# Patient Record
Sex: FEMALE | Race: BLACK OR AFRICAN AMERICAN | Employment: UNEMPLOYED
[De-identification: names, ages, dates, MRNs, and addresses within clinical notes are randomized per-mention and may not be internally consistent; named-entity substitution may affect disease eponyms.]

---

## 2017-02-10 ENCOUNTER — OFFICE VISIT (OUTPATIENT)
Dept: FAMILY MEDICINE CLINIC | Facility: CLINIC | Age: 47
End: 2017-02-10

## 2017-02-10 ENCOUNTER — HOSPITAL ENCOUNTER (OUTPATIENT)
Age: 47
Discharge: HOME | End: 2017-02-10

## 2017-02-10 ENCOUNTER — HOSPITAL ENCOUNTER (OUTPATIENT)
Dept: GENERAL RADIOLOGY | Age: 47
Discharge: HOME | End: 2017-02-10

## 2017-02-10 VITALS
TEMPERATURE: 96.8 F | HEIGHT: 59 IN | HEART RATE: 65 BPM | DIASTOLIC BLOOD PRESSURE: 68 MMHG | WEIGHT: 106.4 LBS | BODY MASS INDEX: 21.45 KG/M2 | SYSTOLIC BLOOD PRESSURE: 102 MMHG

## 2017-02-10 DIAGNOSIS — M25.551 PAIN OF RIGHT HIP JOINT: Primary | ICD-10-CM

## 2017-02-10 DIAGNOSIS — I67.1 BRAIN ANEURYSM: ICD-10-CM

## 2017-02-10 DIAGNOSIS — M25.551 PAIN OF RIGHT HIP JOINT: ICD-10-CM

## 2017-02-10 PROCEDURE — 73502 X-RAY EXAM HIP UNI 2-3 VIEWS: CPT | Performed by: RADIOLOGY

## 2017-02-10 PROCEDURE — 99203 OFFICE O/P NEW LOW 30 MIN: CPT | Performed by: STUDENT IN AN ORGANIZED HEALTH CARE EDUCATION/TRAINING PROGRAM

## 2017-02-10 PROCEDURE — 99214 OFFICE O/P EST MOD 30 MIN: CPT | Performed by: FAMILY MEDICINE

## 2017-02-10 PROCEDURE — 73502 X-RAY EXAM HIP UNI 2-3 VIEWS: CPT

## 2017-02-10 RX ORDER — MELOXICAM 7.5 MG/1
7.5 TABLET ORAL DAILY
Qty: 30 TABLET | Refills: 3 | Status: SHIPPED | OUTPATIENT
Start: 2017-02-10 | End: 2017-10-19 | Stop reason: ALTCHOICE

## 2017-02-10 ASSESSMENT — ENCOUNTER SYMPTOMS
SHORTNESS OF BREATH: 0
COUGH: 0

## 2017-02-14 ENCOUNTER — HOSPITAL ENCOUNTER (OUTPATIENT)
Dept: PHYSICAL THERAPY | Age: 47
Setting detail: THERAPIES SERIES
Discharge: HOME | End: 2017-02-14

## 2017-02-14 PROCEDURE — 97162 PT EVAL MOD COMPLEX 30 MIN: CPT

## 2017-02-14 PROCEDURE — G0283 ELEC STIM OTHER THAN WOUND: HCPCS

## 2017-02-17 ENCOUNTER — HOSPITAL ENCOUNTER (OUTPATIENT)
Dept: PHYSICAL THERAPY | Age: 47
Setting detail: THERAPIES SERIES
Discharge: HOME | End: 2017-02-17

## 2017-02-17 PROCEDURE — 97110 THERAPEUTIC EXERCISES: CPT

## 2017-02-17 PROCEDURE — G0283 ELEC STIM OTHER THAN WOUND: HCPCS

## 2017-02-22 ENCOUNTER — HOSPITAL ENCOUNTER (OUTPATIENT)
Dept: PHYSICAL THERAPY | Age: 47
Setting detail: THERAPIES SERIES
Discharge: HOME | End: 2017-02-22

## 2017-02-22 PROCEDURE — G0283 ELEC STIM OTHER THAN WOUND: HCPCS

## 2017-02-22 PROCEDURE — 97110 THERAPEUTIC EXERCISES: CPT

## 2017-02-24 ENCOUNTER — HOSPITAL ENCOUNTER (OUTPATIENT)
Dept: PHYSICAL THERAPY | Age: 47
Setting detail: THERAPIES SERIES
Discharge: HOME | End: 2017-02-24

## 2017-02-24 PROCEDURE — 97110 THERAPEUTIC EXERCISES: CPT

## 2017-02-24 PROCEDURE — G0283 ELEC STIM OTHER THAN WOUND: HCPCS

## 2017-03-01 ENCOUNTER — HOSPITAL ENCOUNTER (OUTPATIENT)
Dept: PHYSICAL THERAPY | Age: 47
Setting detail: THERAPIES SERIES
Discharge: HOME | End: 2017-03-01

## 2017-03-01 PROCEDURE — G0283 ELEC STIM OTHER THAN WOUND: HCPCS

## 2017-03-01 PROCEDURE — 97110 THERAPEUTIC EXERCISES: CPT

## 2017-03-03 ENCOUNTER — HOSPITAL ENCOUNTER (OUTPATIENT)
Dept: PHYSICAL THERAPY | Age: 47
Setting detail: THERAPIES SERIES
Discharge: HOME | End: 2017-03-03

## 2017-03-03 PROCEDURE — G0283 ELEC STIM OTHER THAN WOUND: HCPCS

## 2017-03-03 PROCEDURE — 97110 THERAPEUTIC EXERCISES: CPT

## 2017-03-08 ENCOUNTER — HOSPITAL ENCOUNTER (OUTPATIENT)
Dept: PHYSICAL THERAPY | Age: 47
Setting detail: THERAPIES SERIES
Discharge: HOME | End: 2017-03-08

## 2017-03-10 ENCOUNTER — HOSPITAL ENCOUNTER (OUTPATIENT)
Dept: PHYSICAL THERAPY | Age: 47
Setting detail: THERAPIES SERIES
Discharge: HOME | End: 2017-03-10

## 2017-03-10 PROCEDURE — G0283 ELEC STIM OTHER THAN WOUND: HCPCS

## 2017-03-10 PROCEDURE — 97110 THERAPEUTIC EXERCISES: CPT

## 2017-03-15 ENCOUNTER — HOSPITAL ENCOUNTER (OUTPATIENT)
Dept: PHYSICAL THERAPY | Age: 47
Setting detail: THERAPIES SERIES
Discharge: HOME | End: 2017-03-15

## 2017-03-15 PROCEDURE — G0283 ELEC STIM OTHER THAN WOUND: HCPCS

## 2017-03-15 PROCEDURE — 97110 THERAPEUTIC EXERCISES: CPT

## 2017-03-17 ENCOUNTER — APPOINTMENT (OUTPATIENT)
Dept: PHYSICAL THERAPY | Age: 47
End: 2017-03-17

## 2017-03-17 ENCOUNTER — HOSPITAL ENCOUNTER (OUTPATIENT)
Dept: PHYSICAL THERAPY | Age: 47
Setting detail: THERAPIES SERIES
Discharge: HOME | End: 2017-03-17

## 2017-03-17 PROCEDURE — 97110 THERAPEUTIC EXERCISES: CPT

## 2017-03-22 ENCOUNTER — HOSPITAL ENCOUNTER (OUTPATIENT)
Dept: PHYSICAL THERAPY | Age: 47
Setting detail: THERAPIES SERIES
Discharge: HOME | End: 2017-03-22

## 2017-03-24 ENCOUNTER — HOSPITAL ENCOUNTER (OUTPATIENT)
Dept: PHYSICAL THERAPY | Age: 47
Setting detail: THERAPIES SERIES
Discharge: HOME | End: 2017-03-24

## 2017-03-24 ENCOUNTER — OFFICE VISIT (OUTPATIENT)
Dept: FAMILY MEDICINE CLINIC | Age: 47
End: 2017-03-24

## 2017-03-24 ENCOUNTER — HOSPITAL ENCOUNTER (OUTPATIENT)
Age: 47
Discharge: HOME | End: 2017-03-24

## 2017-03-24 VITALS
HEART RATE: 78 BPM | BODY MASS INDEX: 22.01 KG/M2 | HEIGHT: 59 IN | DIASTOLIC BLOOD PRESSURE: 58 MMHG | WEIGHT: 109.2 LBS | TEMPERATURE: 97.8 F | SYSTOLIC BLOOD PRESSURE: 90 MMHG

## 2017-03-24 DIAGNOSIS — R42 DIZZINESS: ICD-10-CM

## 2017-03-24 DIAGNOSIS — D25.9 UTERINE LEIOMYOMA, UNSPECIFIED LOCATION: Primary | ICD-10-CM

## 2017-03-24 PROCEDURE — 99213 OFFICE O/P EST LOW 20 MIN: CPT | Performed by: STUDENT IN AN ORGANIZED HEALTH CARE EDUCATION/TRAINING PROGRAM

## 2017-03-24 PROCEDURE — 99213 OFFICE O/P EST LOW 20 MIN: CPT | Performed by: FAMILY MEDICINE

## 2017-03-24 PROCEDURE — 97110 THERAPEUTIC EXERCISES: CPT

## 2017-03-24 ASSESSMENT — ENCOUNTER SYMPTOMS
ABDOMINAL PAIN: 0
COUGH: 0
SHORTNESS OF BREATH: 0

## 2017-03-27 ENCOUNTER — HOSPITAL ENCOUNTER (OUTPATIENT)
Dept: PHYSICAL THERAPY | Age: 47
Setting detail: THERAPIES SERIES
Discharge: HOME | End: 2017-03-27

## 2017-03-27 PROCEDURE — 97110 THERAPEUTIC EXERCISES: CPT

## 2017-03-27 PROCEDURE — G0283 ELEC STIM OTHER THAN WOUND: HCPCS

## 2017-03-29 ENCOUNTER — HOSPITAL ENCOUNTER (OUTPATIENT)
Dept: PHYSICAL THERAPY | Age: 47
Setting detail: THERAPIES SERIES
Discharge: HOME | End: 2017-03-29

## 2017-04-05 ENCOUNTER — APPOINTMENT (OUTPATIENT)
Dept: PHYSICAL THERAPY | Age: 47
End: 2017-04-05

## 2017-04-05 ENCOUNTER — HOSPITAL ENCOUNTER (OUTPATIENT)
Dept: PHYSICAL THERAPY | Age: 47
Setting detail: THERAPIES SERIES
Discharge: HOME | End: 2017-04-05

## 2017-04-11 ENCOUNTER — HOSPITAL ENCOUNTER (OUTPATIENT)
Dept: ULTRASOUND IMAGING | Age: 47
Discharge: HOME | End: 2017-04-11

## 2017-04-11 DIAGNOSIS — D25.9 UTERINE LEIOMYOMA, UNSPECIFIED LOCATION: ICD-10-CM

## 2017-04-11 PROCEDURE — 76856 US EXAM PELVIC COMPLETE: CPT

## 2017-04-12 ENCOUNTER — TELEPHONE (OUTPATIENT)
Dept: INTERNAL MEDICINE CLINIC | Age: 47
End: 2017-04-12

## 2017-04-20 ENCOUNTER — OFFICE VISIT (OUTPATIENT)
Dept: FAMILY MEDICINE CLINIC | Age: 47
End: 2017-04-20

## 2017-04-20 ENCOUNTER — HOSPITAL ENCOUNTER (OUTPATIENT)
Age: 47
Setting detail: SPECIMEN
Discharge: HOME | End: 2017-04-20

## 2017-04-20 VITALS
HEART RATE: 98 BPM | WEIGHT: 109.8 LBS | SYSTOLIC BLOOD PRESSURE: 104 MMHG | BODY MASS INDEX: 22.14 KG/M2 | DIASTOLIC BLOOD PRESSURE: 56 MMHG | TEMPERATURE: 99 F

## 2017-04-20 DIAGNOSIS — Z01.419 WELL WOMAN EXAM: Primary | ICD-10-CM

## 2017-04-20 DIAGNOSIS — D25.1 INTRAMURAL LEIOMYOMA OF UTERUS: ICD-10-CM

## 2017-04-20 DIAGNOSIS — Z13.220 SCREENING FOR HYPERLIPIDEMIA: ICD-10-CM

## 2017-04-20 DIAGNOSIS — Z11.4 SCREENING FOR HIV WITHOUT PRESENCE OF RISK FACTORS: ICD-10-CM

## 2017-04-20 DIAGNOSIS — Z01.419 WELL WOMAN EXAM: ICD-10-CM

## 2017-04-20 LAB
DIRECT EXAM: ABNORMAL
Lab: ABNORMAL
SPECIMEN DESCRIPTION: ABNORMAL
STATUS: ABNORMAL

## 2017-04-20 PROCEDURE — 99214 OFFICE O/P EST MOD 30 MIN: CPT | Performed by: STUDENT IN AN ORGANIZED HEALTH CARE EDUCATION/TRAINING PROGRAM

## 2017-04-20 PROCEDURE — 99213 OFFICE O/P EST LOW 20 MIN: CPT | Performed by: STUDENT IN AN ORGANIZED HEALTH CARE EDUCATION/TRAINING PROGRAM

## 2017-04-20 RX ORDER — DESVENLAFAXINE SUCCINATE 50 MG/1
TABLET, EXTENDED RELEASE ORAL
Refills: 0 | COMMUNITY
Start: 2017-02-07 | End: 2019-03-20 | Stop reason: ALTCHOICE

## 2017-04-20 RX ORDER — CELECOXIB 200 MG/1
CAPSULE ORAL
Refills: 0 | COMMUNITY
Start: 2017-04-04 | End: 2022-02-18

## 2017-04-20 RX ORDER — ARIPIPRAZOLE 5 MG/1
TABLET ORAL
Refills: 0 | COMMUNITY
Start: 2017-03-27 | End: 2017-04-20

## 2017-04-20 RX ORDER — ESCITALOPRAM OXALATE 10 MG/1
TABLET ORAL
Refills: 0 | COMMUNITY
Start: 2017-03-27 | End: 2017-04-20

## 2017-04-20 RX ORDER — ARIPIPRAZOLE 2 MG/1
TABLET ORAL
Refills: 0 | COMMUNITY
Start: 2017-03-27 | End: 2019-03-20 | Stop reason: ALTCHOICE

## 2017-04-20 RX ORDER — ESCITALOPRAM OXALATE 20 MG/1
TABLET ORAL
Refills: 0 | COMMUNITY
Start: 2017-02-07 | End: 2018-01-30

## 2017-04-20 RX ORDER — CLONAZEPAM 0.5 MG/1
TABLET ORAL
Refills: 0 | COMMUNITY
Start: 2017-03-27 | End: 2019-03-20 | Stop reason: ALTCHOICE

## 2017-04-21 LAB
C TRACH DNA GENITAL QL NAA+PROBE: NEGATIVE
N. GONORRHOEAE DNA: NEGATIVE

## 2017-04-25 ENCOUNTER — HOSPITAL ENCOUNTER (OUTPATIENT)
Dept: PHYSICAL THERAPY | Age: 47
Setting detail: THERAPIES SERIES
Discharge: HOME | End: 2017-04-25

## 2017-04-25 LAB — CYTOLOGY REPORT: NORMAL

## 2017-05-04 ENCOUNTER — TELEPHONE (OUTPATIENT)
Dept: INTERNAL MEDICINE CLINIC | Age: 47
End: 2017-05-04

## 2017-05-04 DIAGNOSIS — N76.0 BACTERIAL VAGINOSIS: Primary | ICD-10-CM

## 2017-05-04 DIAGNOSIS — B96.89 BACTERIAL VAGINOSIS: Primary | ICD-10-CM

## 2017-05-04 RX ORDER — METRONIDAZOLE 500 MG/1
500 TABLET ORAL 2 TIMES DAILY
Qty: 14 TABLET | Refills: 0 | Status: SHIPPED | OUTPATIENT
Start: 2017-05-04 | End: 2017-05-11

## 2017-06-26 ENCOUNTER — TELEPHONE (OUTPATIENT)
Dept: FAMILY MEDICINE CLINIC | Age: 47
End: 2017-06-26

## 2017-06-26 ENCOUNTER — OFFICE VISIT (OUTPATIENT)
Dept: FAMILY MEDICINE CLINIC | Age: 47
End: 2017-06-26

## 2017-06-26 VITALS
BODY MASS INDEX: 21.85 KG/M2 | HEIGHT: 59 IN | HEART RATE: 69 BPM | DIASTOLIC BLOOD PRESSURE: 66 MMHG | TEMPERATURE: 97.9 F | SYSTOLIC BLOOD PRESSURE: 109 MMHG | WEIGHT: 108.4 LBS

## 2017-06-26 DIAGNOSIS — J00 ACUTE RHINITIS: Primary | ICD-10-CM

## 2017-06-26 DIAGNOSIS — R09.81 NOSE CONGESTION: ICD-10-CM

## 2017-06-26 PROCEDURE — 99213 OFFICE O/P EST LOW 20 MIN: CPT

## 2017-06-26 PROCEDURE — 99213 OFFICE O/P EST LOW 20 MIN: CPT | Performed by: FAMILY MEDICINE

## 2017-06-26 RX ORDER — FLUTICASONE PROPIONATE 50 MCG
1 SPRAY, SUSPENSION (ML) NASAL DAILY
Qty: 1 BOTTLE | Refills: 3 | Status: SHIPPED | OUTPATIENT
Start: 2017-06-26 | End: 2018-01-30 | Stop reason: ALTCHOICE

## 2017-06-26 RX ORDER — IBUPROFEN 400 MG/1
400 TABLET ORAL EVERY 6 HOURS PRN
Qty: 120 TABLET | Refills: 3 | Status: SHIPPED | OUTPATIENT
Start: 2017-06-26 | End: 2017-10-19 | Stop reason: ALTCHOICE

## 2017-06-26 ASSESSMENT — ENCOUNTER SYMPTOMS
SORE THROAT: 0
RHINORRHEA: 1
APNEA: 0
CHEST TIGHTNESS: 0
COUGH: 0
TROUBLE SWALLOWING: 0
FACIAL SWELLING: 0
CHOKING: 0

## 2017-06-26 ASSESSMENT — PATIENT HEALTH QUESTIONNAIRE - PHQ9
10. IF YOU CHECKED OFF ANY PROBLEMS, HOW DIFFICULT HAVE THESE PROBLEMS MADE IT FOR YOU TO DO YOUR WORK, TAKE CARE OF THINGS AT HOME, OR GET ALONG WITH OTHER PEOPLE: 2
7. TROUBLE CONCENTRATING ON THINGS, SUCH AS READING THE NEWSPAPER OR WATCHING TELEVISION: 1
4. FEELING TIRED OR HAVING LITTLE ENERGY: 3
1. LITTLE INTEREST OR PLEASURE IN DOING THINGS: 3
5. POOR APPETITE OR OVEREATING: 3
SUM OF ALL RESPONSES TO PHQ QUESTIONS 1-9: 19
SUM OF ALL RESPONSES TO PHQ9 QUESTIONS 1 & 2: 6
8. MOVING OR SPEAKING SO SLOWLY THAT OTHER PEOPLE COULD HAVE NOTICED. OR THE OPPOSITE, BEING SO FIGETY OR RESTLESS THAT YOU HAVE BEEN MOVING AROUND A LOT MORE THAN USUAL: 1
3. TROUBLE FALLING OR STAYING ASLEEP: 1
9. THOUGHTS THAT YOU WOULD BE BETTER OFF DEAD, OR OF HURTING YOURSELF: 1
2. FEELING DOWN, DEPRESSED OR HOPELESS: 3
6. FEELING BAD ABOUT YOURSELF - OR THAT YOU ARE A FAILURE OR HAVE LET YOURSELF OR YOUR FAMILY DOWN: 3

## 2017-06-29 ENCOUNTER — TELEPHONE (OUTPATIENT)
Dept: FAMILY MEDICINE CLINIC | Age: 47
End: 2017-06-29

## 2017-06-29 DIAGNOSIS — J20.9 ACUTE BRONCHITIS, UNSPECIFIED ORGANISM: Primary | ICD-10-CM

## 2017-06-29 RX ORDER — AZITHROMYCIN 250 MG/1
TABLET, FILM COATED ORAL
Qty: 1 PACKET | Refills: 0 | Status: SHIPPED | OUTPATIENT
Start: 2017-06-29 | End: 2017-07-09

## 2017-07-26 ENCOUNTER — OFFICE VISIT (OUTPATIENT)
Dept: OBGYN | Age: 47
End: 2017-07-26

## 2017-07-26 VITALS
WEIGHT: 108 LBS | HEART RATE: 73 BPM | BODY MASS INDEX: 21.77 KG/M2 | SYSTOLIC BLOOD PRESSURE: 120 MMHG | HEIGHT: 59 IN | DIASTOLIC BLOOD PRESSURE: 74 MMHG

## 2017-07-26 DIAGNOSIS — D25.1 FIBROIDS, INTRAMURAL: Primary | ICD-10-CM

## 2017-07-26 PROCEDURE — 99212 OFFICE O/P EST SF 10 MIN: CPT

## 2017-07-26 PROCEDURE — 99212 OFFICE O/P EST SF 10 MIN: CPT | Performed by: OBSTETRICS & GYNECOLOGY

## 2017-08-11 ENCOUNTER — HOSPITAL ENCOUNTER (OUTPATIENT)
Dept: INTERVENTIONAL RADIOLOGY/VASCULAR | Age: 47
Discharge: HOME | End: 2017-08-11
Payer: COMMERCIAL

## 2017-08-11 ENCOUNTER — HOSPITAL ENCOUNTER (OUTPATIENT)
Dept: MRI IMAGING | Age: 47
Discharge: HOME | End: 2017-08-11
Payer: COMMERCIAL

## 2017-08-11 VITALS — WEIGHT: 105 LBS | BODY MASS INDEX: 21.17 KG/M2 | HEIGHT: 59 IN

## 2017-08-11 DIAGNOSIS — T14.8XXA SPRAIN: ICD-10-CM

## 2017-08-11 DIAGNOSIS — S43.421A SPRAIN OF RIGHT ROTATOR CUFF CAPSULE, INITIAL ENCOUNTER: ICD-10-CM

## 2017-08-11 PROCEDURE — 2500000003 HC RX 250 WO HCPCS: Performed by: RADIOLOGY

## 2017-08-11 PROCEDURE — 23350 INJECTION FOR SHOULDER X-RAY: CPT | Performed by: RADIOLOGY

## 2017-08-11 PROCEDURE — 6360000004 HC RX CONTRAST MEDICATION: Performed by: ORTHOPAEDIC SURGERY

## 2017-08-11 PROCEDURE — 73222 MRI JOINT UPR EXTREM W/DYE: CPT

## 2017-08-11 PROCEDURE — A9579 GAD-BASE MR CONTRAST NOS,1ML: HCPCS | Performed by: ORTHOPAEDIC SURGERY

## 2017-08-11 PROCEDURE — 73040 CONTRAST X-RAY OF SHOULDER: CPT | Performed by: RADIOLOGY

## 2017-08-11 RX ORDER — LIDOCAINE HYDROCHLORIDE 10 MG/ML
INJECTION, SOLUTION INFILTRATION; PERINEURAL
Status: COMPLETED | OUTPATIENT
Start: 2017-08-11 | End: 2017-08-11

## 2017-08-11 RX ADMIN — GADOPENTETATE DIMEGLUMINE 1 ML: 469.01 INJECTION INTRAVENOUS at 10:21

## 2017-08-11 RX ADMIN — IOTHALAMATE MEGLUMINE 50 ML: 600 INJECTION INTRAVASCULAR at 09:19

## 2017-08-11 RX ADMIN — LIDOCAINE HYDROCHLORIDE 2 ML: 10 INJECTION, SOLUTION INFILTRATION; PERINEURAL at 09:08

## 2017-08-11 ASSESSMENT — PAIN - FUNCTIONAL ASSESSMENT: PAIN_FUNCTIONAL_ASSESSMENT: 0-10

## 2017-08-11 ASSESSMENT — PAIN DESCRIPTION - LOCATION: LOCATION: SHOULDER

## 2017-08-11 ASSESSMENT — PAIN DESCRIPTION - ORIENTATION: ORIENTATION: RIGHT

## 2017-08-11 ASSESSMENT — PAIN DESCRIPTION - PAIN TYPE: TYPE: CHRONIC PAIN

## 2017-08-11 ASSESSMENT — PAIN SCALES - GENERAL: PAINLEVEL_OUTOF10: 9

## 2017-08-11 ASSESSMENT — PAIN DESCRIPTION - DESCRIPTORS: DESCRIPTORS: CONSTANT;ACHING;SHARP

## 2017-09-28 ENCOUNTER — OFFICE VISIT (OUTPATIENT)
Dept: FAMILY MEDICINE CLINIC | Age: 47
End: 2017-09-28
Payer: COMMERCIAL

## 2017-09-28 VITALS
WEIGHT: 111 LBS | HEART RATE: 86 BPM | SYSTOLIC BLOOD PRESSURE: 116 MMHG | HEIGHT: 59 IN | BODY MASS INDEX: 22.38 KG/M2 | TEMPERATURE: 98.6 F | DIASTOLIC BLOOD PRESSURE: 71 MMHG

## 2017-09-28 DIAGNOSIS — J20.9 ACUTE BRONCHITIS, UNSPECIFIED ORGANISM: Primary | ICD-10-CM

## 2017-09-28 PROCEDURE — 99213 OFFICE O/P EST LOW 20 MIN: CPT | Performed by: FAMILY MEDICINE

## 2017-09-28 PROCEDURE — 99213 OFFICE O/P EST LOW 20 MIN: CPT

## 2017-09-28 RX ORDER — GUAIFENESIN/DEXTROMETHORPHAN 100-10MG/5
5 SYRUP ORAL 3 TIMES DAILY PRN
Qty: 120 ML | Refills: 0 | Status: SHIPPED | OUTPATIENT
Start: 2017-09-28 | End: 2017-10-08

## 2017-09-28 RX ORDER — AZITHROMYCIN 250 MG/1
TABLET, FILM COATED ORAL
Qty: 1 PACKET | Refills: 0 | Status: SHIPPED | OUTPATIENT
Start: 2017-09-28 | End: 2017-10-08

## 2017-09-28 ASSESSMENT — ENCOUNTER SYMPTOMS
ABDOMINAL PAIN: 0
WHEEZING: 0
VOMITING: 0
COLOR CHANGE: 0
NAUSEA: 0
CONSTIPATION: 0
SINUS PRESSURE: 0
COUGH: 1
DIARRHEA: 0
SORE THROAT: 1

## 2017-09-28 NOTE — MR AVS SNAPSHOT
After Visit Summary             Manav Wallace   2017 1:45 PM   Office Visit    Description:  Female : 1970   Provider:  Delmis Garcia MD   Department:  [facility]              Your Follow-Up and Future Appointments         Below is a list of your follow-up and future appointments. This may not be a complete list as you may have made appointments directly with providers that we are not aware of or your providers may have made some for you. Please call your providers to confirm appointments. It is important to keep your appointments. Please bring your current insurance card, photo ID, co-pay, and all medication bottles to your appointment. If self-pay, payment is expected at the time of service. Your To-Do List     Future Appointments Provider Department Dept Phone    10/19/2017 11:00 AM Nuvia Cardona MD [facility] [phone]    Please arrive 15 minutes prior to appointment time, bring insurance card and photo ID. Follow-Up    Return in about 2 weeks (around 10/12/2017) for follow up on acute bronchitis . Information from Your Visit        Department     Name Address Phone Fax    [facility] [address] [place] [address] [phone]      You Were Seen for:         Comments    Acute bronchitis, unspecified organism   [[id number]]         Vital Signs     Blood Pressure Pulse Temperature Height Weight Last Menstrual Period    116/71 (Site: Left Arm, Position: Sitting, Cuff Size: Medium Adult) 86 98.6 °F (37 °C) (Temporal) 4' 11.06\" (1.5 m) 111 lb (50.3 kg) 2017 (Approximate)    Body Mass Index Smoking Status                22.38 kg/m2 Current Every Day Smoker          Instructions    Thank you for letting us take care of you today. We hope all your questions were addressed. If a question was overlooked or something else comes to mind after you return home, please contact a member of your Care Team listed below. take 1 tab (250 mg) on days 2 through 5.    guaiFENesin-dextromethorphan (ROBITUSSIN DM) 100-10 MG/5ML syrup Take 5 mLs by mouth 3 times daily as needed for Cough    fluticasone (FLONASE) 50 MCG/ACT nasal spray 1 spray by Nasal route daily    ibuprofen (ADVIL;MOTRIN) 400 MG tablet Take 1 tablet by mouth every 6 hours as needed for Pain    LYRICA 75 MG capsule Take 75 mg by mouth daily     ARIPiprazole (ABILIFY) 2 MG tablet take 1 tablet by mouth every morning    PRISTIQ 50 MG TB24 extended release tablet take 1 tablet by mouth at bedtime    celecoxib (CELEBREX) 200 MG capsule take 1 capsule by mouth once daily    clonazePAM (KLONOPIN) 0.5 MG tablet take 1 tablet every morning if needed    meloxicam (MOBIC) 7.5 MG tablet Take 1 tablet by mouth daily    tiZANidine (ZANAFLEX) 4 MG tablet Take 1 tablet by mouth every 8 hours as needed (pain)    zolpidem (AMBIEN CR) 12.5 MG CR tablet Take 12.5 mg by mouth nightly as needed for Sleep. oxyCODONE-acetaminophen (PERCOCET) 5-325 MG per tablet Take 1 tablet by mouth every 4 hours as needed for Pain.    escitalopram (LEXAPRO) 20 MG tablet take 1 tablet by mouth every morning      Allergies           No Known Allergies         Additional Information        Basic Information     Date Of Birth Sex Race Ethnicity Preferred Language    1970 Female Black Non-/Non  English      Problem List as of 9/28/2017                 Uterine leiomyoma    Dizziness      Immunizations as of 9/28/2017     Name Date    Tdap (Boostrix, Adacel) 3/17/2015      Preventive Care        Date Due    HIV screening is recommended for all people regardless of risk factors  aged 15-65 years at least once (lifetime) who have never been HIV tested.  8/4/1983    Cholesterol Screening 8/4/2008    Yearly Flu Vaccine (1) 9/1/2017    Pneumococcal Vaccine - Pneumovax for adults aged 19-64 years with: chronic heart disease, chronic lung disease, diabetes mellitus,

## 2017-09-28 NOTE — PROGRESS NOTES
Subjective:      Rosmery Shaw is a 50 y.o. female with Hx of  has a past medical history of Anxiety; Chronic right shoulder pain; and Depression. Presented to the office today for:     HPI    Three weeks history of productive cough with yellowish-green thick phlegm. Also reports some fever, sore throat, rhinorrhea and mild SOB. Hx of smoking about 0.5 PPD. trying to cut down. Denies headache, eye pain/discharge, ear pain/discharge, postnasal drip, wheezing or changes in bowel habits. Review of Systems   Constitutional: Positive for fever. Negative for chills. HENT: Positive for congestion and sore throat. Negative for ear discharge, ear pain, postnasal drip and sinus pressure. Eyes: Negative for visual disturbance. Respiratory: Positive for cough. Negative for wheezing. Cardiovascular: Negative for chest pain, palpitations and leg swelling. Gastrointestinal: Negative for abdominal pain, constipation, diarrhea, nausea and vomiting. Genitourinary: Negative for difficulty urinating. Musculoskeletal: Negative for arthralgias and myalgias. Skin: Negative for color change, pallor, rash and wound. Neurological: Negative for headaches. Family History   Problem Relation Age of Onset    Asthma Mother     Cancer Father        Social History     Social History    Marital status: Legally      Spouse name: N/A    Number of children: N/A    Years of education: N/A     Occupational History    Not on file.      Social History Main Topics    Smoking status: Current Every Day Smoker     Packs/day: 0.50     Years: 12.00     Types: Cigarettes    Smokeless tobacco: Never Used    Alcohol use No    Drug use: No    Sexual activity: Yes     Partners: Male     Other Topics Concern    Not on file     Social History Narrative       Objective:      /71 (Site: Left Arm, Position: Sitting, Cuff Size: Medium Adult)  Pulse 86  Temp 98.6 °F (37 °C) (Temporal)   Ht 4' 11.06\" (1.5 m)  Wt 111 lb (50.3 kg)  LMP 08/26/2017 (Approximate)  BMI 22.38 kg/m2   BP Readings from Last 3 Encounters:   09/28/17 116/71   07/26/17 120/74   06/26/17 109/66       Physical Exam    General Appearance:  A&Ox3, NAD  HEENT: Head is NC/AT. Eyes sclera anicteric, no discharge. Ears intact TM B/L, NL external ear, no discharge. Mild nasal mucosal swelling. Tonsillar enlargement mainly on the right side with no exudate   Neck: Supple, no thyromegaly  Lungs:  Clear to auscultation B/L. Cardiovascular:  NL S1/S2, RRR, no m,g,r. Abdomen: Soft, nontender, nondistended, no masses or organomegaly  Extremities: No cyanosis, clubbing     Assessment:     1. Acute bronchitis, unspecified organism      Plan:      1. Acute bronchitis, unspecified organism  - azithromycin (ZITHROMAX) 250 MG tablet; Take 2 tabs (500 mg) on Day 1, and take 1 tab (250 mg) on days 2 through 5. Dispense: 1 packet; Refill: 0  - guaiFENesin-dextromethorphan (ROBITUSSIN DM) 100-10 MG/5ML syrup; Take 5 mLs by mouth 3 times daily as needed for Cough  Dispense: 120 mL; Refill: 0  - continue flonase, and ibuprofen       Meseret received counseling on the following healthy behaviors: nutrition, medication adherence and tobacco cessation  Reviewed prior labs and health maintenance. Continue current medications, diet and exercise. Discussed use, benefit, and side effects of prescribed medications. Barriers to medication compliance addressed. Patient given educational materials - see patient instructions. All patient questions answered. Patient voiced understanding.      Requested Prescriptions     Signed Prescriptions Disp Refills    azithromycin (ZITHROMAX) 250 MG tablet 1 packet 0     Sig: Take 2 tabs (500 mg) on Day 1, and take 1 tab (250 mg) on days 2 through 5.    guaiFENesin-dextromethorphan (ROBITUSSIN DM) 100-10 MG/5ML syrup 120 mL 0     Sig: Take 5 mLs by mouth 3 times daily as needed for Cough       There are no discontinued

## 2017-09-28 NOTE — PROGRESS NOTES
I have reviewed and discussed lopez elements of [address] [place] [address] with the resident including plan of care and follow up and agree with the care romelia plan.

## 2017-09-28 NOTE — PROGRESS NOTES
Visit Information    Have you changed or started any medications since your last visit including any over-the-counter medicines, vitamins, or herbal medicines? no   Have you stopped taking any of your medications? Is so, why? -  no  Are you having any side effects from any of your medications? - no    Have you seen any other physician or provider since your last visit?  no   Have you had any other diagnostic tests since your last visit? yes - MRI   Have you been seen in the emergency room and/or had an admission in a hospital since we last saw you?  no   Have you had your routine dental cleaning in the past 6 months?  no     Do you have an active MyChart account? If no, what is the barrier?   Yes    Patient Care Team:  Kathi Mendoza MD as PCP - General (Family Medicine)    Medical History Review  Past Medical, Family, and Social History reviewed and does not contribute to the patient presenting condition    Health Maintenance   Topic Date Due    HIV screen  08/04/1983    Lipid screen  08/04/2008    Flu vaccine (1) 09/01/2017    Pneumococcal med risk (1 of 1 - PPSV23) 03/24/2023 (Originally 8/4/1987)    Cervical cancer screen  04/20/2020    DTaP/Tdap/Td vaccine (2 - Td) 03/17/2025

## 2017-09-28 NOTE — PATIENT INSTRUCTIONS
Thank you for letting us take care of you today. We hope all your questions were addressed. If a question was overlooked or something else comes to mind after you return home, please contact a member of your Care Team listed below. Please make sure you have a routine office visit set up to follow-up on [address]. Your Care Team at [place] [address] is Team #1  Jose Vieyra MD (Faculty)  Sincere Castano MD (Faculty  Larsherron Best MD (Resident)  Keyon Joseph MD (Resident)  Tamiko Martinez MD (Resident)  Ernestine Cyr MD (Resident)  Thania Olsen MD (Resident)  Philly Childers [facility]  Michael Montiel DARRYL CLAYTON, [facility], ([address])  IVY Heck, ([address] [facility])  Kat Duncan, Ph.D., ([address] [facility])  Vik Alcala, [facility] (Clinical Pharmacist)     Office phone number: [phone]    If you need to get in right away due to illness, please be advised we have \"Same Day\" appointments available Monday-Friday. Please call us at [phone] option #1 to schedule your \"Same Day\" appointment.

## 2017-10-19 ENCOUNTER — OFFICE VISIT (OUTPATIENT)
Dept: FAMILY MEDICINE CLINIC | Age: 47
End: 2017-10-19

## 2017-10-19 VITALS
HEART RATE: 72 BPM | TEMPERATURE: 96.8 F | DIASTOLIC BLOOD PRESSURE: 75 MMHG | SYSTOLIC BLOOD PRESSURE: 119 MMHG | BODY MASS INDEX: 22.18 KG/M2 | HEIGHT: 59 IN | WEIGHT: 110 LBS

## 2017-10-19 DIAGNOSIS — Z13.220 SCREENING, LIPID: ICD-10-CM

## 2017-10-19 DIAGNOSIS — N89.8 VAGINAL DISCHARGE: Primary | ICD-10-CM

## 2017-10-19 PROCEDURE — 99213 OFFICE O/P EST LOW 20 MIN: CPT

## 2017-10-19 PROCEDURE — 96127 BRIEF EMOTIONAL/BEHAV ASSMT: CPT | Performed by: STUDENT IN AN ORGANIZED HEALTH CARE EDUCATION/TRAINING PROGRAM

## 2017-10-19 PROCEDURE — 99213 OFFICE O/P EST LOW 20 MIN: CPT | Performed by: STUDENT IN AN ORGANIZED HEALTH CARE EDUCATION/TRAINING PROGRAM

## 2017-10-19 RX ORDER — METRONIDAZOLE 500 MG/1
500 TABLET ORAL 3 TIMES DAILY
Qty: 7 TABLET | Refills: 0 | Status: SHIPPED | OUTPATIENT
Start: 2017-10-19 | End: 2017-10-29

## 2017-10-19 ASSESSMENT — PATIENT HEALTH QUESTIONNAIRE - PHQ9
4. FEELING TIRED OR HAVING LITTLE ENERGY: 2
9. THOUGHTS THAT YOU WOULD BE BETTER OFF DEAD, OR OF HURTING YOURSELF: 1
6. FEELING BAD ABOUT YOURSELF - OR THAT YOU ARE A FAILURE OR HAVE LET YOURSELF OR YOUR FAMILY DOWN: 2
7. TROUBLE CONCENTRATING ON THINGS, SUCH AS READING THE NEWSPAPER OR WATCHING TELEVISION: 3
SUM OF ALL RESPONSES TO PHQ QUESTIONS 1-9: 14
SUM OF ALL RESPONSES TO PHQ9 QUESTIONS 1 & 2: 2
3. TROUBLE FALLING OR STAYING ASLEEP: 3
SUM OF ALL RESPONSES TO PHQ QUESTIONS 1-9: 2
2. FEELING DOWN, DEPRESSED OR HOPELESS: 1
5. POOR APPETITE OR OVEREATING: 2
8. MOVING OR SPEAKING SO SLOWLY THAT OTHER PEOPLE COULD HAVE NOTICED. OR THE OPPOSITE, BEING SO FIGETY OR RESTLESS THAT YOU HAVE BEEN MOVING AROUND A LOT MORE THAN USUAL: 1
1. LITTLE INTEREST OR PLEASURE IN DOING THINGS: 1
10. IF YOU CHECKED OFF ANY PROBLEMS, HOW DIFFICULT HAVE THESE PROBLEMS MADE IT FOR YOU TO DO YOUR WORK, TAKE CARE OF THINGS AT HOME, OR GET ALONG WITH OTHER PEOPLE: 2

## 2017-10-19 ASSESSMENT — ENCOUNTER SYMPTOMS
SHORTNESS OF BREATH: 0
COUGH: 0
WHEEZING: 0
CHEST TIGHTNESS: 0
STRIDOR: 0

## 2017-10-19 NOTE — PATIENT INSTRUCTIONS
Thank you for letting us take care of you today. We hope all your questions were addressed. If a question was overlooked or something else comes to mind after you return home, please contact a member of your Care Team listed below. Please make sure you have a routine office visit set up to follow-up on [address]. Your Care Team at [place] [address] is Team #1  Abbi Sumner MD (Faculty)  Josesito Medina MD (Faculty  Bonniestacie Sanchez MD (Resident)  Alec Pack MD (Resident)  Zeynep Ramsey MD (Resident)  Александр Cintron MD (Resident)  Josué Miller MD (Resident)  Layla Laird [facility]  Ebony Mahoney DARRYL CLAYTON, [facility], ([address] [facility])  IVY Mathew, ([address] [facility])  Tyler Toribio, Ph.D., ([address] [facility])  Rojas Funk [facility] (Clinical Pharmacist)     Office phone number: [phone]    If you need to get in right away due to illness, please be advised we have \"Same Day\" appointments available Monday-Friday. Please call us at [phone] option #1 to schedule your \"Same Day\" appointment.

## 2017-10-19 NOTE — PROGRESS NOTES
Subjective:    Fer Barksdale is a 50 y.o. female with  has a past medical history of Anxiety; Chronic right shoulder pain; and Depression. Family History   Problem Relation Age of Onset    Asthma Mother    [place] Cancer Father        Presented to the office today for:  Chief Complaint   Patient presents with    Follow-up       HPI   Patient presented with concerns of vaginal discharge . Has had h/o BV treated in the past with flagyl. States symptoms have recurred. Denies fever. States bronchitis has resolved and is feeling better. Has had depression and follows with dr. Brian Mello. Not suicidal now but feels like harming others but vague about if she has a plan. Had seen him last week. States her long standing rt shoulder pain has got her to be depressed, as she is unable to stay employed as before and refuses to be on disability    Review of Systems   Constitutional: Negative for fatigue and fever. Respiratory: Negative for cough, chest tightness, shortness of breath, wheezing and stridor. Cardiovascular: Negative for chest pain and leg swelling. Genitourinary: Positive for difficulty urinating and vaginal discharge. Negative for dyspareunia, flank pain, frequency, genital sores, hematuria, menstrual problem, pelvic pain and vaginal pain. Neurological: Negative for light-headedness and headaches. Psychiatric/Behavioral: Positive for dysphoric mood and sleep disturbance. Negative for agitation, confusion, decreased concentration and suicidal ideas. Objective:    /75 (Site: Left Arm, Position: Sitting, Cuff Size: Small Adult)   Pulse 72   Temp 96.8 °F (36 °C) (Temporal)   Ht 4' 11.06\" (1.5 m)   Wt 110 lb (49.9 kg)   LMP 09/22/2017   BMI 22.18 kg/m²    BP Readings from Last 3 Encounters:   10/19/17 119/75   09/28/17 116/71   07/26/17 120/74     Physical Exam   Constitutional: She appears well-developed and well-nourished. Cardiovascular: Normal heart sounds.     No murmur heard.  Pulmonary/Chest: Effort normal and breath sounds normal. She has no wheezes. She exhibits no tenderness. Abdominal: Soft. Bowel sounds are normal. There is no tenderness. There is no guarding. Psychiatric:   Depressed low mood  phq 9 positive score of 14         Lab Results   Component Value Date    WBC 6.8 10/25/2015    HGB 11.8 (L) 10/25/2015    HCT 36.1 10/25/2015     10/25/2015    ALT 21 12/20/2013    AST 30 12/20/2013     10/25/2015    K 3.6 (L) 10/25/2015     10/25/2015    CREATININE 0.87 10/25/2015    BUN 16 10/25/2015    CO2 24 10/25/2015    INR 1.1 12/20/2013     Lab Results   Component Value Date    CALCIUM 9.0 10/25/2015     No results found for: LDLCALC, LDLCHOLESTEROL, LDLDIRECT    Assessment and Plan:    1. Vaginal discharge    - metroNIDAZOLE (FLAGYL) 500 MG tablet; Take 1 tablet by mouth 3 times daily for 10 days  Dispense: 7 tablet; Refill: 0    2. Screening, lipid   - Lipid Panel; Future      3. Depression  Advised to schedule a earlier follow up appointment with     Requested Prescriptions     Signed Prescriptions Disp Refills    metroNIDAZOLE (FLAGYL) 500 MG tablet 7 tablet 0     Sig: Take 1 tablet by mouth 3 times daily for 10 days       Medications Discontinued During This Encounter   Medication Reason    ibuprofen (ADVIL;MOTRIN) 400 MG tablet Therapy completed    meloxicam (MOBIC) 7.5 MG tablet Therapy completed       Kathryn Epperson received counseling on the following healthy behaviors: nutrition, exercise and medication adherence    Patient given educational materials : see patient instruction       Discussed use, benefit, and side effects of prescribed medications. Barriers to medication compliance addressed. All patient questions answered. Pt voiced understanding. Return in about 2 months (around 12/19/2017), or if symptoms worsen or fail to improve. Called Dr. Mir Render office, staff stated he was busy. . Left a message regarding patient updated

## 2017-10-24 ENCOUNTER — PATIENT MESSAGE (OUTPATIENT)
Dept: FAMILY MEDICINE CLINIC | Age: 47
End: 2017-10-24

## 2017-10-24 NOTE — TELEPHONE ENCOUNTER
From: Lyle Nichole  To: Karmen Beaver MD  Sent: 10/23/2017 5:42 PM EDT  Subject: RE:Thank you for your visit! Thank you for caring about your patients. I appreciate you listening to me and providing the best care.  ----- Message -----  From: Karmen Beaver MD  Sent: 10/20/2017 8:35 AM EDT  To: Lyle Nichole  Subject: Thank you for your visit!  10/20/2017   Karmen Beaver MD   [facility] [place] [address]  Dept: [phone]  Dept Fax: [phone]     Dear Kathryn Epperson,  Thank you for your recent visit. We at [facility] are committed to providing amazing patient care, and would like to make sure we were successful in providing you a great experience at our office. In the coming days, you may receive a survey via mail or email about your experience with my office. We ask that you please take a couple of minutes to complete and return it. We use our patients feedback to make improvements within the office that will make the experience even better for all of our patients. Thank you, and be well!   Karmen Beaver MD

## 2017-11-24 ENCOUNTER — OFFICE VISIT (OUTPATIENT)
Dept: FAMILY MEDICINE CLINIC | Age: 47
End: 2017-11-24

## 2017-11-24 VITALS
HEIGHT: 59 IN | DIASTOLIC BLOOD PRESSURE: 53 MMHG | HEART RATE: 67 BPM | SYSTOLIC BLOOD PRESSURE: 90 MMHG | BODY MASS INDEX: 22.18 KG/M2 | WEIGHT: 110 LBS | TEMPERATURE: 98.5 F

## 2017-11-24 DIAGNOSIS — M25.561 ACUTE PAIN OF RIGHT KNEE: Primary | ICD-10-CM

## 2017-11-24 PROCEDURE — 99213 OFFICE O/P EST LOW 20 MIN: CPT | Performed by: HOSPITALIST

## 2017-11-24 PROCEDURE — 99214 OFFICE O/P EST MOD 30 MIN: CPT

## 2017-11-24 ASSESSMENT — ENCOUNTER SYMPTOMS: BACK PAIN: 0

## 2017-11-24 NOTE — PROGRESS NOTES
kg/m²    Assessment:     1. Acute pain of right knee        Plan:    1. Acute pain of right knee  - Most likely sprain of right knee vs meniscal tear  - will try conservative management with PT, ice, elevation and celebrex for pain control  - ACE bandage done in office  - if no relief in 4-6 weeks, consider MRI for further assessment  - XR KNEE RIGHT (3 VIEWS); Future  [facility]      Continue to monitor for low BP. Today pt is asymptomatic. Tovaandria Rasheed received counseling on the following healthy behaviors: nutrition, exercise and medication adherence    Patient given educational materials : see patient instruction   Tova Wili received counseling on the following healthy behaviors: nutrition, exercise, medication adherence and tobacco cessation  Reviewed prior labs and health maintenance  Continue current medications, diet and exercise. Discussed use, benefit, and side effects of prescribed medications. Barriers to medication compliance addressed. Patient given educational materials - see patient instructions  Was a self-tracking handout given in paper form or via [facility] [employer]? Yes    Requested Prescriptions      No prescriptions requested or ordered in this encounter       All patient questions answered. Patient voiced understanding. Quality Measures    Body mass index is 22.18 kg/m². Normal. Weight control planned discussed Healthy diet and regular exercise. BP: (!) 90/53 Blood pressure is low. Treatment plan consists of No treatment change needed.     No results found for: LDLCALC, LDLCHOLESTEROL, LDLDIRECT (goal LDL reduction with dx if diabetes is 50% LDL reduction)      PHQ Scores 10/19/2017 10/19/2017 6/26/2017   PHQ2 Score - 2 6   PHQ9 Score 14 2 19     Interpretation of Total Score Depression Severity: 1-4 = Minimal depression, 5-9 = Mild depression, 10-14 = Moderate depression, 15-19 = Moderately severe depression, 20-27 = Severe depression    Discussed use, benefit, and side

## 2017-11-27 ENCOUNTER — HOSPITAL ENCOUNTER (OUTPATIENT)
Age: 47
Discharge: HOME | End: 2017-11-27

## 2017-11-27 ENCOUNTER — HOSPITAL ENCOUNTER (OUTPATIENT)
Dept: GENERAL RADIOLOGY | Age: 47
Discharge: HOME | End: 2017-11-27

## 2017-11-27 DIAGNOSIS — M25.561 ACUTE PAIN OF RIGHT KNEE: ICD-10-CM

## 2017-11-27 PROCEDURE — 73562 X-RAY EXAM OF KNEE 3: CPT

## 2018-01-30 ENCOUNTER — HOSPITAL ENCOUNTER (OUTPATIENT)
Dept: PREADMISSION TESTING | Age: 48
Discharge: HOME | End: 2018-02-03

## 2018-01-30 VITALS
DIASTOLIC BLOOD PRESSURE: 64 MMHG | WEIGHT: 114.42 LBS | OXYGEN SATURATION: 100 % | HEART RATE: 65 BPM | SYSTOLIC BLOOD PRESSURE: 110 MMHG | BODY MASS INDEX: 23.07 KG/M2 | HEIGHT: 59 IN | RESPIRATION RATE: 18 BRPM

## 2018-01-30 LAB
ABSOLUTE EOS #: 0 K/UL (ref 0–0.4)
ABSOLUTE IMMATURE GRANULOCYTE: ABNORMAL K/UL (ref 0–0.3)
ABSOLUTE LYMPH #: 1.6 K/UL (ref 1–4.8)
ABSOLUTE MONO #: 0.3 K/UL (ref 0.2–0.8)
ANION GAP SERPL CALCULATED.3IONS-SCNC: 10 MMOL/L (ref 9–17)
BASOPHILS # BLD: 1 % (ref 0–2)
BASOPHILS ABSOLUTE: 0 K/UL (ref 0–0.2)
BUN BLDV-MCNC: 14 MG/DL (ref 6–20)
CHLORIDE BLD-SCNC: 104 MMOL/L (ref 98–107)
CO2: 25 MMOL/L (ref 20–31)
CREAT SERPL-MCNC: 0.84 MG/DL (ref 0.5–0.9)
DIFFERENTIAL TYPE: ABNORMAL
EKG ATRIAL RATE: 63 BPM
EKG P AXIS: 24 DEGREES
EKG P-R INTERVAL: 146 MS
EKG Q-T INTERVAL: 380 MS
EKG QRS DURATION: 68 MS
EKG QTC CALCULATION (BAZETT): 388 MS
EKG R AXIS: 34 DEGREES
EKG T AXIS: 37 DEGREES
EKG VENTRICULAR RATE: 63 BPM
EOSINOPHILS RELATIVE PERCENT: 1 % (ref 1–4)
GFR AFRICAN AMERICAN: >60 ML/MIN
GFR NON-AFRICAN AMERICAN: >60 ML/MIN
GFR SERPL CREATININE-BSD FRML MDRD: NORMAL ML/MIN/{1.73_M2}
GFR SERPL CREATININE-BSD FRML MDRD: NORMAL ML/MIN/{1.73_M2}
HCT VFR BLD CALC: 35.8 % (ref 36–46)
HEMOGLOBIN: 11.8 G/DL (ref 12–16)
IMMATURE GRANULOCYTES: ABNORMAL %
LYMPHOCYTES # BLD: 30 % (ref 24–44)
MCH RBC QN AUTO: 28.9 PG (ref 26–34)
MCHC RBC AUTO-ENTMCNC: 33 G/DL (ref 31–37)
MCV RBC AUTO: 87.7 FL (ref 80–100)
MONOCYTES # BLD: 7 % (ref 1–7)
NRBC AUTOMATED: ABNORMAL PER 100 WBC
PDW BLD-RTO: 14.6 % (ref 11.5–14.5)
PLATELET # BLD: 242 K/UL (ref 130–400)
PLATELET ESTIMATE: ABNORMAL
PMV BLD AUTO: 7.6 FL (ref 6–12)
POTASSIUM SERPL-SCNC: 3.9 MMOL/L (ref 3.7–5.3)
RBC # BLD: 4.08 M/UL (ref 4–5.2)
RBC # BLD: ABNORMAL 10*6/UL
SEG NEUTROPHILS: 61 % (ref 36–66)
SEGMENTED NEUTROPHILS ABSOLUTE COUNT: 3.3 K/UL (ref 1.8–7.7)
SODIUM BLD-SCNC: 139 MMOL/L (ref 135–144)
WBC # BLD: 5.2 K/UL (ref 3.5–11)
WBC # BLD: ABNORMAL 10*3/UL

## 2018-01-30 PROCEDURE — 80051 ELECTROLYTE PANEL: CPT

## 2018-01-30 PROCEDURE — 84520 ASSAY OF UREA NITROGEN: CPT

## 2018-01-30 PROCEDURE — 36415 COLL VENOUS BLD VENIPUNCTURE: CPT

## 2018-01-30 PROCEDURE — 82565 ASSAY OF CREATININE: CPT

## 2018-01-30 PROCEDURE — 93005 ELECTROCARDIOGRAM TRACING: CPT

## 2018-01-30 PROCEDURE — 85025 COMPLETE CBC W/AUTO DIFF WBC: CPT

## 2018-01-30 ASSESSMENT — PAIN SCALES - GENERAL: PAINLEVEL_OUTOF10: 7

## 2018-01-30 ASSESSMENT — PAIN DESCRIPTION - PAIN TYPE: TYPE: CHRONIC PAIN

## 2018-01-30 ASSESSMENT — PAIN DESCRIPTION - LOCATION: LOCATION: SHOULDER

## 2018-01-30 ASSESSMENT — PAIN DESCRIPTION - ORIENTATION: ORIENTATION: RIGHT

## 2018-01-30 ASSESSMENT — PAIN DESCRIPTION - FREQUENCY: FREQUENCY: CONTINUOUS

## 2018-02-13 ENCOUNTER — HOSPITAL ENCOUNTER (OUTPATIENT)
Dept: MRI IMAGING | Age: 48
Discharge: HOME | End: 2018-02-15

## 2018-02-19 ENCOUNTER — ANESTHESIA EVENT (OUTPATIENT)
Dept: OPERATING ROOM | Age: 48
End: 2018-02-19
Payer: COMMERCIAL

## 2018-02-20 ENCOUNTER — ANESTHESIA (OUTPATIENT)
Dept: OPERATING ROOM | Age: 48
End: 2018-02-20
Payer: COMMERCIAL

## 2018-02-20 ENCOUNTER — HOSPITAL ENCOUNTER (OUTPATIENT)
Age: 48
Discharge: HOME | End: 2018-02-20
Attending: ORTHOPAEDIC SURGERY | Admitting: ORTHOPAEDIC SURGERY
Payer: COMMERCIAL

## 2018-02-20 VITALS — DIASTOLIC BLOOD PRESSURE: 59 MMHG | OXYGEN SATURATION: 100 % | TEMPERATURE: 96.3 F | SYSTOLIC BLOOD PRESSURE: 119 MMHG

## 2018-02-20 VITALS
HEART RATE: 60 BPM | HEIGHT: 59 IN | DIASTOLIC BLOOD PRESSURE: 45 MMHG | RESPIRATION RATE: 18 BRPM | OXYGEN SATURATION: 100 % | WEIGHT: 114 LBS | BODY MASS INDEX: 22.98 KG/M2 | TEMPERATURE: 97.6 F | SYSTOLIC BLOOD PRESSURE: 118 MMHG

## 2018-02-20 PROBLEM — M25.511 RIGHT SHOULDER PAIN: Status: ACTIVE | Noted: 2018-02-20

## 2018-02-20 LAB — HCG QUALITATIVE: NEGATIVE

## 2018-02-20 PROCEDURE — 6360000002 HC RX W HCPCS: Performed by: NURSE ANESTHETIST, CERTIFIED REGISTERED

## 2018-02-20 PROCEDURE — 84703 CHORIONIC GONADOTROPIN ASSAY: CPT

## 2018-02-20 PROCEDURE — 2500000003 HC RX 250 WO HCPCS: Performed by: ANESTHESIOLOGY

## 2018-02-20 PROCEDURE — 6360000002 HC RX W HCPCS: Performed by: ANESTHESIOLOGY

## 2018-02-20 PROCEDURE — 3600000013 HC SURGERY LEVEL 3 ADDTL 15MIN: Performed by: ORTHOPAEDIC SURGERY

## 2018-02-20 PROCEDURE — 6370000000 HC RX 637 (ALT 250 FOR IP): Performed by: STUDENT IN AN ORGANIZED HEALTH CARE EDUCATION/TRAINING PROGRAM

## 2018-02-20 PROCEDURE — 7100000000 HC PACU RECOVERY - FIRST 15 MIN: Performed by: ORTHOPAEDIC SURGERY

## 2018-02-20 PROCEDURE — 3600000003 HC SURGERY LEVEL 3 BASE: Performed by: ORTHOPAEDIC SURGERY

## 2018-02-20 PROCEDURE — 2500000003 HC RX 250 WO HCPCS: Performed by: NURSE ANESTHETIST, CERTIFIED REGISTERED

## 2018-02-20 PROCEDURE — 6360000002 HC RX W HCPCS: Performed by: ORTHOPAEDIC SURGERY

## 2018-02-20 PROCEDURE — 2720000010 HC SURG SUPPLY STERILE: Performed by: ORTHOPAEDIC SURGERY

## 2018-02-20 PROCEDURE — 2580000003 HC RX 258: Performed by: ORTHOPAEDIC SURGERY

## 2018-02-20 PROCEDURE — 64416 NJX AA&/STRD BRCH PL NFS IMG: CPT | Performed by: ANESTHESIOLOGY

## 2018-02-20 PROCEDURE — A6454 SELF-ADHER BAND W>=3" <5"/YD: HCPCS | Performed by: ORTHOPAEDIC SURGERY

## 2018-02-20 PROCEDURE — 2580000003 HC RX 258: Performed by: ANESTHESIOLOGY

## 2018-02-20 PROCEDURE — 7100000001 HC PACU RECOVERY - ADDTL 15 MIN: Performed by: ORTHOPAEDIC SURGERY

## 2018-02-20 PROCEDURE — 3700000000 HC ANESTHESIA ATTENDED CARE: Performed by: ORTHOPAEDIC SURGERY

## 2018-02-20 PROCEDURE — 3700000001 HC ADD 15 MINUTES (ANESTHESIA): Performed by: ORTHOPAEDIC SURGERY

## 2018-02-20 RX ORDER — CLONAZEPAM 0.5 MG/1
0.5 TABLET ORAL DAILY
Status: DISCONTINUED | OUTPATIENT
Start: 2018-02-20 | End: 2018-02-20 | Stop reason: HOSPADM

## 2018-02-20 RX ORDER — ONDANSETRON 2 MG/ML
4 INJECTION INTRAMUSCULAR; INTRAVENOUS
Status: DISCONTINUED | OUTPATIENT
Start: 2018-02-20 | End: 2018-02-20 | Stop reason: HOSPADM

## 2018-02-20 RX ORDER — HYDRALAZINE HYDROCHLORIDE 20 MG/ML
5 INJECTION INTRAMUSCULAR; INTRAVENOUS EVERY 10 MIN PRN
Status: DISCONTINUED | OUTPATIENT
Start: 2018-02-20 | End: 2018-02-20 | Stop reason: HOSPADM

## 2018-02-20 RX ORDER — ASPIRIN 81 MG/1
81 TABLET ORAL 2 TIMES DAILY
Status: DISCONTINUED | OUTPATIENT
Start: 2018-02-20 | End: 2018-02-20 | Stop reason: HOSPADM

## 2018-02-20 RX ORDER — CEPHALEXIN 500 MG/1
CAPSULE ORAL
Qty: 6 CAPSULE | Refills: 0 | Status: SHIPPED | OUTPATIENT
Start: 2018-02-20 | End: 2018-08-10

## 2018-02-20 RX ORDER — DOCUSATE SODIUM 100 MG/1
100 CAPSULE, LIQUID FILLED ORAL 2 TIMES DAILY
Status: DISCONTINUED | OUTPATIENT
Start: 2018-02-20 | End: 2018-02-20 | Stop reason: HOSPADM

## 2018-02-20 RX ORDER — NEOSTIGMINE METHYLSULFATE 5 MG/5 ML
SYRINGE (ML) INTRAVENOUS PRN
Status: DISCONTINUED | OUTPATIENT
Start: 2018-02-20 | End: 2018-02-20 | Stop reason: SDUPTHER

## 2018-02-20 RX ORDER — LABETALOL HYDROCHLORIDE 5 MG/ML
5 INJECTION, SOLUTION INTRAVENOUS EVERY 10 MIN PRN
Status: DISCONTINUED | OUTPATIENT
Start: 2018-02-20 | End: 2018-02-20 | Stop reason: HOSPADM

## 2018-02-20 RX ORDER — ZOLPIDEM TARTRATE 12.5 MG/1
12.5 TABLET, FILM COATED, EXTENDED RELEASE ORAL NIGHTLY PRN
Status: DISCONTINUED | OUTPATIENT
Start: 2018-02-20 | End: 2018-02-20 | Stop reason: CLARIF

## 2018-02-20 RX ORDER — CELECOXIB 200 MG/1
200 CAPSULE ORAL DAILY
Status: DISCONTINUED | OUTPATIENT
Start: 2018-02-20 | End: 2018-02-20 | Stop reason: HOSPADM

## 2018-02-20 RX ORDER — PREGABALIN 75 MG/1
75 CAPSULE ORAL DAILY
Status: DISCONTINUED | OUTPATIENT
Start: 2018-02-20 | End: 2018-02-20 | Stop reason: HOSPADM

## 2018-02-20 RX ORDER — FENTANYL CITRATE 50 UG/ML
INJECTION, SOLUTION INTRAMUSCULAR; INTRAVENOUS PRN
Status: DISCONTINUED | OUTPATIENT
Start: 2018-02-20 | End: 2018-02-20 | Stop reason: SDUPTHER

## 2018-02-20 RX ORDER — ARIPIPRAZOLE 2 MG/1
2 TABLET ORAL DAILY
Status: DISCONTINUED | OUTPATIENT
Start: 2018-02-20 | End: 2018-02-20 | Stop reason: HOSPADM

## 2018-02-20 RX ORDER — OXYCODONE HYDROCHLORIDE AND ACETAMINOPHEN 5; 325 MG/1; MG/1
1 TABLET ORAL EVERY 4 HOURS PRN
Status: DISCONTINUED | OUTPATIENT
Start: 2018-02-20 | End: 2018-02-20 | Stop reason: HOSPADM

## 2018-02-20 RX ORDER — FENTANYL CITRATE 50 UG/ML
25 INJECTION, SOLUTION INTRAMUSCULAR; INTRAVENOUS EVERY 5 MIN PRN
Status: DISCONTINUED | OUTPATIENT
Start: 2018-02-20 | End: 2018-02-20 | Stop reason: HOSPADM

## 2018-02-20 RX ORDER — ACETAMINOPHEN 325 MG/1
650 TABLET ORAL EVERY 4 HOURS PRN
Status: DISCONTINUED | OUTPATIENT
Start: 2018-02-20 | End: 2018-02-20 | Stop reason: HOSPADM

## 2018-02-20 RX ORDER — ONDANSETRON 4 MG/1
TABLET, FILM COATED ORAL
Qty: 30 TABLET | Refills: 0 | Status: SHIPPED | OUTPATIENT
Start: 2018-02-20 | End: 2018-08-10 | Stop reason: ALTCHOICE

## 2018-02-20 RX ORDER — PROPOFOL 10 MG/ML
INJECTION, EMULSION INTRAVENOUS PRN
Status: DISCONTINUED | OUTPATIENT
Start: 2018-02-20 | End: 2018-02-20 | Stop reason: SDUPTHER

## 2018-02-20 RX ORDER — DEXAMETHASONE SODIUM PHOSPHATE 10 MG/ML
INJECTION INTRAMUSCULAR; INTRAVENOUS PRN
Status: DISCONTINUED | OUTPATIENT
Start: 2018-02-20 | End: 2018-02-20 | Stop reason: SDUPTHER

## 2018-02-20 RX ORDER — HYDROMORPHONE HCL 110MG/55ML
0.5 PATIENT CONTROLLED ANALGESIA SYRINGE INTRAVENOUS EVERY 5 MIN PRN
Status: DISCONTINUED | OUTPATIENT
Start: 2018-02-20 | End: 2018-02-20 | Stop reason: HOSPADM

## 2018-02-20 RX ORDER — LIDOCAINE HYDROCHLORIDE 20 MG/ML
INJECTION, SOLUTION EPIDURAL; INFILTRATION; INTRACAUDAL; PERINEURAL PRN
Status: DISCONTINUED | OUTPATIENT
Start: 2018-02-20 | End: 2018-02-20 | Stop reason: SDUPTHER

## 2018-02-20 RX ORDER — LIDOCAINE HYDROCHLORIDE 10 MG/ML
1 INJECTION, SOLUTION EPIDURAL; INFILTRATION; INTRACAUDAL; PERINEURAL
Status: DISCONTINUED | OUTPATIENT
Start: 2018-02-21 | End: 2018-02-20 | Stop reason: HOSPADM

## 2018-02-20 RX ORDER — ONDANSETRON 2 MG/ML
INJECTION INTRAMUSCULAR; INTRAVENOUS PRN
Status: DISCONTINUED | OUTPATIENT
Start: 2018-02-20 | End: 2018-02-20 | Stop reason: SDUPTHER

## 2018-02-20 RX ORDER — FENTANYL CITRATE 50 UG/ML
25 INJECTION, SOLUTION INTRAMUSCULAR; INTRAVENOUS
Status: DISCONTINUED | OUTPATIENT
Start: 2018-02-20 | End: 2018-02-20 | Stop reason: HOSPADM

## 2018-02-20 RX ORDER — DESVENLAFAXINE 50 MG/1
50 TABLET, EXTENDED RELEASE ORAL DAILY
Status: DISCONTINUED | OUTPATIENT
Start: 2018-02-20 | End: 2018-02-20 | Stop reason: HOSPADM

## 2018-02-20 RX ORDER — SODIUM CHLORIDE 0.9 % (FLUSH) 0.9 %
10 SYRINGE (ML) INJECTION PRN
Status: DISCONTINUED | OUTPATIENT
Start: 2018-02-20 | End: 2018-02-20 | Stop reason: HOSPADM

## 2018-02-20 RX ORDER — LORAZEPAM 0.5 MG/1
0.5 TABLET ORAL NIGHTLY PRN
Status: DISCONTINUED | OUTPATIENT
Start: 2018-02-20 | End: 2018-02-20 | Stop reason: HOSPADM

## 2018-02-20 RX ORDER — OXYCODONE HYDROCHLORIDE AND ACETAMINOPHEN 5; 325 MG/1; MG/1
2 TABLET ORAL EVERY 4 HOURS PRN
Status: DISCONTINUED | OUTPATIENT
Start: 2018-02-20 | End: 2018-02-20 | Stop reason: HOSPADM

## 2018-02-20 RX ORDER — LIDOCAINE HYDROCHLORIDE 10 MG/ML
INJECTION, SOLUTION EPIDURAL; INFILTRATION; INTRACAUDAL; PERINEURAL PRN
Status: DISCONTINUED | OUTPATIENT
Start: 2018-02-20 | End: 2018-02-20 | Stop reason: SDUPTHER

## 2018-02-20 RX ORDER — ONDANSETRON 2 MG/ML
4 INJECTION INTRAMUSCULAR; INTRAVENOUS EVERY 6 HOURS PRN
Status: DISCONTINUED | OUTPATIENT
Start: 2018-02-20 | End: 2018-02-20 | Stop reason: HOSPADM

## 2018-02-20 RX ORDER — MIDAZOLAM HYDROCHLORIDE 1 MG/ML
INJECTION INTRAMUSCULAR; INTRAVENOUS PRN
Status: DISCONTINUED | OUTPATIENT
Start: 2018-02-20 | End: 2018-02-20 | Stop reason: SDUPTHER

## 2018-02-20 RX ORDER — FENTANYL CITRATE 50 UG/ML
50 INJECTION, SOLUTION INTRAMUSCULAR; INTRAVENOUS
Status: DISCONTINUED | OUTPATIENT
Start: 2018-02-20 | End: 2018-02-20 | Stop reason: HOSPADM

## 2018-02-20 RX ORDER — SODIUM CHLORIDE 0.9 % (FLUSH) 0.9 %
10 SYRINGE (ML) INJECTION EVERY 12 HOURS SCHEDULED
Status: DISCONTINUED | OUTPATIENT
Start: 2018-02-20 | End: 2018-02-20 | Stop reason: HOSPADM

## 2018-02-20 RX ORDER — SODIUM CHLORIDE 9 MG/ML
INJECTION, SOLUTION INTRAVENOUS CONTINUOUS
Status: DISCONTINUED | OUTPATIENT
Start: 2018-02-20 | End: 2018-02-20 | Stop reason: HOSPADM

## 2018-02-20 RX ORDER — ROCURONIUM BROMIDE 10 MG/ML
INJECTION, SOLUTION INTRAVENOUS PRN
Status: DISCONTINUED | OUTPATIENT
Start: 2018-02-20 | End: 2018-02-20 | Stop reason: SDUPTHER

## 2018-02-20 RX ORDER — ROPIVACAINE HYDROCHLORIDE 5 MG/ML
INJECTION, SOLUTION EPIDURAL; INFILTRATION; PERINEURAL PRN
Status: DISCONTINUED | OUTPATIENT
Start: 2018-02-20 | End: 2018-02-20 | Stop reason: SDUPTHER

## 2018-02-20 RX ORDER — PROMETHAZINE HYDROCHLORIDE 25 MG/ML
6.25 INJECTION, SOLUTION INTRAMUSCULAR; INTRAVENOUS
Status: DISCONTINUED | OUTPATIENT
Start: 2018-02-20 | End: 2018-02-20 | Stop reason: HOSPADM

## 2018-02-20 RX ORDER — GLYCOPYRROLATE 1 MG/5 ML
SYRINGE (ML) INTRAVENOUS PRN
Status: DISCONTINUED | OUTPATIENT
Start: 2018-02-20 | End: 2018-02-20 | Stop reason: SDUPTHER

## 2018-02-20 RX ORDER — EPHEDRINE SULFATE 50 MG/ML
INJECTION, SOLUTION INTRAVENOUS PRN
Status: DISCONTINUED | OUTPATIENT
Start: 2018-02-20 | End: 2018-02-20 | Stop reason: SDUPTHER

## 2018-02-20 RX ORDER — SODIUM CHLORIDE, SODIUM LACTATE, POTASSIUM CHLORIDE, CALCIUM CHLORIDE 600; 310; 30; 20 MG/100ML; MG/100ML; MG/100ML; MG/100ML
INJECTION, SOLUTION INTRAVENOUS CONTINUOUS
Status: DISCONTINUED | OUTPATIENT
Start: 2018-02-21 | End: 2018-02-20

## 2018-02-20 RX ORDER — MIDAZOLAM HYDROCHLORIDE 1 MG/ML
2 INJECTION INTRAMUSCULAR; INTRAVENOUS ONCE
Status: COMPLETED | OUTPATIENT
Start: 2018-02-20 | End: 2018-02-20

## 2018-02-20 RX ADMIN — EPHEDRINE SULFATE 5 MG: 50 INJECTION INTRAMUSCULAR; INTRAVENOUS; SUBCUTANEOUS at 12:37

## 2018-02-20 RX ADMIN — CLONAZEPAM 0.5 MG: 0.5 TABLET ORAL at 18:07

## 2018-02-20 RX ADMIN — EPHEDRINE SULFATE 5 MG: 50 INJECTION INTRAMUSCULAR; INTRAVENOUS; SUBCUTANEOUS at 12:38

## 2018-02-20 RX ADMIN — DOCUSATE SODIUM 100 MG: 100 CAPSULE, LIQUID FILLED ORAL at 20:06

## 2018-02-20 RX ADMIN — LIDOCAINE HYDROCHLORIDE 100 MG: 20 INJECTION, SOLUTION EPIDURAL; INFILTRATION; INTRACAUDAL; PERINEURAL at 12:26

## 2018-02-20 RX ADMIN — PHENYLEPHRINE HYDROCHLORIDE 100 MCG: 10 INJECTION INTRAVENOUS at 13:30

## 2018-02-20 RX ADMIN — ROCURONIUM BROMIDE 50 MG: 10 INJECTION, SOLUTION INTRAVENOUS at 12:28

## 2018-02-20 RX ADMIN — ROPIVACAINE HYDROCHLORIDE 30 ML: 5 INJECTION, SOLUTION EPIDURAL; INFILTRATION; PERINEURAL at 10:14

## 2018-02-20 RX ADMIN — ASPIRIN 81 MG: 81 TABLET, COATED ORAL at 20:06

## 2018-02-20 RX ADMIN — ONDANSETRON 4 MG: 2 INJECTION, SOLUTION INTRAMUSCULAR; INTRAVENOUS at 13:47

## 2018-02-20 RX ADMIN — EPHEDRINE SULFATE 5 MG: 50 INJECTION INTRAMUSCULAR; INTRAVENOUS; SUBCUTANEOUS at 12:39

## 2018-02-20 RX ADMIN — SODIUM CHLORIDE, POTASSIUM CHLORIDE, SODIUM LACTATE AND CALCIUM CHLORIDE: 600; 310; 30; 20 INJECTION, SOLUTION INTRAVENOUS at 09:32

## 2018-02-20 RX ADMIN — FENTANYL CITRATE 50 MCG: 50 INJECTION, SOLUTION INTRAMUSCULAR; INTRAVENOUS at 12:54

## 2018-02-20 RX ADMIN — PROPOFOL 200 MG: 10 INJECTION, EMULSION INTRAVENOUS at 12:26

## 2018-02-20 RX ADMIN — PHENYLEPHRINE HYDROCHLORIDE 100 MCG: 10 INJECTION INTRAVENOUS at 13:27

## 2018-02-20 RX ADMIN — MIDAZOLAM HYDROCHLORIDE 2 MG: 1 INJECTION, SOLUTION INTRAMUSCULAR; INTRAVENOUS at 12:21

## 2018-02-20 RX ADMIN — Medication 5 MG: at 13:54

## 2018-02-20 RX ADMIN — SODIUM CHLORIDE, POTASSIUM CHLORIDE, SODIUM LACTATE AND CALCIUM CHLORIDE: 600; 310; 30; 20 INJECTION, SOLUTION INTRAVENOUS at 13:08

## 2018-02-20 RX ADMIN — MIDAZOLAM 2 MG: 1 INJECTION INTRAMUSCULAR; INTRAVENOUS at 10:13

## 2018-02-20 RX ADMIN — DEXAMETHASONE SODIUM PHOSPHATE 10 MG: 10 INJECTION INTRAMUSCULAR; INTRAVENOUS at 12:57

## 2018-02-20 RX ADMIN — LIDOCAINE HYDROCHLORIDE 3 ML: 10 INJECTION, SOLUTION EPIDURAL; INFILTRATION; INTRACAUDAL; PERINEURAL at 10:13

## 2018-02-20 RX ADMIN — SODIUM CHLORIDE, POTASSIUM CHLORIDE, SODIUM LACTATE AND CALCIUM CHLORIDE: 600; 310; 30; 20 INJECTION, SOLUTION INTRAVENOUS at 12:21

## 2018-02-20 RX ADMIN — EPHEDRINE SULFATE 5 MG: 50 INJECTION INTRAMUSCULAR; INTRAVENOUS; SUBCUTANEOUS at 12:40

## 2018-02-20 RX ADMIN — CEFAZOLIN SODIUM 2 G: 2 SOLUTION INTRAVENOUS at 12:37

## 2018-02-20 RX ADMIN — Medication 500 ML: at 14:52

## 2018-02-20 RX ADMIN — Medication 0.8 MG: at 13:54

## 2018-02-20 RX ADMIN — FENTANYL CITRATE 200 MCG: 50 INJECTION, SOLUTION INTRAMUSCULAR; INTRAVENOUS at 12:26

## 2018-02-20 ASSESSMENT — PULMONARY FUNCTION TESTS
PIF_VALUE: 17
PIF_VALUE: 0
PIF_VALUE: 17
PIF_VALUE: 19
PIF_VALUE: 16
PIF_VALUE: 16
PIF_VALUE: 18
PIF_VALUE: 20
PIF_VALUE: 18
PIF_VALUE: 23
PIF_VALUE: 0
PIF_VALUE: 1
PIF_VALUE: 20
PIF_VALUE: 18
PIF_VALUE: 18
PIF_VALUE: 1
PIF_VALUE: 30
PIF_VALUE: 19
PIF_VALUE: 18
PIF_VALUE: 17
PIF_VALUE: 17
PIF_VALUE: 18
PIF_VALUE: 17
PIF_VALUE: 17
PIF_VALUE: 19
PIF_VALUE: 15
PIF_VALUE: 18
PIF_VALUE: 19
PIF_VALUE: 17
PIF_VALUE: 20
PIF_VALUE: 15
PIF_VALUE: 17
PIF_VALUE: 18
PIF_VALUE: 18
PIF_VALUE: 17
PIF_VALUE: 21
PIF_VALUE: 20
PIF_VALUE: 17
PIF_VALUE: 18
PIF_VALUE: 2
PIF_VALUE: 18
PIF_VALUE: 17
PIF_VALUE: 18
PIF_VALUE: 18
PIF_VALUE: 17
PIF_VALUE: 18
PIF_VALUE: 21
PIF_VALUE: 19
PIF_VALUE: 17
PIF_VALUE: 15
PIF_VALUE: 18
PIF_VALUE: 18
PIF_VALUE: 1
PIF_VALUE: 19
PIF_VALUE: 16
PIF_VALUE: 19
PIF_VALUE: 20
PIF_VALUE: 17
PIF_VALUE: 19
PIF_VALUE: 19
PIF_VALUE: 25
PIF_VALUE: 38
PIF_VALUE: 15
PIF_VALUE: 29
PIF_VALUE: 20
PIF_VALUE: 26
PIF_VALUE: 18
PIF_VALUE: 18
PIF_VALUE: 17
PIF_VALUE: 15
PIF_VALUE: 24
PIF_VALUE: 17
PIF_VALUE: 20
PIF_VALUE: 18
PIF_VALUE: 19
PIF_VALUE: 2
PIF_VALUE: 9
PIF_VALUE: 18
PIF_VALUE: 20
PIF_VALUE: 17
PIF_VALUE: 20
PIF_VALUE: 20
PIF_VALUE: 15
PIF_VALUE: 19
PIF_VALUE: 18
PIF_VALUE: 18
PIF_VALUE: 9
PIF_VALUE: 18
PIF_VALUE: 17
PIF_VALUE: 20
PIF_VALUE: 18
PIF_VALUE: 2
PIF_VALUE: 10
PIF_VALUE: 1
PIF_VALUE: 16
PIF_VALUE: 15
PIF_VALUE: 17
PIF_VALUE: 18
PIF_VALUE: 18
PIF_VALUE: 20
PIF_VALUE: 18
PIF_VALUE: 17
PIF_VALUE: 19

## 2018-02-20 ASSESSMENT — PAIN SCALES - GENERAL
PAINLEVEL_OUTOF10: 0

## 2018-02-20 ASSESSMENT — PAIN DESCRIPTION - LOCATION: LOCATION: SHOULDER

## 2018-02-20 ASSESSMENT — PAIN - FUNCTIONAL ASSESSMENT: PAIN_FUNCTIONAL_ASSESSMENT: 0-10

## 2018-02-20 ASSESSMENT — PAIN DESCRIPTION - PAIN TYPE: TYPE: ACUTE PAIN

## 2018-02-20 NOTE — BRIEF OP NOTE
Brief Postoperative Note  ______________________________________________________________    Patient: Dee Dailey  YOB: 1970  MRN: [id number]  Date of Procedure: 2/20/2018    Pre-Op Diagnosis: RIGHT SHOULDER ROTATOR CUFF TEAR    Post-Op Diagnosis: Extensive Adhesions. Suture granuloma right shoulder. Procedure(s):  RIGHT SHOULDER ARTHROSCOPY  LYSIS OF ADHESIONS  Extensive debridement  Open suture granuloma removal     Anesthesia: General    Surgeon(s):  Yonis Bell DO, Rodell Heron    Staff:  Surgical Assistant: Su Quinones     Estimated Blood Loss: 10 cc     Fluids: 1500 cc Crystalliods    Complications: None    Specimens: none    Implants: none    Findings: Findings as above. Intact rotator cuff. See op note for details.      Radha Buenrostro DO  Date: 2/20/2018  Time: 1:53 PM

## 2018-02-20 NOTE — ANESTHESIA PRE PROCEDURE
9.0 10/25/2015    BILITOT 0.20 12/20/2013    ALKPHOS 64 12/20/2013    AST 30 12/20/2013    ALT 21 12/20/2013       POC Tests: No results for input(s): POCGLU, POCNA, POCK, POCCL, POCBUN, POCHEMO, POCHCT in the last 72 hours. Coags:   Lab Results   Component Value Date    PROTIME 11.4 12/20/2013    INR 1.1 12/20/2013    APTT 27.5 12/20/2013       HCG (If Applicable):   Lab Results   Component Value Date    HCG NEGATIVE 10/25/2015        ABGs: No results found for: PHART, PO2ART, IJL3IGX, NBK0CRJ, BEART, M3AQWGHQ     Type & Screen (If Applicable):  No results found for: LABABO, [address]    Anesthesia Evaluation  Patient summary reviewed and Nursing notes reviewed no history of anesthetic complications:   Airway: Mallampati: II  TM distance: >3 FB   Neck ROM: full  Mouth opening: > = 3 FB Dental: normal exam         Pulmonary:normal exam        (-) COPD and asthma                           Cardiovascular:  Exercise tolerance: no interval change,       (-) hypertension, past MI and CAD      Rhythm: regular  Rate: normal           Beta Blocker:  Not on Beta Blocker         Neuro/Psych:   (+) psychiatric history:depression/anxiety    (-) seizures, TIA and CVA           GI/Hepatic/Renal:        (-) GERD       Endo/Other:        (-) diabetes mellitus               Abdominal:           Vascular:                                        Anesthesia Plan      general     ASA 2       Induction: intravenous. Anesthetic plan and risks discussed with patient. Plan discussed with CRNA.     Attending anesthesiologist reviewed and agrees with Pre Eval content              Lindsay Saleh DO   2/20/2018

## 2018-02-20 NOTE — H&P (VIEW-ONLY)
of Anemia; Anxiety; Chronic right shoulder pain; Depression; and Irregular heartbeat. PLANS:   1.  Right shoulder arthroscopy rotator cuff revision    Soraida Burt, ANP-BC  Electronically signed 1/30/2018 at 12:59 PM

## 2018-02-20 NOTE — ANESTHESIA POSTPROCEDURE EVALUATION
Department of Anesthesiology  Postprocedure Note    Patient: Rayne Corley  MRN: [id number]  YOB: 1970  Date of evaluation: 2/20/2018  Time:  6:27 PM     Procedure Summary     Date:  02/20/18 Room / Location:  [facility] OR 01 / [facility] OR    Anesthesia Start:  2626 Anesthesia Stop:  1922    Procedure:  RIGHT SHOULDER ARTHROSCOPY ROTATOR CUFF LYSIS OF ADHESIONS (Right Shoulder) Diagnosis:  (RIGHT SHOULDER ROTATOR CUFF TEAR)    Surgeon:  Nathan Lee DO Responsible Provider:  Carlos Rowley DO    Anesthesia Type:  general ASA Status:  2          Anesthesia Type: general    Lana Phase I: Lana Score: 10    Lana Phase II:      Last vitals: Reviewed and per EMR flowsheets.        Anesthesia Post Evaluation    Patient location during evaluation: PACU  Patient participation: complete - patient participated  Level of consciousness: awake and alert  Airway patency: patent  Nausea & Vomiting: no nausea and no vomiting  Complications: no  Cardiovascular status: hemodynamically stable  Respiratory status: acceptable  Hydration status: stable

## 2018-02-20 NOTE — FLOWSHEET NOTE
Pt admitted to 2102 from PACU  Pt oriented to room and surroundings  Data base completed  Pt refusing all vaccines  Call light with in reach no distress noted   Dressing dry and intact to right shoulder  Sling in place

## 2018-02-21 NOTE — OP NOTE
[address] [address]                                 OPERATIVE REPORT    PATIENT NAME: Braeden Fairchild                    :        1970  MED REC NO:   [id number]                             ROOM:       2102  ACCOUNT NO:   [de-identified]                           ADMIT DATE: 2018  PROVIDER:     Nathan Lee    DATE OF PROCEDURE:  2018    PREOPERATIVE DIAGNOSES:  Right shoulder rotator cuff tear; painful suture  granuloma. POSTOPERATIVE DIAGNOSES:  Right shoulder glenoid chondromalacia, right  shoulder humeral head chondromalacia, right shoulder labral tear, right  shoulder extensive rotator cuff scarring intraarticularly and  subacromially, right shoulder healed rotator cuff tear. PROCEDURES PERFORMED:  Right shoulder arthroscopy with debridement of  glenoid, humeral head, rotator interval scarring, labral fraying,  intraarticular release of anterior interval slide, subacromial extensive  debridement, lysis of adhesions, and removal of suture granuloma. SURGEON:  Nathan Lee DO.    ANESTHESIA:  General.    COMPLICATIONS:  None. DISPOSITION:  To postanesthesia care unit in stable condition. INDICATION:  The patient is a 78-year-old female with longstanding shoulder  pain failing conservative therapy. She had responded mildly to injections. She understands the risk and benefits of the procedure. Informed consent  was signed. Operative site was marked. Preoperative antibiotics were  given. PROCEDURE IN DETAIL:  The patient was taken to the operative suite and  placed in a supine position on the operating table. General inhalation  anesthetic with endotracheal intubation was performed. Exam under  anesthesia revealed a stable shoulder with minimal loss of range of motion. She was placed in a lateral position.   Axillary roll was placed, and padded  underneath her legs and in between her extensive probing was intact. The sutures were  noted in the subacromial space in the proper position. The arthroscopy was  again repeated from all three portals in the subacromial space, and  extensive lysis of adhesions and debridement of scar tissue was performed. No bony procedures were indicated. There was much more free motion of the  supraspinatus and much release of extensive scar. At this time, a small  incision was made over the previous open incision. Blunt dissection was  carried down to the deltoid fascia. There was a very hard large permanent  suture and a granuloma that was present. The suture was dissected out and  cut on one edge and suture knot stack was removed. This markedly decreased  the palpable suture granuloma that was painful to the patient. All portals  were closed with 3-0 nylon suture. Sterile dressing was placed. Simple  sling was placed. The patient was awakened by Department of Anesthesia and  transferred to the postanesthesia care unit in a stable condition.         Messi Jovel    D: 02/20/2018 14:34:20       T: 02/20/2018 15:55:32     [email or web address]  Job#: [id number]     Doc#: [id number]    CC:

## 2018-02-23 ENCOUNTER — HOSPITAL ENCOUNTER (OUTPATIENT)
Dept: PHYSICAL THERAPY | Age: 48
Setting detail: THERAPIES SERIES
Discharge: HOME | End: 2018-02-23
Payer: COMMERCIAL

## 2018-02-23 PROCEDURE — 97140 MANUAL THERAPY 1/> REGIONS: CPT

## 2018-02-23 PROCEDURE — 97110 THERAPEUTIC EXERCISES: CPT

## 2018-02-23 PROCEDURE — 97530 THERAPEUTIC ACTIVITIES: CPT

## 2018-02-23 PROCEDURE — 97161 PT EVAL LOW COMPLEX 20 MIN: CPT

## 2018-02-23 NOTE — CONSULTS
Other:  Allergies:      [] Refer to full medical record [x] None [] Other:    Function:  Hand Dominance  [x] Right  [] Left  Working:  [] Normal Duty  [] Light Duty  [] Off D/T Condition  [] Retired    [] Not Employed    []  Disability  [] Other:             Return to work:   Job/ADL Description: Last worked 2016; did not return to job where injury occurred     Pain:  [x] Yes  [] No Location: Right shoulder   Pain Rating: (0-10 scale) 8/10  Pain altered Tx:  [] Yes  [x] No  Action:  Symptoms:  [] Improving [] Worsening [] Same  Better:  [] AM    [] PM    [] Sit    [] Rise/Sit    []Stand    [] Walk    [] Lying    [x] Other: pendulum  Worse: [] AM    [] PM    [] Sit    [] Rise/Sit    []Stand    [] Walk    [] Lying    [] Bend                             [] Valsalva    [x] Other: everything  Sleep: [] OK    [x] Disturbed    Objective:     ROM  °A/P END FEEL STRENGTH    Left Right  Left Right   Sit Shld Flex        Sit Shld Abd        Sit Shld IR        Shoulder Flex  50  4+ NT   Ext        ABD  46  4+ NT   ER @ 0   17  4+ NT   IR  53  4+ NT   Supraspinatus        Infraspinatus        Serratus Ant        Pectoralis        Lats        Mid Trap        Lower Trap        Elbow Flex. 5 3   Elbow Ext.    5 3   Pronation    5 3   Supination    5 3   Wrist Flex. 5 3   Wrist Ext.    5 3   Rad. Dev.    5 3   Ulnar Dev.     5 3       5 3     OBSERVATION No Deficit Deficit Not Tested Comments   Forward Head [x] [] []    Rounded Shoulders [] [x] []    Kyphosis [x] [] []    Scap Height/Position [x] [] []    Winging [] [x] []    SH Rhythm [] [x] []    INSPECTION/PALPATION       SC/AC Joint [] [x] []    Supraspinatus [] [x] []    Biceps tendon/groove [] [x] []    Posterior shld [] [x] []    Subscapularis [] [x] []    NEUROLOGICAL       Cervical ROM/Quadrant [] [x] []    Reflexes [] [] [x]    Compression/Distraction [] [] [x]    Sensation [] [x] []      FUNCTION Normal Difficult Unable   Overhead reach [] [x] []   Underarm reach Therapeutic Exercise  [] Modalities:  [x] Dry Needling  [] Therapeutic Activity  [] Ultrasound  [x] Electrical Stimulation  [] Gait Training   [] Massage       [] Lumbar/Cervical Traction  [] Neuromuscular Re-education [] Cold/hotpack [] Iontophoresis: 4 mg/mL  [x] Instruction in HEP             Dexamethasone Sodium  [x] Manual Therapy             Phosphate 40-80 mAmin  [] Aquatic Therapy                   [x] Vasocompression/    [x] Other: kinesiotape            Game Ready   []  Medication allergies reviewed for use of    Dexamethasone Sodium Phosphate 4mg/ml     with iontophoresis treatments. Pt is not allergic. Frequency: 2 x/week for 12 visits    Todays Treatment:  Modalities:   Precautions: Per KO Rivera patient did NOT have a repair, only debridement of multiple areas, lysis of adhesions, and removal of suture granuloma (see notation above). Per Cande Hollis, patient does not have any restrictions. Exercises:  Exercise Reps/ Time Weight/ Level Comments   PROM 10 min  All planes   Pendulums   HEP   Ball squeezes 10 x  Issued green ball; HEP   Elbow flexion/extension 10 x  HEP   Shoulder rolls   HEP   Other:  Therapist called Dr. Ana María Pool' office after evaluation and information above was relayed. Spoke to patient on the phone and provided information to her as well. Specific Instructions for next treatment:  Improve ROM; increase strength (slowly); improve function; decrease pain.       Evaluation Complexity:  History (Personal factors, comorbidities) [] 0 [x] 1-2 [] 3+   Exam (limitations, restrictions) [x] 1-2 [] 3 [] 4+   Clinical presentation (progression) [x] Stable [] Evolving  [] Unstable   Decision Making [x] Low [] Moderate [] High    [x] Low Complexity [] Moderate Complexity [] High Complexity       Treatment Charges: Mins Units   [x] Evaluation       [x]  Low       []  Moderate       []  High 20 1   []  Modalities     [x]  Ther Exercise 10 1   []  Manual Therapy     [x]  Ther Activities

## 2018-02-26 ENCOUNTER — HOSPITAL ENCOUNTER (OUTPATIENT)
Dept: PHYSICAL THERAPY | Age: 48
Setting detail: THERAPIES SERIES
Discharge: HOME | End: 2018-02-26
Payer: COMMERCIAL

## 2018-02-26 PROCEDURE — 97110 THERAPEUTIC EXERCISES: CPT

## 2018-02-26 NOTE — FLOWSHEET NOTE
[x] Kenzie Elizondo       Outpatient Physical        Therapy       [address]       Phone: [phone]       Fax: [phone] [] [facility] at [address]       Phone: [phone]       Fax: [phone] [] [place]   Phone: [phone]   Fax:  [phone]     Physical Therapy Daily Treatment Note    Date:  2018  Patient Name:  Terese Whaley    :  1970  MRN: [id number]   Physician: Dr. Jenny Barraza: Cameron Delacruz Comp- 12 visits from 18 - 18  Medical Diagnosis: s/p right shoulder arthroscopy with revision of rotator cuff repair                      Rehab Codes: M 25.611, M 25.511, M 62.81, M 79.601  Onset Date: 18                 Next 's appt: 18  Visit# / total visits:   Cancels/No Shows: 0/0    Subjective:    Pain:  [x] Yes  [] No Location:  R shoulder Pain Rating: (0-10 scale) 6/10  Pain altered Tx:  [x] No  [] Yes  Action:  Comments: Reports she took pain medication prior to therapy. Addressing HEP. States she needs to have a short session due to MD appt at 400. Relying on other to drive her due to fear of driving. Wearing sling. Objective:    Modalities:   Precautions: Per KO Pompa patient did NOT have a repair, only debridement of multiple areas, lysis of adhesions, and removal of suture granuloma (see notation above). Per Suman Parikh, patient does not have any restrictions.   Exercises:  Exercise Reps/ Time Weight/ Level Comments         PROM 10 min   All planes    wand    Added all wand    Chest press 10x     Horizontal abd 15x     protraction 10x     flexion 10x     ER/IR 10x     Pendulums     HEP   Ball squeezes     Issued green ball; HEP   Elbow flexion/extension 20 x   HEP   Shoulder rolls 15x  HEP   Scapular retraction 15x  added          Other:   Informed pt to ask MD specific on wearing sling.        Specific Instructions for next treatment:  Improve ROM; increase strength (slowly); improve function; decrease pain.            Treatment Charges: Mins Units   []  Modalities     [x]  Ther Exercise 30  2   []  Manual Therapy     []  Ther Activities     []  Aquatics     []  Vasocompression     []  Other     Total Treatment time  30 2       Assessment: [x] Progressing toward goals Added supine wand and scapular retraction exercises. Good tolerance to additions. [] No change. [] Other:     STG: (to be met in 6 treatments)  1. ? Pain: Right shoulder pain improve to 7/10 with movement  2. ? ROM: Supine right shoulder AAROM improve to: flexion 90 degrees; abduction 80 degrees; IR 60 degrees; ER 45 degrees  3. ? Strength: Patient able to tolerate 15 minutes of strengthening exs with minimal increase of pain  4. ? Function: Patient to report improved sleep without sling to 3 hours without waking. 5. Independent with Home Exercise Programs     LTG: (to be met in 12 treatments) Additional visits may be required   1. Right shoulder pain improve to 5/10 with movement  2. Supine right shoulder AAROM improve to: flexion 140 degrees; abduction 120 degrees; IR 90 degrees; ER 75 degrees. 3. Seated right shoulder AROM improve to: flexion 120 degrees; abduction 100 degrees; IR within 3\" of left shoulder; ER to behind head. 4. Right shoulder strength improve to 4-/5 grossly  5. DASH score of 50% functionally impaired or less  6. Sleep improve to 6 hours without waking due to right shoulder pain. 7. Patient to resume all ADLs and IADLs.                   Patient goals: \"get better\"       Pt. Education:  [x] Yes  [] No  [] Reviewed Prior HEP/Ed  Method of Education: [x] Verbal  [x] Demo  [] Written  Comprehension of Education:  [x] Verbalizes understanding. [] Demonstrates understanding. [] Needs review. [] Demonstrates/verbalizes HEP/Ed previously given.      Plan: [x] Continue per plan of care.    [] Other:      Time In: 305  PM         Time Out: 335 PM    Electronically signed by:  Malick Rosado, PTA

## 2018-02-27 ENCOUNTER — HOSPITAL ENCOUNTER (OUTPATIENT)
Dept: PHYSICAL THERAPY | Age: 48
Setting detail: THERAPIES SERIES
Discharge: HOME | End: 2018-02-27
Payer: COMMERCIAL

## 2018-02-27 PROCEDURE — 97110 THERAPEUTIC EXERCISES: CPT

## 2018-03-06 ENCOUNTER — HOSPITAL ENCOUNTER (OUTPATIENT)
Dept: PHYSICAL THERAPY | Age: 48
Setting detail: THERAPIES SERIES
Discharge: HOME | End: 2018-03-06
Payer: COMMERCIAL

## 2018-03-09 ENCOUNTER — HOSPITAL ENCOUNTER (OUTPATIENT)
Dept: PHYSICAL THERAPY | Age: 48
Setting detail: THERAPIES SERIES
Discharge: HOME | End: 2018-03-09
Payer: COMMERCIAL

## 2018-03-09 PROCEDURE — 97016 VASOPNEUMATIC DEVICE THERAPY: CPT

## 2018-03-09 PROCEDURE — 97110 THERAPEUTIC EXERCISES: CPT

## 2018-03-09 NOTE — FLOWSHEET NOTE
lbs    ER/IR 15x      Sleeper stretch     IR/ER, add as able. Side lying       ER 10x     abd 10x AAROM          Pendulums  HEP   HEP   Ball squeezes 20x   Issued green ball; HEP   Elbow flexion/extension   x    HEP, pt deferred, requested to end session. Shoulder rolls 10x  HEP   Scapular retraction 10x            Tband   add   ext      rows       IR/ER      Other: completed bold typed exercise. Pt slow with all movements.       Specific Instructions for next treatment:  Improve ROM; increase strength (slowly); improve function; decrease pain. Add tband exercises.            Treatment Charges: Mins Units   []  Modalities     [x]  Ther Exercise  35  2   [x]  Manual Therapy     []  Ther Activities     []  Aquatics     [x]  Vasocompression 10 1   []  Other     Total Treatment time  45  3       Assessment: [x] Progressing toward goals added wall ROM exercises with good tolerance and ROM. Continue with PROM, distraction and progressed wand exercises with good tolerance. No other progressions due to 35 mins late. [] No change. [x] Other:      STG: (to be met in 6 treatments)  1. ? Pain: Right shoulder pain improve to 7/10 with movement  2. ? ROM: Supine right shoulder AAROM improve to: flexion 90 degrees; abduction 80 degrees; IR 60 degrees; ER 45 degrees  3. ? Strength: Patient able to tolerate 15 minutes of strengthening exs with minimal increase of pain  4. ? Function: Patient to report improved sleep without sling to 3 hours without waking. 5. Independent with Home Exercise Programs     LTG: (to be met in 12 treatments) Additional visits may be required   1. Right shoulder pain improve to 5/10 with movement  2. Supine right shoulder AAROM improve to: flexion 140 degrees; abduction 120 degrees; IR 90 degrees; ER 75 degrees. 3. Seated right shoulder AROM improve to: flexion 120 degrees; abduction 100 degrees; IR within 3\" of left shoulder; ER to behind head.   4. Right shoulder strength improve to

## 2018-03-12 ENCOUNTER — HOSPITAL ENCOUNTER (OUTPATIENT)
Dept: PHYSICAL THERAPY | Age: 48
Setting detail: THERAPIES SERIES
Discharge: HOME | End: 2018-03-12
Payer: COMMERCIAL

## 2018-03-12 PROCEDURE — 97110 THERAPEUTIC EXERCISES: CPT

## 2018-03-12 NOTE — FLOWSHEET NOTE
3/9   Chest press 15x 1 lbs    Horizontal abd 15x 1 lbs    protraction 10x 1 lbs    abduction  10x 1 lbs    flexion 10x 1 lbs    ER/IR 15x      ER stretch 5x20 sec     Sleeper stretch     IR/ER, add as able. Side lying       ER 10x     abd 10x      Horizontal abd 5x AAROM          Pendulums  HEP   HEP   Ball squeezes 20x   Issued green ball; HEP   Elbow flexion/extension   x    HEP, pt deferred, requested to end session. Shoulder rolls 10x  HEP   Scapular retraction 10x            Tband   Added 3/12   ext 10x yellow    rows 10x yellow     IR/ER 10x yellow    Other: completed bold typed exercise. Pt slow with all movements. Held some exercises due to pt changed appt time due to transportation issues/time constraints.       Specific Instructions for next treatment:  Improve ROM; increase strength (slowly); improve function; decrease pain.              Treatment Charges: Mins Units   []  Modalities     []  Ther Exercise   40 3   []  Manual Therapy     []  Ther Activities     []  Aquatics     [x]  Vasocompression      []  Other     Total Treatment time  40 3       Assessment: [x] Progressing toward added tband exercises with good tolerance. Pt reports tband exercises made shldr feel better. [] No change. [x] Other:  Slow movement throughout. STG: (to be met in 6 treatments)  1. ? Pain: Right shoulder pain improve to 7/10 with movement  2. ? ROM: Supine right shoulder AAROM improve to: flexion 90 degrees; abduction 80 degrees; IR 60 degrees; ER 45 degrees  3. ? Strength: Patient able to tolerate 15 minutes of strengthening exs with minimal increase of pain  4. ? Function: Patient to report improved sleep without sling to 3 hours without waking. 5. Independent with Home Exercise Programs     LTG: (to be met in 12 treatments) Additional visits may be required   1. Right shoulder pain improve to 5/10 with movement  2.  Supine right shoulder AAROM improve to: flexion 140 degrees; abduction 120 degrees; IR 90 degrees; ER 75 degrees. 3. Seated right shoulder AROM improve to: flexion 120 degrees; abduction 100 degrees; IR within 3\" of left shoulder; ER to behind head. 4. Right shoulder strength improve to 4-/5 grossly  5. DASH score of 50% functionally impaired or less  6. Sleep improve to 6 hours without waking due to right shoulder pain. 7. Patient to resume all ADLs and IADLs.                   Patient goals: \"get better\"       Pt. Education:  [x] Yes  [] No  [] Reviewed Prior HEP/Ed  Method of Education: [x] Verbal  [x] Demo  [x] Written  Comprehension of Education:  [x] Verbalizes understanding. [x] Demonstrates understanding. [] Needs review. [x] Demonstrates/verbalizes HEP/Ed previously given. 2/27/18: supine wand: flexion, IR/ER, horizontal abd. Cervical Spine: SB.rotation, Levator. stretch   3/9/18: wall shldr flexion/abduciton. IR with towel. Plan: [x] Continue per plan of care.    [] Other:      Time In: 204   AM         Time Out: 250   AM    Electronically signed by:  Mj Bejarano PTA

## 2018-03-14 ENCOUNTER — HOSPITAL ENCOUNTER (OUTPATIENT)
Dept: PHYSICAL THERAPY | Age: 48
Setting detail: THERAPIES SERIES
Discharge: HOME | End: 2018-03-14
Payer: COMMERCIAL

## 2018-03-14 PROCEDURE — 97110 THERAPEUTIC EXERCISES: CPT

## 2018-03-14 NOTE — FLOWSHEET NOTE
treatments)  1. ? Pain: Right shoulder pain improve to 7/10 with movement 3/14/18 NOT MET  2. ? ROM: Supine right shoulder AAROM improve to: flexion 90 degrees; abduction 80 degrees; IR 60 degrees; ER 45 degrees 3/14/18 MET  3. ? Strength: Patient able to tolerate 15 minutes of strengthening exs with minimal increase of pain 3/14/18 MET   4. ? Function: Patient to report improved sleep without sling to 3 hours without waking. 3/14 MET, sleeping 3-4 hours  5. Independent with Home Exercise Programs 3/14/18 MET     LTG: (to be met in 12 treatments) Additional visits may be required   1. Right shoulder pain improve to 5/10 with movement  2. Supine right shoulder AAROM improve to: flexion 140 degrees; abduction 120 degrees; IR 90 degrees; ER 75 degrees. 3. Seated right shoulder AROM improve to: flexion 120 degrees; abduction 100 degrees; IR within 3\" of left shoulder; ER to behind head. 4. Right shoulder strength improve to 4-/5 grossly  5. DASH score of 50% functionally impaired or less  6. Sleep improve to 6 hours without waking due to right shoulder pain. 7. Patient to resume all ADLs and IADLs.                   Patient goals: \"get better\"       Pt. Education:  [x] Yes  [] No  [] Reviewed Prior HEP/Ed  Method of Education: [x] Verbal  [x] Demo  [x] Written  Comprehension of Education:  [x] Verbalizes understanding. [x] Demonstrates understanding. [] Needs review. [x] Demonstrates/verbalizes HEP/Ed previously given. 2/27/18: supine wand: flexion, IR/ER, horizontal abd. Cervical Spine: SB.rotation, Levator. stretch   3/9/18: wall shldr flexion/abduciton. IR with towel. Plan: [x] Continue per plan of care.    [] Other:      Time In: 155   PM         Time Out: 250    PM    Electronically signed by:  Trudy Rooney PTA

## 2018-03-19 ENCOUNTER — HOSPITAL ENCOUNTER (OUTPATIENT)
Dept: PHYSICAL THERAPY | Age: 48
Setting detail: THERAPIES SERIES
Discharge: HOME | End: 2018-03-19
Payer: COMMERCIAL

## 2018-03-19 NOTE — FLOWSHEET NOTE
[x] Jamar Pike       Outpatient Physical        Therapy       [address]       Phone: [phone]       Fax: [phone] [] [facility] at [address] [facility]       Phone: [phone]       Fax: [phone] [] [place]. [address] [place] [address]   Phone: [phone]   Fax:  [phone]     Physical Therapy Daily Treatment Note    Date:  3/19/2018  Patient Name:  Jonny Hernandez    :  1970  MRN: [id number]   Physician: Dr. Tomas Francising: Everardo Gonzalez Workers Comp- 12 visits from 18 - 18  Medical Diagnosis: s/p right shoulder arthroscopy with revision of rotator cuff repair                      Rehab Codes: M 25.611, M 25.511, M 62.81, M 79.601  Onset Date: 18                 Next 's appt: 18  Visit# / total visits:     Cancels/No Shows: 0/1    NOTE INTENTIONALLY DELETED.  PT NOT TREATED, LATE CX VIA PT AS SHE STARTED HOME HEALTH PT.               Electronically signed by:  Michel Rider PTA

## 2018-03-19 NOTE — DISCHARGE SUMMARY
[x] [facility]        Outpatient Physical                Therapy       [address]       Phone: [phone]       Fax: [phone] [] [facility]       [facility] at [address]       Phone: [phone]       Fax: [phone] [] [facility]. [address] [place] [address]     [facility]     Phone: [phone]     Fax:  [phone]     Physical Therapy Discharge Note    Date: 3/19/2018      Patient: Moreno iWlder  : 1970  MRN: [id number]    Physician: Dr. Singh Pi: Ilsa Delacruz Comp- 12 visits from 18 - 18  Medical Diagnosis: s/p right shoulder arthroscopy with revision of rotator cuff repair                      Rehab Codes: M 25.611, M 25.511, M 62.81, M 79.601  Onset Date: 18                 Next 's appt: 18  Visit# / total visits:                Cancels/No Shows:     Date of initial visit: 18                Date of final visit: 18     Subjective:  Pain:  [x] Yes  [] No  Location:  R ana Pain Rating: (0-10 scale)8-9 /10  Pain altered Tx:  [x] No  [] Yes  Action:  Comments: 3/14/18 Reports she just took 1/2 pain pill at 2 pm.  States she used hot pack earlier today due to pain level. States JAX perez felt better at the end of PT session on 3/12/18, states that date pain went down to 6-7/10. Also reports she was approved for home Physical therapy due to transportation issues. She does not have this scheduled yet. Objective:  Test Measurements:  3/14/18:R UESupine  KERMITOM shldr flexion 130, abd 84  Supine AROM IR 67 and ER 57 with 45 degrees abduction.    Function: 18:  81% disability         Assessment:   STG: (to be met in 6 treatments)  1. ? Pain: Right shoulder pain improve to 7/10 with movement -- No change; pain remains elevated at 8-9/10  2. ? ROM: Supine right shoulder AAROM improve to: flexion 90 degrees; prescribed number of treatment sessions. [x] Other: Pt transferred to [facility] due to transportion issues. Electronically signed by Salomón Thomas PTA on 3/19/2018 at 9:37 AM   Electronically signed by Brittany Sandoval PT on 3/20/2018 at 11:22 PM        If you have any questions or concerns, please don't hesitate to call.   Thank you for your referral.

## 2018-03-21 ENCOUNTER — APPOINTMENT (OUTPATIENT)
Dept: PHYSICAL THERAPY | Age: 48
End: 2018-03-21
Payer: COMMERCIAL

## 2018-04-05 ENCOUNTER — HOSPITAL ENCOUNTER (OUTPATIENT)
Dept: PHYSICAL THERAPY | Age: 48
Setting detail: THERAPIES SERIES
Discharge: HOME | End: 2018-04-05
Payer: COMMERCIAL

## 2018-04-05 ENCOUNTER — APPOINTMENT (OUTPATIENT)
Dept: PHYSICAL THERAPY | Age: 48
End: 2018-04-05
Payer: COMMERCIAL

## 2018-04-05 PROCEDURE — 97164 PT RE-EVAL EST PLAN CARE: CPT

## 2018-04-09 ENCOUNTER — HOSPITAL ENCOUNTER (OUTPATIENT)
Dept: PHYSICAL THERAPY | Age: 48
Setting detail: THERAPIES SERIES
Discharge: HOME | End: 2018-04-09
Payer: COMMERCIAL

## 2018-04-09 PROCEDURE — 97016 VASOPNEUMATIC DEVICE THERAPY: CPT

## 2018-04-09 PROCEDURE — 97110 THERAPEUTIC EXERCISES: CPT

## 2018-04-11 ENCOUNTER — HOSPITAL ENCOUNTER (EMERGENCY)
Age: 48
Discharge: LEFT BEFORE DISCHARGE | End: 2018-04-11
Attending: EMERGENCY MEDICINE
Payer: MEDICAID

## 2018-04-11 ENCOUNTER — APPOINTMENT (OUTPATIENT)
Dept: GENERAL RADIOLOGY | Age: 48
End: 2018-04-11
Payer: MEDICAID

## 2018-04-11 ENCOUNTER — HOSPITAL ENCOUNTER (OUTPATIENT)
Dept: PHYSICAL THERAPY | Age: 48
Setting detail: THERAPIES SERIES
Discharge: HOME | End: 2018-04-11
Payer: COMMERCIAL

## 2018-04-11 VITALS
DIASTOLIC BLOOD PRESSURE: 73 MMHG | HEART RATE: 60 BPM | BODY MASS INDEX: 21.77 KG/M2 | SYSTOLIC BLOOD PRESSURE: 122 MMHG | RESPIRATION RATE: 18 BRPM | WEIGHT: 108 LBS | TEMPERATURE: 97.3 F | OXYGEN SATURATION: 98 % | HEIGHT: 59 IN

## 2018-04-11 DIAGNOSIS — R55 NEAR SYNCOPE: Primary | ICD-10-CM

## 2018-04-11 LAB
ABSOLUTE EOS #: 0.05 K/UL (ref 0–0.44)
ABSOLUTE IMMATURE GRANULOCYTE: <0.03 K/UL (ref 0–0.3)
ABSOLUTE LYMPH #: 1.36 K/UL (ref 1.1–3.7)
ABSOLUTE MONO #: 0.32 K/UL (ref 0.1–1.2)
ANION GAP SERPL CALCULATED.3IONS-SCNC: 13 MMOL/L (ref 9–17)
BASOPHILS # BLD: 1 % (ref 0–2)
BASOPHILS ABSOLUTE: 0.05 K/UL (ref 0–0.2)
BUN BLDV-MCNC: 12 MG/DL (ref 6–20)
BUN/CREAT BLD: ABNORMAL (ref 9–20)
CALCIUM SERPL-MCNC: 8.9 MG/DL (ref 8.6–10.4)
CHLORIDE BLD-SCNC: 101 MMOL/L (ref 98–107)
CO2: 22 MMOL/L (ref 20–31)
CREAT SERPL-MCNC: 0.67 MG/DL (ref 0.5–0.9)
DIFFERENTIAL TYPE: ABNORMAL
EKG ATRIAL RATE: 57 BPM
EKG P AXIS: 64 DEGREES
EKG P-R INTERVAL: 150 MS
EKG Q-T INTERVAL: 376 MS
EKG QRS DURATION: 64 MS
EKG QTC CALCULATION (BAZETT): 365 MS
EKG R AXIS: 16 DEGREES
EKG T AXIS: 32 DEGREES
EKG VENTRICULAR RATE: 57 BPM
EOSINOPHILS RELATIVE PERCENT: 1 % (ref 1–4)
GFR AFRICAN AMERICAN: >60 ML/MIN
GFR NON-AFRICAN AMERICAN: >60 ML/MIN
GFR SERPL CREATININE-BSD FRML MDRD: ABNORMAL ML/MIN/{1.73_M2}
GFR SERPL CREATININE-BSD FRML MDRD: ABNORMAL ML/MIN/{1.73_M2}
GLUCOSE BLD-MCNC: 139 MG/DL (ref 70–99)
HCT VFR BLD CALC: 39.6 % (ref 36.3–47.1)
HEMOGLOBIN: 12.3 G/DL (ref 11.9–15.1)
IMMATURE GRANULOCYTES: 0 %
LYMPHOCYTES # BLD: 26 % (ref 24–43)
MCH RBC QN AUTO: 27.6 PG (ref 25.2–33.5)
MCHC RBC AUTO-ENTMCNC: 31.1 G/DL (ref 28.4–34.8)
MCV RBC AUTO: 88.8 FL (ref 82.6–102.9)
MONOCYTES # BLD: 6 % (ref 3–12)
NRBC AUTOMATED: 0 PER 100 WBC
PDW BLD-RTO: 13.6 % (ref 11.8–14.4)
PLATELET # BLD: 236 K/UL (ref 138–453)
PLATELET ESTIMATE: ABNORMAL
PMV BLD AUTO: 9.7 FL (ref 8.1–13.5)
POC TROPONIN I: 0 NG/ML (ref 0–0.1)
POC TROPONIN I: 0.01 NG/ML (ref 0–0.1)
POC TROPONIN INTERP: NORMAL
POC TROPONIN INTERP: NORMAL
POTASSIUM SERPL-SCNC: 4.2 MMOL/L (ref 3.7–5.3)
RBC # BLD: 4.46 M/UL (ref 3.95–5.11)
RBC # BLD: ABNORMAL 10*6/UL
SEG NEUTROPHILS: 66 % (ref 36–65)
SEGMENTED NEUTROPHILS ABSOLUTE COUNT: 3.35 K/UL (ref 1.5–8.1)
SODIUM BLD-SCNC: 136 MMOL/L (ref 135–144)
WBC # BLD: 5.2 K/UL (ref 3.5–11.3)
WBC # BLD: ABNORMAL 10*3/UL

## 2018-04-11 PROCEDURE — 99284 EMERGENCY DEPT VISIT MOD MDM: CPT | Performed by: NURSE PRACTITIONER

## 2018-04-11 PROCEDURE — 80048 BASIC METABOLIC PNL TOTAL CA: CPT

## 2018-04-11 PROCEDURE — 85025 COMPLETE CBC W/AUTO DIFF WBC: CPT

## 2018-04-11 PROCEDURE — 97530 THERAPEUTIC ACTIVITIES: CPT

## 2018-04-11 PROCEDURE — 99284 EMERGENCY DEPT VISIT MOD MDM: CPT

## 2018-04-11 PROCEDURE — 71046 X-RAY EXAM CHEST 2 VIEWS: CPT

## 2018-04-11 PROCEDURE — 93005 ELECTROCARDIOGRAM TRACING: CPT

## 2018-04-11 PROCEDURE — 84484 ASSAY OF TROPONIN QUANT: CPT

## 2018-04-11 PROCEDURE — 97110 THERAPEUTIC EXERCISES: CPT

## 2018-04-11 PROCEDURE — 6370000000 HC RX 637 (ALT 250 FOR IP): Performed by: PHYSICIAN ASSISTANT

## 2018-04-11 RX ORDER — 0.9 % SODIUM CHLORIDE 0.9 %
500 INTRAVENOUS SOLUTION INTRAVENOUS ONCE
Status: DISCONTINUED | OUTPATIENT
Start: 2018-04-11 | End: 2018-04-11 | Stop reason: HOSPADM

## 2018-04-11 RX ORDER — MECLIZINE HCL 12.5 MG/1
12.5 TABLET ORAL ONCE
Status: COMPLETED | OUTPATIENT
Start: 2018-04-11 | End: 2018-04-11

## 2018-04-11 RX ADMIN — MECLIZINE HCL 12.5 MG: 12.5 TABLET ORAL at 13:36

## 2018-04-11 ASSESSMENT — ENCOUNTER SYMPTOMS
NAUSEA: 0
EYE DISCHARGE: 0
WHEEZING: 0
RHINORRHEA: 0
COLOR CHANGE: 0
EYE ITCHING: 0
SORE THROAT: 0
COUGH: 0
BACK PAIN: 0
EYE PAIN: 0
VOMITING: 0

## 2018-04-16 ENCOUNTER — HOSPITAL ENCOUNTER (OUTPATIENT)
Dept: PHYSICAL THERAPY | Age: 48
Setting detail: THERAPIES SERIES
Discharge: HOME | End: 2018-04-16
Payer: COMMERCIAL

## 2018-04-16 PROCEDURE — 97110 THERAPEUTIC EXERCISES: CPT

## 2018-04-18 ENCOUNTER — HOSPITAL ENCOUNTER (OUTPATIENT)
Dept: PHYSICAL THERAPY | Age: 48
Setting detail: THERAPIES SERIES
Discharge: HOME | End: 2018-04-18
Payer: COMMERCIAL

## 2018-04-18 PROCEDURE — 97110 THERAPEUTIC EXERCISES: CPT

## 2018-04-20 ENCOUNTER — HOSPITAL ENCOUNTER (OUTPATIENT)
Dept: PHYSICAL THERAPY | Age: 48
Setting detail: THERAPIES SERIES
Discharge: HOME | End: 2018-04-20
Payer: COMMERCIAL

## 2018-04-23 ENCOUNTER — HOSPITAL ENCOUNTER (OUTPATIENT)
Dept: PHYSICAL THERAPY | Age: 48
Setting detail: THERAPIES SERIES
Discharge: HOME | End: 2018-04-23

## 2018-04-27 ENCOUNTER — HOSPITAL ENCOUNTER (OUTPATIENT)
Dept: PHYSICAL THERAPY | Age: 48
Setting detail: THERAPIES SERIES
Discharge: HOME | End: 2018-04-27
Payer: COMMERCIAL

## 2018-04-27 PROCEDURE — 97110 THERAPEUTIC EXERCISES: CPT

## 2018-04-27 PROCEDURE — 97140 MANUAL THERAPY 1/> REGIONS: CPT

## 2018-04-30 ENCOUNTER — HOSPITAL ENCOUNTER (OUTPATIENT)
Dept: PHYSICAL THERAPY | Age: 48
Setting detail: THERAPIES SERIES
Discharge: HOME | End: 2018-04-30

## 2018-05-02 ENCOUNTER — HOSPITAL ENCOUNTER (OUTPATIENT)
Dept: PHYSICAL THERAPY | Age: 48
Setting detail: THERAPIES SERIES
Discharge: HOME | End: 2018-05-02
Payer: COMMERCIAL

## 2018-05-02 PROCEDURE — 97110 THERAPEUTIC EXERCISES: CPT

## 2018-05-04 ENCOUNTER — HOSPITAL ENCOUNTER (OUTPATIENT)
Dept: PHYSICAL THERAPY | Age: 48
Setting detail: THERAPIES SERIES
Discharge: HOME | End: 2018-05-04

## 2018-05-07 ENCOUNTER — HOSPITAL ENCOUNTER (OUTPATIENT)
Dept: PHYSICAL THERAPY | Age: 48
Setting detail: THERAPIES SERIES
Discharge: HOME | End: 2018-05-07
Payer: COMMERCIAL

## 2018-05-07 PROCEDURE — 97140 MANUAL THERAPY 1/> REGIONS: CPT

## 2018-05-07 PROCEDURE — 97110 THERAPEUTIC EXERCISES: CPT

## 2018-05-09 ENCOUNTER — HOSPITAL ENCOUNTER (OUTPATIENT)
Dept: PHYSICAL THERAPY | Age: 48
Setting detail: THERAPIES SERIES
Discharge: HOME | End: 2018-05-09
Payer: COMMERCIAL

## 2018-05-09 PROCEDURE — 97110 THERAPEUTIC EXERCISES: CPT

## 2018-05-09 PROCEDURE — 97140 MANUAL THERAPY 1/> REGIONS: CPT

## 2018-05-14 ENCOUNTER — HOSPITAL ENCOUNTER (OUTPATIENT)
Dept: PHYSICAL THERAPY | Age: 48
Setting detail: THERAPIES SERIES
Discharge: HOME | End: 2018-05-14
Payer: COMMERCIAL

## 2018-05-16 ENCOUNTER — HOSPITAL ENCOUNTER (OUTPATIENT)
Dept: PHYSICAL THERAPY | Age: 48
Setting detail: THERAPIES SERIES
Discharge: HOME | End: 2018-05-16
Payer: COMMERCIAL

## 2018-05-16 PROCEDURE — 97110 THERAPEUTIC EXERCISES: CPT

## 2018-05-17 ENCOUNTER — HOSPITAL ENCOUNTER (OUTPATIENT)
Dept: PHYSICAL THERAPY | Age: 48
Setting detail: THERAPIES SERIES
Discharge: HOME | End: 2018-05-17
Payer: COMMERCIAL

## 2018-05-17 PROCEDURE — 97110 THERAPEUTIC EXERCISES: CPT

## 2018-05-21 ENCOUNTER — HOSPITAL ENCOUNTER (OUTPATIENT)
Dept: PHYSICAL THERAPY | Age: 48
Setting detail: THERAPIES SERIES
End: 2018-05-21
Payer: COMMERCIAL

## 2018-05-24 ENCOUNTER — HOSPITAL ENCOUNTER (OUTPATIENT)
Dept: PHYSICAL THERAPY | Age: 48
Setting detail: THERAPIES SERIES
Discharge: HOME | End: 2018-05-24
Payer: COMMERCIAL

## 2018-05-30 ENCOUNTER — HOSPITAL ENCOUNTER (OUTPATIENT)
Dept: PHYSICAL THERAPY | Age: 48
Setting detail: THERAPIES SERIES
Discharge: HOME | End: 2018-05-30
Payer: COMMERCIAL

## 2018-05-30 PROCEDURE — 97110 THERAPEUTIC EXERCISES: CPT

## 2018-05-31 ENCOUNTER — HOSPITAL ENCOUNTER (OUTPATIENT)
Dept: PHYSICAL THERAPY | Age: 48
Setting detail: THERAPIES SERIES
Discharge: HOME | End: 2018-05-31
Payer: COMMERCIAL

## 2018-05-31 PROCEDURE — 97110 THERAPEUTIC EXERCISES: CPT

## 2018-06-06 ENCOUNTER — HOSPITAL ENCOUNTER (OUTPATIENT)
Dept: PHYSICAL THERAPY | Age: 48
Setting detail: THERAPIES SERIES
Discharge: HOME | End: 2018-06-06
Payer: COMMERCIAL

## 2018-06-06 PROCEDURE — 97110 THERAPEUTIC EXERCISES: CPT

## 2018-06-08 ENCOUNTER — HOSPITAL ENCOUNTER (OUTPATIENT)
Dept: PHYSICAL THERAPY | Age: 48
Setting detail: THERAPIES SERIES
Discharge: HOME | End: 2018-06-08
Payer: COMMERCIAL

## 2018-06-08 PROCEDURE — 97110 THERAPEUTIC EXERCISES: CPT

## 2018-06-18 ENCOUNTER — HOSPITAL ENCOUNTER (OUTPATIENT)
Dept: PHYSICAL THERAPY | Age: 48
Setting detail: THERAPIES SERIES
Discharge: HOME | End: 2018-06-18
Payer: COMMERCIAL

## 2018-06-18 PROCEDURE — 97140 MANUAL THERAPY 1/> REGIONS: CPT

## 2018-06-18 PROCEDURE — 97110 THERAPEUTIC EXERCISES: CPT

## 2018-06-20 ENCOUNTER — HOSPITAL ENCOUNTER (OUTPATIENT)
Dept: PHYSICAL THERAPY | Age: 48
Setting detail: THERAPIES SERIES
Discharge: HOME | End: 2018-06-20
Payer: COMMERCIAL

## 2018-06-20 PROCEDURE — 97110 THERAPEUTIC EXERCISES: CPT

## 2018-06-20 PROCEDURE — 97140 MANUAL THERAPY 1/> REGIONS: CPT

## 2018-06-27 ENCOUNTER — APPOINTMENT (OUTPATIENT)
Dept: PHYSICAL THERAPY | Age: 48
End: 2018-06-27
Payer: COMMERCIAL

## 2018-06-28 ENCOUNTER — HOSPITAL ENCOUNTER (OUTPATIENT)
Dept: PHYSICAL THERAPY | Age: 48
Setting detail: THERAPIES SERIES
Discharge: HOME | End: 2018-06-28
Payer: COMMERCIAL

## 2018-06-28 PROCEDURE — 97110 THERAPEUTIC EXERCISES: CPT

## 2018-06-28 PROCEDURE — 97140 MANUAL THERAPY 1/> REGIONS: CPT

## 2018-07-11 ENCOUNTER — HOSPITAL ENCOUNTER (EMERGENCY)
Age: 48
Discharge: HOME | End: 2018-07-11
Attending: EMERGENCY MEDICINE
Payer: MEDICAID

## 2018-07-11 VITALS
HEIGHT: 59 IN | RESPIRATION RATE: 16 BRPM | OXYGEN SATURATION: 99 % | TEMPERATURE: 97.5 F | WEIGHT: 114 LBS | HEART RATE: 90 BPM | DIASTOLIC BLOOD PRESSURE: 76 MMHG | SYSTOLIC BLOOD PRESSURE: 118 MMHG | BODY MASS INDEX: 22.98 KG/M2

## 2018-07-11 DIAGNOSIS — M79.605 LEFT LEG PAIN: Primary | ICD-10-CM

## 2018-07-11 PROCEDURE — 96372 THER/PROPH/DIAG INJ SC/IM: CPT

## 2018-07-11 PROCEDURE — 6360000002 HC RX W HCPCS: Performed by: NURSE PRACTITIONER

## 2018-07-11 PROCEDURE — 99283 EMERGENCY DEPT VISIT LOW MDM: CPT

## 2018-07-11 RX ORDER — KETOROLAC TROMETHAMINE 30 MG/ML
60 INJECTION, SOLUTION INTRAMUSCULAR; INTRAVENOUS ONCE
Status: COMPLETED | OUTPATIENT
Start: 2018-07-11 | End: 2018-07-11

## 2018-07-11 RX ADMIN — KETOROLAC TROMETHAMINE 60 MG: 30 INJECTION, SOLUTION INTRAMUSCULAR at 21:49

## 2018-07-11 ASSESSMENT — ENCOUNTER SYMPTOMS
CONSTIPATION: 0
ABDOMINAL PAIN: 0
COLOR CHANGE: 0
VOMITING: 0
RHINORRHEA: 0
WHEEZING: 0
NAUSEA: 0
COUGH: 0
SINUS PRESSURE: 0
SORE THROAT: 0
SHORTNESS OF BREATH: 0
DIARRHEA: 0

## 2018-07-11 ASSESSMENT — PAIN SCALES - GENERAL: PAINLEVEL_OUTOF10: 10

## 2018-07-11 ASSESSMENT — PAIN DESCRIPTION - LOCATION: LOCATION: LEG

## 2018-07-11 ASSESSMENT — PAIN DESCRIPTION - DESCRIPTORS: DESCRIPTORS: ACHING;CONSTANT

## 2018-07-11 ASSESSMENT — PAIN DESCRIPTION - ORIENTATION: ORIENTATION: LEFT

## 2018-07-11 ASSESSMENT — PAIN DESCRIPTION - FREQUENCY: FREQUENCY: CONTINUOUS

## 2018-07-12 ENCOUNTER — HOSPITAL ENCOUNTER (OUTPATIENT)
Dept: VASCULAR LAB | Age: 48
Discharge: HOME | End: 2018-07-12
Payer: MEDICAID

## 2018-07-12 ENCOUNTER — OFFICE VISIT (OUTPATIENT)
Dept: FAMILY MEDICINE CLINIC | Age: 48
End: 2018-07-12
Payer: MEDICAID

## 2018-07-12 VITALS
HEART RATE: 61 BPM | TEMPERATURE: 98 F | WEIGHT: 116.2 LBS | BODY MASS INDEX: 23.43 KG/M2 | DIASTOLIC BLOOD PRESSURE: 76 MMHG | SYSTOLIC BLOOD PRESSURE: 126 MMHG | HEIGHT: 59 IN

## 2018-07-12 DIAGNOSIS — M25.562 ACUTE PAIN OF LEFT KNEE: Primary | ICD-10-CM

## 2018-07-12 DIAGNOSIS — M79.605 LEFT LEG PAIN: Primary | ICD-10-CM

## 2018-07-12 PROCEDURE — 99213 OFFICE O/P EST LOW 20 MIN: CPT | Performed by: STUDENT IN AN ORGANIZED HEALTH CARE EDUCATION/TRAINING PROGRAM

## 2018-07-12 PROCEDURE — 4004F PT TOBACCO SCREEN RCVD TLK: CPT | Performed by: STUDENT IN AN ORGANIZED HEALTH CARE EDUCATION/TRAINING PROGRAM

## 2018-07-12 PROCEDURE — G8420 CALC BMI NORM PARAMETERS: HCPCS | Performed by: STUDENT IN AN ORGANIZED HEALTH CARE EDUCATION/TRAINING PROGRAM

## 2018-07-12 PROCEDURE — 93971 EXTREMITY STUDY: CPT

## 2018-07-12 PROCEDURE — G8428 CUR MEDS NOT DOCUMENT: HCPCS | Performed by: STUDENT IN AN ORGANIZED HEALTH CARE EDUCATION/TRAINING PROGRAM

## 2018-07-12 RX ORDER — CYCLOBENZAPRINE HCL 5 MG
5 TABLET ORAL 3 TIMES DAILY PRN
Qty: 20 TABLET | Refills: 0 | Status: SHIPPED | OUTPATIENT
Start: 2018-07-12 | End: 2018-08-10 | Stop reason: SDUPTHER

## 2018-07-12 ASSESSMENT — ENCOUNTER SYMPTOMS
WHEEZING: 0
SHORTNESS OF BREATH: 0
COUGH: 0

## 2018-07-12 NOTE — PATIENT INSTRUCTIONS
Thank you for letting us take care of you today. We hope all your questions were addressed. If a question was overlooked or something else comes to mind after you return home, please contact a member of your Care Team listed below. Please make sure you have a routine office visit set up to follow-up on [address]. Your Care Team at [place] [address] is Team #3  Makenzie Hinds MD (Faculty)  Kendell Amaro MD (Faculty  Jobenny Barlow MD (Resident)  Renita Sharpe MD (Resident)   Rea Toney MD (Resident)  Nacho Richards MD (Resident)  Dillan Akhtar MD (Resident)  IVY Butterfield., TAE Diaz., Itzel Mcneil ([address])  Agatha Koenig RN, ([address] [place] )  Gabe Torres, Ph.D., (Behavioral Services)  Lashell Velasco, [address] [place] [address] (Clinical Pharmacist)     Office phone number: [phone]    If you need to get in right away due to illness, please be advised we have \"Same Day\" appointments available Monday-Friday. Please call us at [phone] option #3 to schedule your \"Same Day\" appointment.

## 2018-07-12 NOTE — ED PROVIDER NOTES
MG capsule 2 capsules every 8 hours, Disp-6 capsule, R-0Normal      ondansetron (ZOFRAN) 4 MG tablet 1 tablet on the tongue and allow to dissolve every 8 hours, Disp-30 tablet, R-0Normal             PAST MEDICAL HISTORY         Diagnosis Date    Anemia     Anxiety     Chronic right shoulder pain     Depression     Irregular heartbeat        SURGICAL HISTORY           Procedure Laterality Date    BRAIN ANEURYSM SURGERY  2014    [facility]iling at [address]      x4    [facility] ARTHROSCOP,SURG,W/ROTAT CUFF REPR Right 2018    RIGHT SHOULDER ARTHROSCOPY ROTATOR CUFF LYSIS OF ADHESIONS performed by Kat Ross DO at [address] ARTHROSCOPY Right 2018    extensive lysis of adhesions; Open suture granuloma removal     SHOULDER SURGERY Right     x 3    TONSILLECTOMY           FAMILY HISTORY       Family History   Problem Relation Age of Onset    Asthma Mother     Cancer Father      Family Status   Relation Status    Mother Alive    Father     Sister Alive    Brother Alive        SOCIAL HISTORY      reports that she has been smoking Cigarettes. She has a 6.00 pack-year smoking history. She has never used smokeless tobacco. She reports that she does not drink alcohol or use drugs. REVIEW OF SYSTEMS    (2-9 systems for level 4, 10 or more for level 5)     Review of Systems   Constitutional: Negative for chills, fever and unexpected weight change. HENT: Negative for congestion, rhinorrhea, sinus pressure and sore throat. Respiratory: Negative for cough, shortness of breath and wheezing. Cardiovascular: Negative for chest pain and palpitations. Gastrointestinal: Negative for abdominal pain, constipation, diarrhea, nausea and vomiting. Genitourinary: Negative for dysuria and hematuria. Musculoskeletal: Negative for arthralgias and myalgias. Leg pain   Skin: Negative for color change and rash.    Neurological: Negative for dizziness, weakness

## 2018-07-12 NOTE — ED NOTES
Pt presents to ED ambulatory with steady gait c/o of left lower leg pain for the past couple of days. Pt rates pain 10/10. Pt states pain to back of calf that radiates down to foot. Pt states pain is worse with no movement. No swelling, bruising, or deformity noted. Pt denies injury to area. Skin is brown, warm, and dry. Palp pedal pulse with cap refill less than 3 seconds.  Pt states taking home medications (percocet) with no relief       [address] Milla Whitlock RN  07/11/18 [id number]

## 2018-07-12 NOTE — PROGRESS NOTES
Visit Information    Have you changed or started any medications since your last visit including any over-the-counter medicines, vitamins, or herbal medicines? no   Have you stopped taking any of your medications? Is so, why? -  no  Are you having any side effects from any of your medications? - no    Have you seen any other physician or provider since your last visit? Yes [facility] ED  Have you had any other diagnostic tests since your last visit?  no   Have you been seen in the emergency room and/or had an admission in a hospital since we last saw you?  yes - [facility] ED   Have you had your routine dental cleaning in the past 6 months?  no     Do you have an active MyChart account? If no, what is the barrier?   Yes    Patient Care Team:  Lino Cabral MD as PCP - General (Family Medicine)    Medical History Review  Past Medical, Family, and Social History reviewed and does contribute to the patient presenting condition    Health Maintenance   Topic Date Due    HIV screen  08/04/1983    Lipid screen  08/04/2008    Flu vaccine (1) 11/24/2018 (Originally 9/1/2018)    Pneumococcal med risk (1 of 1 - PPSV23) 03/24/2023 (Originally 8/4/1987)    Cervical cancer screen  04/20/2020    DTaP/Tdap/Td vaccine (2 - Td) 03/17/2025
Reviewed prior labs and health maintenance  3. Continue current medications, diet and exercise. 4.  Discussed use, benefit, and side effects of prescribed medications. Barriers to medication compliance addressed. All patient questions answered. Pt voiced understanding. 5.  Patient given educational materials - see patient instructions  6. Was a self-tracking handout given in paper form or via [employer]? No  If yes, see orders or list here. PHQ Scores 10/19/2017 10/19/2017 6/26/2017   PHQ2 Score - 2 6   PHQ9 Score 14 2 19     Interpretation of Total Score Depression Severity: 1-4 = Minimal depression, 5-9 = Mild depression, 10-14 = Moderate depression, 15-19 = Moderately severe depression, 20-27 = Severe depression  Normal    Completed Refills   Requested Prescriptions     Signed Prescriptions Disp Refills    cyclobenzaprine (FLEXERIL) 5 MG tablet 20 tablet 0     Sig: Take 1 tablet by mouth 3 times daily as needed for Muscle spasms       Return in about 6 weeks (around 8/23/2018) for l knee pain.

## 2018-07-16 ENCOUNTER — HOSPITAL ENCOUNTER (OUTPATIENT)
Dept: PHYSICAL THERAPY | Age: 48
Setting detail: THERAPIES SERIES
Discharge: HOME | End: 2018-07-16
Payer: MEDICAID

## 2018-07-16 PROCEDURE — 97140 MANUAL THERAPY 1/> REGIONS: CPT

## 2018-07-16 PROCEDURE — 97110 THERAPEUTIC EXERCISES: CPT

## 2018-07-18 ENCOUNTER — HOSPITAL ENCOUNTER (OUTPATIENT)
Dept: PHYSICAL THERAPY | Age: 48
Setting detail: THERAPIES SERIES
Discharge: HOME | End: 2018-07-18
Payer: MEDICAID

## 2018-07-18 PROCEDURE — 97140 MANUAL THERAPY 1/> REGIONS: CPT

## 2018-07-18 PROCEDURE — 97110 THERAPEUTIC EXERCISES: CPT

## 2018-07-18 NOTE — FLOWSHEET NOTE
[x] Alex Sandoval       Outpatient Physical        Therapy       [address]       Phone: [phone]       Fax: [phone] [] [facility] at [address]       Phone: [phone]       Fax: [phone] [] [facility]  [address] [place] [address]   Phone: [phone]   Fax:  [phone]     Physical Therapy Daily Treatment Note    Date:  2018  Patient Name:  Madalyn Horton    :  1970  MRN: [id number]   Physician: Jaziel Weathers DO   Insurance: [email or web address] Comp-extended from 18- 8/15/18 for 18 visits, 2-3x per week. Medical Diagnosis: s/p right shoulder arthroscopy with revision of rotator cuff repair           Rehab Codes: M 25.611, M 25.511, M 62.81, M 79.601  Onset Date: 2018            Next 's appt: Wk of    Visit# / total visits:  of newly extended C9   ( from prior C9's: 18-18 and 18-7/3/18)    Cancels/No Shows:     Subjective:   Pain:  [x] Yes  [] No Location: R shldr  Pain Rating: (0-10 scale) 7-8/10     Pain altered Tx:  [x] No  [] Yes  Action:  Comments:  Pt 19 mins late. States she went to ER last night and reports she received 3 injections for L sciatica. (has new script for L knee pain now to schedule eval) Reports R shldr pain is up due to sleeping on R shdlr to get wt off of left hip. Requesting to go easy on shldr this date.        Objective:    Modalities: HP to R shldr at end of tx for 10 min-HELD this date  Precautions:  TO [address] TRAPS/SCAPULAR AREA WITH ROM PER LINCOLN GAONA AT Jeannette Marino MD ORDER (CALLED )  Exercises:  Exercise   Right shoulder Reps/ Time Weight/ Level  Comments   UBE  2/2 mins L2 x     Cervical rotation to the left  HEP    Verbal instruct with demo for home use   Cervical side bending to the left HEP    Verbal instruct with demo for home use   Cervical flexion HEP    Verbal instruct with demo for home use   Chin tucks HEP                      Supine       PROM/shldr mobs 5 mins   Distraction PROM all planes in supine, and STM teres minor in side lying this date   FW chest press    add   FW shldr flex   x    FW abd    add   FW horz abd    add   FW flys 10x 2 lbs  x    FW triceps    x    Scapular stabilization 5x15\" 1 lbs  restart   Sleeper stretch IR       Standing       Wall ball stab. exercise 15x each  x Tennis ball, CW, CCW,    wall push up plus  15x  x               FW shldr shrugs 20x 2 lbs  restart   FW shldr flexion 15x 2 1bs   restart   FW shldr abd 15x 2 lbs   restart   Scap. retraction  2 lbs  add   Wand IR 15x 2 lbs  restart   pect corner  stretch      Try to add but be very cautions due to prior attempts and pt reports dizziness. dont be suggestive to pt of this.    shldr ext 20x grn x     triceps 20x grn x    depression 20x Blue       high row   20x grn x Progressed to high rows 7/18   biceps 20x grn      IR/ER 20x red x  progressed to 90 degrees abd 7/18   Horizontal abd 20x grn x             Side lying          ER 10x2 2 lbs x      abduction 10x2 2 lbs   Restart   Horizontal abd 10x2 2 lbs   restart   Sleepers stretch 3x20\"  x IR   Prone         Is, Ts, Ys 2x10 2 lbs   restart   ext 2x10  2 lbs  restart   rows 2x10 3 lbs  restart          Ball toss   4' wt ball  uner hand, chest pass ,shot pus throw, add as able          Cybex. Add as ablel              Other: Exercises log updated 7/18/18. completed exercises marked with \"x\"           Manual : Trigger point release and STM to R upper traps and periscapular area x 7 mins.         Specific Instructions for next treatment: Advance to independence of Exercises in clinic with flow sheet, progress all exercises adding scapular stab. Exercises, ball toss, lifting and cybex. EDUCATE PT AGAIN ON THERACANE OR TENNIS BALL STM OF SCAPULAR/UT AREAS.         Treatment Charges: Mins Units   []  Modalities HP      [x]  Ther Exercise Demonstrates/verbalizes HEP/Ed previously given. Plan: [x] Continue per plan of care-extend POC d/t newly extended C9   [x] Other: 2-3x wk for 4-6 wk new C9 7/4/18-8/15/18.   Pt scheduled 7/23/18 for Knee evaluation paper work given and pt to bring to 7/23 eval.      Time In:    1019 AM              Time Out: 1110       AM    Electronically signed by:  Christos Baer PTA

## 2018-07-20 ENCOUNTER — HOSPITAL ENCOUNTER (OUTPATIENT)
Dept: PHYSICAL THERAPY | Age: 48
Setting detail: THERAPIES SERIES
Discharge: HOME | End: 2018-07-20
Payer: MEDICAID

## 2018-07-20 NOTE — FLOWSHEET NOTE
[x] Merlene Bui       Outpatient Physical        Therapy       [address]       Phone: [phone]       Fax: [phone] [] [facility] at [address] [place] [address]       Phone: [phone]       Fax: [phone] [] [facility]  [address]   Phone: [phone]   Fax:  [phone]     Physical Therapy Daily Treatment Note    Date:  2018  Patient Name:  Karli Caldera    :  1970  MRN: [id number]   Physician: Brittney Manzo DO   Insurance: [employer] Comp-extended from 18- 8/15/18 for 18 visits, 2-3x per week. Medical Diagnosis: s/p right shoulder arthroscopy with revision of rotator cuff repair           Rehab Codes: M 25.611, M 25.511, M 62.81, M 79.601  Onset Date: 2018            Next 's appt: Wk of    Visit# / total visits: 3/18 of newly extended C9   ( from prior C9's: 18-18 and 18-7/3/18)    Cancels/No Shows:     Subjective: Pt reports increased pain during PROM, but stated that she \"needed that stretching\". Pt also reports pain in L shoulder d/t overuse. Pain:  [x] Yes  [] No Location: R shldr  Pain Rating: (0-10 scale) 7-8/10     Pain altered Tx:  [x] No  [] Yes  Action:  Comments: Pt arrived on time for PT session.       Objective:    Modalities: HP to R shldr at end of tx for 10 min-HELD this date  Precautions:  TO ADDRESS TRIGGER POINT RELEASE UPPER TRAPS/SCAPULAR AREA WITH ROM PER LINCOLN Beal MD ORDER (CALLED )  Exercises:  Exercise   Right shoulder Reps/ Time Weight/ Level  Comments   UBE  3/3 mins L2 x     Cervical rotation to the left  HEP    Verbal instruct with demo for home use   Cervical side bending to the left HEP    Verbal instruct with demo for home use   Cervical flexion HEP    Verbal instruct with demo for home use   Chin tucks HEP                      Supine       PROM/shldr mobs 5 mins  x Distraction PROM all planes in supine, and STM teres minor in side lying this date   FW chest press    add   FW shldr flex  2lbs x    FW abd  2lbs x add   FW horz abd  2lbs x add   FW flys 10x 2 lbs  x    FW triceps    x    Scapular stabilization 5x15\" 1 lbs     Alternating Isometrics 3x30\"  x Added this date   Sleeper stretch IR       Standing       Wall ball stab. exercise 15x each  x Tennis ball, CW, CCW,    wall push up plus  15x  x               FW shldr shrugs 20x 2 lbs  restart   FW shldr flexion 15x 2 1bs   restart   FW shldr abd 15x 2 lbs   restart   Scap. retraction  2 lbs  add   Wand IR 15x 2 lbs  restart   [place]  stretch      Try to add but be very cautions due to prior attempts and pt reports dizziness. dont be suggestive to pt of this.    shldr ext 20x Green      triceps 20x Green     depression 20x Blue       high row   20x Green  Progressed to high rows 7/18   biceps 20x Green      IR/ER 20x Red   progressed to 90 degrees abd 7/18   Horizontal abd 20x Green              Side lying          ER 10x2 2 lbs x      abduction 10x2 2 lbs  x    Horizontal abd 10x2 2 lbs x     Sleepers stretch 3x20\"  x IR   Prone         Is, Ts, Ys 2x10 2 lbs  x    ext 2x10  2 lbs x    rows 2x10 3 lbs x           Ball toss   4' wt ball  under hand, chest pass ,shot put throw, add as able          Cybex. Add as ablel              Other: Exercises log updated 7/18/18. completed exercises marked with \"x\"           Manual : Trigger point release and STM to R upper traps and periscapular area with AROM; inferior/ Posterior Glide with gentle distraciton        Specific Instructions for next treatment: Advance to independence of Exercises in clinic with flow sheet, progress all exercises adding scapular stab. Exercises, ball toss, lifting and cybex. EDUCATE PT AGAIN ON THERACANE OR TENNIS BALL STM OF SCAPULAR/UT AREAS.         Treatment Charges: Mins Units   []  Modalities HP  10 N/C   [x]  Ther Exercise  40 2   [x]  Manual Therapy    9 1   []  Ther Activities     []  Aquatics     []  Vasocompression      []  Other     Total Treatment time 59  3       Assessment: [x] Progressing toward goals: Continuing to progress towards goals with increased resistance and repetitions. Added alternating isometrics for increased scapulohumeral stabilization, pt tolerated well. STM to upper trapezius and periscapular area with AROM. Pt reports decreased tension in UT with increased ROM. [] No change. [] Other:       STG: (to be met in 9 treatments)  1. ? Pain:I the right shoulder 5/10 or less with activity( 6/18/2018 (goal met)  2. ? ROM: right shoulder:  flexion 160 ° or better (goal met),abduction 150 ° of better (goal met), internal rotation 85 ° or better, external rotation 85 ° or better (both rotation from 90 ° of abduction)   3. ? Strength:patient will have 4+/5 strength in right shoulder flexion, abduction and supraspinatus and external rotation at 90 ° abduction 5/7/18 NOT MET for flexion and abduction  4. ? Function:patient will have DASH score with 45 % of less impairment 5/7/18 NOT MET  5. Independent with Home Exercise Programs 4/25/18 MET  6. Demonstrate Knowledge of fall prevention   LTG: (to be met in 18 treatments)  1. DASH score with 40% or less impairment  2. Patient able to reach overhead with 3/10 or less pain  3. Patient will have 5-/5 strength in all muscle groups of the right upper extremity and scapular muscle groups to promote return to previous activities of daily living and avocational activities  4. Sleep improve to 6 hours without waking due to right shoulder pain. Additional long term goals:  1. Patient will be able to reach overhead with 2/10 or less pain in he right shoulder so she can place objects (plates) in her cupboards                    Patient goals:to return  to use of the right upper extremity       Pt.  Education:  [x] Yes  [] No  [x] Reviewed Prior HEP/Ed  Method of Education: [x] Verbal  [x] Demo [] Written  Comprehension of Education:  [x] Verbalizes understanding. [x] Demonstrates understanding. [] Needs review. [x] Demonstrates/verbalizes HEP/Ed previously given. Plan: [x] Continue per plan of care-extend POC d/t newly extended C9   [x] Other: 2-3x wk for 4-6 wk new C9 7/4/18-8/15/18.   Pt scheduled 7/23/18 for Knee evaluation paper work given and pt to bring to 7/23 eval.      Time In:    0800              Time Out: [address]     Electronically signed by: Angelic Lee PTA

## 2018-07-23 ENCOUNTER — HOSPITAL ENCOUNTER (OUTPATIENT)
Dept: PHYSICAL THERAPY | Age: 48
Setting detail: THERAPIES SERIES
Discharge: HOME | End: 2018-07-23
Payer: MEDICAID

## 2018-07-23 ENCOUNTER — APPOINTMENT (OUTPATIENT)
Dept: PHYSICAL THERAPY | Age: 48
End: 2018-07-23
Payer: MEDICAID

## 2018-07-23 PROCEDURE — 97110 THERAPEUTIC EXERCISES: CPT

## 2018-07-23 PROCEDURE — 97161 PT EVAL LOW COMPLEX 20 MIN: CPT

## 2018-07-23 NOTE — FLOWSHEET NOTE
[x] [facility]       Outpatient Physical        Therapy       [address]       Phone: [phone]       Fax: [phone] [] [facility] at [address] [facility]       Phone: [phone]       Fax: [phone] [] [facility]  [address] [place] [address]   Phone: [phone]   Fax:  [phone]     Physical Therapy Daily Treatment Note    Date:  2018  Patient Name:  Sharon Gonzales    :  1970  MRN: [id number]   Physician: Silvia Painting DO   Insurance: [employer] Comp-extended from 18- 8/15/18 for 18 visits, 2-3x per week. Medical Diagnosis: s/p right shoulder arthroscopy with revision of rotator cuff repair           Rehab Codes: M 25.611, M 25.511, M 62.81, M 79.601  Onset Date: 2018            Next 's appt: Wk of    Visit# / total visits:  of newly extended C9   ( from prior C9's: 18-18 and 18-7/3/18)    Cancels/No Shows:     Subjective: Pt arrived noting \"slightly elevated\" pain rating at 4/10. Notes last night \"was rough\" on her, could not get comfortable enough to get adequate sleep. Pain:  [x] Yes  [] No Location: R shldr  Pain Rating: (0-10 scale) 4/10     Pain altered Tx:  [x] No  [] Yes  Action:  Comments: Pt arrived on time for PT session.       Objective:    Modalities: HP to R shldr at end of tx for 10 min-HELD this date  Precautions:  TO [address] TRAPS/SCAPULAR AREA WITH ROM PER LINCOLN GAONA AT [facility] MD ORDER (CALLED )  Exercises:  Exercise   Right shoulder Reps/ Time Weight/ Level  Comments   UBE  3/3 mins L2 x     Cervical rotation to the left  HEP    Verbal instruct with demo for home use   Cervical side bending to the left HEP    Verbal instruct with demo for home use   Cervical flexion HEP    Verbal instruct with demo for home use   Chin tucks HEP                      Supine       PROM/shldr mobs 5 mins  x Distraction PROM all planes in supine, and STM teres minor in side lying this date   FW chest press 15x 2 lbs x Added 7/23   FW shldr flex 15x 2lbs x    FW abd  2lbs     FW horz abd 15x 2lbs x    FW flys 10x 2 lbs  x    FW triceps        Scapular stabilization 5x15\" 1 lbs     Alternating Isometrics 3x30\"  x Added 7/20   Sleeper stretch IR       Standing       Wall ball stab. exercise 15x each  x Tennis ball, CW, CCW,    wall push up plus  15x  x               FW shldr shrugs 15x 2 lbs x Restarted 7/23   FW shldr flexion 15x 2 lbs x Restarted 7/23   FW shldr abd 15x 2 lbs  x Restarted 7/23   Scap. retraction  2 lbs  add   Wand IR 15x 2 lbs  restart   [place]  stretch      Try to add but be very cautions due to prior attempts and pt reports dizziness. dont be suggestive to pt of this.    shldr ext 20x Green      triceps 20x Green     depression 20x Blue       high row   20x Green  Progressed to high rows 7/18   biceps 20x Green      IR/ER 20x Red   progressed to 90 degrees abd 7/18   Horizontal abd 20x Green              Side lying          ER 10x2 2 lbs x     abduction 10x2 2 lbs  x    Horizontal abd 10x2 2 lbs x     Sleepers stretch 3x20\"  x IR   Prone         Is, Ts, Ys 2x10 2 lbs  x    ext 2x10  2 lbs x    rows 2x10 3 lbs x           Ball toss   4' wt ball  under hand, chest pass ,shot put throw, add as able          Cybex. Add as ablel              Other: Exercises log updated 7/18/18. completed exercises marked with \"x\"           Manual : Trigger point release and STM to R upper traps and periscapular area with AROM; inferior/ Posterior Glide with gentle distraciton        Specific Instructions for next treatment: Advance to independence of Exercises in clinic with flow sheet, progress all exercises adding scapular stab. Exercises, ball toss, lifting and cybex. EDUCATE PT AGAIN ON THERACANE OR TENNIS BALL STM OF SCAPULAR/UT AREAS.         Treatment Charges: Mins Units   []  Modalities HP  10 N/C [x] Reviewed Prior HEP/Ed  Method of Education: [x] Verbal  [x] Demo  [] Written  Comprehension of Education:  [x] Verbalizes understanding. [x] Demonstrates understanding. [] Needs review. [x] Demonstrates/verbalizes HEP/Ed previously given. Plan: [x] Continue per plan of care-extend POC d/t newly extended C9   [x] Other: 2-3x wk for 4-6 wk new C9 7/4/18-8/15/18.   Pt scheduled 7/23/18 for Knee evaluation paper work given and pt to bring to 7/23 eval.      Time In:    923 am              Time Out: 1025     Electronically signed by: Sanchez Knapp PTA

## 2018-07-23 NOTE — CONSULTS
over Lower extremity Functional scale with patient as she left many pararmeters blank and answered only 5   Patient states that she is surprised the referral is only for her left knee because she feels that the problem is in her entire left leg and is back related    Assessment:  Problems:    [x] ? Pain: left knee and left lower extremity    [] ? ROM:left knee    [x] ? Strength:left lower extremity     [x] ? Function:     [] ? Balance  [] Edema:  [x] Postural Deviations  [] Gait Deviations  [] Other:       STG: (to be met in 10 treatments)  1. ? Pain:patient will report decreased episodes of \"cramping\" in the left calf  2. ? Strength:increase  Left knee VMO strength to 4+/5 and quadriceps strength to 5-/5 or better to allow patient greater ease in ambulation on levels, stairs, curbs, ramps  3. ? Function:patient will report increased ability to descend and ascend stairs  4. Independent with Home Exercise Programs                   Patient goals:  not to feel the left leg pain anymore  Rehab Potential:  [x] Good  [] Fair  [] Poor   Suggested Professional Referral:  [x] No  [] Yes:  Barriers to Goal Achievement[de-identified]  [x] No  [] Yes:  Domestic Concerns:  [x] No  [] Yes:    Pt. Education:  [x] Plans/Goals, Risks/Benefits discussed  [x] Home exercise program    Method of Education: [x] Verbal  [x] Demo  [] Written - patient declined written home exercise program and stated that she felt that she could recall her exercises. Patient understands that she may request a written home exercise program at any time   Comprehension of Education:  [x] Verbalizes understanding. [] Demonstrates understanding. [] Needs Review. [] Demonstrates/verbalizes understanding of HEP/Ed previously given.     Treatment Plan:  [x] Therapeutic Exercise    [x] Modalities:  [] Dry Needling  [x] Therapeutic Activity    [] Ultrasound  [] Electrical Stimulation  [] Gait Training     [] Massage       [] Lumbar/Cervical Traction  [] Neuromuscular

## 2018-07-23 NOTE — PROGRESS NOTES
[x] Luz Douglas        Outpatient Physical                Therapy       [address]       Phone: [phone]       Fax: [phone]

## 2018-07-25 ENCOUNTER — HOSPITAL ENCOUNTER (OUTPATIENT)
Dept: PHYSICAL THERAPY | Age: 48
Setting detail: THERAPIES SERIES
End: 2018-07-25
Payer: MEDICAID

## 2018-07-25 ENCOUNTER — HOSPITAL ENCOUNTER (OUTPATIENT)
Dept: PHYSICAL THERAPY | Age: 48
Setting detail: THERAPIES SERIES
Discharge: HOME | End: 2018-07-25
Payer: MEDICAID

## 2018-07-25 PROCEDURE — 97110 THERAPEUTIC EXERCISES: CPT

## 2018-07-25 NOTE — FLOWSHEET NOTE
this date   FW chest press 15x 2 lbs Added 7/23   FW shldr flex 15x 2lbs Patient independent   FW abd  2lbs Patient independent   FW horz abd 15x 2lbs Patient independent   FW flys 10x 2 lbs    FW triceps  10 reps each  Verbal instruct with demo for home use     Scapular stabilization 5x15\" 1 lbs    Alternating Isometrics 3x30\"  Added 7/20   Sleeper stretch IR      Standing      Wall ball stab. exercise 15x each  Tennis ball, CW, CCW,    wall push up plus  15x              FW shldr shrugs 15x 2 lbs Restarted 7/23   FW shldr flexion 15x 2 lbs Restarted 7/23   FW shldr abd 15x 2 lbs  Restarted 7/23   Scap. retraction  2 lbs add   Wand IR 15x 2 lbs restart   [place]  stretch     Try to add but be very cautions due to prior attempts and pt reports dizziness. dont be suggestive to pt of this.    shldr ext  Green     triceps  Green    depression  Blue     high row    Green Progressed to high rows 7/18   Biceps  Green    IR/ER  Red  progressed to 90 degrees abd 7/18   Horizontal abd  Green            Side lying         ER 10x2 2 lbs     abduction 10x2 2 lbs    Horizontal abd 10x2 2 lbs     Sleepers stretch 3x20\"  IR   Prone       Is, Ts, Ys 2x10 2 lbs    ext 2x10  2 lbs    rows 2x10 3 lbs    rhomboids 2 x10 2 poounds    Ball toss   4' wt ball under hand, chest pass ,shot put throw, add as able         Cybex. Add as able             Other: educated patientemphasizing the importance of scapular strengthening for stabilization during overhead activitis          Manual :       Specific Instructions for next treatment: Advance to independence of Exercises in clinic with flow sheet, progress all exercises adding scapular stab. Exercises, ball toss, lifting and cybex. EDUCATE PT AGAIN ON THERACANE OR TENNIS BALL STM OF SCAPULAR/UT AREAS.         Treatment Charges: Mins Units   []  Modalities HP      [x]  Ther Exercise  40 2   [x]  Manual Therapy    9 1   []  Ther Activities     []  Aquatics     []  Vasocompression      [] Other     Total Treatment time 49  3       Assessment: [x] Progressing toward goals: patient demonstrated good understanding of the purpose of her exercise     [] No change. [x] Other:ptient continue to need supervision and some verbal cueing and demo during exercises for correct performance and form with her exercise program       STG: (to be met in 9 treatments)  1. ? Pain:I the right shoulder 5/10 or less with activity( 6/18/2018 (goal met)  2. ? ROM: right shoulder:  flexion 160 ° or better (goal met),abduction 150 ° of better (goal met), internal rotation 85 ° or better, external rotation 85 ° or better (both rotation from 90 ° of abduction)   3. ? Strength:patient will have 4+/5 strength in right shoulder flexion, abduction and supraspinatus and external rotation at 90 ° abduction 5/7/18 NOT MET for flexion and abduction  4. ? Function:patient will have DASH score with 45 % of less impairment 5/7/18 NOT MET  5. Independent with Home Exercise Programs 4/25/18 MET  6. Demonstrate Knowledge of fall prevention   LTG: (to be met in 18 treatments)  1. DASH score with 40% or less impairment  2. Patient able to reach overhead with 3/10 or less pain  3. Patient will have 5-/5 strength in all muscle groups of the right upper extremity and scapular muscle groups to promote return to previous activities of daily living and avocational activities  4. Sleep improve to 6 hours without waking due to right shoulder pain. Additional long term goals:  1. Patient will be able to reach overhead with 2/10 or less pain in he right shoulder so she can place objects (plates) in her cupboards                    Patient goals:to return  to use of the right upper extremity       Pt. Education:  [x] Yes  [] No  [x] Reviewed Prior HEP/Ed  Method of Education: [x] Verbal  [x] Demo  [] Written  Comprehension of Education:  [x] Verbalizes understanding. [x] Demonstrates understanding. [] Needs review.   [x] Demonstrates/verbalizes

## 2018-07-25 NOTE — FLOWSHEET NOTE
Progressing toward goals. [] No change. [] Other:  STG: (to be met in 10 treatments)  1. ? Pain:patient will report decreased episodes of \"cramping\" in the left calf  2. ? Strength:increase  Left knee VMO strength to 4+/5 and quadriceps strength to 5-/5 or better to allow patient greater ease in ambulation on levels, stairs, curbs, ramps  3. ? Function:patient will report increased ability to descend and ascend stairs  4. Independent with Home Exercise Programs         Pt. Education:  [x] Yes  [] No  [x] Reviewed Prior home exercise program/education/instructions  Method of Education: [x] Verbal  [x] Demo  [x] Written - patient declines written home exercise program and understands that she may request one at any time  Comprehension of Education:  [x] Verbalizes understanding. [] Demonstrates understanding. [] Needs review. [x] Demonstrates/verbalizes home exercise program/education/instructions previously given. Plan: [x] Continue per plan of care. [] Other:      Time In:1420            Time Out: 6375    Electronically signed by:   Ramon Myers, PT

## 2018-07-27 ENCOUNTER — APPOINTMENT (OUTPATIENT)
Dept: PHYSICAL THERAPY | Age: 48
End: 2018-07-27
Payer: MEDICAID

## 2018-07-27 ENCOUNTER — HOSPITAL ENCOUNTER (OUTPATIENT)
Dept: PHYSICAL THERAPY | Age: 48
Setting detail: THERAPIES SERIES
Discharge: HOME | End: 2018-07-27
Payer: MEDICAID

## 2018-07-27 PROCEDURE — 97110 THERAPEUTIC EXERCISES: CPT

## 2018-07-27 NOTE — FLOWSHEET NOTE
[x] [facility]       Outpatient Physical        Therapy       [address]       Phone: [phone]       Fax: [phone] [] [facility] at [address] [facility]       Phone: [phone]       Fax: [phone] [] [place] [address] [place] [address] [facility]  [address] [place] [address]   Phone: [phone]   Fax:  [phone]     Physical Therapy Daily Treatment Note    Date:  2018  Patient Name:  Irvin Pollard    :  1970  MRN: [id number]   Physician: Hemal Arredondo DO   Insurance: [employer] Comp-extended from 18- 8/15/18 for 18 visits, 2-3x per week. Medical Diagnosis: s/p right shoulder arthroscopy with revision of rotator cuff repair           Rehab Codes: M 25.611, M 25.511, M 62.81, M 79.601  Onset Date: 2018            Next 's appt: 2018   Visit# / total visits:  of newly extended C9   ( from prior C9's: 18-18 and 18-7/3/18)    Cancels/No Shows: 2/2    SubjectivePain:  [x] Yes  [] No Location: R shldr  Pain Rating: (0-10 scale) 2/10     Pain altered Tx:  [x] No  [] Yes  Action:  Comments: Pt arrived on time for PT session.       Objective:    Modalities: HP to R shldr at end of tx for 15 minutes  Precautions:  TO ADDRESS TRIGGER POINT RELEASE UPPER TRAPS/SCAPULAR AREA WITH ROM PER LINCOLN GAONA AT [facility] PER MD ORDER (CALLED )  Exercises:  Exercise   Right shoulder Reps/ Time Weight/ Level Comments   UBE  2/2/2 mins L2  2 minutes retro, 2 minutes forward, 2 minutes retro   Cervical rotation to the left 3 reps   Reviewed with patient; Patient independent     Cervical side bending to the left 3 reps   Reviewed with patient; Patient independent     Cervical flexion 3 reps   Reviewed with patient; Patient independent   Chin tucks 3 reps  Reviewed with patient                Supine      PROM/shldr mobs   Distraction PROM all planes in supine, and STM teres minor in side lying this date FW chest press 15x 2 lbs Added 7/23   FW shldr flex 15x 2lbs Patient independent   FW abd  2lbs Patient independent   FW horz abd 15x 2lbs Patient independent   FW flys 10x 2 lbs    FW triceps  10 reps each  Verbal instruct with demo for home use     Scapular stabilization 5x15\" 1 lbs    Alternating Isometrics 3x30\"  Added 7/20   Sleeper stretch IR      Standing      Wall ball stab. exercise 15x each  Tennis ball, CW, CCW, patient standng with ball in abduction from the body and in flexion from the body   wall push up plus  15x    Reviewed with patient; Patient independent   Body blade      Arm extended at side  2 minutes  Verbal instruction with demo for use in clinic   Elbow at 90 ° of flexion with arm at side 2 minutes  Verbal instruction with demo for use in clinic     Arm extension to arm with elbow flexion 2 minutes  Verbal instruction with demo for use in clinic             FW shldr shrugs 15x 2 lbs Restarted 7/23   FW shldr flexion 15x 2 lbs Restarted 7/23   FW shldr abd 15x 2 lbs  Restarted 7/23   Scap. retraction  2 lbs add   Wand IR  2 lbs restart   [place]  stretch     Try to add but be very cautions due to prior attempts and pt reports dizziness. dont be suggestive to pt of this.    shldr ext  Tyler Lynn     triceps  Green    depression  Blue     high row    Green Progressed to high rows 7/18   Biceps  Green    IR/ER  Red  progressed to 90 degrees abd 7/18   Horizontal abd  Green            Side lying         ER 10x2 2 lbs     abduction 10x2 2 lbs    Horizontal abd 10x2 2 lbs     Sleepers stretch 3x20\"  IR   Prone       Is, Ts, Ys 2x10 2 lbs Patient independent   ext 15 reps  2 lbs Patient independent   rows 2x10 3 lbs Patient independent   rhomboids 2 x10 2 poounds Reviewed with patient; verbal cueing throughout   Camilla toss   4' wt ball under hand, chest pass ,shot put throw, add as able         Cybex.    Add as able             Other:   Added body blade today as noted above        Manual :   in the clinic                    Patient goals:to return  to use of the right upper extremity       Pt. Education:  [x] Yes  [] No  [x] Reviewed Prior home exercise program/education/instructions  Method of Education: [x] Verbal  [x] Demo  [] Written  Comprehension of Education:  [x] Verbalizes understanding. [x] Demonstrates understanding. [] Needs review. [x] Demonstrates/verbalizes HEP/Ed previously given. Plan: [x] Continue per plan of care-extend POC d/t newly extended C9   [x] Other: 2-3x wk for 4-6 wk new C9 7/4/18-8/15/18. Patient arrived 10 minutes late for appointment - patient called at 10:05am and stated she was on her way     Time In:   1012              Time Out: 1115     Electronically signed by:  Abdulaziz Rodrigues, PT

## 2018-07-30 ENCOUNTER — APPOINTMENT (OUTPATIENT)
Dept: PHYSICAL THERAPY | Age: 48
End: 2018-07-30
Payer: MEDICAID

## 2018-08-01 ENCOUNTER — APPOINTMENT (OUTPATIENT)
Dept: PHYSICAL THERAPY | Age: 48
End: 2018-08-01
Payer: MEDICAID

## 2018-08-01 ENCOUNTER — HOSPITAL ENCOUNTER (OUTPATIENT)
Dept: PHYSICAL THERAPY | Age: 48
Setting detail: THERAPIES SERIES
Discharge: HOME | End: 2018-08-01
Payer: MEDICAID

## 2018-08-01 PROCEDURE — 97110 THERAPEUTIC EXERCISES: CPT

## 2018-08-01 NOTE — FLOWSHEET NOTE
[x] [facility]       Outpatient Physical        Therapy       [address]       Phone: [phone]       Fax: [phone] [] [facility] at [address]       Phone: [phone]       Fax: [phone] [] [place]. [address] [place] [address] [facility]  [address] [place] [address]   Phone: [phone]   Fax:  [phone]     Physical Therapy Daily Treatment Note    Date:  2018  Patient Name:  Neil Willard    :  1970  MRN: [id number]   Physician: Jackie Bell DO   Insurance: [employer]-extended from 18- 8/15/18 for 18 visits, 2-3x per week. Medical Diagnosis: s/p right shoulder arthroscopy with revision of rotator cuff repair           Rehab Codes: M 25.611, M 25.511, M 62.81, M 79.601  Onset Date: 2018            Next 's appt: 2018   Visit# / total visits:  of newly extended C9   ( from prior C9's: 18-18 and 18-7/3/18)    Cancels/No Shows: 2/    SubjectivePain:  [x] Yes  [] No Location: R shldr  Pain Rating: (0-10 scale) 7/10   Upper traps and anterior shoulder. Pain altered Tx:  [x] No  [] Yes  Action:  Comments: . Reports R shoulder pain in up today. Pt points to upper traps and anterior shoulder. States she cx 7/30 due to her pain level. Objective:    Modalities: HP to R shldr at end of tx for 15 minutes-held today.    Precautions:  TO ADDRESS TRIGGER POINT RELEASE UPPER TRAPS/SCAPULAR AREA WITH ROM PER LINCOLN Herrera MD ORDER (CALLED )  Exercises:  Exercise   Right shoulder Reps/ Time Weight/ Level  Comments   UBE  2/2/2 mins L2 x  2 minutes retro, 2 minutes forward, Nustep cindy UE/LE 8/1 x 6 mins   Cervical rotation to the left 3 reps   x Reviewed with patient; Patient independent     Cervical side bending to the left 3x10   x Reviewed with patient; Patient independent     levatror stretch 3x10\"  x Issued HEP    Cervical flexion/ext 3x10\"   x Reviewed

## 2018-08-03 ENCOUNTER — OFFICE VISIT (OUTPATIENT)
Dept: FAMILY MEDICINE CLINIC | Age: 48
End: 2018-08-03
Payer: MEDICAID

## 2018-08-03 ENCOUNTER — HOSPITAL ENCOUNTER (OUTPATIENT)
Dept: PHYSICAL THERAPY | Age: 48
Setting detail: THERAPIES SERIES
Discharge: HOME | End: 2018-08-03
Payer: MEDICAID

## 2018-08-03 ENCOUNTER — APPOINTMENT (OUTPATIENT)
Dept: PHYSICAL THERAPY | Age: 48
End: 2018-08-03
Payer: MEDICAID

## 2018-08-03 VITALS
WEIGHT: 118.6 LBS | SYSTOLIC BLOOD PRESSURE: 122 MMHG | HEIGHT: 59 IN | BODY MASS INDEX: 23.91 KG/M2 | DIASTOLIC BLOOD PRESSURE: 74 MMHG | HEART RATE: 76 BPM | TEMPERATURE: 97.6 F

## 2018-08-03 DIAGNOSIS — Z13.1 SCREENING FOR DIABETES MELLITUS (DM): ICD-10-CM

## 2018-08-03 DIAGNOSIS — Z13.220 NEED FOR LIPID SCREENING: ICD-10-CM

## 2018-08-03 DIAGNOSIS — M54.32 SCIATICA OF LEFT SIDE: Primary | ICD-10-CM

## 2018-08-03 PROCEDURE — 4004F PT TOBACCO SCREEN RCVD TLK: CPT | Performed by: STUDENT IN AN ORGANIZED HEALTH CARE EDUCATION/TRAINING PROGRAM

## 2018-08-03 PROCEDURE — G8420 CALC BMI NORM PARAMETERS: HCPCS | Performed by: STUDENT IN AN ORGANIZED HEALTH CARE EDUCATION/TRAINING PROGRAM

## 2018-08-03 PROCEDURE — 99213 OFFICE O/P EST LOW 20 MIN: CPT | Performed by: STUDENT IN AN ORGANIZED HEALTH CARE EDUCATION/TRAINING PROGRAM

## 2018-08-03 PROCEDURE — G8427 DOCREV CUR MEDS BY ELIG CLIN: HCPCS | Performed by: STUDENT IN AN ORGANIZED HEALTH CARE EDUCATION/TRAINING PROGRAM

## 2018-08-03 PROCEDURE — 97110 THERAPEUTIC EXERCISES: CPT

## 2018-08-03 PROCEDURE — 99214 OFFICE O/P EST MOD 30 MIN: CPT | Performed by: STUDENT IN AN ORGANIZED HEALTH CARE EDUCATION/TRAINING PROGRAM

## 2018-08-03 RX ORDER — KETOROLAC TROMETHAMINE 15 MG/ML
15 INJECTION, SOLUTION INTRAMUSCULAR; INTRAVENOUS ONCE
Status: DISCONTINUED | OUTPATIENT
Start: 2018-08-03 | End: 2018-08-03

## 2018-08-03 RX ORDER — KETOROLAC TROMETHAMINE 15 MG/ML
15 INJECTION, SOLUTION INTRAMUSCULAR; INTRAVENOUS ONCE
Status: COMPLETED | OUTPATIENT
Start: 2018-08-03 | End: 2018-08-03

## 2018-08-03 RX ADMIN — KETOROLAC TROMETHAMINE 15 MG: 15 INJECTION, SOLUTION INTRAMUSCULAR; INTRAVENOUS at 11:49

## 2018-08-03 ASSESSMENT — ENCOUNTER SYMPTOMS
COUGH: 0
VOMITING: 0
WHEEZING: 0
NAUSEA: 0
SHORTNESS OF BREATH: 0

## 2018-08-03 NOTE — PROGRESS NOTES
Subjective:    Dennys Dozier is a 50 y.o. female with  has a past medical history of Anemia; Anxiety; Chronic right shoulder pain; Depression; and Irregular heartbeat. Family History   Problem Relation Age of Onset    Asthma Mother    Ros Iverson Cancer Father        Presented to the office today for:  Chief Complaint   Patient presents with    Knee Pain     lft knee pt believes it is cause by her siaticia and would like to get refill on muscle relaxers     Referral - General     pt would like gyn referral to follow up on fibroids        HPI  Patient presented for follow-up of left-sided sciatica. States physical therapy has been helping her greatly. Uses Flexeril occasionally. Has History of fibroids and was advised to follow up with ObGyn. History is positive for mother having diabetes. Has not been tested for diabetes. Review of Systems   Constitutional: Negative for fatigue and fever. Respiratory: Negative for cough, shortness of breath and wheezing. Cardiovascular: Negative for chest pain, palpitations and leg swelling. Gastrointestinal: Negative for nausea and vomiting. Neurological: Negative for dizziness, weakness, numbness and headaches. Objective:    /74 (Site: Left Arm, Position: Sitting, Cuff Size: Medium Adult)   Pulse 76   Temp 97.6 °F (36.4 °C) (Temporal)   Ht 4' 11\" (1.499 m)   Wt 118 lb 9.6 oz (53.8 kg)   LMP 07/21/2018   BMI 23.95 kg/m²    BP Readings from Last 3 Encounters:   08/03/18 122/74   07/12/18 126/76   07/11/18 118/76     Physical Exam   Constitutional: She is oriented to person, place, and time. She appears well-developed and well-nourished. No distress. Neck: Neck supple. No thyromegaly present. Cardiovascular: Normal rate, regular rhythm and normal heart sounds. No murmur heard. Pulmonary/Chest: Effort normal and breath sounds normal. No respiratory distress. She has no wheezes. She has no rales. She exhibits no tenderness. Musculoskeletal:   slr test negative b/l    Power tone reflexes sensations and pulses b/l LL wnl   Neurological: She is alert and oriented to person, place, and time. Skin: She is not diaphoretic. Lab Results   Component Value Date    WBC 5.2 04/11/2018    HGB 12.3 04/11/2018    HCT 39.6 04/11/2018     04/11/2018    ALT 21 12/20/2013    AST 30 12/20/2013     04/11/2018    K 4.2 04/11/2018     04/11/2018    CREATININE 0.67 04/11/2018    BUN 12 04/11/2018    CO2 22 04/11/2018    INR 1.1 12/20/2013     Lab Results   Component Value Date    CALCIUM 8.9 04/11/2018     No results found for: LDLCALC, LDLCHOLESTEROL, LDLDIRECT    Assessment and Plan:    1. Screening for diabetes mellitus (DM)    - Hemoglobin A1C; Future    2. Need for lipid screening    - Lipid Panel; Future    3. Sciatica of left side    - [facility]  - ketorolac (TORADOL) injection 15 mg; Inject 1 mL into the muscle once          Requested Prescriptions      No prescriptions requested or ordered in this encounter       Medications Discontinued During This Encounter   Medication Reason    ketorolac (TORADOL) injection 15 mg        Srikanth Crooks received counseling on the following healthy behaviors: nutrition, exercise and medication adherence    Discussed use, benefit, and side effects of prescribed medications. Barriers to medication compliance addressed. All patient questions answered. Pt voiced understanding. Return in about 1 month (around 9/3/2018) for follow up of lab results sciatica.

## 2018-08-03 NOTE — FLOWSHEET NOTE
[x] [facility]       Outpatient Physical        Therapy       [address]       Phone: [phone]       Fax: [phone] [] [facility] at [address] [facility]       Phone: [phone]       Fax: [phone] [] [place]. [address] [place] [address] [facility]  [address] [place] [address]   Phone: [phone]   Fax:  [phone]     Physical Therapy Daily Treatment Note    Date:  8/3/2018  Patient Name:  Kush Tim    :  1970  MRN: [id number]  Physician: Terrell Vernon     Insurance: [facility]  Diagnosis: Acute pain of left knee (M25.562)       Onset Date: 2018   Next Dr. Tatiana Driscollerty: 8/3/2018  Visit# / total visits: 2/8  Cancels/No Shows: 0/0    Subjective:  Pt c/o LBP 7/10, stating that it localized on the left side, no radicular pain at the moment. Pain:  [x] Yes  [] No Location: left knee  Pain Rating: (0-10 scale) 7-8/10  Pain altered Tx:  [x] No  [] Yes  Action: HS Stretching/ Hip extension with core stability exercises. Comments:    Objective:   Modalities:   Precautions:  Exercises:   Exercise;   left knee pain Reps/ Time Weight/ Level Comments    supine       Quad set 10 reps   x   Straight leg raise 10 reps   x   Short arc quadriceps 10 reps 1lb  x   Hamstring Stretch 3x45\"  Therapist Assisted x   Piriformis Stretch 3x30\"   x          Prone       Knee flexion 10 reps   x   Hip extension 10 reps  Knee Flexed- applied pressure to L side LB x          Side lying       Hip abduction 10 reps   x   Hip adduction 10reps   x   Clamshells x15  VC to limit pelvic rotation x          Other:  Manual therapy; patellar mobilization on the left knee - superior glide 4 minutes    Specific Instructions for next treatment:patellar mobilizations superior on the left.  Tissue lengthening/ extensibility.       Treatment Charges: Mins Units   []  Modalities     [x]  Ther Exercise 25 2   [x]  Manual Therapy     []  Ther Activities []  Aquatics     []  Vasocompression     []  Other     Total Treatment time 25 2       Assessment: [x] Progressing toward goals. Progressed pt with PROM of hamstring muscles with gentle stretch applied at end range of motion. Pt demonstrates decreased tension and reports decreased pain and increased feeling of well being. [] No change. [] Other:  STG: (to be met in 10 treatments)  1. ? Pain:patient will report decreased episodes of \"cramping\" in the left calf  2. ? Strength:increase  Left knee VMO strength to 4+/5 and quadriceps strength to 5-/5 or better to allow patient greater ease in ambulation on levels, stairs, curbs, ramps  3. ? Function:patient will report increased ability to descend and ascend stairs  4. Independent with Home Exercise Programs         Pt. Education:  [x] Yes  [] No  [x] Reviewed Prior home exercise program/education/instructions  Method of Education: [x] Verbal  [x] Demo  [x] Written - patient declines written home exercise program and understands that she may request one at any time  Comprehension of Education:  [x] Verbalizes understanding. [] Demonstrates understanding. [] Needs review. [x] Demonstrates/verbalizes home exercise program/education/instructions previously given. Plan: [x] Continue per plan of care.    [] Other:      Time In: 0845            Time Out: 4863    Electronically signed by:  Ruchi Haile PTA

## 2018-08-06 ENCOUNTER — HOSPITAL ENCOUNTER (OUTPATIENT)
Dept: PHYSICAL THERAPY | Age: 48
Setting detail: THERAPIES SERIES
Discharge: HOME | End: 2018-08-06
Payer: MEDICAID

## 2018-08-06 PROCEDURE — 97110 THERAPEUTIC EXERCISES: CPT

## 2018-08-08 ENCOUNTER — HOSPITAL ENCOUNTER (OUTPATIENT)
Dept: PHYSICAL THERAPY | Age: 48
Setting detail: THERAPIES SERIES
Discharge: HOME | End: 2018-08-08
Payer: MEDICAID

## 2018-08-08 PROCEDURE — 97110 THERAPEUTIC EXERCISES: CPT

## 2018-08-08 NOTE — FLOWSHEET NOTE
[x] [facility]       Outpatient Physical        Therapy       [address]       Phone: [phone]       Fax: [phone] [] [facility] at [address] [facility]       Phone: [phone]       Fax: [phone] [] [facility]. [address] [place] [address] [facility]  [address] [facility]   Phone: [phone]   Fax:  [phone]     Physical Therapy Daily Treatment Note    Date:  2018  Patient Name:  Sil Bradford    :  1970  MRN: [id number]  Physician: Cleopatra Arcos     Insurance: [employer]  Diagnosis: Acute pain of left knee (M25.562)       Onset Date: 2018   Next Dr. Cox Terry: 8/3/2018  Visit# / total visits: 3/8  Cancels/No Shows: 0/0    Subjective: Initially pt  called to cx session then called back and stated she would be in but would be a few minutes late. Reports L knee feels fine today, States her pain in leg sometime starts at posterior L hip to knee and other times its from knee to ankle.    Pain:  [x] Yes  [] No Location: left knee  Pain Rating: (0-10 scale) 0/10  Pain altered Tx:  [x] No  [] Yes  Action:    Comments:    Objective:   Modalities: N/A  Precautions:  Exercises:   Exercise;   left knee pain Reps/ Time Weight/ Level Comments           sitting        HS stretching* 3x20\"  8 in box x   Piriformis stretch* 3x20\"  Figure 4 x   Standing    added x   Gastro stretch* 3x20\"  wedge x          supine       Quad set* 10 x5\"  Added ball for VMO x   Straight leg raise* 10 reps   x   SLR with ER* 10x      Short arc quadriceps* 10 reps 1lb  x   Hamstring Stretch 3x45\"  Therapist Assisted     Piriformis Stretch 3x30\"              Prone       Prone lying 2 mins   x   JENNIFER 2 mins   x   Knee flexion* 10 reps   x   Hip extension* 10 reps  Knee Flexed- applied pressure to L side LB x          Side lying       Hip abduction* 10 reps   x   Hip adduction* 10reps   x   Clamshells x15  VC to limit pelvic rotation x

## 2018-08-08 NOTE — FLOWSHEET NOTE
Cervical side bending to the left 3x10    Reviewed with patient; Patient independent     levatror stretch 3x10\"   Issued HEP    Cervical flexion/ext 3x10\"    Reviewed with patient; Patient independent   Chin tucks 10x5\"   Reviewed with patient                  Supine       PROM/shldr mobs x  x Distraction PROM all planes in supine,      FW chest press 15x 2 lbs  Added 7/23   FW shldr flex 15x 2lbs  Patient independent   FW abd  2lbs  Patient independent   FW horz abd 15x 2lbs  Patient independent   FW flys 10x 2 lbs     FW triceps  10 reps each   Verbal instruct with demo for home use     FW protraction  15x 2 lbs x Added 8/8   Scapular stabilization 4x15\" 1 lbs x             Standing       Wall ball stab. exercise 15x each  x Tennis ball, CW, CCW, patient standng with ball in abduction from the body and in flexion from the body   wall push up plus  15x  x   Reviewed with patient; Patient independent   Blue rocker board 10x3  x Added 8/8   Body blade       Abduction/ Adduction  1 mins x2    Verbal instruction with demo for use in clinic, decreased time due to fatigue 8/6   Flexion/ Extension 1.5 mins     Verbal instruction with demo for use in clinic, decreased time due to fatigue 8/6      Arm extension to arm with elbow flexion 1.5  mins      Verbal instruction with demo for use in clinic, decreased time due to fatigue     Scapular clock 10x   x    Focus on 7 oclock   FW shldr shrugs 15x 2 lbs       FW shldr flexion 15x 2 lbs      FW shldr abd 15x 2 lbs      Scap. retraction   2 lbs      Wand IR 15x 2 lbs       [place]  stretch        . shldr ext 20x Green       triceps 20x Green x    depression 20x Blue  x    high row   20x Green      rows 20x green x    Biceps 20x Green      IR/ER 20x Red x      Horizontal abd 20x Green              Side lying          manual x  x See above. Scap.  Depression  10x A/AA x Added 8/8   ER 10x2 2 lbs       abduction 10x2 2 lbs     Horizontal abd 10x2 2 lbs      Sleepers

## 2018-08-09 ENCOUNTER — HOSPITAL ENCOUNTER (OUTPATIENT)
Age: 48
Discharge: HOME | End: 2018-08-09
Payer: MEDICAID

## 2018-08-09 DIAGNOSIS — Z13.1 SCREENING FOR DIABETES MELLITUS (DM): ICD-10-CM

## 2018-08-09 DIAGNOSIS — Z13.220 NEED FOR LIPID SCREENING: ICD-10-CM

## 2018-08-09 LAB
CHOLESTEROL/HDL RATIO: 2.9
CHOLESTEROL: 174 MG/DL
ESTIMATED AVERAGE GLUCOSE: 123 MG/DL
HBA1C MFR BLD: 5.9 % (ref 4–6)
HDLC SERPL-MCNC: 59 MG/DL
LDL CHOLESTEROL: 101 MG/DL (ref 0–130)
TRIGL SERPL-MCNC: 69 MG/DL
VLDLC SERPL CALC-MCNC: NORMAL MG/DL (ref 1–30)

## 2018-08-09 PROCEDURE — 83036 HEMOGLOBIN GLYCOSYLATED A1C: CPT

## 2018-08-09 PROCEDURE — 36415 COLL VENOUS BLD VENIPUNCTURE: CPT

## 2018-08-09 PROCEDURE — 80061 LIPID PANEL: CPT

## 2018-08-10 ENCOUNTER — HOSPITAL ENCOUNTER (OUTPATIENT)
Dept: PHYSICAL THERAPY | Age: 48
Setting detail: THERAPIES SERIES
Discharge: HOME | End: 2018-08-10
Payer: MEDICAID

## 2018-08-10 ENCOUNTER — OFFICE VISIT (OUTPATIENT)
Dept: FAMILY MEDICINE CLINIC | Age: 48
End: 2018-08-10
Payer: MEDICAID

## 2018-08-10 VITALS
TEMPERATURE: 98.3 F | HEART RATE: 82 BPM | WEIGHT: 116.3 LBS | BODY MASS INDEX: 23.49 KG/M2 | SYSTOLIC BLOOD PRESSURE: 122 MMHG | DIASTOLIC BLOOD PRESSURE: 81 MMHG

## 2018-08-10 DIAGNOSIS — M62.830 MUSCLE SPASM OF BACK: ICD-10-CM

## 2018-08-10 DIAGNOSIS — H57.89 SWELLING OF EYE, LEFT: Primary | ICD-10-CM

## 2018-08-10 PROCEDURE — 97110 THERAPEUTIC EXERCISES: CPT

## 2018-08-10 PROCEDURE — 4004F PT TOBACCO SCREEN RCVD TLK: CPT | Performed by: STUDENT IN AN ORGANIZED HEALTH CARE EDUCATION/TRAINING PROGRAM

## 2018-08-10 PROCEDURE — 99213 OFFICE O/P EST LOW 20 MIN: CPT | Performed by: STUDENT IN AN ORGANIZED HEALTH CARE EDUCATION/TRAINING PROGRAM

## 2018-08-10 PROCEDURE — G8420 CALC BMI NORM PARAMETERS: HCPCS | Performed by: STUDENT IN AN ORGANIZED HEALTH CARE EDUCATION/TRAINING PROGRAM

## 2018-08-10 PROCEDURE — G8427 DOCREV CUR MEDS BY ELIG CLIN: HCPCS | Performed by: STUDENT IN AN ORGANIZED HEALTH CARE EDUCATION/TRAINING PROGRAM

## 2018-08-10 PROCEDURE — 3017F COLORECTAL CA SCREEN DOC REV: CPT | Performed by: STUDENT IN AN ORGANIZED HEALTH CARE EDUCATION/TRAINING PROGRAM

## 2018-08-10 PROCEDURE — 97140 MANUAL THERAPY 1/> REGIONS: CPT

## 2018-08-10 RX ORDER — CYCLOBENZAPRINE HCL 5 MG
5 TABLET ORAL 3 TIMES DAILY PRN
Qty: 30 TABLET | Refills: 0 | Status: SHIPPED | OUTPATIENT
Start: 2018-08-10 | End: 2018-08-20

## 2018-08-10 ASSESSMENT — ENCOUNTER SYMPTOMS
SORE THROAT: 0
DIARRHEA: 0
EYE ITCHING: 0
EYE REDNESS: 0
EYE DISCHARGE: 0
SHORTNESS OF BREATH: 0
PHOTOPHOBIA: 0
CHEST TIGHTNESS: 0
COUGH: 0
NAUSEA: 0
EYE PAIN: 0
VOMITING: 0
ABDOMINAL PAIN: 0
CONSTIPATION: 0

## 2018-08-10 NOTE — FLOWSHEET NOTE
[x] Nicolas Heaton       Outpatient Physical        Therapy       [address]       Phone: [phone]       Fax: [phone]     Physical Therapy Daily Treatment Note    Date:  8/10/2018  Patient Name:  Rm Cadena    :  1970  MRN: [id number]  Physician: Sarah Oliva     Insurance: [employer]  Diagnosis: Acute pain of left knee (M25.562)       Onset Date: 2018   Next Dr. Carballo Perfect: 8/3/2018  Visit# / total visits:    Cancels/No Shows: 0/0    Subjective:   Pain:  [] Yes  [x] No Location: left knee  Pain Rating: (0-10 scale) 0/10  Pain altered Tx:  [x] No  [] Yes  Action:    Comments: States her knee is doing okay. Objective:   Modalities: N/A  Precautions:  Exercises:   Exercise;   left knee pain Reps/ Time Weight/ Level Comments           sitting        HS stretching* 3x20\"  8 in box    Piriformis stretch* 3x20\"  Figure 4           Standing        Gastroc stretch* 3x20\"  wedge x   Heel raises 15x   x   HS curls 15x  B x   Marches  15x  B x   3 way hip 15x  B x   Squats 7x  Emphasis on posture x          supine       Quad set* 10 x5\"  Added ball for VMO    Straight leg raise* 10 reps      SLR with ER* 10x      Short arc quadriceps* 10 reps 1lb     Hamstring Stretch 3x45\"  Therapist Assisted    Piriformis Stretch 3x30\"             Prone       Prone lying 2 mins      JENNIFER 2 mins      Knee flexion* 10 reps      Hip extension* 10 reps  Knee Flexed- applied pressure to L side LB           Side lying       Hip abduction* 10 reps      Hip adduction* 10reps      Clamshells x15  VC to limit pelvic rotation           Other: * issued HEP in writing. Manual therapy; patellar mobilization on the left knee - superior glide 4 minutes-omitted this date in error. Held     Specific Instructions for next treatment:patellar mobilizations superior on the left. Tissue lengthening/ extensibility. VMO strengthening.  Check compliance with HEP       Treatment Charges: Mins Units   []  Modalities

## 2018-08-10 NOTE — PATIENT INSTRUCTIONS
Readings from Last 3 Encounters:   08/03/18 122/74   07/12/18 126/76   07/11/18 118/76          (goal 120/80)    All Future Testing planned in [facility]  Lab Frequency Next Occurrence             Visit Information    Have you changed or started any medications since your last visit including any over-the-counter medicines, vitamins, or herbal medicines? no   Have you stopped taking any of your medications? Is so, why? -  no  Are you having any side effects from any of your medications? - no    Have you seen any other physician or provider since your last visit?  no   Have you had any other diagnostic tests since your last visit?  no   Have you been seen in the emergency room and/or had an admission in a hospital since we last saw you?  no   Have you had your routine dental cleaning in the past 6 months?  no     Do you have an active MyChart account? If no, what is the barrier?   No:     Patient Care Team:  Brooke Bernal MD as PCP - General (Family Medicine)    Medical History Review  Past Medical, Family, and Social History reviewed and does not contribute to the patient presenting condition    Health Maintenance   Topic Date Due    HIV screen  08/04/1983    Shingles Vaccine (1 of 2 - 2 Dose Series) 08/04/2018    Breast cancer screen  08/04/2018    Colon cancer screen colonoscopy  08/04/2018    Flu vaccine (1) 11/24/2018 (Originally 9/1/2018)    Pneumococcal med risk (1 of 1 - PPSV23) 03/24/2023 (Originally 8/4/1987)    A1C test (Diabetic or Prediabetic)  08/09/2019    Cervical cancer screen  04/20/2020    Lipid screen  08/09/2023    DTaP/Tdap/Td vaccine (2 - Td) 03/17/2025

## 2018-08-10 NOTE — PROGRESS NOTES
Prescriptions     Pending Prescriptions Disp Refills    cyclobenzaprine (FLEXERIL) 5 MG tablet 30 tablet 0     Sig: Take 1 tablet by mouth 3 times daily as needed for Muscle spasms       Medications Discontinued During This Encounter   Medication Reason    cephALEXin (KEFLEX) 500 MG capsule LIST CLEANUP    ondansetron (ZOFRAN) 4 MG tablet Therapy completed       Return in about 6 weeks (around 9/21/2018) for back spasm, eye swelling. Cam Zia received counseling on the following healthy behaviors: nutrition and tobacco cessation  Reviewed prior labs and health maintenance  Continue current medications, diet and exercise. Discussed use, benefit, and side effects of prescribed medications. Barriers to medication compliance addressed. Patient given educational materials - see patient instructions  Was a self-tracking handout given in paper form or via [employer]? No:     Requested Prescriptions     Pending Prescriptions Disp Refills    cyclobenzaprine (FLEXERIL) 5 MG tablet 30 tablet 0     Sig: Take 1 tablet by mouth 3 times daily as needed for Muscle spasms       All patient questions answered. Patient voiced understanding. Quality Measures    Body mass index is 23.49 kg/m². Normal. Weight control planned discussed Healthy diet and regular exercise. BP: 122/81 Blood pressure is normal. Treatment plan consists of No treatment change needed.     Lab Results   Component Value Date    LDLCHOLESTEROL 101 08/09/2018    (goal LDL reduction with dx if diabetes is 50% LDL reduction)      PHQ Scores 10/19/2017 10/19/2017 6/26/2017   PHQ2 Score - 2 6   PHQ9 Score 14 2 19     Interpretation of Total Score Depression Severity: 1-4 = Minimal depression, 5-9 = Mild depression, 10-14 = Moderate depression, 15-19 = Moderately severe depression, 20-27 = Severe depression

## 2018-08-10 NOTE — FLOWSHEET NOTE
[x] Darylstacey Yonyjohn       Outpatient Physical        Therapy       [address]       Phone: [phone]       Fax: [phone]     Physical Therapy Daily Treatment Note    Date:  8/10/2018  Patient Name:  Artur Dunne    :  1970  MRN: [id number]   Physician: Danni Byrne DO   Insurance: [employer]-extended from 18- 8/15/18 for 18 visits, 2-3x per week. Medical Diagnosis: s/p right shoulder arthroscopy with revision of rotator cuff repair           Rehab Codes: M 25.611, M 25.511, M 62.81, M 79.601  Onset Date: 2018            Next 's appt: 2018   Visit# / total visits:  of newly extended C9   ( from prior C9's: 18-18 and 18-7/3/18)    Cancels/No Shows:     Subjective:   [x] Yes  [] No Location: R shldr  Pain Rating: (0-10 scale) 4-5/10  Superior shldr      Pain altered Tx:  [x] No  [] Yes  Action:  Comments: Patient called stating she would be 10 minutes late, ~30 minutes late. Came from doctors appt not related to therapy. Shoulder has been hurting all week, doesn't usually have this much consistent pain. Objective:    Modalities: HP to R shldr at end of tx for 15 minutes post session. Precautions:  TO ADDRESS TRIGGER POINT RELEASE UPPER TRAPS/SCAPULAR AREA WITH ROM PER LINCOLN Garsia MD ORDER (CALLED )-held today. 15 mins prior to exercises 8/10.   Exercises:  Exercise   Right shoulder Reps/ Time Weight/ Level  Comments   UBE  2/2/2 mins L2    2 minutes retro, 2 minutes forward,     Cervical rotation to the left 3 reps    Reviewed with patient; Patient independent     Cervical side bending to the left 3x10    Reviewed with patient; Patient independent     levatror stretch 3x10\"   Issued HEP    Cervical flexion/ext 3x10\"    Reviewed with patient; Patient independent   Chin tucks 10x5\"  x Reviewed with patient                  Supine       PROM/shldr mobs x  x Distraction PROM all planes in supine,      FW chest press 15x 2 lbs  Added 7/23   FW shldr flex 15x 2lbs  Patient independent   FW abd  2lbs  Patient independent   FW horz abd 15x 2lbs  Patient independent   FW flys 10x 2 lbs     FW triceps  10 reps each   Verbal instruct with demo for home use     FW protraction  15x 2 lbs  Added 8/8   Scapular stabilization 4x15\" 1 lbs x             Standing       Wall ball stab. exercise 15x each   Tennis ball, CW, CCW, patient standng with ball in abduction from the body and in flexion from the body   wall push up plus  15x     Reviewed with patient; Patient independent   Blue rocker board 10x3   Added 8/8   Body blade       Abduction/ Adduction  1 mins x2    Verbal instruction with demo for use in clinic, decreased time due to fatigue 8/6   Flexion/ Extension 1.5 mins     Verbal instruction with demo for use in clinic, decreased time due to fatigue 8/6      Arm extension to arm with elbow flexion 1.5  mins      Verbal instruction with demo for use in clinic, decreased time due to fatigue     Scapular clock 10x       Focus on 7 oclock   FW shldr shrugs 15x 2 lbs      FW shldr flexion 15x 2 lbs      FW shldr abd 15x 2 lbs      Scap. retraction   2 lbs      Wand IR 15x 2 lbs      [place]  stretch        . shldr ext 20x Green      triceps 20x Green     depression 20x Blue      high row   20x Green      rows 20x green     Biceps 20x Green     IR/ER 20x Red       Horizontal abd 20x Green              Side lying          manual x   See above. Scap. Depression  10x A/AA  Added 8/8   ER 10x2 2 lbs x     abduction 10x2 2 lbs x    Horizontal abd 10x2 2 lbs x     Sleepers stretch 3x20\"   IR   Prone        Is, Ts, Ys x15 2 lbs x    ext x15 2 lbs x    rows x15 3 lbs x    Rows w/ 90 abd x15 3 lbs x     Ball toss  10-15x ea 4' wt ball  under hand, chest pass            Cybex Athletic Row 2x15 1Plate  Add as able              Other:    Completed exercises marked with \"x\".          Manual : education on theracane  Use for Trigger point release x 3 arielle  2. Patient will be able to tolerate 1.5 hours of strengthening exercises in the clinic                    Patient goals:to return  to use of the right upper extremity       Pt. Education:  [x] Yes  [] No  [x] Reviewed Prior home exercise program/education/instructions  Method of Education: [x] Verbal  [x] Demo  [] Written  Comprehension of Education:  [x] Verbalizes understanding. [x] Demonstrates understanding. [] Needs review. [x] Demonstrates/verbalizes HEP/Ed previously given. 8/1/18 Levator stretching. Plan: [x] Continue per plan of care-extend POC d/t newly extended C9   [x] Other: 2-3x wk for 4-6 wk new C9 7/4/18-8/15/18.          Time In:  1542            Time Out:    9058     Electronically signed by: Idalia Carter PTA

## 2018-08-13 ENCOUNTER — HOSPITAL ENCOUNTER (OUTPATIENT)
Dept: PHYSICAL THERAPY | Age: 48
Setting detail: THERAPIES SERIES
Discharge: HOME | End: 2018-08-13
Payer: COMMERCIAL

## 2018-08-13 ENCOUNTER — HOSPITAL ENCOUNTER (OUTPATIENT)
Dept: PHYSICAL THERAPY | Age: 48
Setting detail: THERAPIES SERIES
Discharge: HOME | End: 2018-08-13
Payer: MEDICAID

## 2018-08-13 PROCEDURE — 97110 THERAPEUTIC EXERCISES: CPT

## 2018-08-13 NOTE — FLOWSHEET NOTE
[x] Kinga Monday       Outpatient Physical        Therapy       [address]       Phone: [phone]       Fax: [phone]     Physical Therapy Daily Treatment Note    Date:  2018  Patient Name:  Kavon Grider    :  1970  MRN: [id number]   Physician: Patric Cordova DO   Insurance: [employer]-extended from 18- 8/15/18 for 18 visits, 2-3x per week. Medical Diagnosis: s/p right shoulder arthroscopy with revision of rotator cuff repair           Rehab Codes: M 25.611, M 25.511, M 62.81, M 79.601  Onset Date: 2018            Next 's appt: 2018   Visit# / total visits:  of newly extended C9   ( from prior C9's: 18-18 and 18-7/3/18)    Cancels/No Shows:     Subjective:   [x] Yes  [] No Location: R shldr  Pain Rating: (0-10 scale) 3/10  Superior shldr      Pain altered Tx:  [x] No  [] Yes  Action:  Comments:  No new complaints. States last week was a bad week for shldr.       Objective:    Modalities: HP to R shldr at end of tx for 10 minutes post session. Precautions:  TO ADDRESS TRIGGER POINT RELEASE UPPER TRAPS/SCAPULAR AREA WITH ROM PER LINCOLN Brooks MD ORDER (CALLED )-held today. 15 mins prior to exercises 8/10.   Exercises:  Exercise   Right shoulder Reps/ Time Weight/ Level  Comments   UBE  2/2/2 mins L2    2 minutes retro, 2 minutes forward,     Cervical rotation to the left 3 reps    Reviewed with patient; Patient independent     Cervical side bending to the left 3x10    Reviewed with patient; Patient independent     levatror stretch 3x10\"   Issued HEP    Cervical flexion/ext 3x10\"    Reviewed with patient; Patient independent   Chin tucks 10x5\"    Reviewed with patient                  Supine       PROM/shldr mobs x    Distraction PROM all planes in supine,      FW chest press 15x 2 lbs x     FW shldr flex 15x 2lbs x Patient independent   FW abd 15x 2lbs   Patient independent   FW horz abd 15x 2lbs  Patient independent Instructions for next treatment: Advance to independence of Exercises in clinic with flow sheet, progress all exercises:progress  scapular stab. exercises, progress weighted ball toss, lifting and cybex.          Treatment Charges: Mins Units   [x]  Modalities HP  10 -   [x]  Ther Exercise   54  4   []  Manual Therapy      []  Ther Activities     []  Aquatics     []  Vasocompression      []  Other     Total Treatment time  54 4       Assessment: [x] Progressing toward goals: Focus on scapular stab. Exercises. Addred serratus anterior exercises as listed in log. [] No change. [x] Other:  Pt self limiting with  Tband serratus anterior work and body blade time. STG: (to be met in 9 treatments)  1. ? Pain:I the right shoulder 5/10 or less with activity( 6/18/2018 (goal met)  2. ? ROM: right shoulder:  flexion 160 ° or better (goal met),abduction 150 ° of better (goal met), internal rotation 85 ° or better, external rotation 85 ° or better (both rotation from 90 ° of abduction)   3. ? Strength:patient will have 4+/5 strength in right shoulder flexion, abduction and supraspinatus and external rotation at 90 ° abduction 5/7/18 NOT MET for flexion and abduction  4. ? Function:patient will have DASH score with 45 % of less impairment 5/7/18 NOT MET  5. Independent with Home Exercise Programs 4/25/18 MET  6. Demonstrate Knowledge of fall prevention   LTG: (to be met in 18 treatments)  1. DASH score with 40% or less impairment  2. Patient able to reach overhead with 3/10 or less pain  3. Patient will have 5-/5 strength in all muscle groups of the right upper extremity and scapular muscle groups to promote return to previous activities of daily living and avocational activities  4. Sleep improve to 6 hours without waking due to right shoulder pain.(goal met)  Additional long term goals:to be met by visit 38  1.  Patient will be able to reach overhead with 2/10 or less pain in he right shoulder so she can place objects (plates) in her cupboards  2. Patient will be able to tolerate 1.5 hours of strengthening exercises in the clinic                    Patient goals:to return  to use of the right upper extremity       Pt. Education:  [x] Yes  [] No  [x] Reviewed Prior home exercise program/education/instructions  Method of Education: [x] Verbal  [x] Demo  [] Written  Comprehension of Education:  [x] Verbalizes understanding. [x] Demonstrates understanding. [] Needs review. [x] Demonstrates/verbalizes HEP/Ed previously given. 8/1/18 Levator stretching. Plan: [x] Continue per plan of care-extend POC d/t newly extended C9   [x] Other: 2-3x wk for 4-6 wk new C9 7/4/18-8/15/18.          Time In:   1048           Time Out:    6880     Electronically signed by: Ruben Morales PTA

## 2018-08-14 ENCOUNTER — HOSPITAL ENCOUNTER (OUTPATIENT)
Dept: PAIN MANAGEMENT | Age: 48
Discharge: HOME | End: 2018-08-14
Payer: COMMERCIAL

## 2018-08-14 VITALS
HEIGHT: 59 IN | HEART RATE: 74 BPM | WEIGHT: 115 LBS | DIASTOLIC BLOOD PRESSURE: 79 MMHG | RESPIRATION RATE: 16 BRPM | SYSTOLIC BLOOD PRESSURE: 112 MMHG | TEMPERATURE: 98.1 F | BODY MASS INDEX: 23.18 KG/M2

## 2018-08-14 DIAGNOSIS — Z79.891 CHRONIC PRESCRIPTION OPIATE USE: Chronic | ICD-10-CM

## 2018-08-14 DIAGNOSIS — Z79.899 CHRONIC PRESCRIPTION BENZODIAZEPINE USE: Chronic | ICD-10-CM

## 2018-08-14 DIAGNOSIS — G89.29 CHRONIC BILATERAL LOW BACK PAIN WITH LEFT-SIDED SCIATICA: Chronic | ICD-10-CM

## 2018-08-14 DIAGNOSIS — G89.29 CHRONIC RIGHT SHOULDER PAIN: Chronic | ICD-10-CM

## 2018-08-14 DIAGNOSIS — M25.511 CHRONIC RIGHT SHOULDER PAIN: Chronic | ICD-10-CM

## 2018-08-14 DIAGNOSIS — M54.42 CHRONIC BILATERAL LOW BACK PAIN WITH LEFT-SIDED SCIATICA: Chronic | ICD-10-CM

## 2018-08-14 PROCEDURE — 99244 OFF/OP CNSLTJ NEW/EST MOD 40: CPT | Performed by: ANESTHESIOLOGY

## 2018-08-14 PROCEDURE — 99203 OFFICE O/P NEW LOW 30 MIN: CPT

## 2018-08-14 ASSESSMENT — PAIN DESCRIPTION - ONSET: ONSET: ON-GOING

## 2018-08-14 ASSESSMENT — ENCOUNTER SYMPTOMS
COUGH: 0
BLURRED VISION: 0
HEARTBURN: 0

## 2018-08-14 ASSESSMENT — PAIN DESCRIPTION - FREQUENCY: FREQUENCY: INTERMITTENT

## 2018-08-14 ASSESSMENT — PAIN DESCRIPTION - LOCATION: LOCATION: BACK;LEG;ANKLE

## 2018-08-14 ASSESSMENT — PAIN SCALES - GENERAL: PAINLEVEL_OUTOF10: 10

## 2018-08-14 ASSESSMENT — PAIN DESCRIPTION - ORIENTATION: ORIENTATION: LEFT

## 2018-08-14 ASSESSMENT — PAIN DESCRIPTION - PAIN TYPE: TYPE: CHRONIC PAIN

## 2018-08-14 ASSESSMENT — PAIN DESCRIPTION - PROGRESSION: CLINICAL_PROGRESSION: NOT CHANGED

## 2018-08-14 NOTE — PROGRESS NOTES
[facility] Pain Management  Patient Pain Assessment  Consultation    Primary Care Physician: Liberty Latif MD    Chief complaint:   Chief Complaint   Patient presents with    Lower Back Pain   . HISTORY OF PRESENT ILLNESS:  Kostas Alcala is 46 y.o. female with    HPI    - History of chronic right shoulder pain, related to a work injury, she has a workman comp case for this injury and has had multiple right shoulder surgery. [email or web address]. He's been seeing pain management at [facility] for her chronic right shoulder pain issues and is being managed with long-term opioids and non-opioid medication regimen. Her current medication regimen include Klonopin, Flexeril, Celebrex, Lyrica and Percocet. - She is here today referred by her primary care physician for evaluation of lower back and left-sided leg pain. Pain started 2 months ago and has been consistent since then. No particular injury associated with this pain. Pain is located on the left side in the lumbosacral area and radiates down constantly as a burning and stabbing pain sensation to the knee. Occasionally the pain has radiated down to the feet. Associated symptoms include intermittent numbness and tingling in leg. No loss of bladder or bowel control. No history of fever chills or weight loss. No previous lumbar spine injection history  No previous lumbar spine diagnostic workup  No previous lumbar spine surgical history. Current Pain Assessment           Associated Symptoms  1 Associated symptoms: tingling  2. Red Flags:    Chills No              Weight Loss :No              Loss of Bladder Control :No              Loss of Bowel Control: No  3. BMI 23.3      Previous management history:  1. Previous diagnostic workup: X-ray 11/2016  2.  Previous non interventional treatments tried:                  Physical Therapy: Yes in currently completed 2-3 active range of motion on lumbar spine is limited and associated with pain, no apparent spine deformity and inspection, positive tenderness to palpation over left lumbar area, straight leg raise positive  . Neurological exam reveals alert, oriented, normal speech, no focal findings or movement disorder noted, neck supple without rigidity, motor and sensory grossly normal bilaterally. Assessment & Plan     - History of chronic right shoulder pain, related to a work injury, she has a workman comp case for this injury and has had multiple right shoulder surgery. Most [email or web address] Dr[facility] Marcela Guevara. He's been seeing pain management at [facility] for her chronic right shoulder pain issues and is being managed with long-term opioids and non-opioid medication regimen. Her current medication regimen include Klonopin, Flexeril, Celebrex, Lyrica and Percocet. - She is here today referred by her primary care physician for evaluation of lower back and left-sided leg pain. Pain started 2 months ago and has been consistent since then. No particular injury associated with this pain. Pain is located on the left side in the lumbosacral area and radiates down constantly as a burning and stabbing pain sensation to the knee. Occasionally the pain has radiated down to the feet. Associated symptoms include intermittent numbness and tingling in leg. No loss of bladder or bowel control. No history of fever chills or weight loss. No previous lumbar spine injection history  No previous lumbar spine diagnostic workup  No previous lumbar spine surgical history. 1. Chronic bilateral low back pain with left-sided sciatica    2. Chronic prescription opiate use    3. Chronic prescription benzodiazepine use    4. Chronic right shoulder pain      She is here today for evaluation of lower back and left-sided sciatica  This is not a workman comp related injury.   Her current pain

## 2018-08-15 ENCOUNTER — HOSPITAL ENCOUNTER (OUTPATIENT)
Dept: PHYSICAL THERAPY | Age: 48
Setting detail: THERAPIES SERIES
Discharge: HOME | End: 2018-08-15
Payer: MEDICAID

## 2018-08-15 ENCOUNTER — HOSPITAL ENCOUNTER (OUTPATIENT)
Dept: PHYSICAL THERAPY | Age: 48
Setting detail: THERAPIES SERIES
End: 2018-08-15
Payer: MEDICAID

## 2018-08-15 PROCEDURE — 97110 THERAPEUTIC EXERCISES: CPT

## 2018-08-15 NOTE — FLOWSHEET NOTE
prevention   LTG: (to be met in 18 treatments)  1. DASH score with 40% or less impairment  2. Patient able to reach overhead with 3/10 or less pain  3. Patient will have 5-/5 strength in all muscle groups of the right upper extremity and scapular muscle groups to promote return to previous activities of daily living and avocational activities  4. Sleep improve to 6 hours without waking due to right shoulder pain.(goal met)  Additional long term goals:to be met by visit 38  1. Patient will be able to reach overhead with 2/10 or less pain in he right shoulder so she can place objects (plates) in her cupboards  2. Patient will be able to tolerate 1.5 hours of strengthening exercises in the clinic                    Patient goals:to return  to use of the right upper extremity       Pt. Education:  [x] Yes  [] No  [x] Reviewed Prior home exercise program/education/instructions  Method of Education: [x] Verbal  [x] Demo  [] Written  Comprehension of Education:  [x] Verbalizes understanding. [x] Demonstrates understanding. [] Needs review. [x] Demonstrates/verbalizes HEP/Ed previously given. 8/1/18 Levator stretching. Plan: [x] Continue per plan of care-extend POC d/t newly extended C9   [x] Other: 2-3x wk for 4-6 wk new C9 7/4/18-8/15/18.          Time In:   9055        Time Out:    3795     Electronically signed by: Saul Lima PTA

## 2018-08-17 ENCOUNTER — HOSPITAL ENCOUNTER (OUTPATIENT)
Dept: PHYSICAL THERAPY | Age: 48
Setting detail: THERAPIES SERIES
Discharge: HOME | End: 2018-08-17
Payer: MEDICAID

## 2018-08-20 ENCOUNTER — HOSPITAL ENCOUNTER (OUTPATIENT)
Dept: PHYSICAL THERAPY | Age: 48
Setting detail: THERAPIES SERIES
Discharge: HOME | End: 2018-08-20
Payer: MEDICAID

## 2018-08-20 PROCEDURE — 97110 THERAPEUTIC EXERCISES: CPT

## 2018-08-20 NOTE — FLOWSHEET NOTE
[x] Orquidea Dean       Outpatient Physical        Therapy       [address]       Phone: [phone]       Fax: [phone]     Physical Therapy Daily Treatment Note    Date:  2018  Patient Name:  Shiela Wilder    :  1970  MRN: [id number]   Physician: Felipe Moe DO   Insurance: [employer]-extended from 18- 8/15/18 for 18 visits, 2-3x per week. (8/15/18 end date was extended to  for remaining 5 visits)  Medical Diagnosis: s/p right shoulder arthroscopy with revision of rotator cuff repair           Rehab Codes: M 25.611, M 25.511, M 62.81, M 79.601  Onset Date: 2018            Next 's appt: 2018   Visit# / total visits:  of newly extended C9   ( from prior C9's: 18-18 and 18-7/3/18)    Cancels/No Shows: 3/     Subjective:   [x] Yes  [] No Location: R shldr  Pain Rating: (0-10 scale) 7/10  Anterior shoulder       Pain altered Tx:  [x] No  [] Yes  Action:  Comments: Reports shldr pain has been up since 8/15/18 session. (cx 18 session due to pain)  States she wants to start with the difficult  Serratus anterior tband exercises first.         Objective:    Modalities: HP to R shldr at end of tx for 10 minutes post session. -held as pt cut session short        Precautions:  TO [address] TRAPS/SCAPULAR AREA WITH ROM PER Liana Ferguson MD ORDER (CALLED )-held today. Exercises:  Exercise   Right shoulder Reps/ Time Weight/ Level  Comments   UBE  2/2/2 mins L2  x  2 minutes retro, 2 minutes forward,     Cervical rotation to the left 3 reps     Patient independent     Cervical side bending to the left 3x10      Patient independent     levatror stretch 3x15\"   Verbal cues for set up with ea UE placement.     Cervical flexion/ext 3x10\"    Patient independent   Chin tucks 10x5\"   Pt indpendent                   Supine       PROM/shldr mobs x   x Distraction PROM all planes in supine,      FW chest press 15x 2 lbs       FW shldr flex 15x 2lbs x Patient independent   FW abd 15x 2lbs  Patient independent   FW horz abd 15x 2lbs   Patient independent   FW flys 10x 2 lbs x    FW triceps  10 reps each    Verbal instruct with demo for home use     FW protraction  15x 2 lbs x     Scapular stabilization 4x15\" 2 lbs x              Standing       Wall ball stab. exercise 15x each  x Tennis ball, CW, CCW, patient standng with ball in abduction from the body and in flexion from the body   wall push up plus  15x  x   Reviewed with patient; Patient independent   Blue rocker board 10x3  x     Body blade       Abduction/ Adduction  15\" x3          Verbal instruction with demo for use in clinic,        Flexion/ Extension 1 mins x2      Verbal instruction with demo for use in clinic,           Arm extension to arm with elbow flexion 1  mins x2       Verbal instruction with demo for use in clinic,         Scapular clock 10x    x    Focus on 7 oclock   FW shldr shrugs 15x 3 lbs    increased wt 8/15   FW shldr flexion 15x 2 lbs     pt self limits at approx 120 degrees 8/15   FW shldr abd 15x 2 lbs      Scap. retraction   2 lbs      Wand IR 15x 2 lbs      [place]  stretch        . shldr ext 20x Green      triceps 20x Green     depression 20x Blue   x    high row   20x Green  x     rows 20x green     Biceps 20x Green     IR/ER 20x Red       Horizontal abd 20x Green      SA wall washes 15x red x     SA wall lateral walks 4x6 ft red x    SA wall (roll off)  R retraction 5x2 red x     Side lying          manual x  x  distraction, flexion, abd   Scap.  Depression  15x A/AA    7 oclock,tactile cues   ER 10x2 2 lbs x     abduction 10x2 2 lbs x      Horizontal abd 10x2 2 lbs x    Sleeper stretch 3x20\"  x IR   Prone        Is, Ts, Ys x15 2 lbs      ext x15 2 lbs     rows x15 3 lbs     Rows w/ 90 abd x15 3 lbs      Ball toss  10-15x ea 5' wt ball  under hand, chest pass            Cybex Athletic Row 2x15 1Plate  Add as able            Other:    Completed exercises marked with \"x\". Rotate vairous  exercises due to volumn/pt exercise rate/ and pt self limiting exercise log. Pt cut session short due to reporting she wants to go to MD office for walk in appt for L hip/thigh pain and get dx changed to LB vs knee pain. (medical insurance POC). Informed pt that additional LTG for shldr is to progress to 1.5 hrs. Work in clinic. Manual :  TRIGGER POINT RELEASE UPPER TRAPS/SCAPULAR AREA  -held. Manual:  MET Lateral/subscap. For progression of shldr flexion/abduction x 5 mins.          Specific Instructions for next treatment: Advance to independence of Exercises in clinic with flow sheet, progress all exercises:progress  scapular stab. exercises, progress weighted ball toss, lifting and cybex. Start with tband SA exercises and body blade vs PRE due to pt fatigue next sessions. Check all LTG, add accordingly.          Treatment Charges: Mins Units   []  Modalities HP      [x]  Ther Exercise  38 3   [x]  Manual Therapy    5 -   []  Ther Activities     []  Aquatics     []  Vasocompression      []  Other     Total Treatment time  43 3       Assessment: [] Progressing toward goals:       [] No change. [x] Other:  Added MET to lateral  subscapularis with  full shldr abduction accomplished  but had to distract shldr for full flexion due to pt reporting increased shldr pain. Addressed Serratus anterior tband exercises prior to other exercises per pt reports of fatigue but unable to complete tband exercises due to pt request. See above comment.         STG: (to be met in 9 treatments)  1. ? Pain:I the right shoulder 5/10 or less with activity( 6/18/2018 (goal met)  2. ? ROM: right shoulder:  flexion 160 ° or better (goal met),abduction 150 ° of better (goal met), internal rotation 85 ° or better, external rotation 85 ° or better (both rotation from 90 ° of abduction)   3. ? Strength:patient will have 4+/5 strength in right shoulder flexion,

## 2018-08-20 NOTE — FLOWSHEET NOTE
[x] Fredy Escobar       Outpatient Physical        Therapy       [address]       Phone: [phone]       Fax: [phone]     Physical Therapy Daily Treatment Note    Date:  2018  Patient Name:  Aminta Oh    :  1970  MRN: [id number]  Physician: Emilio Beck     Insurance: [employer]  Diagnosis: Acute pain of left knee (M25.562)       Onset Date: 2018   Next Dr. Parikh Gearing:  N/A  Visit# / total visits:    Cancels/No Shows: 1/0    Subjective:   Pain:  [] Yes  [x] No Location: left knee  Pain Rating: (0-10 scale) 0/10  Pain altered Tx:  [] No  [] Yes  Action:    Comments:  Report no problems with knee, states she thinks the pain is coming from her back not a \"knee issue\" States she left a msg with MDs office on Friday regarding this. Describes hip/posterior thigh as asharp/stabbing constant pain since 18. Reports heat pad helps a little. States she was in pain all weekend and was not active as a result. Rates this as a  9/10 pain since 18. Reports she is going to stop at MDs office today and will keep me updated.      Objective:   Modalities: N/A  Precautions:  Exercises:   Exercise;   left knee pain Reps/ Time Weight/ Level Comments    Nu step  5 mins L2 4 mins   x          sitting        HS stretching* 3x20\"  8 in box x   Piriformis stretch* 3x20\"  Figure 4 x          Standing        Gastroc stretch* 3x20\"  wedge x   Heel raises 15x       HS curls 15x 1 lbs B,       Marches  15x 1 lbs B,      3 way hip 15x 1 lbs B, 10x each   x   Squats 15x  Emphasis on posture x   Step ups  10x 6 in       Heel taps 10x 4 in                    supine       Quad set* 10 x5\"  Added ball for VMO     Straight leg raise* 10x2 reps 1 lbs Added wt,      SLR with ER* 10x 2 lbs       Short arc quadriceps* 10 reps 1lb      bridging 10x5\"       Hamstring Stretch 3x45\"   completed in standing      Piriformis Stretch 3x30\"  Completed in sitting  x            Prone Prone lying 1 mins   x   JENNIFER 2 mins      Gluteal sets 10x   x   Knee flexion* 15 reps  Increased reps 8/20 x   Hip extension* 5x3 reps  Knee Flexed- applied pressure to L side LB x          Side lying       Hip abduction* 8 reps  Increased pain lateral hip, limited reps   x   Hip adduction* 10 reps   x   Clamshells x15 red   x          Other: * issued HEP in writing 8/8. Completed exercises marked with \"x\" Pt stated she could not address full session due to posterior hip/thigh pain. Manual therapy; patellar mobilization on the left knee - superior glide 3 minutes        Specific Instructions for next treatment:patellar mobilizations superior on the left. Tissue lengthening/ extensibility. VMO strengthening. Progress [facility] exerices        Treatment Charges: Mins Units   []  Modalities     [x]  Ther Exercise  38 3   [x]  Manual Therapy   3 -   []  Ther Activities     []  Aquatics     []  Vasocompression     []  Other     Total Treatment time  41 3       Assessment: [] Progressing toward goals     [] No change. [x] Other: limited progressions and several exercises declined  due to pt pain complaints as listed above. Intermittent verbal cues for posture/technique  with hip strengthning. STG: (to be met in 10 treatments)  1. ? Pain:patient will report decreased episodes of \"cramping\" in the left calf  2. ? Strength:increase  Left knee VMO strength to 4+/5 and quadriceps strength to 5-/5 or better to allow patient greater ease in ambulation on levels, stairs, curbs, ramps  3. ? Function:patient will report increased ability to descend and ascend stairs  4. Independent with Home Exercise Programs         Pt. Education:  [x] Yes  [] No  [x] Reviewed Prior home exercise program/education/instructions  Method of Education: [x] Verbal  [x] Demo  [] Written * ISSUED WRITING hep 8/8/18     Comprehension of Education:  [x] Verbalizes understanding. [] Demonstrates understanding. [] Needs review.   [x]

## 2018-08-24 ENCOUNTER — HOSPITAL ENCOUNTER (OUTPATIENT)
Dept: PHYSICAL THERAPY | Age: 48
Setting detail: THERAPIES SERIES
Discharge: HOME | End: 2018-08-24
Payer: MEDICAID

## 2018-08-24 PROCEDURE — 97110 THERAPEUTIC EXERCISES: CPT

## 2018-08-24 PROCEDURE — 97140 MANUAL THERAPY 1/> REGIONS: CPT

## 2018-08-24 NOTE — FLOWSHEET NOTE
hip, limited reps      Hip adduction* 10 reps      Clamshells x15 red             Other: * issued HEP in writing 8/8. Completed exercises marked with \"x\" Pt stated she could not address full session due to posterior hip/thigh pain. Manual therapy; patellar mobilization on the left knee - superior glide 3 minutes  Held 8/24      Specific Instructions for next treatment:patellar mobilizations superior on the left. Tissue lengthening/ extensibility. VMO strengthening. Progress [facility] exerices        Treatment Charges: Mins Units   []  Modalities     [x]  Ther Exercise  36 2   [x]  Manual Therapy     []  Ther Activities     []  Aquatics     []  Vasocompression     []  Other     Total Treatment time  36 2       Assessment: [] Progressing toward goals     [] No change. [x] Other: No complaints of hip/knee pain throughout shoulder treatment prior to beginning knee treatment. Upon initiating seated stretches patient notes onset of severe pain in the L hip. Discontinued seated stretches and attempted standing stretching/exercises with limited tolerance from patient due to pain. Suggested proceeding to supine exercises with patient expressing she would like to \"call\" treatment. Treating therapist suggested supine with heat to decrease symptoms, proceeded with heat and patient was agreeable to attempt supine exercises completed as noted in log with fair tolerance. Verbalized relief of L hip symptoms by manual assist piriformis stretch. Upon standing to exit clinic, patient demonstrating antalgic gait and guarding, educated patient on importance of standing up straight even with onset of pain to reduce forward flexed posture.       STG: (to be met in 10 treatments)  1. ? Pain:patient will report decreased episodes of \"cramping\" in the left calf  2. ? Strength:increase  Left knee VMO strength to 4+/5 and quadriceps strength to 5-/5 or better to allow patient greater ease in ambulation on levels, stairs, curbs, ramps  3. ? Function:patient will report increased ability to descend and ascend stairs  4. Independent with Home Exercise Programs         Pt. Education:  [x] Yes  [] No  [x] Reviewed Prior home exercise program/education/instructions  Method of Education: [x] Verbal  [x] Demo  [] Written * ISSUED WRITING hep 8/8/18     Comprehension of Education:  [x] Verbalizes understanding. [] Demonstrates understanding. [] Needs review. [x] Demonstrates/verbalizes home exercise program/education/instructions previously given. Plan: [x] Continue per plan of care.    [] Other:      Time In:  1100       Time Out:   0442     Electronically signed by:  Luis E Ritter PTA

## 2018-08-24 NOTE — FLOWSHEET NOTE
[x] [facility]       Outpatient Physical        Therapy       [address]       Phone: [phone]       Fax: [phone]     Physical Therapy Daily Treatment Note    Date:  2018  Patient Name:  Amanda Person    :  1970  MRN: [id number]   Physician: Deb Calles DO   Insurance: [employer] Comp-extended from 18- 8/15/18 for 18 visits, 2-3x per week. (8/15/18 end date was extended to  for remaining 5 visits)  Medical Diagnosis: s/p right shoulder arthroscopy with revision of rotator cuff repair           Rehab Codes: M 25.611, M 25.511, M 62.81, M 79.601  Onset Date: 2018            Next 's appt: 2018   Visit# / total visits: 15/18 of newly extended C9   ( from prior C9's: 18-18 and 18-7/3/18)    Cancels/No Shows: 3/2     Subjective:   [x] Yes  [] No Location: R shldr  Pain Rating: (0-10 scale) 5/10  Anterior shoulder       Pain altered Tx:  [x] No  [] Yes  Action:  Comments: States shoulder pain is \"better than it's been\". Did not sleep at all last night d/t pain, but upon arrival pain is lessened. Objective:    Modalities: HP to R shldr at end of tx for 10 minutes post session. Precautions:  TO ADDRESS TRIGGER POINT RELEASE UPPER TRAPS/SCAPULAR AREA WITH ROM PER LINCOLN Costa MD ORDER (CALLED )-held today. Exercises:  Exercise   Right shoulder Reps/ Time Weight/ Level  Comments   UBE  2/2/2 mins L2  x  2 minutes retro, 2 minutes forward,     Cervical rotation to the left 3 reps     Patient independent     Cervical side bending to the left 3x10      Patient independent     levatror stretch 3x15\"   Verbal cues for set up with ea UE placement.     Cervical flexion/ext 3x10\"    Patient independent   Chin tucks 10x5\"   Pt indpendent                   Supine       PROM/shldr mobs x   x Distraction PROM all planes in supine,      FW chest press 15x 2 lbs  x     FW shldr flex 15x 2lbs  Patient independent   FW abd 15x 2lbs marked with \"x\". Rotate vairous  exercises due to volumn/pt exercise rate/ and pt self limiting exercise log. Manual :  TRIGGER POINT RELEASE UPPER TRAPS/SCAPULAR AREA  X 9 minutes  Manual:  MET Lateral/subscap. For progression of shldr flexion/abduction held 8/24          Specific Instructions for next treatment: Advance to independence of Exercises in clinic with flow sheet, progress all exercises:progress  scapular stab. exercises, progress weighted ball toss, lifting and cybex. Start with tband SA exercises and body blade vs PRE due to pt fatigue next sessions. Check all LTG, add accordingly.          Treatment Charges: Mins Units   []  Modalities HP      [x]  Ther Exercise 44 3   [x]  Manual Therapy    9 1   []  Ther Activities     []  Aquatics     []  Vasocompression      []  Other     Total Treatment time  53 4       Assessment: [] Progressing toward goals:       [] No change. [x] Other: Performed exercises as noted per patient tolerance this date. Overall fair tolerance to program with fatigue noted and minimal increase in pain specifically during standing FW exercises. Intermittent verbal cueing for proper posture and alignment to ensure appropriate muscle activation throughout treatment. Patient verbalized that she wishes to discontinue BodyBlade exercises as they are only increasing pain and she feels little benefit from it. Ended treatment with TPR to R upper trap with good release noted.         STG: (to be met in 9 treatments)  1. ? Pain:I the right shoulder 5/10 or less with activity( 6/18/2018 (goal met)  2. ? ROM: right shoulder:  flexion 160 ° or better (goal met),abduction 150 ° of better (goal met), internal rotation 85 ° or better, external rotation 85 ° or better (both rotation from 90 ° of abduction)   3. ? Strength:patient will have 4+/5 strength in right shoulder flexion, abduction and supraspinatus and external rotation at 90 ° abduction 5/7/18 NOT MET for flexion and

## 2018-08-27 ENCOUNTER — HOSPITAL ENCOUNTER (OUTPATIENT)
Dept: PHYSICAL THERAPY | Age: 48
Setting detail: THERAPIES SERIES
Discharge: HOME | End: 2018-08-27
Payer: MEDICAID

## 2018-08-27 ENCOUNTER — HOSPITAL ENCOUNTER (OUTPATIENT)
Dept: PHYSICAL THERAPY | Age: 48
Setting detail: THERAPIES SERIES
Discharge: HOME | End: 2018-08-27
Payer: COMMERCIAL

## 2018-08-27 PROCEDURE — 97110 THERAPEUTIC EXERCISES: CPT

## 2018-08-27 NOTE — FLOWSHEET NOTE
in 18 treatments)  1. DASH score with 40% or less impairment  2. Patient able to reach overhead with 3/10 or less pain  3. Patient will have 5-/5 strength in all muscle groups of the right upper extremity and scapular muscle groups to promote return to previous activities of daily living and avocational activities  4. Sleep improve to 6 hours without waking due to right shoulder pain.(goal met)  Additional long term goals:to be met by visit 38  1. Patient will be able to reach overhead with 2/10 or less pain in he right shoulder so she can place objects (plates) in her cupboards  2. Patient will be able to tolerate 1.5 hours of strengthening exercises in the clinic                    Patient goals:to return  to use of the right upper extremity       Pt. Education:  [x] Yes  [] No  [x] Reviewed Prior home exercise program/education/instructions  Method of Education: [x] Verbal  [x] Demo  [] Written  Comprehension of Education:  [x] Verbalizes understanding. [x] Demonstrates understanding. [] Needs review. [x] Demonstrates/verbalizes HEP/Ed previously given. 8/1/18 Levator stretching. Plan: [x] Continue per plan of care-extend POC d/t newly extended C9   [x] Other: 2-3x wk for 4-6 wk new C9 7/4/18-8/31/18.          Time In:  1003        Time Out: 1100         Electronically signed by: Anmol Campos PTA

## 2018-08-27 NOTE — FLOWSHEET NOTE
pain lateral hip, limited reps   x   Hip adduction* 10 reps   x   Clamshells x15 red   x          Other: * issued HEP in writing 8/8. Completed exercises marked with \"x\" , held some exercises due to late arrival and pt declined some standing exercises. Pt on her cell phone during some  standing exercises. Manual therapy; patellar mobilization on the left knee - superior glide 3 minutes  Held TODAY. 8/27/18:  LEFS: was no  Scored out from initial eval, did not address  L Hip, 5/5  L VMO 4+/5    Specific Instructions for next treatment: Discharge from therapy, completed 8/8 session. Treatment Charges: Mins Units   []  Modalities     [x]  Ther Exercise 22 1   []  Manual Therapy     []  Ther Activities     []  Aquatics     []  Vasocompression     []  Other     Total Treatment time  22 1       Assessment: [] Progressing toward goals     [] No change. [x] Other: all STG met, discharged as completed 8/8 session. No reports of pain, states calf muscle spasms are a lot less and she knows how to control now. Pt has written HEP. STG: (to be met in 10 treatments)  1. ? Pain:patient will report decreased episodes of \"cramping\" in the left calf  8/27/18 MET  2. ? Strength:increase  Left knee VMO strength to 4+/5 and quadriceps strength to 5-/5 or better to allow patient greater ease in ambulation on levels, stairs, curbs, ramps 8/27/18 MET  3. ? Function:patient will report increased ability to descend and ascend stairs 8/27/18 MET  4. Independent with Home Exercise Programs 8/27/18 MET         Pt. Education:  [x] Yes  [] No  [x] Reviewed Prior home exercise program/education/instructions  Method of Education: [x] Verbal  [x] Demo  [] Written * ISSUED WRITING hep 8/8/18     Comprehension of Education:  [x] Verbalizes understanding. [x] Demonstrates understanding. [] Needs review. [x] Demonstrates/verbalizes home exercise program/education/instructions previously given.      Plan: [] Continue per plan of care.   [x] Other: Discharge, completed 8/8 sessions. Pt to follow up with MD and complete HEP as directed.         Time In:  1011      Time Out:  1133       Electronically signed by:  David Simmons PTA

## 2018-08-29 ENCOUNTER — HOSPITAL ENCOUNTER (OUTPATIENT)
Dept: PHYSICAL THERAPY | Age: 48
Setting detail: THERAPIES SERIES
Discharge: HOME | End: 2018-08-29
Payer: MEDICAID

## 2018-08-29 PROCEDURE — 97140 MANUAL THERAPY 1/> REGIONS: CPT

## 2018-08-29 PROCEDURE — 97110 THERAPEUTIC EXERCISES: CPT

## 2018-08-29 NOTE — FLOWSHEET NOTE
independent     Cervical side bending to the left 3x10      Patient independent     levatror stretch 3x15\"   Verbal cues for set up with ea UE placement. Cervical flexion/ext 3x10\"    Patient independent   Chin tucks 10x5\"   Pt indpendent                   Supine       PROM/shldr mobs  X     Distraction with AAROM. FW chest press 15x 2 lbs        FW shldr flex 15x 2lbs  Patient independent   FW abd 15x 2lbs  Patient independent   FW horz abd 15x 2lbs   Patient independent   FW flys 10x 2 lbs      FW triceps  10 reps each    Verbal instruct with demo for home use     FW protraction  15x 2 lbs x     Scapular stabilization 4x15\" 2 lbs               Standing       Wall ball stab. exercise 15x each  x Tennis ball, CW, CCW, patient standng with ball in abduction from the body and in flexion from the body   wall push up plus  15x  x   Reviewed with patient; Patient independent   Blue rocker board  1 mins x 3  x            Body blade    Held 8/27 per pt request. RESTART NEXT SESSION   Abduction/ Adduction  15\" x3           pt refused 8/29   Flexion/ Extension 1 mins x2      Pt refused 8/29   Arm extension to arm with elbow flexion  20\" x1    x Pt declined after 1 rep, 8/29          Scapular clock 10x        Focus on 7 oclock   FW shldr shrugs 20x 3 lbs  x     FW shldr flexion 20x 2 lbs  x     FW shldr abd 20x 2 lbs  x    Scap. retraction 10x2 2 lbs x    Wand IR 15x 2 lbs       [place]  stretch        . shldr ext 20x Green      triceps 20x Green      depression 20x Blue      high row   20x Green      rows 20x green     Biceps 20x Green     IR/ER 20x Red       Horizontal abd 20x Green      SA wall washes 15x red x     SA wall lateral walks 4x6 ft red x    SA wall (roll off)  R retraction 5x2 red x            Side lying          manual x     distraction, flexion, abd   Scap.  Depression  15x A/AA    7 oclock,tactile cues   ER 10x2 2 lbs       abduction 10x2 2 lbs     Horizontal abd 10x2 2 lbs     Sleeper MET  5. Independent with Home Exercise Programs 4/25/18 MET  6. Demonstrate Knowledge of fall prevention   LTG: (to be met in 18 treatments)  1. DASH score with 40% or less impairment  2. Patient able to reach overhead with 3/10 or less pain  3. Patient will have 5-/5 strength in all muscle groups of the right upper extremity and scapular muscle groups to promote return to previous activities of daily living and avocational activities  4. Sleep improve to 6 hours without waking due to right shoulder pain.(goal met)  Additional long term goals:to be met by visit 38  1. Patient will be able to reach overhead with 2/10 or less pain in he right shoulder so she can place objects (plates) in her cupboards  2. Patient will be able to tolerate 1.5 hours of strengthening exercises in the clinic                    Patient goals:to return  to use of the right upper extremity       Pt. Education:  [x] Yes  [] No  [x] Reviewed Prior home exercise program/education/instructions  Method of Education: [x] Verbal  [x] Demo  [] Written  Comprehension of Education:  [x] Verbalizes understanding. [x] Demonstrates understanding. [] Needs review. [x] Demonstrates/verbalizes HEP/Ed previously given. 8/1/18 Levator stretching. Plan: [x] Continue per plan of care-extend POC d/t newly extended C9   [x] Other: 2-3x wk for 4-6 wk new C9 7/4/18-8/31/18.          Time In:  1048        Time Out: 1138      Electronically signed by: Wandy Du PTA

## 2018-08-30 ENCOUNTER — HOSPITAL ENCOUNTER (OUTPATIENT)
Dept: PHYSICAL THERAPY | Age: 48
Setting detail: THERAPIES SERIES
Discharge: HOME | End: 2018-08-30
Payer: MEDICAID

## 2018-08-30 PROCEDURE — 97110 THERAPEUTIC EXERCISES: CPT

## 2018-08-30 NOTE — FLOWSHEET NOTE
horz abd 15x 2lbs   Patient independent   FW flys 10x 2 lbs      FW triceps  10 reps each    Verbal instruct with demo for home use     FW protraction  15x 2 lbs      Scapular stabilization 4x15\" 2 lbs               Standing       Wall ball stab. exercise 15x each  x Tennis ball, CW, CCW, patient standng with ball in abduction from the body and in flexion from the body   wall push up plus  15x  x   Reviewed with patient; Patient independent   Blue rocker board  1 mins x 3  x            Body blade    Held 8/27 per pt request. RESTART NEXT SESSION   Abduction/ Adduction  15\" x3           pt refused 8/29   Flexion/ Extension 1 mins x2      Pt refused 8/29   Arm extension to arm with elbow flexion  20\" x1    x Pt declined after 1 rep, 8/29          Scapular clock 10x        Focus on 7 oclock   FW shldr shrugs 20x 3 lbs  x     FW shldr flexion 20x 2 lbs  x     FW shldr abd 20x 2 lbs  x    Scap. retraction 10x2 2 lbs x    Wand IR 15x 2 lbs       [place]  stretch        . shldr ext 20x Green      triceps 20x Green      depression 20x Blue      high row   20x Green      rows 20x green     Biceps 20x Green     IR/ER 20x Red       Horizontal abd 20x Green      SA wall washes 15x red x     SA wall lateral walks 4x6 ft red x    SA wall (roll off)  R retraction 5x2 red x            Side lying          manual x     distraction, flexion, abd   Scap. Depression  15x A/AA    7 oclock,tactile cues   ER 10x2 2 lbs       abduction 10x2 2 lbs     Horizontal abd 10x2 2 lbs     Sleeper stretch 3x20\"   IR   Prone        Is, Ts, Ys x15 2 lbs      ext x15 2 lbs     rows x15 3 lbs     Rows w/ 90 abd x15 3 lbs      Ball toss  10-15x ea 5' wt ball  under hand, chest pass RESTART           cybex close  row 15x 2 plates  added   Cybex Athletic Row 15x 1.5 plates   added   cybex lat 15x 4 plates  added                            Other: Ex's complete marked by \"x\"             Manual :    .      Manual:  MET Lateral/subscap.  For be able to reach overhead with 2/10 or less pain in he right shoulder so she can place objects (plates) in her cupboards  2. Patient will be able to tolerate 1.5 hours of strengthening exercises in the clinic                    Patient goals:to return  to use of the right upper extremity       Pt. Education:  [x] Yes  [] No  [] Reviewed Prior home exercise program/education/instructions  Method of Education: [x] Verbal  [x] Demo  [] Written  Comprehension of Education:Pt reports not having any questions with HEP. [] Verbalizes understanding. [] Demonstrates understanding. [] Needs review. [] Demonstrates/verbalizes HEP/Ed previously given.       Plan: [x] Continue per plan of care-extend POC d/t newly extended C9   [x] Other: DC according to C9         Time In:  1430        Time Out: 2050      Electronically signed by: Dena Lennox, PTA

## 2018-09-25 ENCOUNTER — APPOINTMENT (OUTPATIENT)
Dept: MRI IMAGING | Age: 48
End: 2018-09-25
Payer: MEDICAID

## 2018-09-25 ENCOUNTER — APPOINTMENT (OUTPATIENT)
Dept: CT IMAGING | Age: 48
End: 2018-09-25
Payer: MEDICAID

## 2018-09-25 ENCOUNTER — HOSPITAL ENCOUNTER (EMERGENCY)
Age: 48
Discharge: ANOTHER HOSPITAL | End: 2018-09-25
Attending: EMERGENCY MEDICINE
Payer: MEDICAID

## 2018-09-25 VITALS
SYSTOLIC BLOOD PRESSURE: 137 MMHG | WEIGHT: 119.44 LBS | DIASTOLIC BLOOD PRESSURE: 82 MMHG | OXYGEN SATURATION: 100 % | BODY MASS INDEX: 24.08 KG/M2 | RESPIRATION RATE: 16 BRPM | HEIGHT: 59 IN | HEART RATE: 78 BPM | TEMPERATURE: 98.1 F

## 2018-09-25 DIAGNOSIS — I67.1 CEREBRAL ANEURYSM WITHOUT RUPTURE: Primary | ICD-10-CM

## 2018-09-25 LAB
ABSOLUTE EOS #: 0 K/UL (ref 0–0.4)
ABSOLUTE IMMATURE GRANULOCYTE: ABNORMAL K/UL (ref 0–0.3)
ABSOLUTE LYMPH #: 1.2 K/UL (ref 1–4.8)
ABSOLUTE MONO #: 0.3 K/UL (ref 0.2–0.8)
ANION GAP SERPL CALCULATED.3IONS-SCNC: 9 MMOL/L (ref 9–17)
BASOPHILS # BLD: 0 % (ref 0–2)
BASOPHILS ABSOLUTE: 0 K/UL (ref 0–0.2)
BUN BLDV-MCNC: 9 MG/DL (ref 6–20)
BUN/CREAT BLD: 11 (ref 9–20)
CALCIUM SERPL-MCNC: 8.9 MG/DL (ref 8.6–10.4)
CHLORIDE BLD-SCNC: 106 MMOL/L (ref 98–107)
CO2: 24 MMOL/L (ref 20–31)
CREAT SERPL-MCNC: 0.79 MG/DL (ref 0.5–0.9)
DIFFERENTIAL TYPE: ABNORMAL
EKG ATRIAL RATE: 70 BPM
EKG P AXIS: 68 DEGREES
EKG P-R INTERVAL: 152 MS
EKG Q-T INTERVAL: 366 MS
EKG QRS DURATION: 64 MS
EKG QTC CALCULATION (BAZETT): 395 MS
EKG R AXIS: 2 DEGREES
EKG T AXIS: 44 DEGREES
EKG VENTRICULAR RATE: 70 BPM
EOSINOPHILS RELATIVE PERCENT: 1 % (ref 1–4)
GFR AFRICAN AMERICAN: >60 ML/MIN
GFR NON-AFRICAN AMERICAN: >60 ML/MIN
GFR SERPL CREATININE-BSD FRML MDRD: ABNORMAL ML/MIN/{1.73_M2}
GFR SERPL CREATININE-BSD FRML MDRD: ABNORMAL ML/MIN/{1.73_M2}
GLUCOSE BLD-MCNC: 106 MG/DL (ref 70–99)
HCT VFR BLD CALC: 35.5 % (ref 36–46)
HEMOGLOBIN: 11.6 G/DL (ref 12–16)
IMMATURE GRANULOCYTES: ABNORMAL %
LYMPHOCYTES # BLD: 25 % (ref 24–44)
MCH RBC QN AUTO: 28.3 PG (ref 26–34)
MCHC RBC AUTO-ENTMCNC: 32.7 G/DL (ref 31–37)
MCV RBC AUTO: 86.5 FL (ref 80–100)
MONOCYTES # BLD: 6 % (ref 1–7)
NRBC AUTOMATED: ABNORMAL PER 100 WBC
PDW BLD-RTO: 13 % (ref 11.5–14.5)
PLATELET # BLD: 225 K/UL (ref 130–400)
PLATELET ESTIMATE: ABNORMAL
PMV BLD AUTO: ABNORMAL FL (ref 6–12)
POTASSIUM SERPL-SCNC: 4.3 MMOL/L (ref 3.7–5.3)
RBC # BLD: 4.11 M/UL (ref 4–5.2)
RBC # BLD: ABNORMAL 10*6/UL
SEG NEUTROPHILS: 68 % (ref 36–66)
SEGMENTED NEUTROPHILS ABSOLUTE COUNT: 3.5 K/UL (ref 1.8–7.7)
SODIUM BLD-SCNC: 139 MMOL/L (ref 135–144)
WBC # BLD: 5 K/UL (ref 3.5–11)
WBC # BLD: ABNORMAL 10*3/UL

## 2018-09-25 PROCEDURE — 99285 EMERGENCY DEPT VISIT HI MDM: CPT

## 2018-09-25 PROCEDURE — 70544 MR ANGIOGRAPHY HEAD W/O DYE: CPT

## 2018-09-25 PROCEDURE — 70498 CT ANGIOGRAPHY NECK: CPT

## 2018-09-25 PROCEDURE — 93005 ELECTROCARDIOGRAM TRACING: CPT

## 2018-09-25 PROCEDURE — 70496 CT ANGIOGRAPHY HEAD: CPT

## 2018-09-25 PROCEDURE — 6370000000 HC RX 637 (ALT 250 FOR IP): Performed by: EMERGENCY MEDICINE

## 2018-09-25 PROCEDURE — 96374 THER/PROPH/DIAG INJ IV PUSH: CPT

## 2018-09-25 PROCEDURE — 80048 BASIC METABOLIC PNL TOTAL CA: CPT

## 2018-09-25 PROCEDURE — 85025 COMPLETE CBC W/AUTO DIFF WBC: CPT

## 2018-09-25 PROCEDURE — 6360000004 HC RX CONTRAST MEDICATION: Performed by: EMERGENCY MEDICINE

## 2018-09-25 PROCEDURE — 2580000003 HC RX 258: Performed by: EMERGENCY MEDICINE

## 2018-09-25 PROCEDURE — 70553 MRI BRAIN STEM W/O & W/DYE: CPT

## 2018-09-25 PROCEDURE — A9579 GAD-BASE MR CONTRAST NOS,1ML: HCPCS | Performed by: EMERGENCY MEDICINE

## 2018-09-25 RX ORDER — MECLIZINE HCL 12.5 MG/1
25 TABLET ORAL ONCE
Status: COMPLETED | OUTPATIENT
Start: 2018-09-25 | End: 2018-09-25

## 2018-09-25 RX ORDER — SODIUM CHLORIDE 0.9 % (FLUSH) 0.9 %
10 SYRINGE (ML) INJECTION PRN
Status: DISCONTINUED | OUTPATIENT
Start: 2018-09-25 | End: 2018-09-25 | Stop reason: HOSPADM

## 2018-09-25 RX ADMIN — IOPAMIDOL 100 ML: 755 INJECTION, SOLUTION INTRAVENOUS at 10:02

## 2018-09-25 RX ADMIN — Medication 10 ML: at 10:03

## 2018-09-25 RX ADMIN — MECLIZINE HYDROCHLORIDE 25 MG: 12.5 TABLET, FILM COATED ORAL at 17:43

## 2018-09-25 RX ADMIN — GADOTERIDOL 10 ML: 279.3 INJECTION, SOLUTION INTRAVENOUS at 14:20

## 2018-09-25 NOTE — ED PROVIDER NOTES
Physical Exam   Constitutional: She is oriented to person, place, and time. She appears well-developed and well-nourished. No distress. HENT:   Head: Normocephalic. Right Ear: External ear normal.   Left Ear: External ear normal.   Nose: Nose normal.   Mouth/Throat: Oropharynx is clear and moist.   As soon as the patient turned her head to the left to allow me to examine her right ear canal she complained of the sudden onset of extreme dizziness. Eyes: Pupils are equal, round, and reactive to light. EOM are normal. No scleral icterus. Minimal horizontal nystagmus bilaterally that extinguishes spontaneously after about 3 beats. Neck: Neck supple. Carotid pulses are good volume bilaterally. Cardiovascular: Normal rate, regular rhythm and normal heart sounds. No murmur heard. Pulmonary/Chest: Effort normal and breath sounds normal. No respiratory distress. She has no rales. Abdominal: Soft. She exhibits no distension and no mass. There is no tenderness. Musculoskeletal: Normal range of motion. Lymphadenopathy:     She has no cervical adenopathy. Neurological: She is alert and oriented to person, place, and time. No cranial nerve deficit. She exhibits normal muscle tone. Coordination normal.   Skin: Skin is warm and dry. No rash noted. She is not diaphoretic. No pallor. Nursing note and vitals reviewed. DIAGNOSTIC RESULTS     EKG: All EKG's are interpreted by the Emergency Department Physician who either signs or Co-signs this chart in the absence of a cardiologist.    Normal sinus rhythm with a rate of 70 beats a minute. Normal axis. No acute ischemic changes or conduction defect.     RADIOLOGY:   Non-plain film images such as CT, Ultrasound and MRI are read by the radiologist. Plain radiographic images are visualized and preliminarily interpreted by the emergency physician with the below findings:    Interpretation per the Radiologist below, if available at the time of this

## 2018-10-01 ENCOUNTER — TELEPHONE (OUTPATIENT)
Dept: ADMINISTRATIVE | Age: 48
End: 2018-10-01

## 2018-10-09 ENCOUNTER — OFFICE VISIT (OUTPATIENT)
Dept: FAMILY MEDICINE CLINIC | Age: 48
End: 2018-10-09
Payer: MEDICAID

## 2018-10-09 VITALS
TEMPERATURE: 99 F | DIASTOLIC BLOOD PRESSURE: 69 MMHG | SYSTOLIC BLOOD PRESSURE: 103 MMHG | HEART RATE: 74 BPM | WEIGHT: 118 LBS | BODY MASS INDEX: 23.83 KG/M2

## 2018-10-09 DIAGNOSIS — Z11.4 ENCOUNTER FOR SCREENING FOR HIV: ICD-10-CM

## 2018-10-09 DIAGNOSIS — R42 DIZZINESS: Primary | ICD-10-CM

## 2018-10-09 DIAGNOSIS — Z12.11 SCREENING FOR COLON CANCER: ICD-10-CM

## 2018-10-09 DIAGNOSIS — Z12.31 ENCOUNTER FOR SCREENING MAMMOGRAM FOR BREAST CANCER: ICD-10-CM

## 2018-10-09 PROCEDURE — G8484 FLU IMMUNIZE NO ADMIN: HCPCS | Performed by: STUDENT IN AN ORGANIZED HEALTH CARE EDUCATION/TRAINING PROGRAM

## 2018-10-09 PROCEDURE — 99213 OFFICE O/P EST LOW 20 MIN: CPT | Performed by: STUDENT IN AN ORGANIZED HEALTH CARE EDUCATION/TRAINING PROGRAM

## 2018-10-09 PROCEDURE — 4004F PT TOBACCO SCREEN RCVD TLK: CPT | Performed by: STUDENT IN AN ORGANIZED HEALTH CARE EDUCATION/TRAINING PROGRAM

## 2018-10-09 PROCEDURE — G8427 DOCREV CUR MEDS BY ELIG CLIN: HCPCS | Performed by: STUDENT IN AN ORGANIZED HEALTH CARE EDUCATION/TRAINING PROGRAM

## 2018-10-09 PROCEDURE — 3017F COLORECTAL CA SCREEN DOC REV: CPT | Performed by: STUDENT IN AN ORGANIZED HEALTH CARE EDUCATION/TRAINING PROGRAM

## 2018-10-09 PROCEDURE — G8420 CALC BMI NORM PARAMETERS: HCPCS | Performed by: STUDENT IN AN ORGANIZED HEALTH CARE EDUCATION/TRAINING PROGRAM

## 2018-10-09 ASSESSMENT — ENCOUNTER SYMPTOMS
CONSTIPATION: 0
NAUSEA: 0
DIARRHEA: 0
SINUS PAIN: 0
VOICE CHANGE: 0
RHINORRHEA: 0
SORE THROAT: 0
VOMITING: 0
SINUS PRESSURE: 0

## 2018-10-09 ASSESSMENT — PATIENT HEALTH QUESTIONNAIRE - PHQ9
1. LITTLE INTEREST OR PLEASURE IN DOING THINGS: 0
2. FEELING DOWN, DEPRESSED OR HOPELESS: 0
SUM OF ALL RESPONSES TO PHQ QUESTIONS 1-9: 0
SUM OF ALL RESPONSES TO PHQ9 QUESTIONS 1 & 2: 0
SUM OF ALL RESPONSES TO PHQ QUESTIONS 1-9: 0

## 2018-10-11 ENCOUNTER — HOSPITAL ENCOUNTER (OUTPATIENT)
Age: 48
Discharge: HOME | End: 2018-10-11
Payer: MEDICAID

## 2018-10-15 ENCOUNTER — HOSPITAL ENCOUNTER (OUTPATIENT)
Age: 48
Discharge: HOME | End: 2018-10-15
Payer: MEDICAID

## 2018-10-15 DIAGNOSIS — Z11.4 ENCOUNTER FOR SCREENING FOR HIV: ICD-10-CM

## 2018-10-15 LAB — HIV AG/AB: NONREACTIVE

## 2018-10-15 PROCEDURE — 87389 HIV-1 AG W/HIV-1&-2 AB AG IA: CPT

## 2018-10-15 PROCEDURE — 36415 COLL VENOUS BLD VENIPUNCTURE: CPT

## 2018-10-23 ENCOUNTER — HOSPITAL ENCOUNTER (OUTPATIENT)
Dept: NON INVASIVE DIAGNOSTICS | Age: 48
Discharge: HOME | End: 2018-10-23
Payer: MEDICAID

## 2018-10-23 ENCOUNTER — HOSPITAL ENCOUNTER (OUTPATIENT)
Dept: MAMMOGRAPHY | Age: 48
Discharge: HOME | End: 2018-10-25
Payer: MEDICAID

## 2018-10-23 DIAGNOSIS — R42 DIZZINESS: ICD-10-CM

## 2018-10-23 DIAGNOSIS — Z12.31 ENCOUNTER FOR SCREENING MAMMOGRAM FOR BREAST CANCER: ICD-10-CM

## 2018-10-23 LAB
LV EF: 65 %
LVEF MODALITY: NORMAL

## 2018-10-23 PROCEDURE — 77063 BREAST TOMOSYNTHESIS BI: CPT

## 2018-10-23 PROCEDURE — 93306 TTE W/DOPPLER COMPLETE: CPT

## 2018-10-26 ENCOUNTER — HOSPITAL ENCOUNTER (OUTPATIENT)
Dept: MAMMOGRAPHY | Age: 48
Discharge: HOME | End: 2018-10-28
Payer: MEDICAID

## 2018-10-26 ENCOUNTER — HOSPITAL ENCOUNTER (OUTPATIENT)
Dept: ULTRASOUND IMAGING | Age: 48
Discharge: HOME | End: 2018-10-28
Payer: MEDICAID

## 2018-10-26 DIAGNOSIS — R92.8 ABNORMAL MAMMOGRAM: ICD-10-CM

## 2018-10-26 PROCEDURE — G0279 TOMOSYNTHESIS, MAMMO: HCPCS

## 2018-10-26 PROCEDURE — 76642 ULTRASOUND BREAST LIMITED: CPT

## 2019-02-07 ENCOUNTER — OFFICE VISIT (OUTPATIENT)
Dept: ORTHOPEDIC SURGERY | Age: 49
End: 2019-02-07
Payer: COMMERCIAL

## 2019-02-07 VITALS
WEIGHT: 109 LBS | BODY MASS INDEX: 21.97 KG/M2 | SYSTOLIC BLOOD PRESSURE: 123 MMHG | DIASTOLIC BLOOD PRESSURE: 78 MMHG | HEIGHT: 59 IN | HEART RATE: 66 BPM

## 2019-02-07 DIAGNOSIS — M25.511 CHRONIC RIGHT SHOULDER PAIN: Primary | ICD-10-CM

## 2019-02-07 DIAGNOSIS — G89.29 CHRONIC RIGHT SHOULDER PAIN: Primary | ICD-10-CM

## 2019-02-07 DIAGNOSIS — M25.511 RIGHT SHOULDER PAIN, UNSPECIFIED CHRONICITY: ICD-10-CM

## 2019-02-07 PROCEDURE — 99213 OFFICE O/P EST LOW 20 MIN: CPT | Performed by: ORTHOPAEDIC SURGERY

## 2019-02-07 PROCEDURE — 20611 DRAIN/INJ JOINT/BURSA W/US: CPT | Performed by: ORTHOPAEDIC SURGERY

## 2019-02-07 RX ORDER — LIDOCAINE HYDROCHLORIDE 10 MG/ML
4 INJECTION, SOLUTION INFILTRATION; PERINEURAL ONCE
Status: COMPLETED | OUTPATIENT
Start: 2019-02-07 | End: 2019-02-08

## 2019-02-07 RX ORDER — METHYLPREDNISOLONE ACETATE 40 MG/ML
40 INJECTION, SUSPENSION INTRA-ARTICULAR; INTRALESIONAL; INTRAMUSCULAR; SOFT TISSUE ONCE
Status: COMPLETED | OUTPATIENT
Start: 2019-02-07 | End: 2019-02-08

## 2019-02-08 RX ADMIN — LIDOCAINE HYDROCHLORIDE 4 ML: 10 INJECTION, SOLUTION INFILTRATION; PERINEURAL at 08:33

## 2019-02-08 RX ADMIN — METHYLPREDNISOLONE ACETATE 40 MG: 40 INJECTION, SUSPENSION INTRA-ARTICULAR; INTRALESIONAL; INTRAMUSCULAR; SOFT TISSUE at 08:46

## 2019-02-08 ASSESSMENT — ENCOUNTER SYMPTOMS
CHEST TIGHTNESS: 0
ABDOMINAL PAIN: 0
NAUSEA: 0
VOMITING: 0
COUGH: 0
CONSTIPATION: 0
SHORTNESS OF BREATH: 0
ABDOMINAL DISTENTION: 0
COLOR CHANGE: 0
DIARRHEA: 0
APNEA: 0

## 2019-02-12 ENCOUNTER — TELEPHONE (OUTPATIENT)
Dept: FAMILY MEDICINE CLINIC | Age: 49
End: 2019-02-12

## 2019-02-12 DIAGNOSIS — N63.0 BREAST MASS IN FEMALE: Primary | ICD-10-CM

## 2019-02-13 ENCOUNTER — HOSPITAL ENCOUNTER (OUTPATIENT)
Dept: MAMMOGRAPHY | Age: 49
Discharge: HOME | End: 2019-02-15
Payer: MEDICAID

## 2019-02-13 VITALS — SYSTOLIC BLOOD PRESSURE: 110 MMHG | HEART RATE: 69 BPM | DIASTOLIC BLOOD PRESSURE: 88 MMHG

## 2019-02-13 DIAGNOSIS — N63.0 BREAST MASS IN FEMALE: ICD-10-CM

## 2019-02-13 DIAGNOSIS — R92.8 ABNORMAL MAMMOGRAM: ICD-10-CM

## 2019-02-13 LAB — COMMENT: NORMAL

## 2019-02-13 PROCEDURE — 88305 TISSUE EXAM BY PATHOLOGIST: CPT

## 2019-02-13 PROCEDURE — 2500000003 HC RX 250 WO HCPCS

## 2019-02-13 PROCEDURE — 19081 BX BREAST 1ST LESION STRTCTC: CPT

## 2019-02-13 PROCEDURE — 77065 DX MAMMO INCL CAD UNI: CPT

## 2019-02-15 LAB — SURGICAL PATHOLOGY REPORT: NORMAL

## 2019-03-06 ENCOUNTER — TELEPHONE (OUTPATIENT)
Dept: ORTHOPEDIC SURGERY | Age: 49
End: 2019-03-06

## 2019-03-08 DIAGNOSIS — M25.511 CHRONIC RIGHT SHOULDER PAIN: Primary | ICD-10-CM

## 2019-03-08 DIAGNOSIS — G89.29 CHRONIC RIGHT SHOULDER PAIN: Primary | ICD-10-CM

## 2019-03-11 ENCOUNTER — HOSPITAL ENCOUNTER (OUTPATIENT)
Dept: PHYSICAL THERAPY | Facility: CLINIC | Age: 49
Setting detail: THERAPIES SERIES
Discharge: HOME | End: 2019-03-11
Payer: COMMERCIAL

## 2019-03-11 PROCEDURE — 97110 THERAPEUTIC EXERCISES: CPT

## 2019-03-11 PROCEDURE — G0283 ELEC STIM OTHER THAN WOUND: HCPCS

## 2019-03-11 PROCEDURE — 97162 PT EVAL MOD COMPLEX 30 MIN: CPT

## 2019-03-14 ENCOUNTER — HOSPITAL ENCOUNTER (OUTPATIENT)
Dept: PHYSICAL THERAPY | Facility: CLINIC | Age: 49
Setting detail: THERAPIES SERIES
Discharge: HOME | End: 2019-03-14
Payer: COMMERCIAL

## 2019-03-14 PROCEDURE — 97110 THERAPEUTIC EXERCISES: CPT

## 2019-03-14 PROCEDURE — G0283 ELEC STIM OTHER THAN WOUND: HCPCS

## 2019-03-15 ENCOUNTER — HOSPITAL ENCOUNTER (OUTPATIENT)
Dept: PHYSICAL THERAPY | Facility: CLINIC | Age: 49
Setting detail: THERAPIES SERIES
Discharge: HOME | End: 2019-03-15
Payer: COMMERCIAL

## 2019-03-15 ENCOUNTER — OFFICE VISIT (OUTPATIENT)
Dept: ORTHOPEDIC SURGERY | Age: 49
End: 2019-03-15
Payer: MEDICAID

## 2019-03-15 VITALS
BODY MASS INDEX: 22.78 KG/M2 | WEIGHT: 113 LBS | HEIGHT: 59 IN | DIASTOLIC BLOOD PRESSURE: 76 MMHG | HEART RATE: 71 BPM | SYSTOLIC BLOOD PRESSURE: 130 MMHG

## 2019-03-15 DIAGNOSIS — S43.421D SPRAIN OF RIGHT ROTATOR CUFF CAPSULE, SUBSEQUENT ENCOUNTER: Primary | ICD-10-CM

## 2019-03-15 PROCEDURE — 20610 DRAIN/INJ JOINT/BURSA W/O US: CPT | Performed by: PHYSICIAN ASSISTANT

## 2019-03-15 PROCEDURE — 99024 POSTOP FOLLOW-UP VISIT: CPT | Performed by: PHYSICIAN ASSISTANT

## 2019-03-15 PROCEDURE — 96372 THER/PROPH/DIAG INJ SC/IM: CPT | Performed by: PHYSICIAN ASSISTANT

## 2019-03-15 RX ORDER — PREGABALIN 100 MG/1
100 CAPSULE ORAL 2 TIMES DAILY
COMMUNITY

## 2019-03-16 RX ORDER — METHYLPREDNISOLONE ACETATE 40 MG/ML
40 INJECTION, SUSPENSION INTRA-ARTICULAR; INTRALESIONAL; INTRAMUSCULAR; SOFT TISSUE ONCE
Status: COMPLETED | OUTPATIENT
Start: 2019-03-16 | End: 2019-03-18

## 2019-03-16 RX ORDER — LIDOCAINE HYDROCHLORIDE 10 MG/ML
4 INJECTION, SOLUTION INFILTRATION; PERINEURAL ONCE
Status: COMPLETED | OUTPATIENT
Start: 2019-03-16 | End: 2019-03-18

## 2019-03-17 ASSESSMENT — ENCOUNTER SYMPTOMS
RESPIRATORY NEGATIVE: 1
NAUSEA: 0
VOMITING: 0
ABDOMINAL DISTENTION: 0
DIARRHEA: 0
CONSTIPATION: 0
COLOR CHANGE: 0
APNEA: 0
SHORTNESS OF BREATH: 0
ABDOMINAL PAIN: 0
CHEST TIGHTNESS: 0
COUGH: 0

## 2019-03-18 RX ADMIN — LIDOCAINE HYDROCHLORIDE 4 ML: 10 INJECTION, SOLUTION INFILTRATION; PERINEURAL at 10:25

## 2019-03-18 RX ADMIN — METHYLPREDNISOLONE ACETATE 40 MG: 40 INJECTION, SUSPENSION INTRA-ARTICULAR; INTRALESIONAL; INTRAMUSCULAR; SOFT TISSUE at 10:25

## 2019-03-20 ENCOUNTER — APPOINTMENT (OUTPATIENT)
Dept: CT IMAGING | Age: 49
End: 2019-03-20
Payer: MEDICAID

## 2019-03-20 ENCOUNTER — HOSPITAL ENCOUNTER (EMERGENCY)
Age: 49
Discharge: HOME | End: 2019-03-20
Attending: EMERGENCY MEDICINE
Payer: MEDICAID

## 2019-03-20 VITALS
DIASTOLIC BLOOD PRESSURE: 67 MMHG | BODY MASS INDEX: 23.76 KG/M2 | WEIGHT: 110.13 LBS | HEART RATE: 78 BPM | SYSTOLIC BLOOD PRESSURE: 109 MMHG | OXYGEN SATURATION: 98 % | RESPIRATION RATE: 16 BRPM | HEIGHT: 57 IN | TEMPERATURE: 98.2 F

## 2019-03-20 DIAGNOSIS — K52.9 ENTERITIS: ICD-10-CM

## 2019-03-20 DIAGNOSIS — K62.5 RECTAL BLEEDING: Primary | ICD-10-CM

## 2019-03-20 LAB
ABSOLUTE EOS #: 0 K/UL (ref 0–0.4)
ABSOLUTE IMMATURE GRANULOCYTE: ABNORMAL K/UL (ref 0–0.3)
ABSOLUTE LYMPH #: 1.3 K/UL (ref 1–4.8)
ABSOLUTE MONO #: 0.5 K/UL (ref 0.2–0.8)
ALBUMIN SERPL-MCNC: 4.2 G/DL (ref 3.5–5.2)
ALBUMIN/GLOBULIN RATIO: ABNORMAL (ref 1–2.5)
ALP BLD-CCNC: 85 U/L (ref 35–104)
ALT SERPL-CCNC: 10 U/L (ref 5–33)
ANION GAP SERPL CALCULATED.3IONS-SCNC: 9 MMOL/L (ref 9–17)
AST SERPL-CCNC: 17 U/L
BASOPHILS # BLD: 0 % (ref 0–2)
BASOPHILS ABSOLUTE: 0 K/UL (ref 0–0.2)
BILIRUB SERPL-MCNC: 0.25 MG/DL (ref 0.3–1.2)
BILIRUBIN DIRECT: <0.08 MG/DL
BILIRUBIN, INDIRECT: ABNORMAL MG/DL (ref 0–1)
BUN BLDV-MCNC: 12 MG/DL (ref 6–20)
BUN/CREAT BLD: 15 (ref 9–20)
C DIFFICILE TOXINS, PCR: NORMAL
CALCIUM SERPL-MCNC: 8.8 MG/DL (ref 8.6–10.4)
CAMPYLOBACTER PCR: NORMAL
CHLORIDE BLD-SCNC: 100 MMOL/L (ref 98–107)
CO2: 25 MMOL/L (ref 20–31)
CREAT SERPL-MCNC: 0.8 MG/DL (ref 0.5–0.9)
DATE, STOOL #1: ABNORMAL
DATE, STOOL #2: ABNORMAL
DATE, STOOL #3: ABNORMAL
DIFFERENTIAL TYPE: ABNORMAL
E COLI ENTEROTOXIGENIC PCR: NORMAL
EOSINOPHILS RELATIVE PERCENT: 1 % (ref 1–4)
GFR AFRICAN AMERICAN: >60 ML/MIN
GFR NON-AFRICAN AMERICAN: >60 ML/MIN
GFR SERPL CREATININE-BSD FRML MDRD: ABNORMAL ML/MIN/{1.73_M2}
GFR SERPL CREATININE-BSD FRML MDRD: ABNORMAL ML/MIN/{1.73_M2}
GLOBULIN: ABNORMAL G/DL (ref 1.5–3.8)
GLUCOSE BLD-MCNC: 154 MG/DL (ref 70–99)
HCT VFR BLD CALC: 38.2 % (ref 36–46)
HEMOCCULT SP1 STL QL: POSITIVE
HEMOCCULT SP2 STL QL: ABNORMAL
HEMOCCULT SP3 STL QL: ABNORMAL
HEMOGLOBIN: 12.6 G/DL (ref 12–16)
IMMATURE GRANULOCYTES: ABNORMAL %
INR BLD: 1.1
LACTIC ACID: 1.1 MMOL/L (ref 0.5–2.2)
LIPASE: 38 U/L (ref 13–60)
LYMPHOCYTES # BLD: 19 % (ref 24–44)
MCH RBC QN AUTO: 28.3 PG (ref 26–34)
MCHC RBC AUTO-ENTMCNC: 33 G/DL (ref 31–37)
MCV RBC AUTO: 85.7 FL (ref 80–100)
MONOCYTES # BLD: 7 % (ref 1–7)
NRBC AUTOMATED: ABNORMAL PER 100 WBC
PDW BLD-RTO: 14.1 % (ref 11.5–14.5)
PLATELET # BLD: 234 K/UL (ref 130–400)
PLATELET ESTIMATE: ABNORMAL
PLESIOMONAS SHIGELLOIDES PCR: NORMAL
PMV BLD AUTO: 8.2 FL (ref 6–12)
POTASSIUM SERPL-SCNC: 4.3 MMOL/L (ref 3.7–5.3)
PROTHROMBIN TIME: 11 SEC (ref 9.7–11.6)
RBC # BLD: 4.46 M/UL (ref 4–5.2)
RBC # BLD: ABNORMAL 10*6/UL
SALMONELLA PCR: NORMAL
SEG NEUTROPHILS: 73 % (ref 36–66)
SEGMENTED NEUTROPHILS ABSOLUTE COUNT: 5.1 K/UL (ref 1.8–7.7)
SHIGATOXIN GENE PCR: NORMAL
SHIGELLA SP PCR: NORMAL
SODIUM BLD-SCNC: 134 MMOL/L (ref 135–144)
SPECIMEN DESCRIPTION: NORMAL
SPECIMEN DESCRIPTION: NORMAL
TIME, STOOL #1: 335
TIME, STOOL #2: ABNORMAL
TIME, STOOL #3: ABNORMAL
TOTAL PROTEIN: 6.9 G/DL (ref 6.4–8.3)
VIBRIO PCR: NORMAL
WBC # BLD: 6.9 K/UL (ref 3.5–11)
WBC # BLD: ABNORMAL 10*3/UL
YERSINIA ENTEROCOLITICA PCR: NORMAL

## 2019-03-20 PROCEDURE — 83690 ASSAY OF LIPASE: CPT

## 2019-03-20 PROCEDURE — 87329 GIARDIA AG IA: CPT

## 2019-03-20 PROCEDURE — 87493 C DIFF AMPLIFIED PROBE: CPT

## 2019-03-20 PROCEDURE — 83605 ASSAY OF LACTIC ACID: CPT

## 2019-03-20 PROCEDURE — 6370000000 HC RX 637 (ALT 250 FOR IP): Performed by: EMERGENCY MEDICINE

## 2019-03-20 PROCEDURE — 99284 EMERGENCY DEPT VISIT MOD MDM: CPT

## 2019-03-20 PROCEDURE — 87506 IADNA-DNA/RNA PROBE TQ 6-11: CPT

## 2019-03-20 PROCEDURE — 85610 PROTHROMBIN TIME: CPT

## 2019-03-20 PROCEDURE — 6360000004 HC RX CONTRAST MEDICATION: Performed by: EMERGENCY MEDICINE

## 2019-03-20 PROCEDURE — 85025 COMPLETE CBC W/AUTO DIFF WBC: CPT

## 2019-03-20 PROCEDURE — 74177 CT ABD & PELVIS W/CONTRAST: CPT

## 2019-03-20 PROCEDURE — 2580000003 HC RX 258: Performed by: EMERGENCY MEDICINE

## 2019-03-20 PROCEDURE — 80048 BASIC METABOLIC PNL TOTAL CA: CPT

## 2019-03-20 PROCEDURE — 82272 OCCULT BLD FECES 1-3 TESTS: CPT

## 2019-03-20 PROCEDURE — 80076 HEPATIC FUNCTION PANEL: CPT

## 2019-03-20 RX ORDER — METRONIDAZOLE 500 MG/1
500 TABLET ORAL ONCE
Status: COMPLETED | OUTPATIENT
Start: 2019-03-20 | End: 2019-03-20

## 2019-03-20 RX ORDER — DICYCLOMINE HYDROCHLORIDE 10 MG/1
10 CAPSULE ORAL EVERY 6 HOURS PRN
Qty: 20 CAPSULE | Refills: 0 | Status: SHIPPED | OUTPATIENT
Start: 2019-03-20 | End: 2019-03-22 | Stop reason: ALTCHOICE

## 2019-03-20 RX ORDER — CIPROFLOXACIN 500 MG/1
500 TABLET, FILM COATED ORAL 2 TIMES DAILY
Qty: 20 TABLET | Refills: 0 | Status: SHIPPED | OUTPATIENT
Start: 2019-03-20 | End: 2019-03-30

## 2019-03-20 RX ORDER — DICYCLOMINE HYDROCHLORIDE 10 MG/1
10 CAPSULE ORAL ONCE
Status: COMPLETED | OUTPATIENT
Start: 2019-03-20 | End: 2019-03-20

## 2019-03-20 RX ORDER — SODIUM CHLORIDE 0.9 % (FLUSH) 0.9 %
10 SYRINGE (ML) INJECTION PRN
Status: DISCONTINUED | OUTPATIENT
Start: 2019-03-20 | End: 2019-03-20 | Stop reason: HOSPADM

## 2019-03-20 RX ORDER — CIPROFLOXACIN 500 MG/1
500 TABLET, FILM COATED ORAL ONCE
Status: COMPLETED | OUTPATIENT
Start: 2019-03-20 | End: 2019-03-20

## 2019-03-20 RX ORDER — 0.9 % SODIUM CHLORIDE 0.9 %
80 INTRAVENOUS SOLUTION INTRAVENOUS ONCE
Status: COMPLETED | OUTPATIENT
Start: 2019-03-20 | End: 2019-03-20

## 2019-03-20 RX ORDER — METRONIDAZOLE 500 MG/1
500 TABLET ORAL 2 TIMES DAILY
Qty: 20 TABLET | Refills: 0 | Status: SHIPPED | OUTPATIENT
Start: 2019-03-20 | End: 2019-03-30

## 2019-03-20 RX ADMIN — SODIUM CHLORIDE 80 ML: 0.9 INJECTION, SOLUTION INTRAVENOUS at 04:05

## 2019-03-20 RX ADMIN — SODIUM CHLORIDE, PRESERVATIVE FREE 10 ML: 5 INJECTION INTRAVENOUS at 04:06

## 2019-03-20 RX ADMIN — DICYCLOMINE HYDROCHLORIDE 10 MG: 10 CAPSULE ORAL at 05:07

## 2019-03-20 RX ADMIN — CIPROFLOXACIN 500 MG: 500 TABLET ORAL at 05:07

## 2019-03-20 RX ADMIN — METRONIDAZOLE 500 MG: 500 TABLET ORAL at 05:07

## 2019-03-20 RX ADMIN — IOPAMIDOL 80 ML: 755 INJECTION, SOLUTION INTRAVENOUS at 04:05

## 2019-03-20 ASSESSMENT — PAIN DESCRIPTION - DESCRIPTORS: DESCRIPTORS: DISCOMFORT

## 2019-03-21 ENCOUNTER — HOSPITAL ENCOUNTER (OUTPATIENT)
Dept: PHYSICAL THERAPY | Facility: CLINIC | Age: 49
Setting detail: THERAPIES SERIES
Discharge: HOME | End: 2019-03-21
Payer: COMMERCIAL

## 2019-03-21 LAB
DIRECT EXAM: NORMAL
Lab: NORMAL
SPECIMEN DESCRIPTION: NORMAL

## 2019-03-21 PROCEDURE — 97110 THERAPEUTIC EXERCISES: CPT

## 2019-03-21 PROCEDURE — G0283 ELEC STIM OTHER THAN WOUND: HCPCS

## 2019-03-22 ENCOUNTER — OFFICE VISIT (OUTPATIENT)
Dept: FAMILY MEDICINE CLINIC | Age: 49
End: 2019-03-22
Payer: MEDICAID

## 2019-03-22 ENCOUNTER — HOSPITAL ENCOUNTER (OUTPATIENT)
Dept: PHYSICAL THERAPY | Facility: CLINIC | Age: 49
Setting detail: THERAPIES SERIES
Discharge: HOME | End: 2019-03-22
Payer: COMMERCIAL

## 2019-03-22 VITALS
SYSTOLIC BLOOD PRESSURE: 110 MMHG | WEIGHT: 113 LBS | HEART RATE: 80 BPM | DIASTOLIC BLOOD PRESSURE: 70 MMHG | TEMPERATURE: 97.5 F | BODY MASS INDEX: 24.45 KG/M2

## 2019-03-22 DIAGNOSIS — Z12.11 ENCOUNTER FOR FIT (FECAL IMMUNOCHEMICAL TEST) SCREENING: ICD-10-CM

## 2019-03-22 DIAGNOSIS — Z87.19 HISTORY OF RECTAL BLEEDING: ICD-10-CM

## 2019-03-22 DIAGNOSIS — J06.9 VIRAL URI: Primary | ICD-10-CM

## 2019-03-22 PROCEDURE — 97110 THERAPEUTIC EXERCISES: CPT

## 2019-03-22 PROCEDURE — 99213 OFFICE O/P EST LOW 20 MIN: CPT | Performed by: STUDENT IN AN ORGANIZED HEALTH CARE EDUCATION/TRAINING PROGRAM

## 2019-03-22 PROCEDURE — G8427 DOCREV CUR MEDS BY ELIG CLIN: HCPCS | Performed by: STUDENT IN AN ORGANIZED HEALTH CARE EDUCATION/TRAINING PROGRAM

## 2019-03-22 PROCEDURE — G0283 ELEC STIM OTHER THAN WOUND: HCPCS

## 2019-03-22 PROCEDURE — 3017F COLORECTAL CA SCREEN DOC REV: CPT | Performed by: STUDENT IN AN ORGANIZED HEALTH CARE EDUCATION/TRAINING PROGRAM

## 2019-03-22 PROCEDURE — G8484 FLU IMMUNIZE NO ADMIN: HCPCS | Performed by: STUDENT IN AN ORGANIZED HEALTH CARE EDUCATION/TRAINING PROGRAM

## 2019-03-22 PROCEDURE — G8420 CALC BMI NORM PARAMETERS: HCPCS | Performed by: STUDENT IN AN ORGANIZED HEALTH CARE EDUCATION/TRAINING PROGRAM

## 2019-03-22 PROCEDURE — 4004F PT TOBACCO SCREEN RCVD TLK: CPT | Performed by: STUDENT IN AN ORGANIZED HEALTH CARE EDUCATION/TRAINING PROGRAM

## 2019-03-22 ASSESSMENT — ENCOUNTER SYMPTOMS
VOMITING: 0
SHORTNESS OF BREATH: 0
BLOOD IN STOOL: 0
NAUSEA: 0
RHINORRHEA: 1
ABDOMINAL DISTENTION: 0
CONSTIPATION: 0
SINUS PRESSURE: 0
ABDOMINAL PAIN: 1
ANAL BLEEDING: 0
COUGH: 0
RECTAL PAIN: 0
SORE THROAT: 1
SINUS PAIN: 0
WHEEZING: 0
DIARRHEA: 0
TROUBLE SWALLOWING: 0

## 2019-03-26 ENCOUNTER — HOSPITAL ENCOUNTER (OUTPATIENT)
Dept: PHYSICAL THERAPY | Facility: CLINIC | Age: 49
Setting detail: THERAPIES SERIES
Discharge: HOME | End: 2019-03-26
Payer: COMMERCIAL

## 2019-03-26 PROCEDURE — G0283 ELEC STIM OTHER THAN WOUND: HCPCS

## 2019-03-26 PROCEDURE — 97110 THERAPEUTIC EXERCISES: CPT

## 2019-03-28 ENCOUNTER — HOSPITAL ENCOUNTER (OUTPATIENT)
Dept: PHYSICAL THERAPY | Facility: CLINIC | Age: 49
Setting detail: THERAPIES SERIES
Discharge: HOME | End: 2019-03-28
Payer: COMMERCIAL

## 2019-03-28 NOTE — FLOWSHEET NOTE
? [address] [facility].    [phone]  F: [phone] [address] [facility] [address]   [address]  P: [phone]  F: [phone] [facility] 109  Outpatient Rehabilitation &  Therapy  [address] [place] [address]  P: [phone]  F: [phone]   ? THE [facility] &  Therapy  [facility] [address]   P: [phone]  F: [phone]  ? [address]  [place]: [phone]   F: [phone]     Physical Therapy Cancel/No Show note    Date: 3/28/2019  Patient: Brandi Baron  : 1970  MRN: [id number]    Cancels/No Shows to date:     For today's appointment patient:    ?  Cancelled- X    ? Rescheduled appointment    ? No-show     Reason given by patient:    ?  Patient ill- X    ? Conflicting appointment    ? No transportation      ? Conflict with work    ? No reason given    ? Weather related    ?  Other:      Comments:        ? Next appointment was confirmed- No.    Electronically signed by: Paras Thompson PT

## 2019-04-01 ENCOUNTER — HOSPITAL ENCOUNTER (OUTPATIENT)
Dept: PHYSICAL THERAPY | Facility: CLINIC | Age: 49
Setting detail: THERAPIES SERIES
Discharge: HOME | End: 2019-04-01
Payer: COMMERCIAL

## 2019-04-01 PROCEDURE — 97110 THERAPEUTIC EXERCISES: CPT

## 2019-04-01 PROCEDURE — G0283 ELEC STIM OTHER THAN WOUND: HCPCS

## 2019-04-01 NOTE — FLOWSHEET NOTE
[] [facility]) [facility] &  Therapy  [address]  [phone]  F: [phone] [] [address]  [facility]   [address]  P: [phone]  F: [phone] [] [address] &  Therapy  [address]  P: [phone]  F: [phone] [] [address]  [facility]   [address]  [place]: [phone]  F: [phone]     Physical Therapy Daily Treatment Note    Date:  2019  Patient Name:  Erica Haas    :  1970  MRN: [id number]  Physician: Emelia Black DO                Insurance: [facility] (3x a week for 6 weeks, 18 visits total)  Medical Diagnosis: chronic right shoulder pain              (hx right shoulder scope and debridement 11 months ago - see below)  Rehab Codes: M25.511, M25.611  Onset Date: 3-8-2019 date of script, (rotator cuff surgery in )                      Next 's appt  Visit# / total visits:   Cancels/No Shows: 1/0    Subjective:    Pain:  [x] Yes  [] No Location: R shoulder Pain Rating: (0-10 scale) 6/10  Pain altered Tx:  [] No  [] Yes  Action:  Comments: Pt reports she is continuing to have bowel issues. Rates shoulder 6/10 pain but reports she has been able to do more at home. Pt arrives 15 min late noting she was in the bathroom. Objective:  Modalities:   Modalities: HP with IFC end treatment to R shoulder in supine x 15 min.    Precautions:  Exercises: Bolded  Completed 19    Exercise Reps/ Time Weight/ Level Comments   UBE 4 min  fwd   Pulleys 2\"/2\"  Flex/abd         PROM to right shoulder 10 min   Flex, abduction, supine \"abduction felt good\"             Cane supine flexion 15  1#     Cane supine abduction 15  1#     Cane supine chest press 15  1#          Supine shoulder flexion 10x2 A \"felt good\"   Supine SA punches 10x2 A          Seated shoulder retraction 10x5\"  Cueing to decrease UT comp             Table flexion  15x   Flexion, scaption              Tband     cueing to decrease UT comp    ext  15x red  Increased resistance 4/1    rows  15x red  Increased resistance 4/1                       Other:     KT TAPE;   1 I strip for UT inhibition  1 I strip for postural awareness - anchored anterior shoulder, shoulders retracted, anchored rhomboid region  2 strips for shoulder stability. - 1 Y strip anchored at deltoid insertion, tails of tape ending superior shoulder near ac joint. 1 I strip \"u\" shape to cup deltoids  Image in soft chart of KT tape   - Not today. Patient still painful from when she removed it last and deferred today. Specific Instructions for next treatment:      Treatment Charges: Mins Units   [x]  Modalities- HP/IFC 15/15 0/1   [x]  Ther Exercise 25 2   []  Manual Therapy     []  Ther Activities     []  Aquatics     []  Vasocompression     []  Other     Total Treatment time 40 3       Assessment: [x] Progressing toward goals. Pt able to resume some previously held exercises. Pt able to complete charted above with good tolerance and no reports of increased pain during. Pt does however note at the end of the treatment she had increased pain with exercises. Reports 6/10 pain end of treatment. [] No change. [] Other:    Problems:    [x] ? Pain: 9/10 in right shoulder at A-C joint and also laterally in deltoid  [x] ? ROM:right shoulder  [x] ? Strength: right shoulder  [x] ? Function:limited with all activities involving right arm, disturbed sleep  [x] Other:UEFI 84% overall limitations, limited with all activities involving use of right arm     STG: (to be met in 9 treatments)  1. ? Pain: to 4/10 maximum in right shoulder  2. ? ROM:AROm right shoulder 140 flexion and 90 abduction to improve reaching range. 3. ? Function: Ability to get shirts on and off without increased pain.    4. Independent with Home Exercise Programs  5.    LTG:

## 2019-04-02 ENCOUNTER — OFFICE VISIT (OUTPATIENT)
Dept: FAMILY MEDICINE CLINIC | Age: 49
End: 2019-04-02
Payer: MEDICAID

## 2019-04-02 VITALS
DIASTOLIC BLOOD PRESSURE: 67 MMHG | SYSTOLIC BLOOD PRESSURE: 116 MMHG | BODY MASS INDEX: 24.45 KG/M2 | HEART RATE: 66 BPM | WEIGHT: 113 LBS | TEMPERATURE: 98 F

## 2019-04-02 DIAGNOSIS — G47.00 INSOMNIA, UNSPECIFIED TYPE: Primary | ICD-10-CM

## 2019-04-02 DIAGNOSIS — Z86.59 H/O: DEPRESSION: ICD-10-CM

## 2019-04-02 PROCEDURE — 99213 OFFICE O/P EST LOW 20 MIN: CPT | Performed by: STUDENT IN AN ORGANIZED HEALTH CARE EDUCATION/TRAINING PROGRAM

## 2019-04-02 PROCEDURE — 3017F COLORECTAL CA SCREEN DOC REV: CPT | Performed by: STUDENT IN AN ORGANIZED HEALTH CARE EDUCATION/TRAINING PROGRAM

## 2019-04-02 PROCEDURE — G8427 DOCREV CUR MEDS BY ELIG CLIN: HCPCS | Performed by: STUDENT IN AN ORGANIZED HEALTH CARE EDUCATION/TRAINING PROGRAM

## 2019-04-02 PROCEDURE — 4004F PT TOBACCO SCREEN RCVD TLK: CPT | Performed by: STUDENT IN AN ORGANIZED HEALTH CARE EDUCATION/TRAINING PROGRAM

## 2019-04-02 PROCEDURE — G8420 CALC BMI NORM PARAMETERS: HCPCS | Performed by: STUDENT IN AN ORGANIZED HEALTH CARE EDUCATION/TRAINING PROGRAM

## 2019-04-02 ASSESSMENT — ENCOUNTER SYMPTOMS
VOMITING: 0
COUGH: 0
SHORTNESS OF BREATH: 0
DIARRHEA: 1
WHEEZING: 0
NAUSEA: 0

## 2019-04-02 ASSESSMENT — PATIENT HEALTH QUESTIONNAIRE - PHQ9
2. FEELING DOWN, DEPRESSED OR HOPELESS: 0
SUM OF ALL RESPONSES TO PHQ QUESTIONS 1-9: 0
SUM OF ALL RESPONSES TO PHQ9 QUESTIONS 1 & 2: 0
SUM OF ALL RESPONSES TO PHQ QUESTIONS 1-9: 0
1. LITTLE INTEREST OR PLEASURE IN DOING THINGS: 0

## 2019-04-02 NOTE — PROGRESS NOTES
Subjective:     Raji Escobedo is a 46 y.o. female with  has a past medical history of Anemia, Anxiety, Chronic right shoulder pain, Depression, and Irregular heartbeat. Family History   Problem Relation Age of Onset    Asthma Mother    [facility] Cancer Father        Presented to the office today for:  Chief Complaint   Patient presents with    Other     pt is here for GI bleed. Pt states she feels fine       HPI     Patient here for follow up of lower gi bleed  No more episodes of lower gi bleed since last visit    Complains of insomnia  H/o depression , off her medications since the past 6 months  Sees her psychologist regularly  States does not like being on medications as it makes her sleepy    Review of Systems   Constitutional: Negative for fatigue and fever. Respiratory: Negative for cough, shortness of breath and wheezing. Cardiovascular: Negative for chest pain, palpitations and leg swelling. Gastrointestinal: Positive for diarrhea (2-3 episodes per day). Negative for nausea and vomiting. Psychiatric/Behavioral: Negative for agitation, confusion, decreased concentration, dysphoric mood, sleep disturbance and suicidal ideas. Denies homicidal or suicidal ideations       Objective:    /67 (Site: Left Upper Arm, Position: Sitting, Cuff Size: Medium Adult)   Pulse 66   Temp 98 °F (36.7 °C) (Oral)   Wt 113 lb (51.3 kg)   LMP 03/11/2019   BMI 24.45 kg/m²    BP Readings from Last 3 Encounters:   04/02/19 116/67   03/22/19 110/70   03/20/19 109/67     Physical Exam   Constitutional: She is oriented to person, place, and time. She appears well-developed and well-nourished. No distress. Neck: Neck supple. No thyromegaly present. Cardiovascular: Normal rate, regular rhythm, normal heart sounds and intact distal pulses. Exam reveals no gallop and no friction rub. No murmur heard. Pulmonary/Chest: Effort normal and breath sounds normal. No respiratory distress. She exhibits no tenderness. Musculoskeletal: She exhibits no edema. Neurological: She is alert and oriented to person, place, and time. Skin: She is not diaphoretic. Lab Results   Component Value Date    WBC 6.9 2019    HGB 12.6 2019    HCT 38.2 2019     2019    CHOL 174 2018    TRIG 69 2018    HDL 59 2018    ALT 10 2019    AST 17 2019     (L) 2019    K 4.3 2019     2019    CREATININE 0.80 2019    BUN 12 2019    CO2 25 2019    INR 1.1 2019    LABA1C 5.9 2018     Lab Results   Component Value Date    CALCIUM 8.8 2019     Lab Results   Component Value Date    LDLCHOLESTEROL 101 2018       Assessment and Plan:    1. Insomnia, unspecified type    - Melatonin 1 MG CAPS; 1 tab daily  Dispense: 30 capsule; Refill: 0    2. H/O: depression   patient would like to continue psychotherapy  Patient educated if she feels homicidal or suicidal to call 911. I offered her information of [facility] and [facility], that she declined a this time . She states she has the information already    3. Gi bleed  Resolved now  Patient has a appointment scheduled to see gi later this month for possible coloscopy  reeducated the patient about the importance of following up with gi      Requested Prescriptions     Signed Prescriptions Disp Refills    Melatonin 1 MG CAPS 30 capsule 0     Si tab daily       There are no discontinued medications. Pepe Ashley received counseling on the following healthy behaviors:nutrition, exercise and medication adherence    Patient given educational materials : see patient instruction      Discussed use, benefit, and side effects of prescribed medications. Barriers to medication compliance addressed. All patient questionsanswered. Pt voiced understanding. Return in about 6 weeks (around 2019) for for h/o depression.

## 2019-04-02 NOTE — PROGRESS NOTES
Attending Physician Statement  I have discussed the care of Cristy Spine, including pertinent history and exam findings,  with the resident. I have reviewed the key elements of all parts of the encounter with the resident. I agree with the assessment, plan and orders as documented by the resident. (Herbert Barrett) Carlos Astorga M.D  Vitals:    04/02/19 1008   BP: 116/67   Pulse: 66   Temp: 98 °F (36.7 °C)     1. Insomnia, unspecified type    pt sees the psychologist. Refusing anti depressant.   colonoscopy needed

## 2019-04-02 NOTE — PROGRESS NOTES
Visit Information    Have you changed or started any medications since your last visit including any over-the-counter medicines, vitamins, or herbal medicines? no   Have you stopped taking any of your medications? Is so, why? -  no  Are you having any side effects from any of your medications? - no    Have you seen any other physician or provider since your last visit?  no   Have you had any other diagnostic tests since your last visit?  no   Have you been seen in the emergency room and/or had an admission in a hospital since we last saw you?  no   Have you had your routine dental cleaning in the past 6 months?  no     Do you have an active MyChart account? If no, what is the barrier?   Yes    Patient Care Team:  Lorenza Jenkins MD as PCP - General (Family Medicine)  Reena Najjar, MD as PCP - S Attributed Provider    Medical History Review  Past Medical, Family, and Social History reviewed and does not contribute to the patient presenting condition    Health Maintenance   Topic Date Due    Pneumococcal 0-64 years at Risk Vaccine (1 of 1 - PPSV23) 08/04/1974    Shingles Vaccine (1 of 2) 08/04/2018    Colon cancer screen colonoscopy  08/04/2018    A1C test (Diabetic or Prediabetic)  08/09/2019    Flu vaccine (Season Ended) 09/01/2019    Cervical cancer screen  04/20/2020    Breast cancer screen  02/13/2021    Lipid screen  08/09/2023    DTaP/Tdap/Td vaccine (2 - Td) 03/17/2025    HIV screen  Completed

## 2019-04-03 ENCOUNTER — HOSPITAL ENCOUNTER (OUTPATIENT)
Dept: PHYSICAL THERAPY | Facility: CLINIC | Age: 49
Setting detail: THERAPIES SERIES
Discharge: HOME | End: 2019-04-03
Payer: COMMERCIAL

## 2019-04-03 NOTE — FLOWSHEET NOTE
[] [facility]) - [facility] &  Therapy  [address]    [phone]  F: [phone]   [] [address]  [facility]   [address]  P: [phone]  F: [phone]  [] [place]  [address] [facility]  P: [phone]  F: [phone]   [] [address]  [facility] [address]   P: [phone]  F: [phone]  [] [facility]  [email or web address] [facility] [address]  [place]: [phone]   F: [phone]     Physical Therapy Cancel/No Show note    Date: 4/3/2019  Patient: Elizabeth Trent  : 1970  MRN: [id number]    Cancels/No Shows to date: 3/0    For today's appointment patient:    [x]  Cancelled    [] Rescheduled appointment    [] No-show     Reason given by patient:    [x]  Patient ill    []  Conflicting appointment    [] No transportation      [] Conflict with work    [] No reason given    [] Weather related    [x] Other:      Comments:  Pt arrives noting \" the shoulder is good, the other thing not so good\". Rates her shoulder a 5/10 today. Pt voices her frustration with this issue. Notes she has been trying to increase intake however still feels dehydrated. Pt states she is no longer bleeding but is having diarrhea still. Scheduled to have colonoscopy next week. Pt reports this is making her feel very depressed, noting \" life is not good right now, this issue is starting to scare me. I just want to feel better and find out what is wrong\". Pt completed the arm bike this date and then requested to leave due to bowel issues. [x] Next appointment was confirmed for Friday.     Electronically signed by: Uli Gonzalez PTA

## 2019-04-05 ENCOUNTER — HOSPITAL ENCOUNTER (OUTPATIENT)
Dept: PHYSICAL THERAPY | Facility: CLINIC | Age: 49
Setting detail: THERAPIES SERIES
Discharge: HOME | End: 2019-04-05
Payer: COMMERCIAL

## 2019-04-05 PROCEDURE — 97110 THERAPEUTIC EXERCISES: CPT

## 2019-04-05 PROCEDURE — G0283 ELEC STIM OTHER THAN WOUND: HCPCS

## 2019-04-05 NOTE — FLOWSHEET NOTE
[] [facility]) - [facility] &  Therapy  [address]  [phone]  F: [phone] [] [address]  [facility]   [address]  P: [phone]  F: [phone] [] [address] &  Therapy  [address]  P: [phone]  F: [phone] [] [address]  [facility]   [address]  [place]: [phone]  F: [phone]     Physical Therapy Daily Treatment Note    Date:  2019  Patient Name:  Alex Suggs    :  1970  MRN: [id number]  Physician: Heidi Denton DO                Insurance: [facility] (3x a week for 6 weeks, 18 visits total)  Medical Diagnosis: chronic right shoulder pain              (hx right shoulder scope and debridement 11 months ago - see below)  Rehab Codes: M25.511, M25.611  Onset Date: 3-8-2019 date of script, (rotator cuff surgery in )                      Next 's appt  Visit# / total visits:   Cancels/No Shows: 1/0    Subjective:    Pain:  [x] Yes  [] No Location: R shoulder Pain Rating: (0-10 scale) 4-5/10  Pain altered Tx:  [] No  [] Yes  Action:  Comments: Pt arrives today in better spirits, more hopeful. Pt reports she has been watching what she has been eating and seems to have better results with that regarding bowel issues however, still notes episodes of diarrhea. Pt reports shoulder is the same and rates pain 4-5/10. Pt reports that her L shoulder has been bothering her and notes it is \"over worked\". Objective:  Modalities:   Modalities: HP with IFC end treatment to R shoulder in supine x 15 min.    Precautions:  Exercises: Bolded  Completed 19    Exercise Reps/ Time Weight/ Level Comments   UBE 4 min  fwd   Pulleys 2\"/2\"  Flex/abd         PROM to right shoulder 5 min   Flex, abduction, supine \"abduction felt good\"             Cane supine flexion 10x2  1#     Cane supine abduction 10x2  1#     Cane supine chest press 10x2  1#          Supine shoulder flexion 10x2 A \"felt good\"   Supine SA punches 10x2 A          Sidelying       ER 10x2 A Added4/5   ABD 10x2 A \"         Seated shoulder retraction 15x5\"  Cueing to decrease UT comp             Table flexion  15x   Flexion, scaption             Tband     cueing to decrease UT comp   ext  15x red  Increased resistance 4/1   rows  15x red  Increased resistance 4/1             bicep curls  20x  3#  Added 4/5   Shoulder abduction 10x2 A To 90°   Shoulder flexion 10x2 A                Other:     KT TAPE;   1 I strip for UT inhibition  1 I strip for postural awareness - anchored anterior shoulder, shoulders retracted, anchored rhomboid region  2 strips for shoulder stability. - 1 Y strip anchored at deltoid insertion, tails of tape ending superior shoulder near ac joint. 1 I strip \"u\" shape to cup deltoids  Image in soft chart of KT tape   - Not today. Biofreeze applied to shoulder end treatment. Specific Instructions for next treatment:      Treatment Charges: Mins Units   [x]  Modalities- /IFC 15/15 0/1   [x]  Ther Exercise 35 2   []  Manual Therapy     []  Ther Activities     []  Aquatics     []  Vasocompression     []  Other     Total Treatment time 50 3       Assessment: [x] Progressing toward goals. Attempted to increase resistance of shoulder tband exercises however patient requested to hold off on progression due to fear of increased pain. Difficulty progressing patient due to recent health issues as well as patient is fearful of pain increasing. Able to add in sidelying exercises with good tolerance. Applied Biofreeze to patients shoulder end of treatment. [] No change. [] Other:    Problems:    [x] ? Pain: 9/10 in right shoulder at A-C joint and also laterally in deltoid  [x] ? ROM:right shoulder  [x] ? Strength: right shoulder  [x] ?  Function:limited with all activities involving right arm, disturbed sleep  [x] Other:UEFI 84% overall limitations, limited with all activities involving use of right arm     STG: (to be met in 9 treatments)  1. ? Pain: to 4/10 maximum in right shoulder  2. ? ROM:AROm right shoulder 140 flexion and 90 abduction to improve reaching range. 3. ? Function: Ability to get shirts on and off without increased pain. 4. Independent with Home Exercise Programs  5.    LTG: (to be met in 18 treatments)  1. Improve by 2 levels on UEFI for grooming, lifting groceries to waist level, dressing, cleaning, opening a jar, and sleeping  2. 3/10 maximum pain in right shoulder  3. Not to awaken > 1x per night due to right shoulder pain.                    Patient goals: reduce pain and increase use      Pt. Education:  [] Yes  [x] No  [] Reviewed Prior HEP/Ed  Method of Education: [] Verbal  [] Demo  [] Written  3/21 - KT tape wear and removal  Comprehension of Education:  [] Verbalizes understanding. [] Demonstrates understanding. [] Needs review. [x] Demonstrates/verbalizes HEP/Ed previously given. Plan: [x] Continue per plan of care.    [] Other:      Time In: 1:00              Time Out: 2:04 pm     Electronically signed by:  Nick Vidal PTA

## 2019-04-08 ENCOUNTER — HOSPITAL ENCOUNTER (OUTPATIENT)
Dept: PHYSICAL THERAPY | Facility: CLINIC | Age: 49
Setting detail: THERAPIES SERIES
Discharge: HOME | End: 2019-04-08
Payer: COMMERCIAL

## 2019-04-10 ENCOUNTER — OFFICE VISIT (OUTPATIENT)
Dept: GASTROENTEROLOGY | Age: 49
End: 2019-04-10
Payer: MEDICAID

## 2019-04-10 ENCOUNTER — HOSPITAL ENCOUNTER (OUTPATIENT)
Dept: PHYSICAL THERAPY | Facility: CLINIC | Age: 49
Setting detail: THERAPIES SERIES
Discharge: HOME | End: 2019-04-10
Payer: COMMERCIAL

## 2019-04-10 VITALS
DIASTOLIC BLOOD PRESSURE: 63 MMHG | SYSTOLIC BLOOD PRESSURE: 114 MMHG | WEIGHT: 114.6 LBS | HEART RATE: 70 BPM | BODY MASS INDEX: 24.8 KG/M2

## 2019-04-10 DIAGNOSIS — K62.5 RECTAL BLEEDING: Primary | ICD-10-CM

## 2019-04-10 PROCEDURE — 97110 THERAPEUTIC EXERCISES: CPT

## 2019-04-10 PROCEDURE — 3017F COLORECTAL CA SCREEN DOC REV: CPT | Performed by: INTERNAL MEDICINE

## 2019-04-10 PROCEDURE — G8427 DOCREV CUR MEDS BY ELIG CLIN: HCPCS | Performed by: INTERNAL MEDICINE

## 2019-04-10 PROCEDURE — G8420 CALC BMI NORM PARAMETERS: HCPCS | Performed by: INTERNAL MEDICINE

## 2019-04-10 PROCEDURE — 99203 OFFICE O/P NEW LOW 30 MIN: CPT | Performed by: INTERNAL MEDICINE

## 2019-04-10 PROCEDURE — 4004F PT TOBACCO SCREEN RCVD TLK: CPT | Performed by: INTERNAL MEDICINE

## 2019-04-10 PROCEDURE — G0283 ELEC STIM OTHER THAN WOUND: HCPCS

## 2019-04-10 RX ORDER — POLYETHYLENE GLYCOL 3350 17 G/17G
POWDER, FOR SOLUTION ORAL
Qty: 255 G | Refills: 0 | Status: ON HOLD | OUTPATIENT
Start: 2019-04-10 | End: 2019-04-18 | Stop reason: ALTCHOICE

## 2019-04-10 NOTE — FLOWSHEET NOTE
[] [facility]) [facility] &  Therapy  [address]  [phone]  F: [phone] [] [address]  [facility]   [address]  P: [phone]  F: [phone] [] [address] &  Therapy  [address] [facility] [address] [address]  P: [phone]  F: [phone] [] [address]  [facility]   [address]  [place]: [phone]  F: [phone]     Physical Therapy Daily Treatment Note    Date:  4/10/2019  Patient Name:  May Delong    :  1970  MRN: [id number]  Physician: Augustin Dubois DO                Insurance: [facility] (3x a week for 6 weeks, 18 visits total)  Medical Diagnosis: chronic right shoulder pain              (hx right shoulder scope and debridement 11 months ago - see below)  Rehab Codes: M25.511, M25.611  Onset Date: 3-8-2019 date of script, (rotator cuff surgery in )                      Next 's appt  Visit# / total visits:   Cancels/No Shows: 3/0    Subjective:    Pain:  [x] Yes  [] No Location: R shoulder Pain Rating: (0-10 scale) 4/10  Pain altered Tx:  [] No  [] Yes  Action:  Comments: Pt arrives noting her shoulder is doing okay and rates her pain a 4/10. Met with gastronologist and has appointment scheduled for colonoscopy on Thursday of next week. Objective:  Modalities:   Modalities: HP with IFC end treatment to R shoulder in supine x 15 min.    Precautions:  Exercises: Bolded  Completed 04/10/19    Exercise Reps/ Time Weight/ Level Comments   UBE 4 min  fwd   Pulleys 2\"/2\"  Flex/abd         PROM to right shoulder x   Flex, abduction, supine \"abduction felt good\"             Cane supine flexion 10x3  1#  Increased reps 4/10   Cane supine abduction 10x3  1#  \"   Cane supine chest press 10x3  1#  \"         Supine shoulder flexion 10x2 A \"felt good\"   Supine SA punches 10x2 1# Added weight 4/10         Sidelying       ER 10x2 A Added4/5   ABD 10x2 A \"   H. ABD 10x A Added 4/10         Seated shoulder retraction 15x5\"  Cueing to decrease UT comp   Seated UT stretch 3x20\"               Table flexion  15x   Flexion, scaption             Tband     cueing to decrease UT comp   ext  15x red  Increased resistance 4/1   rows  15x red  Increased resistance 4/1             bicep curls  20x  2#  Added 4/5 decreased weight 4/10   Shoulder abduction 10x2 A To 90°   Shoulder flexion 10x2 A                Other:     KT TAPE;   1 I strip for UT inhibition  1 I strip for postural awareness - anchored anterior shoulder, shoulders retracted, anchored rhomboid region  2 strips for shoulder stability. - 1 Y strip anchored at deltoid insertion, tails of tape ending superior shoulder near ac joint. 1 I strip \"u\" shape to cup deltoids  Image in soft chart of KT tape   - Not today. Specific Instructions for next treatment:      Treatment Charges: Mins Units   [x]  Modalities- HP/IFC 15/15 0/1   [x]  Ther Exercise 35 2   []  Manual Therapy     []  Ther Activities     []  Aquatics     []  Vasocompression     []  Other     Total Treatment time 50 3       Assessment: [x] Progressing toward goals. Minimal progressions made today with good tolerance. Pt reports \"I just needed to wake my shoulder up\" while completing cane exercises. Pt reports that her shoulder was sore end of treatment. Declined Biofreeze application noting she has some at home. [] No change. [] Other:    Problems:    [x] ? Pain: 9/10 in right shoulder at A-C joint and also laterally in deltoid  [x] ? ROM:right shoulder  [x] ? Strength: right shoulder  [x] ?  Function:limited with all activities involving right arm, disturbed sleep  [x] Other:UEFI 84% overall limitations, limited with all activities involving use of right arm     STG: (to be met in 9 treatments)  1. ? Pain: to 4/10 maximum in right shoulder  2. ? ROM:AROm right shoulder 140 flexion and 90 abduction to improve reaching range. 3. ? Function: Ability to get shirts on and off without increased pain. 4. Independent with Home Exercise Programs  5.    LTG: (to be met in 18 treatments)  1. Improve by 2 levels on UEFI for grooming, lifting groceries to waist level, dressing, cleaning, opening a jar, and sleeping  2. 3/10 maximum pain in right shoulder  3. Not to awaken > 1x per night due to right shoulder pain.                    Patient goals: reduce pain and increase use      Pt. Education:  [] Yes  [x] No  [] Reviewed Prior HEP/Ed  Method of Education: [] Verbal  [] Demo  [] Written  3/21 - KT tape wear and removal  Comprehension of Education:  [] Verbalizes understanding. [] Demonstrates understanding. [] Needs review. [x] Demonstrates/verbalizes HEP/Ed previously given. Plan: [x] Continue per plan of care.    [] Other:      Time In: 3:00           Time Out: 4:00 pm     Electronically signed by:  Liliam Addison PTA

## 2019-04-10 NOTE — PROGRESS NOTES
INITIAL NOTE    HISTORY OF PRESENT ILLNESS: Ms. Jenifer Bae is a 46 y.o. female referred for evaluation of rectal bleeding. She reports 1 episode of bright red blood per rectum. No abdominal pain. No nausea or vomiting. No change in bowel habits. No weight loss. No prior GI issues. No prior colonoscopy. Past Medical, Family, and Social History reviewed and does not contribute to the patient presenting condition. Patient's PMH/PSH,SH,PSYCH Hx, MEDs, ALLERGIES, and ROS were all reviewed and updated in the appropriate sections. PAST MEDICAL HISTORY:  Past Medical History:   Diagnosis Date    Anemia     Anxiety     Chronic right shoulder pain     Depression     Irregular heartbeat        Past Surgical History:   Procedure Laterality Date    BRAIN ANEURYSM SURGERY  12/2014    Coiling at [facility]      x4    [facility] ARTHROSCOP,SURG,W/ROTAT CUFF REPR Right 2/20/2018    RIGHT SHOULDER ARTHROSCOPY ROTATOR CUFF LYSIS OF ADHESIONS performed by Dale Chaparro DO at [address] [facility] ARTHROSCOPY Right 02/20/2018    extensive lysis of adhesions; Open suture granuloma removal     SHOULDER SURGERY Right     x 3    TONSILLECTOMY         CURRENT MEDICATIONS:    Current Outpatient Medications:     Melatonin 1 MG CAPS, 1 tab daily, Disp: 30 capsule, Rfl: 0    pregabalin (LYRICA) 100 MG capsule, Take 100 mg by mouth 2 times daily. , Disp: , Rfl:     celecoxib (CELEBREX) 200 MG capsule, take 1 capsule by mouth once daily prn, Disp: , Rfl: 0    oxyCODONE-acetaminophen (PERCOCET) 5-325 MG per tablet, Take 1 tablet by mouth every 4 hours as needed for Pain., Disp: , Rfl:     ALLERGIES:   No Known Allergies    FAMILY HISTORY:       Problem Relation Age of Onset    Asthma Mother     Cancer Father         throat     No GI issues.     SOCIAL HISTORY:   Social History     Socioeconomic History    Marital status: Legally      Spouse name: Not on file    Number of children: Not on file    Years of education: Not on file    Highest education level: Not on file   Occupational History    Not on file   Social Needs    Financial resource strain: Not on file    Food insecurity:     Worry: Not on file     Inability: Not on file    Transportation needs:     Medical: Not on file     Non-medical: Not on file   Tobacco Use    Smoking status: Current Every Day Smoker     Packs/day: 0.25     Years: 12.00     Pack years: 3.00     Types: Cigarettes    Smokeless tobacco: Never Used    Tobacco comment: pt smokes about 4 ciggs daily   Substance and Sexual Activity    Alcohol use: No    Drug use: No    Sexual activity: Yes     Partners: Male   Lifestyle    Physical activity:     Days per week: Not on file     Minutes per session: Not on file    Stress: Not on file   Relationships    Social connections:     Talks on phone: Not on file     Gets together: Not on file     Attends Restoration service: Not on file     Active member of club or organization: Not on file     Attends meetings of clubs or organizations: Not on file     Relationship status: Not on file    Intimate partner violence:     Fear of current or ex partner: Not on file     Emotionally abused: Not on file     Physically abused: Not on file     Forced sexual activity: Not on file   Other Topics Concern    Not on file   Social History Narrative    Not on file       REVIEW OF SYSTEMS: A 12-point review of systems was obtained and pertinent positives and negatives were enumerated above in the history of present illness. All other reviewed systems / symptoms were negative. Review of Systems    PHYSICAL EXAMINATION: Vital signs reviewed per the nursing documentation. LMP 03/11/2019   There is no height or weight on file to calculate BMI. I personally reviewed the nurse's notes and documentation and I agree with her notes.     General: alert, appears stated age and cooperative Psych: Normal. and Alert and oriented, appropriate affect. . Normal affect. Mentation normal  HEENT: PERRLA. Clear conjunctivae and sclerae. Moist oral mucosae, no lesions or ulcers. The neck is supple, without lymphadenopathy or jugular venous distension. No masses. Normal thyroid. Cardiovascular: S1 S2 RRR no rubs or murmurs. Pulmonary: clear BL. No accessory muscle usage. Abdominal Exam: Soft, NT ND, no hepato or spleno megaly, +BS, no ascites. No groin masses or lymphadenopathy. Extremities: no lower ext edema. Skin: Warm skin. No skin rash. No spider nevi palmar erythema nail dystrophy. Joint: No joint swelling or deformity. Neurological: intact sensory. DTR+. LABORATORY DATA: Reviewed  Lab Results   Component Value Date    WBC 6.9 03/20/2019    HGB 12.6 03/20/2019    HCT 38.2 03/20/2019    MCV 85.7 03/20/2019     03/20/2019     (L) 03/20/2019    K 4.3 03/20/2019     03/20/2019    CO2 25 03/20/2019    BUN 12 03/20/2019    CREATININE 0.80 03/20/2019    LABALBU 4.2 03/20/2019    BILITOT 0.25 (L) 03/20/2019    ALKPHOS 85 03/20/2019    AST 17 03/20/2019    ALT 10 03/20/2019    INR 1.1 03/20/2019         Lab Results   Component Value Date    RBC 4.46 03/20/2019    HGB 12.6 03/20/2019    MCV 85.7 03/20/2019    MCH 28.3 03/20/2019    MCHC 33.0 03/20/2019    RDW 14.1 03/20/2019    MPV 8.2 03/20/2019    BASOPCT 0 03/20/2019    LYMPHSABS 1.30 03/20/2019    MONOSABS 0.50 03/20/2019    NEUTROABS 5.10 03/20/2019    EOSABS 0.00 03/20/2019    BASOSABS 0.00 03/20/2019         DIAGNOSTIC TESTING:     Ct Abdomen Pelvis W Iv Contrast    Result Date: 3/20/2019  EXAMINATION: CT OF THE ABDOMEN AND PELVIS WITH CONTRAST 3/20/2019 3:43 am TECHNIQUE: CT of the abdomen and pelvis was performed with the administration of intravenous contrast. Multiplanar reformatted images are provided for review.  Dose modulation, iterative reconstruction, and/or weight based adjustment of the mA/kV was utilized to reduce the radiation dose to as low as reasonably achievable. COMPARISON: October 25, 2015. HISTORY: ORDERING SYSTEM PROVIDED HISTORY: ABDOMINAL PAIN TECHNOLOGIST PROVIDED HISTORY: IV Only Contrast FINDINGS: Lower Chest: Normal. Liver: Normal. Gallbladder and Bile Ducts: Normal. Spleen: Normal. Adrenal Glands: Normal. Pancreas: Normal. Genitourinary: Normal kidneys and ureters. The urinary bladder is decompressed demonstrates diffuse wall thickening. Fibroid uterus. Bowel: Normal. Vasculature: Normal. Bones and Soft Tissues: Leftward convex curvature of the thoracolumbar spine. Retroperitoneum/Mesentery: No intraperitoneal free air or drainable fluid collection. Small amount of free fluid in the posterior pelvis. No lymphadenopathy in the abdomen or pelvis. No acute abnormality in the abdomen or pelvis. Nonspecific small amount of free fluid in the posterior pelvis. Fibroid uterus. IMPRESSION: Ms. Chasity Hill is a 46 y.o. female with rectal bleeding. We discussed differential diagnosis and workup. Plan for colonoscopy. Thank you for allowing me to participate in the care of Ms. Chasity Hill. For any further questions please do not hesitate to contact me. Hanna Moreira MD [facility]    Please note that this chart was generated using voice recognition Dragon dictation software. Although every effort was made to ensure the accuracy of this automated transcription, some errors in transcription may have occurred.

## 2019-04-15 ENCOUNTER — HOSPITAL ENCOUNTER (OUTPATIENT)
Dept: PHYSICAL THERAPY | Facility: CLINIC | Age: 49
Setting detail: THERAPIES SERIES
Discharge: HOME | End: 2019-04-15
Payer: COMMERCIAL

## 2019-04-15 PROCEDURE — G0283 ELEC STIM OTHER THAN WOUND: HCPCS

## 2019-04-15 PROCEDURE — 97110 THERAPEUTIC EXERCISES: CPT

## 2019-04-15 NOTE — FLOWSHEET NOTE
[] [facility]) - [facility] &  Therapy  [address]  [phone]  F: [phone] [x] [address]  [place] [address]   [address]  P: [phone]  F: [phone] [] [address] &  Therapy  [address]  P: [phone]  F: [phone] [] [address]  [facility]   [address]  [facility] [phone]  F: [phone]     Physical Therapy Daily Treatment Note    Date:  4/15/2019  Patient Name:  Brunilda Barney    :  1970  MRN: [id number]  Physician: Dinah Aldridge DO                Insurance: [facility] (3x a week for 6 weeks, 18 visits total)  Medical Diagnosis: chronic right shoulder pain              (hx right shoulder scope and debridement 11 months ago - see below)  Rehab Codes: M25.511, M25.611  Onset Date: 3-8-2019 date of script, (rotator cuff surgery in )                      Next 's appt: pt to call to schedule  Visit# / total visits:     Cancels/No Shows:  (corrected 4-15-19)    Subjective:    Pain:  [x] Yes  [] No Location: R shoulder Pain Rating: (0-10 scale) 4/10  Pain altered Tx:  [x] No  [] Yes  Action:  Comments: Pt arrives saying she is doing better with other gastric issues. Shoulder feeling \"okay\" difficulty sleeping last night due to shoulder. Repositioned and shoulder felt okay in the morning but her back hurt. Having a colonoscopy this Thursday. Objective:  Modalities:   Modalities: HP with IFC end treatment to R shoulder in supine x 15 min.    Precautions:  Exercises: Bolded  Completed 04/15/19    Exercise Reps/ Time Weight/ Level Comments   UBE 4 min  Fwd- Schwin   Pulleys 2\"/2\"  Flex/abd         PROM to right shoulder x   Flex, abduction, supine \"abduction felt good\"             Cane supine flexion 10x3  3#  Increased wt 15   Cane supine abduction 10x3  3#  \"alternated with chest press after 10 reps   Cane supine chest press 10x3  3#  \"               Supine SA punches 10x 3# cane Increased wt 4/15         Sidelying       ER 10x2 A Added4/5   ABD 10x2 A \"   H. ABD 10x A Added 4/10         Seated shoulder retraction 15x5\"  Cueing to decrease UT comp   Seated UT stretch 3x20\"               Table flexion  15x   Flexion, scaption             Tband     cueing to decrease UT comp   ext  15x red  Increased resistance 4/1, try blueberry next visit   rows  15x red  Increased resistance 4/1, try blueberry next visit             bicep curls - standing 20x  2#  Added 4/5 decreased weight 4/10   Shoulder abduction 10 2# To 90°, wt added 4/15, keep scapula retracted   Shoulder flexion 10 A Wt added 4/15               Other:       - Not today. - pt prefers not to have any more taping due to it \"peeling my hairs off\"      Specific Instructions for next treatment:      Treatment Charges: Mins Units   [x]  Modalities- HP/IFC 15/15 0/1   [x]  Ther Exercise 35 2   []  Manual Therapy     []  Ther Activities     []  Aquatics     []  Vasocompression     []  Other     Total Treatment time 50 3       Assessment: [x] Progressing toward goals. She reports pain is decreasing in shoulder but she get the feeling it is \"tired\" at times. No pain at A-C joint. Able to progress exercises as charted above. [] Other:Right shoulder   4-15-19 sitting AROM  Strength  Flexion  143   -3   Abduction 112   -3  ER  64   +3    Problems:    [x] ? Pain: 9/10 in right shoulder at A-C joint and also laterally in deltoid  [x] ? ROM:right shoulder  [x] ? Strength: right shoulder  [x] ?  Function:limited with all activities involving right arm, disturbed sleep  [x] Other:UEFI 84% overall limitations, limited with all activities involving use of right arm     STG: (to be met in 9 treatments)  1. ? Pain: to 4/10 maximum in right shoulder, met 4-15-19  2. ? ROM:AROM right shoulder 140 flexion and 90 abduction to improve reaching range. met 4-15-19  3. ? Function: Ability to get shirts on and off without increased pain. met 4-15-19, but \"doing a certain way, right arm in sleeve first\"  4. Independent with Home Exercise Programsmet 4-15-19  5.    LTG: (to be met in 18 treatments)  1. Improve by 2 levels on UEFI for grooming, lifting groceries to waist level, dressing, cleaning, opening a jar, and sleeping  2. 3/10 maximum pain in right shoulder  3. Not to awaken > 1x per night due to right shoulder pain.                    Patient goals: reduce pain and increase use      Pt. Education:  [] Yes  [] No  [x] Reviewed Prior HEP/Ed  Method of Education: [x] Verbal  [] Demo  [] Written  3/21 - KT tape wear and removal  Comprehension of Education:  [] Verbalizes understanding. [] Demonstrates understanding. [] Needs review. [x] Demonstrates/verbalizes HEP/Ed previously given. Plan: [x] Continue per plan of care.    [x] Other:  4- daily note sent to Dr. Marilynn Leiva      Time In: 11:03 am         Time Out:11: 62 am     Electronically signed by:  Da Salmeron, PT

## 2019-04-17 ENCOUNTER — ANESTHESIA EVENT (OUTPATIENT)
Dept: OPERATING ROOM | Age: 49
End: 2019-04-17
Payer: MEDICAID

## 2019-04-17 ENCOUNTER — HOSPITAL ENCOUNTER (OUTPATIENT)
Dept: PHYSICAL THERAPY | Facility: CLINIC | Age: 49
Setting detail: THERAPIES SERIES
Discharge: HOME | End: 2019-04-17
Payer: COMMERCIAL

## 2019-04-17 PROCEDURE — G0283 ELEC STIM OTHER THAN WOUND: HCPCS

## 2019-04-17 PROCEDURE — 97110 THERAPEUTIC EXERCISES: CPT

## 2019-04-17 NOTE — FLOWSHEET NOTE
[] [facility]) [facility] &  Therapy  [address]  [phone]  F: [phone] [x] [address]  [facility]   [address]  P: [phone]  F: [phone] [] [address] &  Therapy  [address]  P: [phone]  F: [phone] [] [address]  [facility]   [address]  [place]: [phone]  F: [phone]     Physical Therapy Daily Treatment Note    Date:  2019  Patient Name:  Jaya Herrera    :  1970  MRN: [id number]  Physician: Tarik Menendez DO                Insurance: [facility] (3x a week for 6 weeks, 18 visits total)  Medical Diagnosis: chronic right shoulder pain              (hx right shoulder scope and debridement 11 months ago - see below)  Rehab Codes: M25.511, M25.611  Onset Date: 3-8-2019 date of script, (rotator cuff surgery in )                      Next 's appt: pt to call to schedule  Visit# / total visits: 10/18    Cancels/No Shows:  (corrected 4-15-19)    Subjective:    Pain:  [x] Yes  [] No Location: R shoulder Pain Rating: (0-10 scale) 4/10  Pain altered Tx:  [x] No  [] Yes  Action:  Comments: Pt arrives noting she is the same today. Having a colonoscopy this Thursday. Objective:  Modalities:   Modalities: HP with IFC end treatment to R shoulder in supine x 15 min.    Precautions:  Exercises: Bolded  Completed 19    Exercise Reps/ Time Weight/ Level Comments   UBE 4 min  Fwd- Schwin   Pulleys 2\"/2\"  Flex/abd         PROM to right shoulder x   Flex, abduction, supine \"abduction felt good\"             Cane supine flexion 10x3  3#  Increased wt 4/15   Cane supine abduction 10x3  3#  \"alternated with chest press after 10 reps   Cane supine chest press 10x3  3#  \"               Supine SA punches 10x 3# cane Increased wt 4/15         Sidelying       ER 10x2 1# Added weight 4/17   ABD 10x  10x 1#  A Added weight 4/17   H. ABD 10x 1# Added 4/10         Seated shoulder retraction 15x5\"  Cueing to decrease UT comp   Seated UT stretch 3x20\"               Table flexion  15x   Flexion, scaption             Tband     cueing to decrease UT comp   ext  15x blueberry  Increased resistance 4/1,    rows  15x blueberry  Increased resistance 4/1,              bicep curls - standing 20x 3#  Added 4/5 Increased weight  4/10   Shoulder abduction 10x  8x 2# To 90°, wt added 4/15, keep scapula retracted   Shoulder flexion 10x2 A Wt added 4/15               Other:       - Not today. - pt prefers not to have any more taping due to it \"peeling my hairs off\"      Specific Instructions for next treatment:      Treatment Charges: Mins Units   [x]  Modalities- HP/IFC 15/15 0/1   [x]  Ther Exercise 35 2   []  Manual Therapy     []  Ther Activities     []  Aquatics     []  Vasocompression     []  Other     Total Treatment time 50 3       Assessment: [x] Progressing toward goals. Able to make small progressions today however patient frequently reports \" I think I over did it\" . Cueing to properly complete exercises. [] Other:    Right shoulder   4-15-19 sitting AROM  Strength  Flexion  143   -3   Abduction 112   -3  ER  64   +3    Problems:    [x] ? Pain: 9/10 in right shoulder at A-C joint and also laterally in deltoid  [x] ? ROM:right shoulder  [x] ? Strength: right shoulder  [x] ? Function:limited with all activities involving right arm, disturbed sleep  [x] Other:UEFI 84% overall limitations, limited with all activities involving use of right arm     STG: (to be met in 9 treatments)  1. ? Pain: to 4/10 maximum in right shoulder, met 4-15-19  2. ? ROM:AROM right shoulder 140 flexion and 90 abduction to improve reaching range. met 4-15-19  3. ? Function: Ability to get shirts on and off without increased pain.   met 4-15-19, but \"doing a certain way, right arm in sleeve first\"  4. Independent with Home Exercise Programsmet 4-15-19  5.    LTG: (to be met in 18 treatments)  1. Improve by 2 levels on UEFI for grooming, lifting groceries to waist level, dressing, cleaning, opening a jar, and sleeping  2. 3/10 maximum pain in right shoulder  3. Not to awaken > 1x per night due to right shoulder pain.                    Patient goals: reduce pain and increase use      Pt. Education:  [] Yes  [] No  [x] Reviewed Prior HEP/Ed  Method of Education: [x] Verbal  [] Demo  [] Written  3/21 - KT tape wear and removal  Comprehension of Education:  [] Verbalizes understanding. [] Demonstrates understanding. [] Needs review. [x] Demonstrates/verbalizes HEP/Ed previously given. Plan: [x] Continue per plan of care.    [x] Other:  4- daily note sent to Dr. Bryan Wall      Time In: 1:00 pm         Time Out: 2:00 pm     Electronically signed by:  Uli Gonzalez PTA

## 2019-04-18 ENCOUNTER — HOSPITAL ENCOUNTER (OUTPATIENT)
Age: 49
Setting detail: OUTPATIENT SURGERY
Discharge: HOME | End: 2019-04-18
Attending: INTERNAL MEDICINE | Admitting: INTERNAL MEDICINE
Payer: MEDICAID

## 2019-04-18 ENCOUNTER — ANESTHESIA (OUTPATIENT)
Dept: OPERATING ROOM | Age: 49
End: 2019-04-18
Payer: MEDICAID

## 2019-04-18 VITALS
RESPIRATION RATE: 16 BRPM | WEIGHT: 110.01 LBS | OXYGEN SATURATION: 99 % | HEIGHT: 59 IN | HEART RATE: 58 BPM | DIASTOLIC BLOOD PRESSURE: 56 MMHG | TEMPERATURE: 97.9 F | BODY MASS INDEX: 22.18 KG/M2 | SYSTOLIC BLOOD PRESSURE: 112 MMHG

## 2019-04-18 VITALS
RESPIRATION RATE: 23 BRPM | OXYGEN SATURATION: 100 % | DIASTOLIC BLOOD PRESSURE: 72 MMHG | SYSTOLIC BLOOD PRESSURE: 134 MMHG

## 2019-04-18 PROCEDURE — 2580000003 HC RX 258: Performed by: ANESTHESIOLOGY

## 2019-04-18 PROCEDURE — 2709999900 HC NON-CHARGEABLE SUPPLY: Performed by: INTERNAL MEDICINE

## 2019-04-18 PROCEDURE — 6360000002 HC RX W HCPCS: Performed by: NURSE ANESTHETIST, CERTIFIED REGISTERED

## 2019-04-18 PROCEDURE — 2500000003 HC RX 250 WO HCPCS: Performed by: NURSE ANESTHETIST, CERTIFIED REGISTERED

## 2019-04-18 PROCEDURE — 6370000000 HC RX 637 (ALT 250 FOR IP): Performed by: ANESTHESIOLOGY

## 2019-04-18 PROCEDURE — 45378 DIAGNOSTIC COLONOSCOPY: CPT | Performed by: INTERNAL MEDICINE

## 2019-04-18 PROCEDURE — 7100000011 HC PHASE II RECOVERY - ADDTL 15 MIN: Performed by: INTERNAL MEDICINE

## 2019-04-18 PROCEDURE — 3700000001 HC ADD 15 MINUTES (ANESTHESIA): Performed by: INTERNAL MEDICINE

## 2019-04-18 PROCEDURE — 3700000000 HC ANESTHESIA ATTENDED CARE: Performed by: INTERNAL MEDICINE

## 2019-04-18 PROCEDURE — 7100000010 HC PHASE II RECOVERY - FIRST 15 MIN: Performed by: INTERNAL MEDICINE

## 2019-04-18 PROCEDURE — 3609027000 HC COLONOSCOPY: Performed by: INTERNAL MEDICINE

## 2019-04-18 PROCEDURE — 84703 CHORIONIC GONADOTROPIN ASSAY: CPT

## 2019-04-18 RX ORDER — POLYVINYL ALCOHOL 14 MG/ML
1 SOLUTION/ DROPS OPHTHALMIC EVERY 30 MIN PRN
Status: DISCONTINUED | OUTPATIENT
Start: 2019-04-18 | End: 2019-04-18 | Stop reason: HOSPADM

## 2019-04-18 RX ORDER — FENTANYL CITRATE 50 UG/ML
50 INJECTION, SOLUTION INTRAMUSCULAR; INTRAVENOUS EVERY 5 MIN PRN
Status: DISCONTINUED | OUTPATIENT
Start: 2019-04-18 | End: 2019-04-18 | Stop reason: HOSPADM

## 2019-04-18 RX ORDER — SODIUM CHLORIDE 0.9 % (FLUSH) 0.9 %
10 SYRINGE (ML) INJECTION PRN
Status: DISCONTINUED | OUTPATIENT
Start: 2019-04-18 | End: 2019-04-18 | Stop reason: HOSPADM

## 2019-04-18 RX ORDER — FENTANYL CITRATE 50 UG/ML
25 INJECTION, SOLUTION INTRAMUSCULAR; INTRAVENOUS EVERY 5 MIN PRN
Status: DISCONTINUED | OUTPATIENT
Start: 2019-04-18 | End: 2019-04-18 | Stop reason: HOSPADM

## 2019-04-18 RX ORDER — OXYCODONE HYDROCHLORIDE AND ACETAMINOPHEN 5; 325 MG/1; MG/1
1 TABLET ORAL ONCE
Status: COMPLETED | OUTPATIENT
Start: 2019-04-18 | End: 2019-04-18

## 2019-04-18 RX ORDER — SODIUM CHLORIDE, SODIUM LACTATE, POTASSIUM CHLORIDE, CALCIUM CHLORIDE 600; 310; 30; 20 MG/100ML; MG/100ML; MG/100ML; MG/100ML
INJECTION, SOLUTION INTRAVENOUS CONTINUOUS
Status: DISCONTINUED | OUTPATIENT
Start: 2019-04-19 | End: 2019-04-18 | Stop reason: HOSPADM

## 2019-04-18 RX ORDER — PROPOFOL 10 MG/ML
INJECTION, EMULSION INTRAVENOUS PRN
Status: DISCONTINUED | OUTPATIENT
Start: 2019-04-18 | End: 2019-04-18 | Stop reason: SDUPTHER

## 2019-04-18 RX ORDER — LIDOCAINE HYDROCHLORIDE 10 MG/ML
INJECTION, SOLUTION INFILTRATION; PERINEURAL PRN
Status: DISCONTINUED | OUTPATIENT
Start: 2019-04-18 | End: 2019-04-18 | Stop reason: SDUPTHER

## 2019-04-18 RX ORDER — SODIUM CHLORIDE 9 MG/ML
INJECTION, SOLUTION INTRAVENOUS CONTINUOUS
Status: DISCONTINUED | OUTPATIENT
Start: 2019-04-19 | End: 2019-04-18

## 2019-04-18 RX ORDER — LIDOCAINE HYDROCHLORIDE 10 MG/ML
1 INJECTION, SOLUTION EPIDURAL; INFILTRATION; INTRACAUDAL; PERINEURAL
Status: DISCONTINUED | OUTPATIENT
Start: 2019-04-19 | End: 2019-04-18 | Stop reason: HOSPADM

## 2019-04-18 RX ORDER — SODIUM CHLORIDE 0.9 % (FLUSH) 0.9 %
10 SYRINGE (ML) INJECTION EVERY 12 HOURS SCHEDULED
Status: DISCONTINUED | OUTPATIENT
Start: 2019-04-18 | End: 2019-04-18 | Stop reason: HOSPADM

## 2019-04-18 RX ORDER — ONDANSETRON 2 MG/ML
4 INJECTION INTRAMUSCULAR; INTRAVENOUS
Status: DISCONTINUED | OUTPATIENT
Start: 2019-04-18 | End: 2019-04-18 | Stop reason: HOSPADM

## 2019-04-18 RX ADMIN — SODIUM CHLORIDE, POTASSIUM CHLORIDE, SODIUM LACTATE AND CALCIUM CHLORIDE: 600; 310; 30; 20 INJECTION, SOLUTION INTRAVENOUS at 09:35

## 2019-04-18 RX ADMIN — PROPOFOL 50 MG: 10 INJECTION, EMULSION INTRAVENOUS at 10:04

## 2019-04-18 RX ADMIN — LIDOCAINE HYDROCHLORIDE 30 MG: 10 INJECTION, SOLUTION INFILTRATION; PERINEURAL at 10:04

## 2019-04-18 RX ADMIN — PROPOFOL 20 MG: 10 INJECTION, EMULSION INTRAVENOUS at 10:10

## 2019-04-18 RX ADMIN — POLYVINYL ALCOHOL 1 DROP: 14 SOLUTION/ DROPS OPHTHALMIC at 11:31

## 2019-04-18 RX ADMIN — PROPOFOL 20 MG: 10 INJECTION, EMULSION INTRAVENOUS at 10:07

## 2019-04-18 RX ADMIN — PROPOFOL 20 MG: 10 INJECTION, EMULSION INTRAVENOUS at 10:13

## 2019-04-18 RX ADMIN — POLYVINYL ALCOHOL 1 DROP: 14 SOLUTION/ DROPS OPHTHALMIC at 11:03

## 2019-04-18 RX ADMIN — PROPOFOL 50 MG: 10 INJECTION, EMULSION INTRAVENOUS at 10:05

## 2019-04-18 RX ADMIN — OXYCODONE AND ACETAMINOPHEN 1 TABLET: 5; 325 TABLET ORAL at 10:57

## 2019-04-18 ASSESSMENT — PULMONARY FUNCTION TESTS
PIF_VALUE: 1

## 2019-04-18 ASSESSMENT — PAIN SCALES - GENERAL
PAINLEVEL_OUTOF10: 0
PAINLEVEL_OUTOF10: 9
PAINLEVEL_OUTOF10: 0

## 2019-04-18 ASSESSMENT — PAIN - FUNCTIONAL ASSESSMENT: PAIN_FUNCTIONAL_ASSESSMENT: 0-10

## 2019-04-18 ASSESSMENT — LIFESTYLE VARIABLES: SMOKING_STATUS: 1

## 2019-04-18 NOTE — OP NOTE
[facility]     PROCEDURE DATE: 04/18/19    REFERRING PHYSICIAN: No ref. provider found     PRIMARY CARE PROVIDER: Brittney Millan MD    ATTENDING PHYSICIAN: Lindy Seip, MD     HISTORY: Ms. Bao Morocho is a 46 y.o. female who presents to the [place] [address] Endoscopy unit for colonoscopy. The patient's clinical history is remarkable for rectal bleeding. She is currently medically stable and appropriate for the planned procedure. PREOPERATIVE DIAGNOSIS: rectal bleeding. PROCEDURES:   Transanal Colonoscopy --diagnostic. POSTPROCEDURE DIAGNOSIS:  Small internal hemorrhoids  Redundant colon    MEDICATIONS:     MAC per anesthesia    EBL: minimal    INSTRUMENT: Olympus PCF-H190 AL flexible Colonoscope. PREPARATION: The nature and character of the procedure as well as risks, benefits, and alternatives were discussed with the patient and informed consent was obtained. Complications were said to include, but were not limited to: medication allergy, medication reaction, cardiovascular and respiratory problems, bleeding, perforation, infection, and/or missed diagnosis. Following arrival in the endoscopy room, the patient was placed in the left lateral decubitus position and final time-out accomplished in the presence of the nursing staff. Baseline vital signs were obtained and reviewed, and IV sedation was subsequently initiated. FINDINGS: Rectal examination demonstrated no significant visible external abnormality and digital palpation was unremarkable. Following adequate conscious sedation the colonoscope was introduced and advanced under direct visualization to the cecum, which was identified by the ileocecal valve and appendiceal orifice. The bowel preparation was felt to be adequate. This included small amounts of thin and watery stool that was able to be adequately irrigated and aspirated. Cecal intubation time was 4 minutes.      Once maximally inserted, the endoscope was withdrawn and the mucosa was carefully inspected. The mucosal exam was normal. Colon was mildly redundant. Retroflexion was performed in the rectum and showed small internal hemorrhoids. Withdrawal time was 7 minutes. RECOMMENDATIONS:   1) Follow up with referring provider, as previously scheduled. 2) Preparation-H suppositories as needed for rectal bleeding.          Jose Delarosa

## 2019-04-18 NOTE — PROGRESS NOTES
Patient c/o 9/10 back pain and left eye pain. Left eye red and watery. Dr Ry Le notified. Order received for percocet for back pain and artificial tears Q30 minutes for 2-3 doses and continue to monitor.

## 2019-04-18 NOTE — PROGRESS NOTES
Dr Salome Stahl requesting an additional eye drop and to continue to monitor for 30 minutes. Patient requesting to leave right now. Dr Salome Stahl notified, ok for patient to leave as long as patient continues to monitor eye and go see an eye doctor if eye worsens or does not get better. Patient verbalized understanding of this and agreed to comply.

## 2019-04-18 NOTE — H&P
History and Physical Update    Pt Name: Francisco J Berumen  MRN: [id number]  YOB: 1970  Date of evaluation: 4/18/2019      [x] I have reviewed the Gastroenterology Note by Dr Rodrigo Lawrence dated 4/10/19 in [address] [place] [address] which meets the criteria for an Interval History and Physical note and is attached below. [x] I have examined  Francisco J Berumen  There are no changes to the patient who is scheduled for colorectal cancer screening by Dr Delaney Fernandez for rectal bleeding   The patient followed the prep until clear  No previous colonoscopy  No FH colon cancer or polyps  She denies health changes, diarrhea alternating with constipation, bloody stools today , abdominal pain,  fever, chills, productive cough, SOB, chest pain, open sores or wounds. Vital signs: BP (!) 102/55   Pulse 88   Temp 98.1 °F (36.7 °C)   Resp 16   Ht 4' 11\" (1.499 m)   Wt 110 lb 0.2 oz (49.9 kg)   LMP 03/11/2019   SpO2 100%   BMI 22.22 kg/m²     Allergies:  Patient has no known allergies. Medications:    Prior to Admission medications    Medication Sig Start Date End Date Taking? Authorizing Provider   oxyCODONE-acetaminophen (PERCOCET) 5-325 MG per tablet Take 1 tablet by mouth every 4 hours as needed for Pain. Yes Historical Provider, MD   magnesium citrate solution Take 296 mLs by mouth once for 1 dose 4/10/19 4/10/19  Delaney Fernandez MD   Melatonin 1 MG CAPS 1 tab daily 4/2/19   Gwen Chandler MD   pregabalin (LYRICA) 100 MG capsule Take 100 mg by mouth 2 times daily. Historical Provider, MD   celecoxib (CELEBREX) 200 MG capsule take 1 capsule by mouth once daily prn 4/4/17   Historical Provider, MD       This is a 46 y. o.thin female who is pleasant, cooperative, alert and oriented x3, in no acute distress. Heart: Heart sounds are normal.  HR 88 regular rate and rhythm without murmur, gallop or rub.    Lungs: Normal respiratory effort with good air exchange, unlabored and clear to auscultation without wheezes or rales bilaterally Abdomen: soft, nontender, nondistended with bowel sounds. Labs:  Recent Labs     03/20/19  0330   HGB 12.6   HCT 38.2   WBC 6.9   MCV 85.7      *   K 4.3      CO2 25   BUN 12   CREATININE 0.80   GLUCOSE 154*   INR 1.1   PROTIME 11.0   AST 17   ALT 10   LABALBU 4.2       Nicol Burtagapito FRAIRE, ANP-BC  Electronically signed 4/18/2019 at 9:22 AM           Adrienne Downey MD   Physician   Gastroenterology   Progress Notes      Signed   Encounter Date:  4/10/2019               Signed        Expand All Collapse All           Show:Clear all  [x]Manual[x]Template[]Copied    Added by:  [x]Bree Mckeon MD      []Saadia for details      INITIAL NOTE     HISTORY OF PRESENT ILLNESS: Ms. David Weinstein is a 46 y.o. female referred for evaluation of rectal bleeding. She reports 1 episode of bright red blood per rectum. No abdominal pain. No nausea or vomiting. No change in bowel habits. No weight loss. No prior GI issues. No prior colonoscopy. Past Medical, Family, and Social History reviewed and does not contribute to the patient presenting condition. Patient's PMH/PSH,SH,PSYCH Hx, MEDs, ALLERGIES, and ROS were all reviewed and updated in the appropriate sections. PAST MEDICAL HISTORY:  Past Medical History        Past Medical History:   Diagnosis Date    Anemia      Anxiety      Chronic right shoulder pain      Depression      Irregular heartbeat              Past Surgical History         Past Surgical History:   Procedure Laterality Date    BRAIN ANEURYSM SURGERY   12/2014     Coiling at [address]         x4    [address] [facility] ARTHROSCOP,SURG,W/ROTAT CUFF REPR Right 2/20/2018     RIGHT SHOULDER ARTHROSCOPY ROTATOR CUFF LYSIS OF ADHESIONS performed by Montse Sauceda DO at [address] [facility] ARTHROSCOPY Right 02/20/2018     extensive lysis of adhesions; Open suture granuloma removal     SHOULDER SURGERY Right       x 3    TONSILLECTOMY CURRENT MEDICATIONS:    Current Medication      Current Outpatient Medications:     Melatonin 1 MG CAPS, 1 tab daily, Disp: 30 capsule, Rfl: 0    pregabalin (LYRICA) 100 MG capsule, Take 100 mg by mouth 2 times daily. , Disp: , Rfl:     celecoxib (CELEBREX) 200 MG capsule, take 1 capsule by mouth once daily prn, Disp: , Rfl: 0    oxyCODONE-acetaminophen (PERCOCET) 5-325 MG per tablet, Take 1 tablet by mouth every 4 hours as needed for Pain., Disp: , Rfl:         ALLERGIES:   No Known Allergies     FAMILY HISTORY:   Family History             Problem Relation Age of Onset    Asthma Mother      Cancer Father           throat         No GI issues.      SOCIAL HISTORY:   Social History               Socioeconomic History    Marital status: Legally        Spouse name: Not on file    Number of children: Not on file    Years of education: Not on file    Highest education level: Not on file   Occupational History    Not on file   Social Needs    Financial resource strain: Not on file    Food insecurity:       Worry: Not on file       Inability: Not on file    Transportation needs:       Medical: Not on file       Non-medical: Not on file   Tobacco Use    Smoking status: Current Every Day Smoker       Packs/day: 0.25       Years: 12.00       Pack years: 3.00       Types: Cigarettes    Smokeless tobacco: Never Used    Tobacco comment: pt smokes about 4 ciggs daily   Substance and Sexual Activity    Alcohol use: No    Drug use: No    Sexual activity: Yes       Partners: Male   Lifestyle    Physical activity:       Days per week: Not on file       Minutes per session: Not on file    Stress: Not on file   Relationships    Social connections:       Talks on phone: Not on file       Gets together: Not on file       Attends Caoda[employer] service: Not on file       Active member of club or organization: Not on file       Attends meetings of clubs or organizations: Not on file       Relationship colonoscopy. Thank you for allowing me to participate in the care of Ms. Grisel Brower. For any further questions please do not hesitate to contact me. Francisco Arredondo MD [facility]     Please note that this chart was generated using voice recognition Dragon dictation software. Although every effort was made to ensure the accuracy of this automated transcription, some errors in transcription may have occurred. ·   Office Visit on 4/10/2019   ·     ·   Revision History   ·     ·   Detailed Report   ·     ·   Note shared with patient   Progress Notes Info     Author Note Status Last Update User   Francisco Arredondo MD Signed Francisco Arredondo MD   Last Update Date/Time: 4/10/2019 11:46 AM   Chart Review Routing History     Routing history could not be found for this note. This is because the note has never been routed or because communication record creation was suppressed.

## 2019-04-18 NOTE — ANESTHESIA PRE PROCEDURE
Allergies:  No Known Allergies    Problem List:    Patient Active Problem List   Diagnosis Code    Uterine leiomyoma D25.9    Dizziness R42    Right shoulder pain M25.511    Chronic bilateral low back pain with left-sided sciatica M54.42, G89.29    Chronic prescription opiate use Z79.891    Chronic prescription benzodiazepine use Z79.899    Chronic right shoulder pain M25.511, G89.29       Past Medical History:        Diagnosis Date    Anemia     Anxiety     Chronic right shoulder pain     Depression     Irregular heartbeat        Past Surgical History:        Procedure Laterality Date    BRAIN ANEURYSM SURGERY  12/2014    [facility] [address]      x[facility]    [address] [facility] ARTHROSCOP,SURG,W/ROTAT CUFF REPR Right 2/20/2018    RIGHT SHOULDER ARTHROSCOPY ROTATOR CUFF LYSIS OF ADHESIONS performed by Trupti Hendricks DO at [address] [facility] ARTHROSCOPY Right 02/20/2018    extensive lysis of adhesions; Open suture granuloma removal     SHOULDER SURGERY Right     x 3    TONSILLECTOMY         Social History:    Social History     Tobacco Use    Smoking status: Current Every Day Smoker     Packs/day: 0.25     Years: 12.00     Pack years: 3.00     Types: Cigarettes    Smokeless tobacco: Never Used    Tobacco comment: pt smokes about 4 ciggs daily   Substance Use Topics    Alcohol use:  No                                Ready to quit: Not Answered  Counseling given: Not Answered  Comment: pt smokes about 4 ciggs daily      Vital Signs (Current):   Vitals:    04/18/19 0900 04/18/19 0908   BP: (!) 102/55    Pulse: 88    Resp: 16    Temp: 98.1 °F (36.7 °C)    TempSrc:  Oral   SpO2: 100%    Weight:  110 lb 0.2 oz (49.9 kg)   Height:  4' 11\" (1.499 m)                                              BP Readings from Last 3 Encounters:   04/18/19 (!) 102/55   04/10/19 114/63   04/02/19 116/67       NPO Status:                                                                                 BMI: intravenous. Anesthetic plan and risks discussed with patient.                       Soraya Hinson MD   4/18/2019

## 2019-04-19 LAB — HCG, PREGNANCY URINE (POC): NEGATIVE

## 2019-04-23 ENCOUNTER — HOSPITAL ENCOUNTER (OUTPATIENT)
Dept: PHYSICAL THERAPY | Facility: CLINIC | Age: 49
Setting detail: THERAPIES SERIES
Discharge: HOME | End: 2019-04-23
Payer: COMMERCIAL

## 2019-04-23 PROCEDURE — G0283 ELEC STIM OTHER THAN WOUND: HCPCS

## 2019-04-23 PROCEDURE — 97110 THERAPEUTIC EXERCISES: CPT

## 2019-04-23 NOTE — FLOWSHEET NOTE
[] [facility]) - [facility] &  Therapy  [address]  [phone]  F: [phone] [x] [address]  [facility]   [address]  P: [phone]  F: [phone] [] [address] &  Therapy  [address]  P: [phone]  F: [phone] [] [address]  [facility]   [address]  [place]: [phone]  F: [phone]     Physical Therapy Daily Treatment Note    Date:  2019  Patient Name:  Jenifer Bae    :  1970  MRN: [id number]  Physician: Monica Mcfarland DO                Insurance: [facility] (3x a week for 6 weeks, 18 visits total)  Medical Diagnosis: chronic right shoulder pain              (hx right shoulder scope and debridement 11 months ago - see below)  Rehab Codes: M25.511, M25.611  Onset Date: 3-8-2019 date of script, (rotator cuff surgery in )                      Next 's appt: pt to call to schedule  Visit# / total visits:     Cancels/No Shows:  (corrected 4-15-19)    Subjective:    Pain:  [x] Yes  [] No Location: R shoulder Pain Rating: (0-10 scale) 5/10  Pain altered Tx:  [x] No  [] Yes  Action:  Comments: Pt arrives noting her pain is 5/10. States that over the weekend pain was higher. Pt reports shoulder feels tired today. Reports that when shoulder feels good she does more at home and then it hurts more the next day than it previously had. Objective:  Modalities:   Modalities: HP with IFC end treatment to R shoulder in supine x 15 min. Precautions:  Exercises: Bolded  Completed 19    Exercise Reps/ Time Weight/ Level Comments   UBE 4 min  Fwd- Schwin   Pulleys 2\"/2\"  Flex/abd         PROM to right shoulder x   Flex, abduction, supine \"abduction felt good\" -requested PROM today, Pain end range in flexion.  Distraction with PROM             Cane supine flexion 10x3  3#  Increased wt 4/15 - requests to hold today, \"arm tired\"   Cane supine abduction 10x3  3#  \"alternated with chest press after 10 reps - requests to hold today, \"arm tired\"   Cane supine chest press 10x3  3#  \"               Supine SA punches 10x 3# cane Increased wt 4/15         Sidelying       ER 10x2 1# Added weight 4/17   ABD 10x2 1# Added weight 4/17   H. ABD 10x 1# Added 4/10         Seated shoulder retraction 15x5\"  Cueing to decrease UT comp   Seated UT stretch 3x20\"               Table flexion  15x   Flexion, scaption             Tband     cueing to decrease UT comp   ext  15x blueberry  Increased resistance 4/1, Decreased resistance 4/23   rows  15x blueberry  Increased resistance 4/1, Decreased resistance 4/23   ER 15x Yellow Added 4/23             bicep curls - standing 20x 3# 2#  Added 4/5 Increased weight  4/10 Decreased resistance 4/23   Shoulder abduction 10x2   2# To 90°, wt added 4/15, keep scapula retracted   Shoulder flexion 10x2 1# Wt added 4/23               Other:     - Not today. - pt prefers not to have any more taping due to it \"peeling my hairs off\"      Specific Instructions for next treatment:      Treatment Charges: Mins Units   [x]  Modalities- HP/IFC 15/15 0/1   [x]  Ther Exercise 35 2   []  Manual Therapy     []  Ther Activities     []  Aquatics     []  Vasocompression     []  Other     Total Treatment time 50 3       Assessment: [x] Progressing toward goals. Decreased some resistance and weight to exercises per patient request due to high pain levels. Pt with facial grimacing and reports of increased pain during PROM at end range. Notes an increase in pain during treatment to 7/10. Pt reports decreased pain end of IFC and HP to 4/10. [] Other:    Right shoulder   4-15-19 sitting AROM  Strength  Flexion  143   -3   Abduction 112   -3  ER  64   +3    Problems:    [x] ? Pain: 9/10 in right shoulder at A-C joint and also laterally in deltoid  [x] ?  Marci Kumari shoulder  [x] ? Strength: right shoulder  [x] ? Function:limited with all activities involving right arm, disturbed sleep  [x] Other:UEFI 84% overall limitations, limited with all activities involving use of right arm     STG: (to be met in 9 treatments)  1. ? Pain: to 4/10 maximum in right shoulder, met 4-15-19  2. ? ROM:AROM right shoulder 140 flexion and 90 abduction to improve reaching range. met 4-15-19  3. ? Function: Ability to get shirts on and off without increased pain. met 4-15-19, but \"doing a certain way, right arm in sleeve first\"  4. Independent with Home Exercise Programsmet 4-15-19  5.    LTG: (to be met in 18 treatments)  1. Improve by 2 levels on UEFI for grooming, lifting groceries to waist level, dressing, cleaning, opening a jar, and sleeping  2. 3/10 maximum pain in right shoulder  3. Not to awaken > 1x per night due to right shoulder pain.                    Patient goals: reduce pain and increase use      Pt. Education:  [] Yes  [] No  [x] Reviewed Prior HEP/Ed  Method of Education: [x] Verbal  [] Demo  [] Written  3/21 - KT tape wear and removal  Comprehension of Education:  [] Verbalizes understanding. [] Demonstrates understanding. [] Needs review. [x] Demonstrates/verbalizes HEP/Ed previously given. Plan: [x] Continue per plan of care.    [] Other:        Time In: 1:10 pm         Time Out: 2:10 pm     Electronically signed by:  Nina Syed PTA

## 2019-04-25 ENCOUNTER — OFFICE VISIT (OUTPATIENT)
Dept: ORTHOPEDIC SURGERY | Age: 49
End: 2019-04-25
Payer: MEDICAID

## 2019-04-25 VITALS
RESPIRATION RATE: 20 BRPM | DIASTOLIC BLOOD PRESSURE: 73 MMHG | BODY MASS INDEX: 23.03 KG/M2 | HEART RATE: 66 BPM | WEIGHT: 114 LBS | SYSTOLIC BLOOD PRESSURE: 115 MMHG

## 2019-04-25 DIAGNOSIS — S43.91XD SPRAIN OF RIGHT SHOULDER GIRDLE, SUBSEQUENT ENCOUNTER: Primary | ICD-10-CM

## 2019-04-25 DIAGNOSIS — M24.011 LOOSE BODY OF RIGHT SHOULDER: ICD-10-CM

## 2019-04-25 DIAGNOSIS — M75.41 IMPINGEMENT SYNDROME OF RIGHT SHOULDER: ICD-10-CM

## 2019-04-25 DIAGNOSIS — F32.9 CURRENT EPISODE OF MAJOR DEPRESSIVE DISORDER WITHOUT PRIOR EPISODE, UNSPECIFIED DEPRESSION EPISODE SEVERITY: ICD-10-CM

## 2019-04-25 DIAGNOSIS — M19.011 ARTHRITIS OF RIGHT SHOULDER REGION: ICD-10-CM

## 2019-04-25 DIAGNOSIS — S43.421D SPRAIN OF RIGHT ROTATOR CUFF CAPSULE, SUBSEQUENT ENCOUNTER: ICD-10-CM

## 2019-04-25 PROCEDURE — G8420 CALC BMI NORM PARAMETERS: HCPCS | Performed by: ORTHOPAEDIC SURGERY

## 2019-04-25 PROCEDURE — G8427 DOCREV CUR MEDS BY ELIG CLIN: HCPCS | Performed by: ORTHOPAEDIC SURGERY

## 2019-04-25 PROCEDURE — 3017F COLORECTAL CA SCREEN DOC REV: CPT | Performed by: ORTHOPAEDIC SURGERY

## 2019-04-25 PROCEDURE — 4004F PT TOBACCO SCREEN RCVD TLK: CPT | Performed by: ORTHOPAEDIC SURGERY

## 2019-04-25 PROCEDURE — 99213 OFFICE O/P EST LOW 20 MIN: CPT | Performed by: ORTHOPAEDIC SURGERY

## 2019-04-25 ASSESSMENT — ENCOUNTER SYMPTOMS
SHORTNESS OF BREATH: 0
NAUSEA: 0
VOMITING: 0
DIARRHEA: 0
RESPIRATORY NEGATIVE: 1
COLOR CHANGE: 0
CHEST TIGHTNESS: 0
ABDOMINAL PAIN: 0
CONSTIPATION: 0
COUGH: 0
ABDOMINAL DISTENTION: 0
APNEA: 0

## 2019-04-25 NOTE — PROGRESS NOTES
Trevor Singh AND SPORTS MEDICINE  [facility] [address]  [facility] [address]  Dept: [phone]  Dept Fax: [phone]                                Right Shoulder - Follow up    Chief Complaint:     Chief Complaint   Patient presents with    Shoulder Pain     patient states shoulder a little achey and pain level is a 7 out of 10     [address] [facility] claim #: [id number]  DOI: 07/02/2008  Dx: C37. 91xa        M19.011        S43.421A        F32.9        M75.41        M24.011        S43.92xa    HPI:     Manny Benson is a 46y.o. year old right hand dominant female that has had pain in the right shoulder since a re-injury at work lifting car seat frames about January 18, 2019. As far as any trauma to the shoulder, the patient indicates lifting the car frame when arm gave out and \"popped\". The pain is worse at night and when doing overhead activities. Weakness of the shoulder has been noted. The pain restricts activities such as working, driving, reaching or lifting. The pain does not seem to improve with time. The following medications have been tried aleve, advil and percocet from pain management. The patient has had a corticosteroid injection on 3/15/2019. The patient has tried physical therapy. The patient has had surgery. The opposite shoulder is not okay. Neck pain has not been present. She is currently off work. She is feeling better with the last cortisone injection and physical therapy. She is having more achy and sharp pain, but it is better than how it was. Patient states that she was doing well until the shoulder got re-aggravated. She has received no compensation from workers compensation since her re-injury. Review of Systems   Constitutional: Positive for activity change. Negative for appetite change, fatigue and fever. Respiratory: Negative. Negative for apnea, cough, chest tightness and shortness of breath. Cardiovascular: Negative.   Negative for allergies. Social History:   Social History     Socioeconomic History    Marital status: Legally      Spouse name: None    Number of children: None    Years of education: None    Highest education level: None   Occupational History    None   Social Needs    Financial resource strain: None    Food insecurity:     Worry: None     Inability: None    Transportation needs:     Medical: None     Non-medical: None   Tobacco Use    Smoking status: Current Every Day Smoker     Packs/day: 0.50     Years: 12.00     Pack years: 6.00     Types: Cigarettes    Smokeless tobacco: Never Used    Tobacco comment: pt smokes about 4 ciggs daily   Substance and Sexual Activity    Alcohol use: No    Drug use: No    Sexual activity: Yes     Partners: Male   Lifestyle    Physical activity:     Days per week: None     Minutes per session: None    Stress: None   Relationships    Social connections:     Talks on phone: None     Gets together: None     Attends Worship service: None     Active member of club or organization: None     Attends meetings of clubs or organizations: None     Relationship status: None    Intimate partner violence:     Fear of current or ex partner: None     Emotionally abused: None     Physically abused: None     Forced sexual activity: None   Other Topics Concern    None   Social History Narrative    None       Family History:  Family History   Problem Relation Age of Onset    Asthma Mother     Cancer Father         throat       I have reviewed the CC, HPI, ROS, PMH, FHX, Social History, and if not present in this note, I have reviewed in the patient's chart. I agree with the documentation provided by other staff and have reviewed their documentation prior to providing my signature indicating agreement. Vitals:   /73   Pulse 66   Resp 20   Wt 114 lb (51.7 kg)   BMI 23.03 kg/m²  Body mass index is 23.03 kg/m².   Physical Examination:     Orthopedics:    GENERAL: Alert and oriented X3 in no acute distress. SKIN: Intact without lesions or ulcerations. NEURO: Musculoskeletal and axillary nerves intact to sensory and motor testing. VASC: Capillary refill is less than 3 seconds. Right Shoulder Exam    GEN:  Alert and oriented X 3, in no acute distress. SKIN:  Intact without rashes, lesions, or ulcerations. Incisions are well healed. NEURO:  Musculoskeletal ans axillary nerves intact to sensory and motor testing. VASC:  Cap refill less than than 3 secs. Negative Adson's test, Negative Christina's test.  ROM: 110a/160p degrees of forward elevation, 30 degrees of external rotation in neutral, 90 degrees of external rotation in abduction, internal rotation to back pocket. STRENGTH: Supraspinatus 4/5, external rotators 4/5. MUSC:  No atrophy, negative subscap lift off or belly press test.  IMP:  (+) Neer's sign, (+) Hawkin's sign, no painful arc, no pain with cross body abduction. PALP: no pain over anterolateral acromion, no pain over AC joint, no pain over traps/rhomboids. INST:  no sulcus in ext. rot, no apprehension, no relocation, (-) Arlington's test, no load and shift, no crank test.    Assessment:     1. Sprain of right shoulder girdle, subsequent encounter    2. Arthritis of right shoulder region    3. Sprain of right rotator cuff capsule, subsequent encounter    4. Current episode of major depressive disorder without prior episode, unspecified depression episode severity    5. Impingement syndrome of right shoulder    6. Loose body of right shoulder        Procedures:    Procedure no    Radiology:   Reviewed from 2/7/2019    Plan:   Treatment : I reviewed the X-ray with the patient. We discussed the etiologies and natural histories of arthritis of right shoulder region. We discussed the various treatment alternatives including anti-inflammatory medications, physical therapy, injections, further imaging studies and as a last result surgery.  The patient has opted for submitting a C-9 for a Functional capacity exam.  On 2/7/2019, I placed the patient on sedentary work restrictions. The patient had already been let go of her job due to missing two days of work, so she did not have a job to return to. A C-84, and a [employer] 14 will be filled out to reimburse the patient for lost wages from 2/7/2019-until her next appt. A C-9 will also be filled out for a functional capacity exam and at that point she will be made MMI. We could consider doing an MRI and considering another surgery. But the patient does not want another surgery since she has already had 4 surgeries. She may also qualify for working wage loss if she returns to her job that pays less. She is feeling better than she was when she came to see us in February, but still feels she cannot do her job with any lifting and I do not feel that would be a good idea. I believe she can do a sedentary job. Patient should return to the clinic after her FCE to follow up with Roz Lopez D.O. . The patient will call the office immediately with any problems. No orders of the defined types were placed in this encounter. No orders of the defined types were placed in this encounter. I,  Leah Noel PA-C, ATC, am scribing for and in the presence of Roz BURNS. 4/28/2019  4:26 PM    I, Roz Lopez DO, have personally seen this patient, reviewed the chart including history, and imaging if done. I personally  performed the physical exam and obtained any needed additional history. I placed orders, performed or supervised procedures and developed the treatment plan.     Electronically signed by Concepcion Perez DO, on 4/28/2019 at 4:26 PM

## 2019-04-26 ENCOUNTER — HOSPITAL ENCOUNTER (OUTPATIENT)
Dept: PHYSICAL THERAPY | Facility: CLINIC | Age: 49
Setting detail: THERAPIES SERIES
Discharge: HOME | End: 2019-04-26
Payer: COMMERCIAL

## 2019-04-26 PROCEDURE — 97110 THERAPEUTIC EXERCISES: CPT

## 2019-04-26 PROCEDURE — G0283 ELEC STIM OTHER THAN WOUND: HCPCS

## 2019-04-26 NOTE — FLOWSHEET NOTE
[] [facility]) [facility] &  Therapy  [address]  [phone]  F: [phone] [x] [address]  [facility]   [address]  P: [phone]  F: [phone] [] [address] &  Therapy  [address]  P: [phone]  F: [phone] [] [address]  [facility]   [address]  [place]: [phone]  F: [phone]     Physical Therapy Daily Treatment Note    Date:  2019  Patient Name:  Sabiha Duffy    :  1970  MRN: [id number]  Physician: Galo Richardson DO                Insurance: [facility] (3x a week for 6 weeks, 18 visits total)  Medical Diagnosis: chronic right shoulder pain              (hx right shoulder scope and debridement 11 months ago - see below)  Rehab Codes: M25.511, M25.611  Onset Date: 3-8-2019 date of script, (rotator cuff surgery in )                      Next 's appt: pt to call to schedule  Visit# / total visits:     Cancels/No Shows:  (corrected 4-15-19)    Subjective:    Pain:  [x] Yes  [] No Location: R shoulder Pain Rating: (0-10 scale) 7/10  Pain altered Tx:  [] No  [x] Yes  Action:see comments in exercises below  Comments: When patient is asked how her shoulder is feeling today she responds with \"we been fighting with things, we been up arguing, been going through it. \". Pt reports that laundry, dishes, wiping down things bothers her. Reports left shoulder is bothering her as well, rating it a 6/10. Pt unable to describe type of pain she is having. Objective:  Modalities:   Modalities: HP with IFC end treatment to R shoulder in supine x 15 min.    Precautions:  Exercises: Bolded  Completed 19    Exercise Reps/ Time Weight/ Level Comments   UBE 4 min  Fwd- Schwin   Pulleys 2\"/2\"  Flex/abd         PROM to right shoulder x   Flex, abduction, supine \"abduction felt good\" -requested PROM today, Pain end range in flexion. Distraction with PROM - per request completed again today 4/26,              Cane supine flexion 10x3  3#  Increased wt 4/15 - requests to hold today, \"arm tired\" resumed today 4/26   Cane supine abduction 10x3  3#  \"alternated with chest press after 10 reps - requests to hold today, \"arm tired\"   Cane supine chest press 10x3  3#  \"               Supine SA punches 10x 3# cane Increased wt 4/15         Sidelying       ER 10x2 1# Added weight 4/17   ABD 10x2 1# Added weight 4/17   H. ABD 10x 1# Added 4/10         Seated shoulder retraction 15x5\"  Cueing to decrease UT comp   Seated UT stretch 3x20\"               Table flexion  15x   Flexion, scaption             Tband     cueing to decrease UT comp   ext  15x blueberry/Red  Increased resistance 4/1, Decreased resistance 4/23 Decreased resistance per patient 4/26   rows  15x blueberry/Red  Increased resistance 4/1, Decreased resistance 4/23 Decreased resistance per patient 4/26   ER 15x Yellow Added 4/23             bicep curls - standing 20x 3# 2# 1#  Added 4/5 Increased weight  4/10 Decreased resistance 4/23 Decreased resistance per patient 4/26   Shoulder abduction 10x2   2# 1# To 90°, wt added 4/15, keep scapula retracted Decreased resistance per patient 4/26   Shoulder flexion 10x2 1# Wt added 4/23               Other:     - Not today. - pt prefers not to have any more taping due to it \"peeling my hairs off\"      Specific Instructions for next treatment:      Treatment Charges: Mins Units   [x]  Modalities- HP/IFC 15/15 0/1   [x]  Ther Exercise 35 2   []  Manual Therapy     []  Ther Activities     []  Aquatics     []  Vasocompression     []  Other     Total Treatment time 50 3       Assessment: [] Progressing toward goals. [x] Other: Difficulty progressing patient in therapy due to patient requesting to hold and decrease weight and reps.  Continues to have facial grimacing through out treatment. Difficulty verbalizing type of pain or discomfort she is having. Pt with empty end feel during PROM. Right shoulder   4-15-19 sitting AROM  Strength  Flexion  143   -3   Abduction 112   -3  ER  64   +3    Problems:    [x] ? Pain: 9/10 in right shoulder at A-C joint and also laterally in deltoid  [x] ? ROM:right shoulder  [x] ? Strength: right shoulder  [x] ? Function:limited with all activities involving right arm, disturbed sleep  [x] Other:UEFI 84% overall limitations, limited with all activities involving use of right arm     STG: (to be met in 9 treatments)  1. ? Pain: to 4/10 maximum in right shoulder, met 4-15-19  2. ? ROM:AROM right shoulder 140 flexion and 90 abduction to improve reaching range. met 4-15-19  3. ? Function: Ability to get shirts on and off without increased pain. met 4-15-19, but \"doing a certain way, right arm in sleeve first\"  4. Independent with Home Exercise Programsmet 4-15-19  5.    LTG: (to be met in 18 treatments)  1. Improve by 2 levels on UEFI for grooming, lifting groceries to waist level, dressing, cleaning, opening a jar, and sleeping  2. 3/10 maximum pain in right shoulder  3. Not to awaken > 1x per night due to right shoulder pain.                    Patient goals: reduce pain and increase use      Pt. Education:  [] Yes  [] No  [x] Reviewed Prior HEP/Ed  Method of Education: [x] Verbal  [] Demo  [] Written  3/21 - KT tape wear and removal  Comprehension of Education:  [] Verbalizes understanding. [] Demonstrates understanding. [] Needs review. [x] Demonstrates/verbalizes HEP/Ed previously given. Plan: [x] Continue per plan of care.    [] Other:        Time In: 1:17 pm         Time Out: 2:20 pm     Electronically signed by:  Uli Gonzalez PTA

## 2019-04-29 ENCOUNTER — HOSPITAL ENCOUNTER (OUTPATIENT)
Dept: PHYSICAL THERAPY | Facility: CLINIC | Age: 49
Setting detail: THERAPIES SERIES
Discharge: HOME | End: 2019-04-29
Payer: COMMERCIAL

## 2019-04-29 PROCEDURE — G0283 ELEC STIM OTHER THAN WOUND: HCPCS

## 2019-04-29 PROCEDURE — 97110 THERAPEUTIC EXERCISES: CPT

## 2019-04-29 NOTE — FLOWSHEET NOTE
[] [facility]) - [facility] &  Therapy  [address]  [phone]  F: [phone] [x] [address]  [place] [address]   [address]  P: [phone]  F: [phone] [] [address] &  Therapy  [address]  P: [phone]  F: [phone] [] [address]  [facility]   [address]  [place]: [phone]  F: [phone]     Physical Therapy Daily Treatment Note    Date:  2019  Patient Name:  Elli Mas    :  1970  MRN: [id number]  Physician: Jadon Cordova DO                Insurance: [facility] (3x a week for 6 weeks, 18 visits total)  Medical Diagnosis: chronic right shoulder pain              (hx right shoulder scope and debridement 11 months ago - see below)  Rehab Codes: M25.511, M25.611  Onset Date: 3-8-2019 date of script, (rotator cuff surgery in )                      Next 's appt: pt to call to schedule after FCE, awaiting FCE approval  Visit# / total visits:     Cancels/No Shows:  (corrected 4-15-19)    Subjective:    Pain:  [x] Yes  [] No Location: R shoulder Pain Rating: (0-10 scale) 7/10  Pain altered Tx:  [] No  [x] Yes  Action:see comments in exercises below, HELD T band today but otherwise resumed prior exercises. Comments: Patient reports shoulder has been \"up and down\" states it keeps me up. She also stated she was surprised she didn't hurt more due to the rainy weather. States her psychologist talked to her about not overdoing it at home on days she is sore, I agreed she needs to pace home activities and alternate them so as not to increase the pain. Objective:  Modalities:   Modalities: HP with IFC end treatment to R shoulder in supine x 15 min.    Precautions:  Exercises: Bolded  Completed 19    Exercise Reps/ Time Weight/ Level Comments   UBE 4 min sitting AROM  Strength  Flexion  143   -3   Abduction 112   -3  ER  64   +3    Problems:    [x] ? Pain: 9/10 in right shoulder at A-C joint and also laterally in deltoid  [x] ? ROM:right shoulder  [x] ? Strength: right shoulder  [x] ? Function:limited with all activities involving right arm, disturbed sleep  [x] Other:UEFI 84% overall limitations, limited with all activities involving use of right arm     STG: (to be met in 9 treatments)  1. ? Pain: to 4/10 maximum in right shoulder, met 4-15-19  2. ? ROM:AROM right shoulder 140 flexion and 90 abduction to improve reaching range. met 4-15-19  3. ? Function: Ability to get shirts on and off without increased pain. met 4-15-19, but \"doing a certain way, right arm in sleeve first\"  4. Independent with Home Exercise Programsmet 4-15-19  5.    LTG: (to be met in 18 treatments)  1. Improve by 2 levels on UEFI for grooming, lifting groceries to waist level, dressing, cleaning, opening a jar, and sleeping  2. 3/10 maximum pain in right shoulder  3. Not to awaken > 1x per night due to right shoulder pain.                    Patient goals: reduce pain and increase use      Pt. Education:  [] Yes  [] No  [x] Reviewed Prior HEP/Ed  Method of Education: [x] Verbal  [] Demo  [] Written  3/21 - KT tape wear and removal  Comprehension of Education:  [] Verbalizes understanding. [] Demonstrates understanding. [] Needs review. [x] Demonstrates/verbalizes HEP/Ed previously given. Plan: [x] Continue per plan of care.    [] Other:        Time In: 2: 40 pm        Time Out: 3: 30 pm     Electronically signed by:  Kathy Santiago, PT

## 2019-05-02 ENCOUNTER — HOSPITAL ENCOUNTER (OUTPATIENT)
Dept: PHYSICAL THERAPY | Facility: CLINIC | Age: 49
Setting detail: THERAPIES SERIES
Discharge: HOME | End: 2019-05-02
Payer: COMMERCIAL

## 2019-05-06 ENCOUNTER — HOSPITAL ENCOUNTER (OUTPATIENT)
Dept: PHYSICAL THERAPY | Facility: CLINIC | Age: 49
Setting detail: THERAPIES SERIES
Discharge: HOME | End: 2019-05-06
Payer: COMMERCIAL

## 2019-05-06 PROCEDURE — G0283 ELEC STIM OTHER THAN WOUND: HCPCS

## 2019-05-06 PROCEDURE — 97110 THERAPEUTIC EXERCISES: CPT

## 2019-05-06 NOTE — FLOWSHEET NOTE
[] [facility]) [facility] &  Therapy  [address]  [phone]  F: [phone] [x] [address]  [facility]   [address]  P: [phone]  F: [phone] [] [address]  Therapy  [address]  P: [phone]  F: [phone] [] [address]  [facility]   [address]  [place]: [phone]  F: [phone]     Physical Therapy Daily Treatment Note    Date:  2019  Patient Name:  Elli Mas    :  1970  MRN: [id number]  Physician: Jadon Cordova DO                Insurance: [facility] (3x a week for 6 weeks, 18 visits total)  Medical Diagnosis: chronic right shoulder pain              (hx right shoulder scope and debridement 11 months ago - see below)  Rehab Codes: M25.511, M25.611  Onset Date: 3-8-2019 date of script, (rotator cuff surgery in )                      Next 's appt: pt to call to schedule after FCE, awaiting FCE approval  Visit# / total visits:     Cancels/No Shows:  (corrected 4-15-19)    Subjective:    Pain:  [x] Yes  [] No Location: R shoulder Pain Rating: (0-10 scale) 3/10  Pain altered Tx:  [x] No  [] Yes  Action:    Comments: Pt reports she is doing okay today. Reports her shoulder was really bad yesterday. States she wanted to go to the ER yesterday. L shoulder hurting her bad as well. Objective:  Modalities:   Modalities: HP with IFC end treatment to R shoulder in supine x 15 min.    Precautions:  Exercises: Bolded  Completed 19    Exercise Reps/ Time Weight/ Level Comments   UBE 2\"/2\"     Pulleys 2\"/2\"  Flex/abd         PROM to right shoulder x                Cane supine flexion 10x3  3#  Increased wt 4/15 -    Cane supine abduction 10x3  3#  \"alternated with chest press after 10 reps -    Cane supine chest press 10x3  3#  \"               Supine SA punches 10x 3# cane Increased wt 4/15         Sidelying       ER 10x2 1# Added weight 4/17   ABD 10x2 1# Added weight 4/17   H. ABD 10x 1# Added 4/10         Seated shoulder retraction 15x5\"  Cueing to decrease UT comp   Seated UT stretch 3x20\"               Table flexion  15x   Flexion, scaption             Tband     cueing to decrease UT comp   ext  15x blueberry/Red  Increased resistance 4/1, Decreased resistance 4/23 Decreased resistance per patient 4/26   rows  15x blueberry/Red  Increased resistance 4/1, Decreased resistance 4/23 Decreased resistance per patient 4/26   ER 15x Yellow Added 4/23             bicep curls - standing 20x 3# 2# 1#  Added 4/5 Increased weight  4/10 Decreased resistance 4/23 Decreased resistance per patient 4/26   Shoulder abduction 10x2   2# 1# To 90°, wt added 4/15, keep scapula retracted Decreased resistance per patient 4/26   Shoulder flexion 10x2 1# Wt added 4/23               Other:     - Not today. - pt prefers not to have any more taping due to it \"peeling my hairs off\"      Specific Instructions for next treatment:      Treatment Charges: Mins Units   [x]  Modalities- HP/IFC 15/15 0/1   [x]  Ther Exercise 30 2   []  Manual Therapy     []  Ther Activities     []  Aquatics     []  Vasocompression     []  Other     Total Treatment time 45 3       Assessment: [x] Progressing toward goals. Able to reinitiate  Previously held tband exercises this date. Able to complete well with proper form and execution. Pt did however note that shelter through table exercises pain had increased. Reports of decreased pain upon completion of IFC. [] Other:     Right shoulder   4-15-19 sitting AROM  Strength  Flexion  143   -3   Abduction 112   -3  ER  64   +3    Problems:    [x] ? Pain: 9/10 in right shoulder at A-C joint and also laterally in deltoid  [x] ? ROM:right shoulder  [x] ? Strength: right shoulder  [x] ?  Function:limited with all activities involving right arm, disturbed

## 2019-05-09 ENCOUNTER — HOSPITAL ENCOUNTER (OUTPATIENT)
Dept: PHYSICAL THERAPY | Facility: CLINIC | Age: 49
Setting detail: THERAPIES SERIES
Discharge: HOME | End: 2019-05-09
Payer: COMMERCIAL

## 2019-05-13 ENCOUNTER — HOSPITAL ENCOUNTER (OUTPATIENT)
Dept: PHYSICAL THERAPY | Facility: CLINIC | Age: 49
Setting detail: THERAPIES SERIES
Discharge: HOME | End: 2019-05-13
Payer: COMMERCIAL

## 2019-05-13 PROCEDURE — 97110 THERAPEUTIC EXERCISES: CPT

## 2019-05-13 PROCEDURE — G0283 ELEC STIM OTHER THAN WOUND: HCPCS

## 2019-05-13 NOTE — FLOWSHEET NOTE
[] [facility]) [facility] &  Therapy  [address]  [phone]  F: [phone] [x] [address]  [facility]   [address]  P: [phone]  F: [phone] [] [address]  Therapy  [address]  P: [phone]  F: [phone] [] [address]  [facility]   [address]  [place]: [phone]  F: [phone]     Physical Therapy Daily Treatment Note    Date:  2019  Patient Name:  Perlita Early    :  1970  MRN: [id number]  Physician: Roz Lopez DO                Insurance: [facility] (3x a week for 6 weeks, 18 visits total)  Medical Diagnosis: chronic right shoulder pain              (hx right shoulder scope and debridement 11 months ago - see below)  Rehab Codes: M25.511, M25.611  Onset Date: 3-8-2019 date of script, (rotator cuff surgery in )                      Next 's appt: pt to call to schedule after FCE, awaiting FCE approval  Visit# / total visits: 15/18    Cancels/No Shows:  (corrected 4-15-19)    Subjective:    Pain:  [x] Yes  [] No Location: R shoulder Pain Rating: (0-10 scale) 3/10  Pain altered Tx:  [x] No  [] Yes  Action:    Comments: Pt reports last few days have been \"up and down\"    Objective:  Modalities:   Modalities: end of treatment, HP with IFC end treatment to R shoulder in supine x 15 min.    Precautions:  Exercises: Bolded  Completed 19    Exercise Reps/ Time Weight/ Level Comments   UBE 2\"/2\"     Pulleys 2\"/2\"  Flex/abd         PROM to right shoulder x                Cane supine flexion 10x3  3#  Increased wt 4/15 -    Cane supine abduction 10x3  3#  \"alternated with chest press after 10 reps -    Cane supine chest press 10x3  3#  \"               Supine SA punches 10x 3# cane Increased wt 4/15         Sidelying       ER 10x2 1# Added weight  ABD 10x2 1# Added weight 4/17   H. ABD 10x 1# Added 4/10         Seated shoulder retraction 15x5\"  Cueing to decrease UT comp   Seated UT stretch 3x20\"               Table flexion  15x   Flexion, scaption             Tband     cueing to decrease UT comp   ext  15x blueberry   5/13 resumed blueberry band   rows  15x blueberry  5/13 resumed blueberry band   ER 15x red Added 4/23             bicep curls - standing 10x 3#  5/13 resumed 3# handwts   Shoulder abduction 10x2   2#  To 90°, wt added 4/15, keep scapula retracted     Shoulder flexion 10x2 1# Wt added 4/23               Other:     - Not today. - pt prefers not to have any more taping due to it \"peeling my hairs off\"      Specific Instructions for next treatment:      Treatment Charges: Mins Units   [x]  Modalities- HP/IFC 15/15 0/1   [x]  Ther Exercise 35 2   []  Manual Therapy     []  Ther Activities     []  Aquatics     []  Vasocompression     []  Other     Total Treatment time 50 3       Assessment: [x] Progressing toward goals. [] Other:     Right shoulder   4-15-19 sitting AROM  Strength  Flexion  143   -3   Abduction 112   -3  ER  64   +3    5- supine right shoulder  Flexion 156   Abduction 135  ER  WFL        Problems:    [x] ? Pain: 9/10 in right shoulder at A-C joint and also laterally in deltoid  [x] ? ROM:right shoulder  [x] ? Strength: right shoulder  [x] ? Function:limited with all activities involving right arm, disturbed sleep  [x] Other:UEFI 84% overall limitations, limited with all activities involving use of right arm     STG: (to be met in 9 treatments)  1. ? Pain: to 4/10 maximum in right shoulder, met 4-15-19  2. ? ROM:AROM right shoulder 140 flexion and 90 abduction to improve reaching range. met 4-15-19  3. ? Function: Ability to get shirts on and off without increased pain. met 4-15-19, but \"doing a certain way, right arm in sleeve first\"  4.  Independent with Home Exercise Programsmet 4-15-19  5.    LTG: (to be met in 18 treatments)  1. Improve by 2 levels on UEFI for grooming, lifting groceries to waist level, dressing, cleaning, opening a jar, and sleeping  2. 3/10 maximum pain in right shoulder  3. Not to awaken > 1x per night due to right shoulder pain.                    Patient goals: reduce pain and increase use      Pt. Education:  [] Yes  [] No  [x] Reviewed Prior HEP/Ed  Method of Education: [x] Verbal  [] Demo  [] Written  3/21 - KT tape wear and removal  Comprehension of Education:  [] Verbalizes understanding. [] Demonstrates understanding. [] Needs review. [x] Demonstrates/verbalizes HEP/Ed previously given. Plan: [x] Continue per plan of care.    [] Other:      Time In: 2:00 pm      Time Out: 3:05 pm     Electronically signed by:  Thang Samano PT

## 2019-05-16 ENCOUNTER — HOSPITAL ENCOUNTER (OUTPATIENT)
Dept: PHYSICAL THERAPY | Facility: CLINIC | Age: 49
Setting detail: THERAPIES SERIES
Discharge: HOME | End: 2019-05-16
Payer: COMMERCIAL

## 2019-05-16 PROCEDURE — G0283 ELEC STIM OTHER THAN WOUND: HCPCS

## 2019-05-16 PROCEDURE — 97110 THERAPEUTIC EXERCISES: CPT

## 2019-05-16 NOTE — FLOWSHEET NOTE
Seated shoulder retraction 15x5\"  Cueing to decrease UT comp   Seated UT stretch 3x20\"               Table flexion  15x   Flexion, scaption             Tband     cueing to decrease UT comp   ext  15x blueberry   5/13 resumed blueberry band   rows  15x blueberry  5/13 resumed blueberry band   ER 15x blueberry Added 4/23             bicep curls - standing 10x 3#  5/13 resumed 3# handwts   Shoulder abduction 10x2   3#  To 90°, wt added 4/15, keep scapula retracted     Shoulder flexion 10x2 2-3# Wt added 4/23               Other:     - Not today. - pt prefers not to have any more taping due to it \"peeling my hairs off\"    Specific Instructions for next treatment: Discharge PT    Treatment Charges: Mins Units   [x]  Modalities- HP/IFC 15/15 0/1   [x]  Ther Exercise 30 2   []  Manual Therapy     []  Ther Activities     []  Aquatics     []  Vasocompression     []  Other     Total Treatment time 45 3       Assessment: [x] Progressing toward goals. Has awoken her the last few nights \"but not as bad as it used to be. \"     [] Other:     Right shoulder   4-15-19 sitting AROM  Strength  Flexion  143   -3   Abduction 112   -3  ER  64   +3    5- supine right shoulder  Flexion 156   Abduction 135  ER  WFL    5-16-19 supine AROM right shoulder Strength  Flexion  156    5/5 in available range  abduuction 133    5/5 in available range      Problems:    [x] ? Pain: 9/10 in right shoulder at A-C joint and also laterally in deltoid  [x] ? ROM:right shoulder  [x] ? Strength: right shoulder  [x] ? Function:limited with all activities involving right arm, disturbed sleep  [x] Other:UEFI 84% overall limitations, limited with all activities involving use of right arm     STG: (to be met in 9 treatments)  1. ? Pain: to 4/10 maximum in right shoulder, met 4-15-19  2. ? ROM:AROM right shoulder 140 flexion and 90 abduction to improve reaching range. met 4-15-19  3. ? Function: Ability to get shirts on and off without increased pain. met 4-15-19, but \"doing a certain way, right arm in sleeve first\"  4. Independent with Home Exercise Programsmet 4-15-19  5.    LTG: (to be met in 18 treatments)  1. Improve by 2 levels on UEFI for grooming, lifting groceries to waist level, dressing, cleaning, opening a jar, and sleeping  2. 3/10 maximum pain in right shoulder  3. Not to awaken > 1x per night due to right shoulder pain.                    Patient goals: reduce pain and increase use      Pt. Education:  [] Yes  [] No  [x] Reviewed Prior HEP/Ed  Method of Education: [x] Verbal  [] Demo  [] Written  3/21 - KT tape wear and removal  Comprehension of Education:  [] Verbalizes understanding. [] Demonstrates understanding. [] Needs review. [x] Demonstrates/verbalizes HEP/Ed previously given. Plan: [x] Continue per plan of care.    [] Other:      Time In: 2:30 pm      Time Out: 3: 20     Electronically signed by:  Thang Samano, PT

## 2019-05-17 ENCOUNTER — HOSPITAL ENCOUNTER (OUTPATIENT)
Dept: PHYSICAL THERAPY | Facility: CLINIC | Age: 49
Setting detail: THERAPIES SERIES
Discharge: HOME | End: 2019-05-17
Payer: COMMERCIAL

## 2019-05-17 PROCEDURE — 97110 THERAPEUTIC EXERCISES: CPT

## 2019-05-17 PROCEDURE — G0283 ELEC STIM OTHER THAN WOUND: HCPCS

## 2019-05-17 NOTE — FLOWSHEET NOTE
[] [facility]) - [facility] &  Therapy  [address]  [phone]  F: [phone] [x] [address]  [facility]"" 36   [address]  P: [phone]  F: [phone] [] [address] &  Therapy  [address]  P: [phone]  F: [phone] [] [address]  Psychiatric   [address]  [place]: [phone]  F: [phone]     Physical Therapy Daily Treatment Note    Date:  2019  Patient Name:  Luis E Costa    :  1970  MRN: [id number]  Physician: Candace Oconnor,                 Insurance: [facility] (3x a week for 6 weeks, 18 visits total)  Medical Diagnosis: chronic right shoulder pain              (hx right shoulder scope and debridement 11 months ago - see below)  Rehab Codes: M25.511, M25.611  Onset Date: 3-8-2019 date of script, (rotator cuff surgery in )                      Next 's appt: pt to call to schedule after FCE, awaiting FCE approval  Visit# / total visits:     Cancels/No Shows:  (corrected 4-15-19)    Subjective:    Pain:  [x] Yes  [] No Location: R shoulder Pain Rating: (0-10 scale) 5/10  Pain altered Tx:  [x] No  [] Yes  Action:    Comments: Pt reports she is not feeling so good overall today. States that her shoulder is bothering her; wonders if she slept wrong or if it is the weather. Objective:  Modalities:   Modalities: end of treatment, HP with IFC end treatment to R shoulder in supine x 15 min.    Precautions:  Exercises: Bolded  Completed 19    Exercise Reps/ Time Weight/ Level Comments   UBE 2\"/2\"                     Cane supine flexion 10x3  3#  Increased wt /15 -    Cane supine abduction 10x3  3#  \"alternated with chest press after 10 reps -    Cane supine chest press 10x3  3#  \"               Supine SA punches 10x 3# cane Increased wt 4/15 Sidelying       ER 10x2 3# Added weight 4/17   ABD 10x2 3# Added weight 4/17   H. ABD 10x 3# Added 4/10         Seated shoulder retraction 15x5\"  Cueing to decrease UT comp   Seated UT stretch 3x20\"               Table flexion  15x   Flexion, scaption             Tband     cueing to decrease UT comp   ext  15x blueberry   5/13 resumed blueberry band   rows  15x blueberry  5/13 resumed blueberry band   ER 15x blueberry Added 4/23             bicep curls - standing 10x 2#  5/13 resumed 3# handwts   Shoulder abduction 10x2   3#  To 90°, wt added 4/15, keep scapula retracted     Shoulder flexion 10x2 2-3# Wt added 4/23               Other:     - Not today. - pt prefers not to have any more taping due to it \"peeling my hairs off\"    Specific Instructions for next treatment: Discharge PT    Treatment Charges: Mins Units   [x]  Modalities- HP/IFC 15/15 0/1   [x]  Ther Exercise 30 2   []  Manual Therapy     []  Ther Activities     []  Aquatics     []  Vasocompression     []  Other     Total Treatment time 45 3       Assessment: [x] Progressing toward goals. Pt with good recall and minimal cueing through out treatment for proper execution in preparation for discharge today from formal therapy. Educated patient on importance of continuing to complete HEP to maintain the progress thus far.       [] Other:     Right shoulder   4-15-19 sitting AROM  Strength  Flexion  143   -3   Abduction 112   -3  ER  64   +3    5- supine right shoulder  Flexion 156   Abduction 135  ER  WFL    5-16-19 supine AROM right shoulder Strength  Flexion  156    5/5 in available range  abduuction 133    5/5 in available range      Problems:    [x] ? Pain: 9/10 in right shoulder at A-C joint and also laterally in deltoid  [x] ? ROM:right shoulder  [x] ? Strength: right shoulder  [x] ?  Function:limited with all activities involving right arm, disturbed sleep  [x] Other:UEFI 84% overall limitations, limited with all activities involving use of right arm     STG: (to be met in 9 treatments)  1. ? Pain: to 4/10 maximum in right shoulder, met 4-15-19  2. ? ROM:AROM right shoulder 140 flexion and 90 abduction to improve reaching range. met 4-15-19  3. ? Function: Ability to get shirts on and off without increased pain. met 4-15-19, but \"doing a certain way, right arm in sleeve first\"  4. Independent with Home Exercise Programsmet 4-15-19  5.    LTG: (to be met in 18 treatments)  1. Improve by 2 levels on UEFI for grooming, lifting groceries to waist level, dressing, cleaning, opening a jar, and sleeping  1. 5- grooming improved one level   Lifting groceries improved one level  Dressing improved two levels  Cleaning improved one level  Opening a jar improved two levels  Sleeping improved two levels  2. 3/10 maximum pain in right shoulder, unmet 5/10 reported 5/17/2019  3. Not to awaken > 1x per night due to right shoulder pain. Met 5-                    Patient goals: reduce pain and increase use      Pt. Education:  [] Yes  [] No  [x] Reviewed Prior HEP/Ed  Method of Education: [x] Verbal  [] Demo  [] Written  3/21 - KT tape wear and removal  Comprehension of Education:  [] Verbalizes understanding. [] Demonstrates understanding. [] Needs review. [x] Demonstrates/verbalizes HEP/Ed previously given. Plan: [x] Continue per plan of care.    [] Other:      Time In: 1:34 pm      Time Out: 2:40      Electronically signed by:  London Hyatt PTA

## 2019-05-20 ENCOUNTER — APPOINTMENT (OUTPATIENT)
Dept: PHYSICAL THERAPY | Facility: CLINIC | Age: 49
End: 2019-05-20
Payer: COMMERCIAL

## 2019-05-21 NOTE — DISCHARGE SUMMARY
[] [facility]  Outpatient Rehabilitation &  Therapy  [address]  [phone]  F: [phone] [x] [address] [facility] 36   [address]  P: [phone]  F: [phone] [] [place]  [address] [facility]  P: [phone]  F: [phone] [] [address]  [facility]   [address]   [place]: [phone]  F: [phone]     Physical Therapy Discharge Note    Date: 2019      Patient: Manny Benson  : 1970  MRN: [id number]    [address], [address]                [id number]: [facility] (3x a week for 6 weeks, 18 visits total)  Medical Diagnosis: chronic right shoulder pain              (hx right shoulder scope and debridement 11 months ago - see below)  Rehab Codes: M25.511, M25.611  Onset Date: 3-8-2019 date of script, (rotator cuff surgery in )                      Next 's appt: pt to call to schedule after FCE, awaiting FCE approval  Visit# / total visits:                     Cancels/No Shows:  (corrected 4-15-19)  Date of initial visit: 3-           Date of final visit: 2019    Subjective:  Pain:  [x] Yes  [] No          Location: R shoulder   Pain Rating: (0-10 scale) 5/10  Pain altered Tx:  [x] No  [] Yes  Action:     Comments: Pt reports she is not feeling so good overall today. States that her shoulder is bothering her; wonders if she slept wrong or if it is the weather.       Objective:  2019 supine right shoulder  Flexion 156        Abduction        135  ER                   WFL     19 supine AROM right shoulder         Strength  Flexion             156                                          5/5 in available range  abduuction      133                                          5/5 in available range      Assessment:  STG: (to be met

## 2019-05-23 ENCOUNTER — APPOINTMENT (OUTPATIENT)
Dept: PHYSICAL THERAPY | Facility: CLINIC | Age: 49
End: 2019-05-23
Payer: COMMERCIAL

## 2019-07-31 ENCOUNTER — OFFICE VISIT (OUTPATIENT)
Dept: ORTHOPEDIC SURGERY | Age: 49
End: 2019-07-31
Payer: MEDICAID

## 2019-07-31 VITALS
DIASTOLIC BLOOD PRESSURE: 81 MMHG | WEIGHT: 109 LBS | BODY MASS INDEX: 21.97 KG/M2 | HEART RATE: 64 BPM | SYSTOLIC BLOOD PRESSURE: 123 MMHG | HEIGHT: 59 IN

## 2019-07-31 DIAGNOSIS — M75.41 IMPINGEMENT SYNDROME OF RIGHT SHOULDER: ICD-10-CM

## 2019-07-31 DIAGNOSIS — M19.011 ARTHRITIS OF RIGHT SHOULDER REGION: Primary | ICD-10-CM

## 2019-07-31 PROCEDURE — 99999 PR OFFICE/OUTPT VISIT,PROCEDURE ONLY: CPT | Performed by: PHYSICIAN ASSISTANT

## 2019-07-31 PROCEDURE — 20610 DRAIN/INJ JOINT/BURSA W/O US: CPT | Performed by: PHYSICIAN ASSISTANT

## 2019-07-31 RX ORDER — METHYLPREDNISOLONE ACETATE 40 MG/ML
40 INJECTION, SUSPENSION INTRA-ARTICULAR; INTRALESIONAL; INTRAMUSCULAR; SOFT TISSUE ONCE
Status: COMPLETED | OUTPATIENT
Start: 2019-07-31 | End: 2019-07-31

## 2019-07-31 RX ORDER — LIDOCAINE HYDROCHLORIDE 10 MG/ML
4 INJECTION, SOLUTION INFILTRATION; PERINEURAL ONCE
Status: COMPLETED | OUTPATIENT
Start: 2019-07-31 | End: 2019-07-31

## 2019-07-31 RX ADMIN — LIDOCAINE HYDROCHLORIDE 4 ML: 10 INJECTION, SOLUTION INFILTRATION; PERINEURAL at 11:24

## 2019-07-31 RX ADMIN — METHYLPREDNISOLONE ACETATE 40 MG: 40 INJECTION, SUSPENSION INTRA-ARTICULAR; INTRALESIONAL; INTRAMUSCULAR; SOFT TISSUE at 11:25

## 2019-07-31 ASSESSMENT — ENCOUNTER SYMPTOMS
NAUSEA: 0
APNEA: 0
ABDOMINAL DISTENTION: 0
VOMITING: 0
COLOR CHANGE: 0
CHEST TIGHTNESS: 0
COUGH: 0
DIARRHEA: 0
SHORTNESS OF BREATH: 0
CONSTIPATION: 0
ABDOMINAL PAIN: 0

## 2019-08-16 ENCOUNTER — APPOINTMENT (OUTPATIENT)
Dept: GENERAL RADIOLOGY | Age: 49
End: 2019-08-16
Payer: OTHER MISCELLANEOUS

## 2019-08-16 ENCOUNTER — HOSPITAL ENCOUNTER (EMERGENCY)
Age: 49
Discharge: HOME | End: 2019-08-16
Attending: EMERGENCY MEDICINE
Payer: OTHER MISCELLANEOUS

## 2019-08-16 VITALS
RESPIRATION RATE: 16 BRPM | DIASTOLIC BLOOD PRESSURE: 72 MMHG | BODY MASS INDEX: 21.57 KG/M2 | WEIGHT: 107 LBS | OXYGEN SATURATION: 100 % | SYSTOLIC BLOOD PRESSURE: 124 MMHG | HEART RATE: 65 BPM | TEMPERATURE: 98.3 F | HEIGHT: 59 IN

## 2019-08-16 DIAGNOSIS — S16.1XXA STRAIN OF NECK MUSCLE, INITIAL ENCOUNTER: ICD-10-CM
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
## 2019-08-16 DIAGNOSIS — S39.012A STRAIN OF LUMBAR REGION, INITIAL ENCOUNTER: ICD-10-CM

## 2019-08-16 PROCEDURE — 96372 THER/PROPH/DIAG INJ SC/IM: CPT

## 2019-08-16 PROCEDURE — 99283 EMERGENCY DEPT VISIT LOW MDM: CPT

## 2019-08-16 PROCEDURE — 72040 X-RAY EXAM NECK SPINE 2-3 VW: CPT

## 2019-08-16 PROCEDURE — 6360000002 HC RX W HCPCS: Performed by: NURSE PRACTITIONER

## 2019-08-16 PROCEDURE — 72100 X-RAY EXAM L-S SPINE 2/3 VWS: CPT

## 2019-08-16 RX ORDER — DEXAMETHASONE SODIUM PHOSPHATE 10 MG/ML
10 INJECTION INTRAMUSCULAR; INTRAVENOUS ONCE
Status: COMPLETED | OUTPATIENT
Start: 2019-08-16 | End: 2019-08-16

## 2019-08-16 RX ORDER — METHOCARBAMOL 750 MG/1
750 TABLET, FILM COATED ORAL 3 TIMES DAILY
Qty: 30 TABLET | Refills: 0 | Status: SHIPPED | OUTPATIENT
Start: 2019-08-16 | End: 2019-12-19

## 2019-08-16 RX ORDER — PREDNISONE 10 MG/1
TABLET ORAL
Qty: 20 TABLET | Refills: 0 | Status: SHIPPED | OUTPATIENT
Start: 2019-08-16 | End: 2019-09-30

## 2019-08-16 RX ADMIN — DEXAMETHASONE SODIUM PHOSPHATE 10 MG: 10 INJECTION INTRAMUSCULAR; INTRAVENOUS at 13:53

## 2019-08-16 ASSESSMENT — ENCOUNTER SYMPTOMS
ABDOMINAL PAIN: 0
EYE PAIN: 0
BACK PAIN: 1
COLOR CHANGE: 0
SHORTNESS OF BREATH: 0
PHOTOPHOBIA: 0
NAUSEA: 0
VOMITING: 0

## 2019-08-16 ASSESSMENT — PAIN DESCRIPTION - LOCATION: LOCATION: BACK;NECK

## 2019-08-16 ASSESSMENT — PAIN DESCRIPTION - PAIN TYPE: TYPE: ACUTE PAIN

## 2019-08-16 ASSESSMENT — PAIN SCALES - GENERAL: PAINLEVEL_OUTOF10: 10
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
## 2019-08-19 ENCOUNTER — TELEPHONE (OUTPATIENT)
Dept: FAMILY MEDICINE CLINIC | Age: 49
End: 2019-08-19

## 2019-08-23 ENCOUNTER — OFFICE VISIT (OUTPATIENT)
Dept: FAMILY MEDICINE CLINIC | Age: 49
End: 2019-08-23
Payer: MEDICAID

## 2019-08-23 VITALS
DIASTOLIC BLOOD PRESSURE: 87 MMHG | WEIGHT: 108 LBS | HEART RATE: 77 BPM | BODY MASS INDEX: 21.77 KG/M2 | SYSTOLIC BLOOD PRESSURE: 137 MMHG | HEIGHT: 59 IN

## 2019-08-23 DIAGNOSIS — M54.2 ACUTE NECK PAIN: Primary | ICD-10-CM
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
## 2019-08-23 PROCEDURE — 99213 OFFICE O/P EST LOW 20 MIN: CPT | Performed by: STUDENT IN AN ORGANIZED HEALTH CARE EDUCATION/TRAINING PROGRAM

## 2019-08-23 PROCEDURE — 99211 OFF/OP EST MAY X REQ PHY/QHP: CPT | Performed by: FAMILY MEDICINE

## 2019-08-23 RX ORDER — KETOROLAC TROMETHAMINE 10 MG/1
10 TABLET, FILM COATED ORAL EVERY 6 HOURS PRN
Qty: 30 TABLET | Refills: 0 | Status: SHIPPED | OUTPATIENT
Start: 2019-08-23 | End: 2019-10-11

## 2019-08-23 RX ORDER — MELOXICAM 15 MG/1
15 TABLET ORAL DAILY PRN
Qty: 30 TABLET | Refills: 0 | Status: CANCELLED | OUTPATIENT
Start: 2019-08-23

## 2019-08-23 ASSESSMENT — ENCOUNTER SYMPTOMS
NAUSEA: 0
VOMITING: 0
EYES NEGATIVE: 1
DIARRHEA: 0
COUGH: 0
WHEEZING: 0
GASTROINTESTINAL NEGATIVE: 1
SHORTNESS OF BREATH: 0
COLOR CHANGE: 0
CONSTIPATION: 0

## 2019-09-12 ENCOUNTER — OFFICE VISIT (OUTPATIENT)
Dept: ORTHOPEDIC SURGERY | Age: 49
End: 2019-09-12
Payer: MEDICAID

## 2019-09-12 VITALS
SYSTOLIC BLOOD PRESSURE: 118 MMHG | DIASTOLIC BLOOD PRESSURE: 77 MMHG | BODY MASS INDEX: 21.78 KG/M2 | HEIGHT: 59 IN | WEIGHT: 108.03 LBS | HEART RATE: 65 BPM

## 2019-09-12 DIAGNOSIS — M19.011 ARTHRITIS OF RIGHT SHOULDER REGION: Primary | ICD-10-CM

## 2019-09-12 PROCEDURE — 99213 OFFICE O/P EST LOW 20 MIN: CPT | Performed by: PHYSICIAN ASSISTANT

## 2019-09-12 ASSESSMENT — ENCOUNTER SYMPTOMS
COLOR CHANGE: 0
ABDOMINAL PAIN: 0
NAUSEA: 0
SHORTNESS OF BREATH: 0
ABDOMINAL DISTENTION: 0
DIARRHEA: 0
COUGH: 0
CONSTIPATION: 0
CHEST TIGHTNESS: 0
APNEA: 0
VOMITING: 0

## 2019-09-12 NOTE — PROGRESS NOTES
[address] AND SP[facility]TS MEDICINE  [address] [place] [id number]  Dept: [phone]  Dept Fax: [phone]        Right Shoulder - Follow Up - [facility]    Chief Complaint:     Chief Complaint   Patient presents with    Shoulder Pain     [facility], RT shoulder, painful,m Percet for pain     [facility] claim #: [id number]  DOI: 07/02/2008  Dx: GERTRUDIS Diaz        H12.873        P83.855F  [id number]        M75.41        U92.911  [id number]. [id number]    HPI:     Too Jackson is a 44y.o. year old right hand dominant female that has had pain in the right shoulder for 8 months since 01/18/2019. As far as any trauma to the shoulder, the patient indicates she was at work and she aggravated the shoulder when she was trying to lift and adjust a car seat. The patient states that car seat was heavier than she expected and it re-aggravated her pre-existing condition of osteoarthritis in her shoulder and her shoulder has been in pain since. Patient states that the pain is worse at night and when doing overhead activities. Weakness of the shoulder has been noted. The pain restricts activities such as lifting things, reaching above her head and sleeping comfortably. The pain does not seem to improve with time. The following medications have been tried: Percocet, Celebrex and Lyrica with mild relief. The patient has had a corticosteroid injection into the subacromial bursa with good relief on 02/07/2019 and then into the glenohumeral joint on 07/31/2019 with great pain relief. The patient has tried physical therapy with mild pain relief. She also does home exercises as well. The patient has had surgery and the surgery was a right shoulder arthroscopy with debridement of glenoid, humeral head, rotator interval scarring, labral fraying, intraarticular release of anterior interval slide, subacromial extensive debridement, lysis of adhesions, and removal of suture granuloma.  The date of insecurity:     Worry: None     Inability: None    Transportation needs:     Medical: None     Non-medical: None   Tobacco Use    Smoking status: Current Every Day Smoker     Packs/day: 0.50     Years: 12.00     Pack years: 6.00     Types: Cigarettes    Smokeless tobacco: Never Used    Tobacco comment: pt smokes about 4 ciggs daily   Substance and Sexual Activity    Alcohol use: No    Drug use: No    Sexual activity: None   Lifestyle    Physical activity:     Days per week: None     Minutes per session: None    Stress: None   Relationships    Social connections:     Talks on phone: None     Gets together: None     Attends [employer] service: None     Active member of club or organization: None     Attends meetings of clubs or organizations: None     Relationship status: None    Intimate partner violence:     Fear of current or ex partner: None     Emotionally abused: None     Physically abused: None     Forced sexual activity: None   Other Topics Concern    None   Social History Narrative    None     Family History:  Family History   Problem Relation Age of Onset    Asthma Mother     Cancer Father         throat     I have reviewed the CC, HPI, ROS, PMH, FHX, Social History, and if not present in this note, I have reviewed in the patient's chart. I agree with the documentation provided by other staff and have reviewed their documentation prior to providing my signature indicating agreement. Vitals:   /77   Pulse 65   Ht 4' 11.02\" (1.499 m)   Wt 108 lb 0.4 oz (49 kg)   LMP 07/20/2019   BMI 21.81 kg/m²  Body mass index is 21.81 kg/m². Physical Examination:     Orthopedics:    GENERAL: Alert and oriented X3 in no acute distress. SKIN: Intact without lesions or ulcerations. NEURO: Musculoskeletal and axillary nerves intact to sensory and motor testing. VASC: Capillary refill is less than 3 seconds. Right Shoulder Exam    GEN: Alert and oriented X 3, in no acute distress.   SKIN:

## 2019-09-30 ENCOUNTER — APPOINTMENT (OUTPATIENT)
Dept: CT IMAGING | Age: 49
End: 2019-09-30
Payer: MEDICAID

## 2019-09-30 ENCOUNTER — HOSPITAL ENCOUNTER (EMERGENCY)
Age: 49
Discharge: HOME | End: 2019-09-30
Attending: EMERGENCY MEDICINE
Payer: MEDICAID

## 2019-09-30 VITALS
HEART RATE: 68 BPM | BODY MASS INDEX: 22.58 KG/M2 | OXYGEN SATURATION: 100 % | RESPIRATION RATE: 16 BRPM | WEIGHT: 112 LBS | DIASTOLIC BLOOD PRESSURE: 69 MMHG | HEIGHT: 59 IN | TEMPERATURE: 98.5 F | SYSTOLIC BLOOD PRESSURE: 123 MMHG
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
## 2019-09-30 DIAGNOSIS — R42 LIGHTHEADEDNESS: ICD-10-CM

## 2019-09-30 DIAGNOSIS — S80.862A INSECT BITE OF LEFT LOWER LEG, INITIAL ENCOUNTER: Primary | ICD-10-CM

## 2019-09-30 LAB
ABSOLUTE EOS #: 0.08 K/UL (ref 0–0.44)
ABSOLUTE IMMATURE GRANULOCYTE: 0.02 K/UL (ref 0–0.3)
ABSOLUTE LYMPH #: 2.07 K/UL (ref 1.1–3.7)
ABSOLUTE MONO #: 0.34 K/UL (ref 0.1–1.2)
ANION GAP SERPL CALCULATED.3IONS-SCNC: 8 MMOL/L (ref 9–17)
BASOPHILS # BLD: 1 % (ref 0–2)
BASOPHILS ABSOLUTE: 0.04 K/UL (ref 0–0.2)
BUN BLDV-MCNC: 10 MG/DL (ref 6–20)
BUN/CREAT BLD: 12 (ref 9–20)
CALCIUM SERPL-MCNC: 9 MG/DL (ref 8.6–10.4)
CHLORIDE BLD-SCNC: 103 MMOL/L (ref 98–107)
CO2: 28 MMOL/L (ref 20–31)
CREAT SERPL-MCNC: 0.82 MG/DL (ref 0.5–0.9)
DIFFERENTIAL TYPE: ABNORMAL
EOSINOPHILS RELATIVE PERCENT: 1 % (ref 1–4)
GFR AFRICAN AMERICAN: >60 ML/MIN
GFR NON-AFRICAN AMERICAN: >60 ML/MIN
GFR SERPL CREATININE-BSD FRML MDRD: ABNORMAL ML/MIN/{1.73_M2}
GFR SERPL CREATININE-BSD FRML MDRD: ABNORMAL ML/MIN/{1.73_M2}
GLUCOSE BLD-MCNC: 87 MG/DL (ref 70–99)
HCT VFR BLD CALC: 37.2 % (ref 36.3–47.1)
HEMOGLOBIN: 11.8 G/DL (ref 11.9–15.1)
IMMATURE GRANULOCYTES: 0 %
LYMPHOCYTES # BLD: 35 % (ref 24–43)
MCH RBC QN AUTO: 28 PG (ref 25.2–33.5)
MCHC RBC AUTO-ENTMCNC: 31.7 G/DL (ref 28.4–34.8)
MCV RBC AUTO: 88.4 FL (ref 82.6–102.9)
MONOCYTES # BLD: 6 % (ref 3–12)
NRBC AUTOMATED: 0 PER 100 WBC
PDW BLD-RTO: 13.5 % (ref 11.8–14.4)
PLATELET # BLD: 217 K/UL (ref 138–453)
PLATELET ESTIMATE: ABNORMAL
PMV BLD AUTO: 9.9 FL (ref 8.1–13.5)
POTASSIUM SERPL-SCNC: 4 MMOL/L (ref 3.7–5.3)
RBC # BLD: 4.21 M/UL (ref 3.95–5.11)
RBC # BLD: ABNORMAL 10*6/UL
SEG NEUTROPHILS: 57 % (ref 36–65)
SEGMENTED NEUTROPHILS ABSOLUTE COUNT: 3.37 K/UL (ref 1.5–8.1)
SODIUM BLD-SCNC: 139 MMOL/L (ref 135–144)
WBC # BLD: 5.9 K/UL (ref 3.5–11.3)
WBC # BLD: ABNORMAL 10*3/UL

## 2019-09-30 PROCEDURE — 6370000000 HC RX 637 (ALT 250 FOR IP): Performed by: NURSE PRACTITIONER

## 2019-09-30 PROCEDURE — 85025 COMPLETE CBC W/AUTO DIFF WBC: CPT

## 2019-09-30 PROCEDURE — 99283 EMERGENCY DEPT VISIT LOW MDM: CPT

## 2019-09-30 PROCEDURE — 80048 BASIC METABOLIC PNL TOTAL CA: CPT

## 2019-09-30 PROCEDURE — 70450 CT HEAD/BRAIN W/O DYE: CPT

## 2019-09-30 PROCEDURE — 36415 COLL VENOUS BLD VENIPUNCTURE: CPT

## 2019-09-30 RX ORDER — CEPHALEXIN 500 MG/1
500 CAPSULE ORAL 2 TIMES DAILY
Qty: 14 CAPSULE | Refills: 0 | Status: SHIPPED | OUTPATIENT
Start: 2019-09-30 | End: 2019-12-09 | Stop reason: ALTCHOICE

## 2019-09-30 RX ORDER — CEPHALEXIN 500 MG/1
500 CAPSULE ORAL ONCE
Status: COMPLETED | OUTPATIENT
Start: 2019-09-30 | End: 2019-09-30

## 2019-09-30 RX ORDER — PREDNISONE 20 MG/1
20 TABLET ORAL 2 TIMES DAILY
Qty: 10 TABLET | Refills: 0 | Status: SHIPPED | OUTPATIENT
Start: 2019-09-30 | End: 2019-10-05

## 2019-09-30 RX ORDER — PREDNISONE 20 MG/1
60 TABLET ORAL ONCE
Status: COMPLETED | OUTPATIENT
Start: 2019-09-30 | End: 2019-09-30

## 2019-09-30 RX ADMIN — CEPHALEXIN 500 MG: 500 CAPSULE ORAL at 22:14

## 2019-09-30 RX ADMIN — PREDNISONE 60 MG: 20 TABLET ORAL at 22:14

## 2019-09-30 ASSESSMENT — ENCOUNTER SYMPTOMS
RHINORRHEA: 0
ABDOMINAL PAIN: 0
NAUSEA: 0
COUGH: 0
COLOR CHANGE: 0
VOMITING: 0
CONSTIPATION: 0
SORE THROAT: 0
SHORTNESS OF BREATH: 0
WHEEZING: 0
SINUS PRESSURE: 0
DIARRHEA: 0

## 2019-10-09 ENCOUNTER — OFFICE VISIT (OUTPATIENT)
Dept: FAMILY MEDICINE CLINIC | Age: 49
End: 2019-10-09
Payer: MEDICAID

## 2019-10-09 VITALS
BODY MASS INDEX: 22.34 KG/M2 | DIASTOLIC BLOOD PRESSURE: 86 MMHG | HEIGHT: 59 IN | WEIGHT: 110.8 LBS | TEMPERATURE: 96.3 F | SYSTOLIC BLOOD PRESSURE: 130 MMHG | HEART RATE: 71 BPM

## 2019-10-09 DIAGNOSIS — Z00.00 HEALTH CARE MAINTENANCE: ICD-10-CM
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
## 2019-10-09 LAB — HBA1C MFR BLD: 6.4 %

## 2019-10-09 PROCEDURE — 3017F COLORECTAL CA SCREEN DOC REV: CPT | Performed by: FAMILY MEDICINE

## 2019-10-09 PROCEDURE — G8420 CALC BMI NORM PARAMETERS: HCPCS | Performed by: FAMILY MEDICINE

## 2019-10-09 PROCEDURE — 83036 HEMOGLOBIN GLYCOSYLATED A1C: CPT | Performed by: STUDENT IN AN ORGANIZED HEALTH CARE EDUCATION/TRAINING PROGRAM

## 2019-10-09 PROCEDURE — G8484 FLU IMMUNIZE NO ADMIN: HCPCS | Performed by: FAMILY MEDICINE

## 2019-10-09 PROCEDURE — 99213 OFFICE O/P EST LOW 20 MIN: CPT | Performed by: STUDENT IN AN ORGANIZED HEALTH CARE EDUCATION/TRAINING PROGRAM

## 2019-10-09 PROCEDURE — G8427 DOCREV CUR MEDS BY ELIG CLIN: HCPCS | Performed by: FAMILY MEDICINE

## 2019-10-09 PROCEDURE — 4004F PT TOBACCO SCREEN RCVD TLK: CPT | Performed by: FAMILY MEDICINE

## 2019-10-09 ASSESSMENT — ENCOUNTER SYMPTOMS
CHEST TIGHTNESS: 0
SHORTNESS OF BREATH: 0

## 2019-10-11 ENCOUNTER — HOSPITAL ENCOUNTER (EMERGENCY)
Age: 49
Discharge: HOME | End: 2019-10-11
Attending: EMERGENCY MEDICINE
Payer: MEDICAID

## 2019-10-11 VITALS
TEMPERATURE: 97.8 F | RESPIRATION RATE: 18 BRPM | BODY MASS INDEX: 22.6 KG/M2 | SYSTOLIC BLOOD PRESSURE: 118 MMHG | HEIGHT: 59 IN | HEART RATE: 80 BPM | WEIGHT: 112.13 LBS | DIASTOLIC BLOOD PRESSURE: 77 MMHG | OXYGEN SATURATION: 98 %

## 2019-10-11 DIAGNOSIS — L30.9 DERMATITIS: Primary | ICD-10-CM

## 2019-10-11 PROCEDURE — 6370000000 HC RX 637 (ALT 250 FOR IP): Performed by: EMERGENCY MEDICINE

## 2019-10-11 PROCEDURE — 99282 EMERGENCY DEPT VISIT SF MDM: CPT

## 2019-10-11 RX ORDER — PREDNISONE 20 MG/1
60 TABLET ORAL ONCE
Status: COMPLETED | OUTPATIENT
Start: 2019-10-11 | End: 2019-10-11

## 2019-10-11 RX ORDER — DIPHENHYDRAMINE HCL 25 MG
25 CAPSULE ORAL EVERY 6 HOURS PRN
Qty: 20 CAPSULE | Refills: 0 | Status: SHIPPED | OUTPATIENT
Start: 2019-10-11 | End: 2019-10-16

## 2019-10-11 RX ORDER — IBUPROFEN 800 MG/1
800 TABLET ORAL ONCE
Status: COMPLETED | OUTPATIENT
Start: 2019-10-11 | End: 2019-10-11

## 2019-10-11 RX ORDER — NEOMYCIN SULFATE, POLYMYXIN B SULFATE AND GRAMICIDIN 1.75; 10000; .025 MG/ML; [USP'U]/ML; MG/ML
1 SOLUTION/ DROPS OPHTHALMIC ONCE
Status: DISCONTINUED | OUTPATIENT
Start: 2019-10-11 | End: 2019-10-12 | Stop reason: HOSPADM

## 2019-10-11 RX ORDER — IBUPROFEN 800 MG/1
800 TABLET ORAL EVERY 6 HOURS PRN
Qty: 25 TABLET | Refills: 1 | Status: SHIPPED | OUTPATIENT
Start: 2019-10-11 | End: 2019-12-19

## 2019-10-11 RX ORDER — PREDNISONE 20 MG/1
60 TABLET ORAL DAILY
Qty: 21 TABLET | Refills: 0 | Status: SHIPPED | OUTPATIENT
Start: 2019-10-11 | End: 2019-10-18

## 2019-10-11 RX ADMIN — PREDNISONE 60 MG: 20 TABLET ORAL at 23:17

## 2019-10-11 RX ADMIN — IBUPROFEN 800 MG: 800 TABLET, FILM COATED ORAL at 23:17

## 2019-10-11 ASSESSMENT — ENCOUNTER SYMPTOMS
BACK PAIN: 0
EYE PAIN: 0
FACIAL SWELLING: 0
SHORTNESS OF BREATH: 0
ABDOMINAL PAIN: 0
ABDOMINAL DISTENTION: 0
CHEST TIGHTNESS: 0
EYE DISCHARGE: 0

## 2019-10-11 ASSESSMENT — PAIN SCALES - GENERAL: PAINLEVEL_OUTOF10: 5

## 2019-10-31 ENCOUNTER — OFFICE VISIT (OUTPATIENT)
Dept: ORTHOPEDIC SURGERY | Age: 49
End: 2019-10-31
Payer: COMMERCIAL

## 2019-10-31 VITALS
HEIGHT: 59 IN | SYSTOLIC BLOOD PRESSURE: 123 MMHG | HEART RATE: 64 BPM | WEIGHT: 108 LBS | DIASTOLIC BLOOD PRESSURE: 74 MMHG | BODY MASS INDEX: 21.77 KG/M2

## 2019-10-31 DIAGNOSIS — M75.41 IMPINGEMENT SYNDROME OF RIGHT SHOULDER: ICD-10-CM

## 2019-10-31 DIAGNOSIS — S43.91XD SPRAIN OF RIGHT SHOULDER GIRDLE, SUBSEQUENT ENCOUNTER: ICD-10-CM

## 2019-10-31 DIAGNOSIS — M25.511 CHRONIC RIGHT SHOULDER PAIN: Primary | ICD-10-CM

## 2019-10-31 DIAGNOSIS — G89.29 CHRONIC RIGHT SHOULDER PAIN: Primary | ICD-10-CM

## 2019-10-31 DIAGNOSIS — S43.421D SPRAIN OF RIGHT ROTATOR CUFF CAPSULE, SUBSEQUENT ENCOUNTER: ICD-10-CM

## 2019-10-31 PROCEDURE — 99213 OFFICE O/P EST LOW 20 MIN: CPT | Performed by: ORTHOPAEDIC SURGERY

## 2019-11-04 ASSESSMENT — ENCOUNTER SYMPTOMS
VOMITING: 0
ABDOMINAL PAIN: 0
WHEEZING: 0
NAUSEA: 0
DIARRHEA: 0
EYE DISCHARGE: 0
CHEST TIGHTNESS: 0
CHOKING: 0

## 2019-12-02 ENCOUNTER — OFFICE VISIT (OUTPATIENT)
Dept: ORTHOPEDIC SURGERY | Age: 49
End: 2019-12-02
Payer: COMMERCIAL

## 2019-12-02 DIAGNOSIS — M25.511 CHRONIC RIGHT SHOULDER PAIN: Chronic | ICD-10-CM

## 2019-12-02 DIAGNOSIS — G89.29 CHRONIC RIGHT SHOULDER PAIN: Chronic | ICD-10-CM

## 2019-12-02 PROCEDURE — 20611 DRAIN/INJ JOINT/BURSA W/US: CPT | Performed by: ORTHOPAEDIC SURGERY

## 2019-12-02 PROCEDURE — 99213 OFFICE O/P EST LOW 20 MIN: CPT | Performed by: ORTHOPAEDIC SURGERY

## 2019-12-02 RX ADMIN — METHYLPREDNISOLONE ACETATE 40 MG: 40 INJECTION, SUSPENSION INTRA-ARTICULAR; INTRALESIONAL; INTRAMUSCULAR; SOFT TISSUE at 15:40

## 2019-12-02 RX ADMIN — LIDOCAINE HYDROCHLORIDE 4 ML: 10 INJECTION, SOLUTION INFILTRATION; PERINEURAL at 15:39

## 2019-12-02 ASSESSMENT — ENCOUNTER SYMPTOMS
DIARRHEA: 0
COLOR CHANGE: 0
CHEST TIGHTNESS: 0
APNEA: 0
NAUSEA: 0
ABDOMINAL DISTENTION: 0
COUGH: 0
CONSTIPATION: 0
VOMITING: 0
ABDOMINAL PAIN: 0
SHORTNESS OF BREATH: 0

## 2019-12-04 ENCOUNTER — APPOINTMENT (OUTPATIENT)
Dept: GENERAL RADIOLOGY | Age: 49
End: 2019-12-04
Payer: MEDICAID

## 2019-12-04 ENCOUNTER — HOSPITAL ENCOUNTER (EMERGENCY)
Age: 49
Discharge: HOME | End: 2019-12-04
Attending: EMERGENCY MEDICINE
Payer: MEDICAID

## 2019-12-04 VITALS
DIASTOLIC BLOOD PRESSURE: 77 MMHG | BODY MASS INDEX: 22.18 KG/M2 | RESPIRATION RATE: 20 BRPM | TEMPERATURE: 98.7 F | HEART RATE: 71 BPM | SYSTOLIC BLOOD PRESSURE: 147 MMHG | WEIGHT: 110 LBS | HEIGHT: 59 IN | OXYGEN SATURATION: 100 %

## 2019-12-04 DIAGNOSIS — R03.0 ELEVATED BLOOD PRESSURE READING: ICD-10-CM

## 2019-12-04 DIAGNOSIS — J40 BRONCHITIS: Primary | ICD-10-CM

## 2019-12-04 PROCEDURE — 94664 DEMO&/EVAL PT USE INHALER: CPT

## 2019-12-04 PROCEDURE — 6360000002 HC RX W HCPCS: Performed by: PHYSICIAN ASSISTANT

## 2019-12-04 PROCEDURE — 99283 EMERGENCY DEPT VISIT LOW MDM: CPT

## 2019-12-04 PROCEDURE — 71046 X-RAY EXAM CHEST 2 VIEWS: CPT

## 2019-12-04 PROCEDURE — 94640 AIRWAY INHALATION TREATMENT: CPT

## 2019-12-04 PROCEDURE — 96372 THER/PROPH/DIAG INJ SC/IM: CPT

## 2019-12-04 PROCEDURE — 94150 VITAL CAPACITY TEST: CPT

## 2019-12-04 RX ORDER — IPRATROPIUM BROMIDE AND ALBUTEROL SULFATE 2.5; .5 MG/3ML; MG/3ML
1 SOLUTION RESPIRATORY (INHALATION)
Status: DISCONTINUED | OUTPATIENT
Start: 2019-12-04 | End: 2019-12-04 | Stop reason: HOSPADM

## 2019-12-04 RX ORDER — METHYLPREDNISOLONE SODIUM SUCCINATE 125 MG/2ML
125 INJECTION, POWDER, LYOPHILIZED, FOR SOLUTION INTRAMUSCULAR; INTRAVENOUS ONCE
Status: COMPLETED | OUTPATIENT
Start: 2019-12-04 | End: 2019-12-04

## 2019-12-04 RX ORDER — ALBUTEROL SULFATE 90 UG/1
2 AEROSOL, METERED RESPIRATORY (INHALATION)
Status: DISCONTINUED | OUTPATIENT
Start: 2019-12-04 | End: 2019-12-04 | Stop reason: HOSPADM

## 2019-12-04 RX ORDER — BENZONATATE 100 MG/1
100 CAPSULE ORAL 3 TIMES DAILY PRN
Qty: 30 CAPSULE | Refills: 0 | Status: SHIPPED | OUTPATIENT
Start: 2019-12-04 | End: 2019-12-11

## 2019-12-04 RX ORDER — METHYLPREDNISOLONE ACETATE 40 MG/ML
40 INJECTION, SUSPENSION INTRA-ARTICULAR; INTRALESIONAL; INTRAMUSCULAR; SOFT TISSUE ONCE
Status: COMPLETED | OUTPATIENT
Start: 2019-12-04 | End: 2019-12-02

## 2019-12-04 RX ORDER — LIDOCAINE HYDROCHLORIDE 10 MG/ML
4 INJECTION, SOLUTION INFILTRATION; PERINEURAL ONCE
Status: COMPLETED | OUTPATIENT
Start: 2019-12-04 | End: 2019-12-02

## 2019-12-04 RX ORDER — PREDNISONE 20 MG/1
20 TABLET ORAL 2 TIMES DAILY
Qty: 10 TABLET | Refills: 0 | Status: SHIPPED | OUTPATIENT
Start: 2019-12-04 | End: 2019-12-09

## 2019-12-04 RX ORDER — ALBUTEROL SULFATE 90 UG/1
2 AEROSOL, METERED RESPIRATORY (INHALATION) EVERY 4 HOURS PRN
Qty: 1 INHALER | Refills: 0 | Status: SHIPPED | OUTPATIENT
Start: 2019-12-04 | End: 2020-03-05

## 2019-12-04 RX ORDER — ALBUTEROL SULFATE 2.5 MG/3ML
5 SOLUTION RESPIRATORY (INHALATION)
Status: DISCONTINUED | OUTPATIENT
Start: 2019-12-04 | End: 2019-12-04 | Stop reason: HOSPADM

## 2019-12-04 RX ADMIN — ALBUTEROL SULFATE 2.5 MG: 5 SOLUTION RESPIRATORY (INHALATION) at 16:05

## 2019-12-04 RX ADMIN — METHYLPREDNISOLONE SODIUM SUCCINATE 125 MG: 125 INJECTION, POWDER, FOR SOLUTION INTRAMUSCULAR; INTRAVENOUS at 16:00

## 2019-12-04 ASSESSMENT — PAIN SCALES - GENERAL: PAINLEVEL_OUTOF10: 5

## 2019-12-09 ENCOUNTER — OFFICE VISIT (OUTPATIENT)
Dept: PRIMARY CARE CLINIC | Age: 49
End: 2019-12-09
Payer: MEDICAID

## 2019-12-09 VITALS
WEIGHT: 110 LBS | SYSTOLIC BLOOD PRESSURE: 135 MMHG | TEMPERATURE: 98.4 F | HEART RATE: 64 BPM | BODY MASS INDEX: 22.22 KG/M2 | DIASTOLIC BLOOD PRESSURE: 88 MMHG

## 2019-12-09 DIAGNOSIS — J20.9 ACUTE BRONCHITIS, UNSPECIFIED ORGANISM: Primary | ICD-10-CM

## 2019-12-09 PROCEDURE — G8420 CALC BMI NORM PARAMETERS: HCPCS | Performed by: INTERNAL MEDICINE

## 2019-12-09 PROCEDURE — G8484 FLU IMMUNIZE NO ADMIN: HCPCS | Performed by: INTERNAL MEDICINE

## 2019-12-09 PROCEDURE — G8427 DOCREV CUR MEDS BY ELIG CLIN: HCPCS | Performed by: INTERNAL MEDICINE

## 2019-12-09 PROCEDURE — 3017F COLORECTAL CA SCREEN DOC REV: CPT | Performed by: INTERNAL MEDICINE

## 2019-12-09 PROCEDURE — 4004F PT TOBACCO SCREEN RCVD TLK: CPT | Performed by: INTERNAL MEDICINE

## 2019-12-09 PROCEDURE — 99212 OFFICE O/P EST SF 10 MIN: CPT | Performed by: INTERNAL MEDICINE

## 2019-12-09 RX ORDER — AZITHROMYCIN 250 MG/1
TABLET, FILM COATED ORAL
Qty: 1 PACKET | Refills: 0 | Status: SHIPPED | OUTPATIENT
Start: 2019-12-09 | End: 2019-12-19

## 2019-12-09 ASSESSMENT — ENCOUNTER SYMPTOMS: COUGH: 1

## 2019-12-19 ENCOUNTER — OFFICE VISIT (OUTPATIENT)
Dept: FAMILY MEDICINE CLINIC | Age: 49
End: 2019-12-19
Payer: MEDICAID

## 2019-12-19 VITALS
BODY MASS INDEX: 22.82 KG/M2 | HEART RATE: 97 BPM | DIASTOLIC BLOOD PRESSURE: 65 MMHG | SYSTOLIC BLOOD PRESSURE: 115 MMHG | WEIGHT: 113 LBS

## 2019-12-19 DIAGNOSIS — G47.00 INSOMNIA, UNSPECIFIED TYPE: ICD-10-CM

## 2019-12-19 DIAGNOSIS — K21.9 GASTROESOPHAGEAL REFLUX DISEASE, ESOPHAGITIS PRESENCE NOT SPECIFIED: ICD-10-CM

## 2019-12-19 DIAGNOSIS — J34.89 RHINORRHEA: Primary | ICD-10-CM

## 2019-12-19 PROCEDURE — 99213 OFFICE O/P EST LOW 20 MIN: CPT | Performed by: STUDENT IN AN ORGANIZED HEALTH CARE EDUCATION/TRAINING PROGRAM

## 2019-12-19 PROCEDURE — 4004F PT TOBACCO SCREEN RCVD TLK: CPT | Performed by: STUDENT IN AN ORGANIZED HEALTH CARE EDUCATION/TRAINING PROGRAM

## 2019-12-19 PROCEDURE — G8484 FLU IMMUNIZE NO ADMIN: HCPCS | Performed by: STUDENT IN AN ORGANIZED HEALTH CARE EDUCATION/TRAINING PROGRAM

## 2019-12-19 PROCEDURE — 99211 OFF/OP EST MAY X REQ PHY/QHP: CPT | Performed by: FAMILY MEDICINE

## 2019-12-19 PROCEDURE — 3017F COLORECTAL CA SCREEN DOC REV: CPT | Performed by: STUDENT IN AN ORGANIZED HEALTH CARE EDUCATION/TRAINING PROGRAM

## 2019-12-19 PROCEDURE — G8420 CALC BMI NORM PARAMETERS: HCPCS | Performed by: STUDENT IN AN ORGANIZED HEALTH CARE EDUCATION/TRAINING PROGRAM

## 2019-12-19 PROCEDURE — G8427 DOCREV CUR MEDS BY ELIG CLIN: HCPCS | Performed by: STUDENT IN AN ORGANIZED HEALTH CARE EDUCATION/TRAINING PROGRAM

## 2019-12-19 RX ORDER — BENZONATATE 200 MG/1
200 CAPSULE ORAL 3 TIMES DAILY PRN
Qty: 42 CAPSULE | Refills: 0 | Status: SHIPPED | OUTPATIENT
Start: 2019-12-19 | End: 2020-01-02

## 2019-12-19 RX ORDER — OMEPRAZOLE 40 MG/1
40 CAPSULE, DELAYED RELEASE ORAL
Qty: 30 CAPSULE | Refills: 1 | Status: SHIPPED | OUTPATIENT
Start: 2019-12-19 | End: 2020-03-05

## 2019-12-19 RX ORDER — IPRATROPIUM BROMIDE 42 UG/1
2 SPRAY, METERED NASAL 4 TIMES DAILY
Qty: 1 BOTTLE | Refills: 3 | Status: SHIPPED | OUTPATIENT
Start: 2019-12-19 | End: 2020-11-23 | Stop reason: SDUPTHER

## 2019-12-19 ASSESSMENT — PATIENT HEALTH QUESTIONNAIRE - PHQ9
1. LITTLE INTEREST OR PLEASURE IN DOING THINGS: 2
SUM OF ALL RESPONSES TO PHQ QUESTIONS 1-9: 4
2. FEELING DOWN, DEPRESSED OR HOPELESS: 2
SUM OF ALL RESPONSES TO PHQ9 QUESTIONS 1 & 2: 4
SUM OF ALL RESPONSES TO PHQ QUESTIONS 1-9: 4

## 2020-01-28 ENCOUNTER — HOSPITAL ENCOUNTER (EMERGENCY)
Age: 50
Discharge: HOME | End: 2020-01-28
Attending: EMERGENCY MEDICINE
Payer: MEDICAID

## 2020-01-28 VITALS
RESPIRATION RATE: 16 BRPM | SYSTOLIC BLOOD PRESSURE: 143 MMHG | TEMPERATURE: 97.9 F | BODY MASS INDEX: 23.71 KG/M2 | WEIGHT: 117.6 LBS | DIASTOLIC BLOOD PRESSURE: 73 MMHG | HEIGHT: 59 IN | OXYGEN SATURATION: 97 % | HEART RATE: 66 BPM

## 2020-01-28 PROCEDURE — 12001 RPR S/N/AX/GEN/TRNK 2.5CM/<: CPT

## 2020-01-28 PROCEDURE — 99282 EMERGENCY DEPT VISIT SF MDM: CPT

## 2020-01-28 RX ORDER — CEPHALEXIN 500 MG/1
500 CAPSULE ORAL 2 TIMES DAILY
Qty: 10 CAPSULE | Refills: 0 | Status: SHIPPED | OUTPATIENT
Start: 2020-01-28 | End: 2020-02-02

## 2020-01-29 NOTE — ED NOTES
Pt Lt index finger has a piece of the skin removed that was cleaned with iodine and secured with a steri strip and then antibiotic ointment and then a bandage, Pt was given a finger splint that she does not want to wear right now. Pt cut her finger yesterday and appears clean and well cared for.        Mikala Kessler  01/28/20 4729

## 2020-01-29 NOTE — ED PROVIDER NOTES
[facility] ED  EMERGENCY DEPARTMENT ENCOUNTER      Pt Name: Teri Jackson  MRN: [id number]  [place] 1970  Date of evaluation: 1/28/20  CHIEF COMPLAINT       Chief Complaint   Patient presents with    Laceration     left pointer finger; cut on saw yesterday     HISTORY OF PRESENT ILLNESS   Since the emergency room via private auto with finger laceration that occurred over 24 hours ago. She was helping somewhat use a table saw when she looked away for a second resulting in laceration to the index finger. She has an avulsion laceration to the tip of the right index finger. Able to control the bleeding with pressure. She has not had any bleeding today. Her tetanus is up-to-date. She is not diabetic. She is concerned finger may become infected. No surrounding redness. No purulent drainage or fevers. She denies decreased range of motion to the finger. The history is provided by the patient. REVIEW OF SYSTEMS     Review of Systems   All other systems reviewed and are negative. PASTMEDICAL HISTORY     Past Medical History:   Diagnosis Date    Anemia     Anxiety     Chronic right shoulder pain     Depression     Irregular heartbeat     Sciatica      SURGICAL HISTORY       Past Surgical History:   Procedure Laterality Date    BRAIN ANEURYSM SURGERY  12/2014    Coiling at [address]      x4     COLONOSCOPY  04/18/2019    COLONOSCOPY N/A 4/18/2019    COLORECTAL CANCER SCREENING, NOT HIGH RISK performed by Herbie Brumfield MD at [address] [facility] Right 2/20/2018    RIGHT SHOULDER ARTHROSCOPY ROTATOR CUFF LYSIS OF ADHESIONS performed by Flakito Garcia DO at [address] [facility] ARTHROSCOPY Right 02/20/2018    extensive lysis of adhesions; Open suture granuloma removal     SHOULDER SURGERY Right     x 3    TONSILLECTOMY       CURRENT MEDICATIONS       Previous Medications    ALBUTEROL SULFATE HFA (PROAIR HFA) 108 (90 BASE) MCG/ACT tip of the right index finger that affected the skin only. She has a superficial laceration on the side of the index fingernail measuring less than a half a centimeter. Nail has not been disrupted. The laceration is not gaping. No bleeding. There is no surrounding erythema. No purulent drainage. Skin:     General: Skin is warm and dry. Capillary Refill: Capillary refill takes less than 2 seconds. Neurological:      General: No focal deficit present. Mental Status: She is alert and oriented to person, place, and time. Cranial Nerves: No cranial nerve deficit. Sensory: No sensory deficit. Psychiatric:         Mood and Affect: Mood normal.         Thought Content: Thought content normal.         DIAGNOSTIC RESULTS     RADIOLOGY:All plain film, CT, MRI, and formal ultrasound images (except ED bedside ultrasound) are read by the radiologist, see reports below, unless otherwise noted in MDM or here. Interpretation per the Radiologist below, if available at the time of this note:    No orders to display       LABS:  Labs Reviewed - No data to display    All other labs were within normal range or not returned as of this dictation. EMERGENCY DEPARTMENT COURSE and DIFFERENTIAL DIAGNOSIS/MDM:   Vitals:    Vitals:    20 2216 20 2218   BP: (!) 143/73    Pulse: 66    Resp: 16    Temp: 97.9 °F (36.6 °C)    SpO2: 97%    Weight:  117 lb 9.6 oz (53.3 kg)   Height:  4' 11\" (1.499 m)       Medical Decision Makin-year-old female with finger laceration that is over 25 hours old. Even if she had come in immediately there does not look like there was anything that required repair. The wound was cleansed thoroughly. Steri-Strips were placed over the superficial laceration on the side of the fingernail. Bandage was placed. She was then placed in a finger splint. I did check the placement of the splint and found it to be appropriate. She remains neurovascularly intact.   She will be started on Keflex prophylactically and she will follow-up with her primary care provider. The patient was given the following meds in the ED:  Orders Placed This Encounter   Medications    cephALEXin (KEFLEX) 500 MG capsule     Sig: Take 1 capsule by mouth 2 times daily for 5 days     Dispense:  10 capsule     Refill:  0       FINAL IMPRESSION      1.  Laceration of right index finger without foreign body without damage to nail, initial encounter          DISPOSITION/PLAN   DISPOSITION Decision To Discharge 01/28/2020 11:10:24 PM      PATIENT REFERRED TO:   Yenny Castelan MD  [facility] [address]  [phone]            DISCHARGE MEDICATIONS:     New Prescriptions    CEPHALEXIN (KEFLEX) 500 MG CAPSULE    Take 1 capsule by mouth 2 times daily for 5 days       CRITICAL CARE TIME       Please note that portions of this note were completed with a voice recognition program.      Pedrito MOORE [id number], APRN - CNP  01/28/20 [id number]

## 2020-03-05 ENCOUNTER — OFFICE VISIT (OUTPATIENT)
Dept: GASTROENTEROLOGY | Age: 50
End: 2020-03-05

## 2020-03-05 VITALS
HEART RATE: 60 BPM | WEIGHT: 115.6 LBS | SYSTOLIC BLOOD PRESSURE: 116 MMHG | BODY MASS INDEX: 23.35 KG/M2 | DIASTOLIC BLOOD PRESSURE: 80 MMHG

## 2020-03-05 ASSESSMENT — ENCOUNTER SYMPTOMS
ABDOMINAL DISTENTION: 0
WHEEZING: 0
CHOKING: 0
DIARRHEA: 0
SINUS PRESSURE: 0
CONSTIPATION: 0
VOMITING: 0
BACK PAIN: 0
NAUSEA: 0
RECTAL PAIN: 0
ANAL BLEEDING: 0
BLOOD IN STOOL: 0
TROUBLE SWALLOWING: 0
ABDOMINAL PAIN: 0
VOICE CHANGE: 0
COUGH: 0
SORE THROAT: 0

## 2020-03-05 NOTE — PROGRESS NOTES
GI CLINIC FOLLOW UP    INTERVAL HISTORY:   No referring provider defined for this encounter. Chief Complaint   Patient presents with    Gastroesophageal Reflux     Little acid reflux    Colonoscopy     Colonoscopy in april never got results, rectal bleeding gone. HISTORY OF PRESENT ILLNESS: Ms.Cynthia Edd Peoples is a 44 y.o. female . Past Medical,Family, and Social History reviewed and does not contribute to the patient presentingcondition. Patient's PMH/PSH,SH,PSYCH Hx, MEDs, ALLERGIES, and ROS were all reviewed and updated in the appropriate sections. PAST MEDICAL HISTORY:  Past Medical History:   Diagnosis Date    Anemia     Anxiety     Chronic right shoulder pain     Depression     Irregular heartbeat     Sciatica        Past Surgical History:   Procedure Laterality Date    BRAIN ANEURYSM SURGERY  12/2014    Coiling at [address] [facility]      x4     COLONOSCOPY  04/18/2019    COLONOSCOPY N/A 4/18/2019    COLORECTAL CANCER SCREENING, NOT HIGH RISK performed by Ata Garay MD at [address] [facility] Right 2/20/2018    RIGHT SHOULDER ARTHROSCOPY ROTATOR CUFF LYSIS OF ADHESIONS performed by Lawyer Leta DO at [address] [facility] ARTHROSCOPY Right 02/20/2018    extensive lysis of adhesions; Open suture granuloma removal     SHOULDER SURGERY Right     x 3    TONSILLECTOMY         CURRENT MEDICATIONS:    Current Outpatient Medications:     ipratropium (ATROVENT) 0.06 % nasal spray, 2 sprays by Each Nostril route 4 times daily, Disp: 1 Bottle, Rfl: 3    pregabalin (LYRICA) 100 MG capsule, Take 100 mg by mouth 2 times daily. , Disp: , Rfl:     celecoxib (CELEBREX) 200 MG capsule, take 1 capsule by mouth once daily prn, Disp: , Rfl: 0    ALLERGIES:   No Known Allergies    FAMILY HISTORY:       Problem Relation Age of Onset    Asthma Mother     Cancer Father         throat         SOCIAL HISTORY:   Social History Socioeconomic History    Marital status: Legally      Spouse name: Not on file    Number of children: Not on file    Years of education: Not on file    Highest education level: Not on file   Occupational History    Not on file   Social Needs    Financial resource strain: Not on file    Food insecurity:     Worry: Not on file     Inability: Not on file    Transportation needs:     Medical: Not on file     Non-medical: Not on file   Tobacco Use    Smoking status: Current Every Day Smoker     Packs/day: 0.50     Years: 12.00     Pack years: 6.00     Types: Cigarettes    Smokeless tobacco: Never Used    Tobacco comment: pt smokes about 4 ciggs daily   Substance and Sexual Activity    Alcohol use: No    Drug use: No    Sexual activity: Not on file   Lifestyle    Physical activity:     Days per week: Not on file     Minutes per session: Not on file    Stress: Not on file   Relationships    Social connections:     Talks on phone: Not on file     Gets together: Not on file     Attends Yazdanism service: Not on file     Active member of club or organization: Not on file     Attends meetings of clubs or organizations: Not on file     Relationship status: Not on file    Intimate partner violence:     Fear of current or ex partner: Not on file     Emotionally abused: Not on file     Physically abused: Not on file     Forced sexual activity: Not on file   Other Topics Concern    Not on file   Social History Narrative    Not on file       REVIEW OF SYSTEMS: A 12-point review of systemswas obtained and pertinent positives and negatives were enumerated above in the history of present illness. All other reviewed systems / symptoms were negative. Review of Systems   Constitutional: Positive for fatigue. Negative for appetite change and unexpected weight change. HENT: Negative for dental problem, postnasal drip, sinus pressure, sore throat, trouble swallowing and voice change.     Eyes: Negative symptoms. She decided not to be seen today. She left the office before we could see her. Thank you for allowing me to participate in the care of Ms. Veronica Gregg. For any further questions please do not hesitate to contact me. I have reviewed and agree with the ROS entered by the MA/LPN. Note is dictated utilizing voice recognition software. Unfortunately this leads to occasional typographical errors. Please contact our office if you have any questions.     Nadya Cordova MD  [facility]  O: #[phone]

## 2020-05-12 ENCOUNTER — HOSPITAL ENCOUNTER (OUTPATIENT)
Age: 50
Discharge: HOME | End: 2020-05-12
Payer: MEDICAID

## 2020-05-12 ENCOUNTER — HOSPITAL ENCOUNTER (EMERGENCY)
Age: 50
Discharge: HOME | End: 2020-05-12
Attending: EMERGENCY MEDICINE
Payer: MEDICAID

## 2020-05-12 VITALS
HEART RATE: 76 BPM | BODY MASS INDEX: 22.82 KG/M2 | TEMPERATURE: 98.6 F | SYSTOLIC BLOOD PRESSURE: 118 MMHG | WEIGHT: 113 LBS | DIASTOLIC BLOOD PRESSURE: 74 MMHG | RESPIRATION RATE: 16 BRPM | OXYGEN SATURATION: 99 %

## 2020-05-12 LAB
ABSOLUTE EOS #: 0.04 K/UL (ref 0–0.44)
ABSOLUTE IMMATURE GRANULOCYTE: <0.03 K/UL (ref 0–0.3)
ABSOLUTE LYMPH #: 1.76 K/UL (ref 1.1–3.7)
ABSOLUTE MONO #: 0.26 K/UL (ref 0.1–1.2)
ALBUMIN SERPL-MCNC: 4.5 G/DL (ref 3.5–5.2)
ALBUMIN/GLOBULIN RATIO: 1.5 (ref 1–2.5)
ALP BLD-CCNC: 82 U/L (ref 35–104)
ALT SERPL-CCNC: 33 U/L (ref 5–33)
ANION GAP SERPL CALCULATED.3IONS-SCNC: 12 MMOL/L (ref 9–17)
AST SERPL-CCNC: 37 U/L
BASOPHILS # BLD: 1 % (ref 0–2)
BASOPHILS ABSOLUTE: 0.04 K/UL (ref 0–0.2)
BILIRUB SERPL-MCNC: 0.29 MG/DL (ref 0.3–1.2)
BILIRUBIN DIRECT: <0.08 MG/DL
BILIRUBIN, INDIRECT: ABNORMAL MG/DL (ref 0–1)
BUN BLDV-MCNC: 11 MG/DL (ref 6–20)
CALCIUM SERPL-MCNC: 9.5 MG/DL (ref 8.6–10.4)
CHLORIDE BLD-SCNC: 101 MMOL/L (ref 98–107)
CO2: 25 MMOL/L (ref 20–31)
CREAT SERPL-MCNC: 0.81 MG/DL (ref 0.5–0.9)
DIFFERENTIAL TYPE: NORMAL
EOSINOPHILS RELATIVE PERCENT: 1 % (ref 1–4)
GFR AFRICAN AMERICAN: >60 ML/MIN
GFR NON-AFRICAN AMERICAN: >60 ML/MIN
GFR SERPL CREATININE-BSD FRML MDRD: ABNORMAL ML/MIN/{1.73_M2}
GFR SERPL CREATININE-BSD FRML MDRD: ABNORMAL ML/MIN/{1.73_M2}
GLUCOSE BLD-MCNC: 108 MG/DL (ref 70–99)
HCT VFR BLD CALC: 42.4 % (ref 36.3–47.1)
HEMOGLOBIN: 13.4 G/DL (ref 11.9–15.1)
IMMATURE GRANULOCYTES: 0 %
LYMPHOCYTES # BLD: 38 % (ref 24–43)
MCH RBC QN AUTO: 27.9 PG (ref 25.2–33.5)
MCHC RBC AUTO-ENTMCNC: 31.6 G/DL (ref 28.4–34.8)
MCV RBC AUTO: 88.3 FL (ref 82.6–102.9)
MONOCYTES # BLD: 6 % (ref 3–12)
NRBC AUTOMATED: 0 PER 100 WBC
PDW BLD-RTO: 13.6 % (ref 11.8–14.4)
PLATELET # BLD: 256 K/UL (ref 138–453)
PLATELET ESTIMATE: NORMAL
PMV BLD AUTO: 10.4 FL (ref 8.1–13.5)
POTASSIUM SERPL-SCNC: 3.9 MMOL/L (ref 3.7–5.3)
RBC # BLD: 4.8 M/UL (ref 3.95–5.11)
RBC # BLD: NORMAL 10*6/UL
SEG NEUTROPHILS: 54 % (ref 36–65)
SEGMENTED NEUTROPHILS ABSOLUTE COUNT: 2.57 K/UL (ref 1.5–8.1)
SODIUM BLD-SCNC: 138 MMOL/L (ref 135–144)
TOTAL PROTEIN: 7.5 G/DL (ref 6.4–8.3)
WBC # BLD: 4.7 K/UL (ref 3.5–11.3)
WBC # BLD: NORMAL 10*3/UL

## 2020-05-12 PROCEDURE — 82248 BILIRUBIN DIRECT: CPT

## 2020-05-12 PROCEDURE — 36415 COLL VENOUS BLD VENIPUNCTURE: CPT

## 2020-05-12 PROCEDURE — 93971 EXTREMITY STUDY: CPT

## 2020-05-12 PROCEDURE — 80053 COMPREHEN METABOLIC PANEL: CPT

## 2020-05-12 PROCEDURE — 85025 COMPLETE CBC W/AUTO DIFF WBC: CPT

## 2020-05-12 PROCEDURE — 99283 EMERGENCY DEPT VISIT LOW MDM: CPT

## 2020-05-12 ASSESSMENT — PAIN SCALES - GENERAL: PAINLEVEL_OUTOF10: 7

## 2020-05-12 ASSESSMENT — PAIN DESCRIPTION - ORIENTATION: ORIENTATION: LEFT;POSTERIOR;LOWER

## 2020-05-12 ASSESSMENT — PAIN DESCRIPTION - PAIN TYPE: TYPE: ACUTE PAIN

## 2020-05-12 ASSESSMENT — PAIN DESCRIPTION - FREQUENCY: FREQUENCY: CONTINUOUS

## 2020-05-12 ASSESSMENT — PAIN DESCRIPTION - DESCRIPTORS: DESCRIPTORS: DISCOMFORT

## 2020-05-12 ASSESSMENT — PAIN DESCRIPTION - LOCATION: LOCATION: LEG

## 2020-05-12 NOTE — ED PROVIDER NOTES
[address] [place] [address] ED  eMERGENCY dEPARTMENT eNCOUnter      Pt Name: Satish Graves  MRN: [id number]  [place] 1970  Date of evaluation: 5/12/2020  Provider: Flor Graves MD    [address] [place] [address]       Chief Complaint   Patient presents with    Leg Pain     posterior left lower leg/ bruising, redness and swelling/ no shortness of breath/ no chest pain/ denies history of blood clots         HISTORY OF PRESENT ILLNESS  (Location/Symptom, Timing/Onset, Context/Setting, Quality, Duration, Modifying Factors, Severity.)   Satish Graves is a 44 y.o. female who presents to the emergency department for evaluation of calf discomfort and a contusion noted at the top of her calf muscle. Patient denies any injury or trauma to the knee or lower leg. She felt that she had some swelling to the left leg although it has improved. A relative told her that she may have a blood clot. Patient is now very worried that this is the case. Patient is on extensive amount of pain medication and has extensive OARRs      Nursing Notes were reviewed. ALLERGIES     Patient has no known allergies. CURRENT MEDICATIONS       Previous Medications    CELECOXIB (CELEBREX) 200 MG CAPSULE    take 1 capsule by mouth once daily prn    IPRATROPIUM (ATROVENT) 0.06 % NASAL SPRAY    2 sprays by Each Nostril route 4 times daily    PREGABALIN (LYRICA) 100 MG CAPSULE    Take 100 mg by mouth 2 times daily.    Prescriptions  Total Prescriptions: 49    Total Private Pay: 0    Fill Date ID   Written Drug Qty Days Prescriber Rx # Pharmacy Refill   Daily Dose* Pymt Type      04/27/2020  1   04/23/2020  Oxycodon-Acetaminophen 7.5-325  84.00 [address]   [id number]   The (0864)   0  33.75 MME  [place]   03/26/2020  1   03/26/2020  Oxycodon-Acetaminophen 7.5-325  84.00 [address] [facility]   [id number]   The (0864)   0  33.75 [place]   03/26/2020  1   03/26/2020  Pregabalin 75 MG Capsule  28.00 [address] [facility]   [id number]   The (0864)   0  0.50 LME  [place]   02/28/2020  1   02/27/2020  Pregabalin 75 MG Capsule  28.00 [address] [facility]   [id number]   The (0864)   0  0.50 L[place]   02/28/2020  1   02/27/2020  Oxycodon-Acetaminophen 7.5-325  84.00 [address] [facility]   [id number]   The (0864)   0  33.75 MME  [place]   01/31/2020  1   01/30/2020  Oxycodon-Acetaminophen 7.5-325  84.00 [address] [facility]   [id number]   The (0864)   0  33.75 MME  [place]   01/31/2020  1   01/30/2020  Pregabalin 75 MG Capsule  28.00 [facility]   [id number]   The (0864)   0  0.50 LME  [place]   01/02/2020  1   12/30/2019  Pregabalin 75 MG Capsule  28.00 [address] [facility]   [id number]   The (0864)   0  0.50 LME  [place]   01/02/2020  1   12/30/2019  Oxycodon-Acetaminophen 7.5-325  84.00 28 [facility]   [id number]   The (0864)   0  33.75 MME  [place]   12/04/2019  1   12/04/2019  Oxycodon-Acetaminophen 7.5-325  84.00 [address] [facility]   [id number]   The (0864)   0  33.75 MME  [place]   12/04/2019  1   12/04/2019  Pregabalin 75 MG Capsule  28.00 [address] [facility]   [id number]   The (0864)   0  0.50 LME  [place]   11/13/2019  1   10/16/2019  Pregabalin 75 MG Capsule  21.00 [facility]   [id number]   The (0864)   0  0.50 LME  [place]   11/13/2019  1   10/16/2019  Oxycodon-Acetaminophen 7.5-325  63.00 [address] [facility]   [id number]   The (0864)   0  33.75 MME  [place]   10/16/2019  1   10/16/2019  Oxycodon-Acetaminophen 7.5-325  90.00 30 [facility]   [id number]   The (0864)   0  33.75 MME  [place]   10/16/2019  1   10/16/2019  Pregabalin 75 MG Capsule  30.00 30 [facility]   [id number]   The (0864)   0  0.50 LME  [place]   09/11/2019  1   08/27/2019  Oxycodone-Acetaminophen 5-325  90.00 30 Na [facility]   [id number]   The (0864)   0  22.50 MME  [place]   09/10/2019  1   08/27/2019  Pregabalin 75 MG Capsule  30.00 30 Na [facility]   [id number]   The (0864)   0  0.50 LME  [place]   08/12/2019  1   08/01/2019  Oxycodon-Acetaminophen 7.5-325  90.00 30 Na [facility]   [id number]   The (5309)   0  33.75 MME  [place]   07/14/2019  1   07/01/2019 include pulse ox of 100% on room air. She is not hypoxic. She is alert conversive and appropriate behavior. She is not in distress. She is ambulatory without antalgia. Heart regular rate and rhythm normal S1-S2 no murmurs rubs gallops. Lungs are clear to auscultation. Left lower extremity is examined. Negative Homans. No significant calf asymmetry. He does have what appears to be a subacute contusion at the top of the gastroc muscle with clearing in the center. No open wounds. No discomfort to palpation. No palpable cords. No evidence of joint effusion to the knee. No bony point tenderness. No neurovascular deficits and she has full range of motion      DIAGNOSTIC RESULTS       EMERGENCY DEPARTMENT COURSE and DIFFERENTIAL DIAGNOSIS/MDM:   Vitals:    Vitals:    05/12/20 0141 05/12/20 0145   BP: 118/74    Pulse: 76    Resp: 16    Temp: 98.6 °F (37 °C)    TempSrc: Oral    SpO2: 99%    Weight:  113 lb (51.3 kg)     Patient is evaluated. I did discuss appearance of fading bruise to her. She is quite concerned about possibility of DVT. Ultimately patient is in agreement with returning for Doppler studies in the morning. She will follow-up with her primary care or pain management physician. CONSULTS:  None    PROCEDURES:  None    FINAL IMPRESSION      1.  Contusion of left lower extremity, initial encounter          DISPOSITION/PLAN   DISPOSITION Decision To Discharge 05/12/2020 01:51:09 AM      PATIENT REFERRED TO:   Ashly Adame MD  [address] [place] [id number]  [address] [place], [place] [id number]  [phone]            DISCHARGE MEDICATIONS:     New Prescriptions    No medications on file     Controlled Substance Monitoring:    Acute and Chronic Pain Monitoring:   RX Monitoring 5/12/2020   Attestation -   Periodic Controlled Substance Monitoring Possible medication side effects, risk of tolerance/dependence & alternative treatments

## 2020-05-13 ENCOUNTER — OFFICE VISIT (OUTPATIENT)
Dept: FAMILY MEDICINE CLINIC | Age: 50
End: 2020-05-13
Payer: MEDICAID

## 2020-05-13 VITALS
BODY MASS INDEX: 22.18 KG/M2 | WEIGHT: 110 LBS | HEART RATE: 79 BPM | TEMPERATURE: 97.7 F | DIASTOLIC BLOOD PRESSURE: 84 MMHG | HEIGHT: 59 IN | SYSTOLIC BLOOD PRESSURE: 128 MMHG

## 2020-05-13 PROCEDURE — 3017F COLORECTAL CA SCREEN DOC REV: CPT | Performed by: STUDENT IN AN ORGANIZED HEALTH CARE EDUCATION/TRAINING PROGRAM

## 2020-05-13 PROCEDURE — 99213 OFFICE O/P EST LOW 20 MIN: CPT | Performed by: STUDENT IN AN ORGANIZED HEALTH CARE EDUCATION/TRAINING PROGRAM

## 2020-05-13 PROCEDURE — G8427 DOCREV CUR MEDS BY ELIG CLIN: HCPCS | Performed by: STUDENT IN AN ORGANIZED HEALTH CARE EDUCATION/TRAINING PROGRAM

## 2020-05-13 PROCEDURE — 4004F PT TOBACCO SCREEN RCVD TLK: CPT | Performed by: STUDENT IN AN ORGANIZED HEALTH CARE EDUCATION/TRAINING PROGRAM

## 2020-05-13 PROCEDURE — G8420 CALC BMI NORM PARAMETERS: HCPCS | Performed by: STUDENT IN AN ORGANIZED HEALTH CARE EDUCATION/TRAINING PROGRAM

## 2020-05-13 ASSESSMENT — ENCOUNTER SYMPTOMS
COUGH: 0
SHORTNESS OF BREATH: 0
APNEA: 0
CHEST TIGHTNESS: 0
PHOTOPHOBIA: 0

## 2020-05-13 NOTE — PROGRESS NOTES
Subjective:    Neo Lopez is a 44 y.o. female with  has a past medical history of Anemia, Anxiety, Chronic right shoulder pain, Depression, Irregular heartbeat, and Sciatica. Presented to the office today for:  Chief Complaint   Patient presents with    Follow-Up from Hospital     contusion of left lower extremity    Shoulder Pain     left shoulder pain , cracking        HPI    The patient is here today for a follow-up of her left shoulder and left lower extremity pain. The patient was seen in the ER yesterday for a bruise on her left lower calf. DVT scan was done it was negative. The bruise is resolving and now the patient denies any pain. Left shoulder pain is chronic and has been going on for several years now, the patient states that she had injury to her right shoulder and has chronic right shoulder pain and now she feels that because of overuse her left shoulder is also hurting. The patient denies any distal upper extremity weakness or tingling. The patient also denies any weakness of her left upper extremity. She does state that there is pain on palpation of the left shoulder joint, there is a lot of cracking and and pain when moving the left shoulder. The patient states that she follows with pain management and she is getting a lot of pain medications from them including Percocet as well as Lyrica and celecoxib. The patient was interested in investigations of her left shoulder pain including x-ray ultrasound and she was interested in physical therapy for rehab purposes. Review of Systems   Constitutional: Negative for fatigue, fever and unexpected weight change. Eyes: Negative for photophobia and visual disturbance. Respiratory: Negative for apnea, cough, chest tightness and shortness of breath. Cardiovascular: Negative for chest pain, palpitations and leg swelling. Musculoskeletal: Positive for arthralgias. Negative for myalgias and neck pain.         Left shoulder pain orthopedic surgery  - XR SHOULDER LEFT (MIN 2 VIEWS); Future  - [facility] - [facility]          Requested Prescriptions      No prescriptions requested or ordered in this encounter       There are no discontinued medications. Herbert Chavarria received counseling on the following healthy behaviors: nutrition, exercise and medication adherence    Discussed use,benefit, and side effects of prescribed medications. Barriers to medication compliance addressed. All patient questions answered. Pt voiced understanding. Return in about 2 months (around 7/13/2020) for follow up shoulder. Disclaimer: Some orall of this note was transcribed using voice-recognition software. This may cause typographical errors occasionally. Although all effort is made to fix these errors, please do not hesitate to contact our office if there Hortensia Cough concern with the understanding of this note.

## 2020-05-14 ENCOUNTER — HOSPITAL ENCOUNTER (OUTPATIENT)
Dept: GENERAL RADIOLOGY | Age: 50
Discharge: HOME | End: 2020-05-16
Payer: MEDICAID

## 2020-05-14 ENCOUNTER — HOSPITAL ENCOUNTER (OUTPATIENT)
Age: 50
Discharge: HOME | End: 2020-05-16
Payer: MEDICAID

## 2020-05-14 PROCEDURE — 73030 X-RAY EXAM OF SHOULDER: CPT

## 2020-05-14 NOTE — PROGRESS NOTES
Attending Physician Statement    Wt Readings from Last 3 Encounters:   05/13/20 110 lb (49.9 kg)   05/12/20 113 lb (51.3 kg)   03/05/20 115 lb 9.6 oz (52.4 kg)     Temp Readings from Last 3 Encounters:   05/13/20 97.7 °F (36.5 °C) (Temporal)   05/12/20 98.6 °F (37 °C) (Oral)   01/28/20 97.9 °F (36.6 °C)     BP Readings from Last 3 Encounters:   05/13/20 128/84   05/12/20 118/74   03/05/20 116/80     Pulse Readings from Last 3 Encounters:   05/13/20 79   05/12/20 76   03/05/20 60         I have discussed the care of Valentino Eis, including pertinent history and exam findings with the resident. I have reviewed the key elements of all parts of the encounter with the resident. I agree with the assessment, plan and orders as documented by the resident.   (GE Modifier)

## 2020-06-02 ENCOUNTER — HOSPITAL ENCOUNTER (OUTPATIENT)
Dept: ULTRASOUND IMAGING | Facility: CLINIC | Age: 50
Discharge: HOME | End: 2020-06-04
Payer: MEDICAID

## 2020-06-02 PROCEDURE — 76881 US COMPL JOINT R-T W/IMG: CPT

## 2020-06-08 ENCOUNTER — OFFICE VISIT (OUTPATIENT)
Dept: ORTHOPEDIC SURGERY | Age: 50
End: 2020-06-08
Payer: COMMERCIAL

## 2020-06-08 VITALS
WEIGHT: 110.01 LBS | HEART RATE: 66 BPM | BODY MASS INDEX: 22.18 KG/M2 | DIASTOLIC BLOOD PRESSURE: 80 MMHG | SYSTOLIC BLOOD PRESSURE: 124 MMHG | TEMPERATURE: 98.7 F | HEIGHT: 59 IN

## 2020-06-08 PROCEDURE — G8420 CALC BMI NORM PARAMETERS: HCPCS | Performed by: ORTHOPAEDIC SURGERY

## 2020-06-08 PROCEDURE — G8427 DOCREV CUR MEDS BY ELIG CLIN: HCPCS | Performed by: ORTHOPAEDIC SURGERY

## 2020-06-08 PROCEDURE — 99212 OFFICE O/P EST SF 10 MIN: CPT | Performed by: ORTHOPAEDIC SURGERY

## 2020-06-08 PROCEDURE — 4004F PT TOBACCO SCREEN RCVD TLK: CPT | Performed by: ORTHOPAEDIC SURGERY

## 2020-06-08 PROCEDURE — 20611 DRAIN/INJ JOINT/BURSA W/US: CPT | Performed by: ORTHOPAEDIC SURGERY

## 2020-06-08 PROCEDURE — 3017F COLORECTAL CA SCREEN DOC REV: CPT | Performed by: ORTHOPAEDIC SURGERY

## 2020-06-08 RX ORDER — METHYLPREDNISOLONE ACETATE 40 MG/ML
40 INJECTION, SUSPENSION INTRA-ARTICULAR; INTRALESIONAL; INTRAMUSCULAR; SOFT TISSUE ONCE
Status: COMPLETED | OUTPATIENT
Start: 2020-06-08 | End: 2020-06-12

## 2020-06-08 RX ORDER — LIDOCAINE HYDROCHLORIDE 10 MG/ML
4 INJECTION, SOLUTION INFILTRATION; PERINEURAL ONCE
Status: COMPLETED | OUTPATIENT
Start: 2020-06-08 | End: 2020-06-12

## 2020-06-08 ASSESSMENT — ENCOUNTER SYMPTOMS
DIARRHEA: 0
APNEA: 0
CONSTIPATION: 0
ABDOMINAL PAIN: 0
CHEST TIGHTNESS: 0
COLOR CHANGE: 0
ABDOMINAL DISTENTION: 0
VOMITING: 0
NAUSEA: 0
SHORTNESS OF BREATH: 0
COUGH: 0

## 2020-06-08 NOTE — PROGRESS NOTES
Once Ata Alejandra, DO        methylPREDNISolone acetate (DEPO-MEDROL) injection 40 mg  40 mg Intra-articular Once Ata Alejandra, DO         Allergies:    Patient has no known allergies. Social History:   Social History     Socioeconomic History    Marital status: Legally      Spouse name: None    Number of children: None    Years of education: None    Highest education level: None   Occupational History    None   Social Needs    Financial resource strain: None    Food insecurity     Worry: None     Inability: None    Transportation needs     Medical: None     Non-medical: None   Tobacco Use    Smoking status: Current Every Day Smoker     Packs/day: 0.50     Years: 12.00     Pack years: 6.00     Types: Cigarettes    Smokeless tobacco: Never Used    Tobacco comment: pt smokes about 4 ciggs daily   Substance and Sexual Activity    Alcohol use: No    Drug use: No    Sexual activity: None   Lifestyle    Physical activity     Days per week: None     Minutes per session: None    Stress: None   Relationships    Social connections     Talks on phone: None     Gets together: None     Attends [employer] service: None     Active member of club or organization: None     Attends meetings of clubs or organizations: None     Relationship status: None    Intimate partner violence     Fear of current or ex partner: None     Emotionally abused: None     Physically abused: None     Forced sexual activity: None   Other Topics Concern    None   Social History Narrative    None     Family History:  Family History   Problem Relation Age of Onset    Asthma Mother     Cancer Father         throat     I have reviewed the CC, HPI, ROS, PMH, FHX, Social History, and if not present in this note, I have reviewed in the patient's chart. I agree with the documentation provided by other staff and have reviewed their documentation prior to providing my signature indicating agreement.     Vitals:   /80   Pulse 66 Utilizing an ultrasound for precise placement and clean technique with sterile gloves, a 5 cc solution containing 4 cc of 1 % Lidocaine with 1 cc containing 40mg of Depo medrol was injected into the glenohumeral joint. There was no resistance to injection. The wound was cleansed and a Band-Aid was placed. The patient tolerated the procedure without difficulty. Adverse reactions of the injection was discussed with the patient including signs of infection (increasing pain, redness, swelling) and the patient was instructed to call immediately with any of these symptoms. Radiology:   X-ray was reviewed from 05/14/2020. Plan:   Treatment : I reviewed the X-ray with the patient. We discussed the etiologies and natural histories of s/p Right shoulder arthroscopy with debridement of glenoid, humeral head, rotator interval scarring, labral fraying, intraarticular release of anterior interval slide, subacromial extensive debridement, lysis of adhesions, and removal of suture granuloma. We discussed the various treatment alternatives including anti-inflammatory medications, physical therapy, injections, further imaging studies and as a last result surgery. During today's visit, I explained to the patient that we will inject the shoulder today but I would like for the patient to ask us via epic online to submit a C9, that way we can get the injection approved before her next visit. Patient was amenable and at this time, the patient has opted for a cortisone injection into the glenohumeral joint of the right shoulder to help reduce inflammation and pain. The injection site should never get red, hot, or swollen and if it does the patient will contact our office right away. The patient may experience a increase in soreness the first 24-48 hours due to a cortisone flair and can take anti-inflammatories for a short period of time to reduce that soreness.  The patient should not submerge the injection site in water for a minimum of 24 hours to avoid infection. This means no lakes, pools, ponds, or hot tubs for 24 hours. If the patient is diabetic the injection may increase their blood sugar for up to one week. The patient can do this cortisone injection once every 3 months as needed. If the injections stop working and do not give the patient relief the patient should consider surgical interventions to produce long term relief. Patient should return to the clinic in 3 months to follow up with  Oscar Anderson PA-C, ATC for a cortisone injection if needed. The patient will call the office immediately with any problems. Orders Placed This Encounter   Medications    lidocaine 1 % injection 4 mL    methylPREDNISolone acetate (DEPO-MEDROL) injection 40 mg     No orders of the defined types were placed in this encounter. Charleen WOLF am scribing for and in the presence of Altagracia Michael D.O.. 6/9/2020  3:20 PM      I, Altagracia Michael DO, have personally seen this patient and I have reviewed the CC, PMH, FHX and Social History as provided by other clinical staff. I reassessed the HPI and ROS as scribed by Jerry Hunter in my presence and it is both accurate and complete. Thereafter, I personally performed the PE, reviewed the imaging and established the DX and POC. I agree with the documentation provided by the Medical Scribe. I have reviewed all documentation in its entirety prior to providing my signature indicating agreement. Any areas of disagreement are noted on the chart.     Electronically signed by Anjali Henry DO on 6/9/2020 at 3:21 PM        Electronically signed by Anjali Henry DO, on 6/9/2020 at 3:20 PM

## 2020-06-12 RX ADMIN — METHYLPREDNISOLONE ACETATE 40 MG: 40 INJECTION, SUSPENSION INTRA-ARTICULAR; INTRALESIONAL; INTRAMUSCULAR; SOFT TISSUE at 16:20

## 2020-06-12 RX ADMIN — LIDOCAINE HYDROCHLORIDE 4 ML: 10 INJECTION, SOLUTION INFILTRATION; PERINEURAL at 16:19

## 2020-06-22 ENCOUNTER — HOSPITAL ENCOUNTER (OUTPATIENT)
Dept: PHYSICAL THERAPY | Facility: CLINIC | Age: 50
Setting detail: THERAPIES SERIES
Discharge: HOME | End: 2020-06-22
Payer: MEDICAID

## 2020-06-22 PROCEDURE — 97110 THERAPEUTIC EXERCISES: CPT

## 2020-06-22 PROCEDURE — G0283 ELEC STIM OTHER THAN WOUND: HCPCS

## 2020-06-22 PROCEDURE — 97161 PT EVAL LOW COMPLEX 20 MIN: CPT

## 2020-06-22 NOTE — CONSULTS
Compression/Distraction [x] [] []    Sensation [x] [] []      Functional Test: Upper Extremity Functional Index (UEFI) Score: 75% functionally impaired     Comments:    Assessment:  Pt presents with physical therapy signs and symptoms consistent with L supraspinatus tear. Pt with pain and difficulty with lifting, OH activities and positioning. Crepitus and popping around L AC joint with elevation. Equal active and passive ROM due to patient limiting due to increased pain. Patient would benefit from skilled physical therapy services in order to: increase ROM, strength, functional activity tolerance and to decrease pain and assist required for ADL's. Problems:    [x] ? Pain: L shoulder, 8/10  [x] ? ROM: impaired L shoulder ROM  [x] ? Strength: global upper extremity weakness  [x] ? Function: UEFI = 75% functional impairment  [] Other:      STG: (to be met in 6 treatments)  1. ? Pain: Pt will report decreased L shoulder pain to no greater than 6/10 with overhead activities and lifting/carrying. 2. ? ROM: Pt will increase L shoulder AROM in all directions by 10 degrees in order to improve tolerance to doing her hair and dressing. 3. ? Strength: Pt will increase L elbow strength to 5/5 globally and  strength by 10 pounds in order to improve tolerance to carrying. 4. Patient to be independent with home exercise program as demonstrated by performance with correct form without cues. LTG: (to be met in 12 treatments)  5. ? Pain: Pt will report decreased L shoulder pain to no greater than 4/10 with overhead activities and lifting/carrying. 6. ? Strength: Pt will increase L abduction strength to 4/5 in order to improve tolerance to ADL's above waist level. 7. ? Function:  Pt will demonstrate improved functional activity tolerance as evident by an improved score on the UEFI to less than 50% functional impairment.                      Patient goals:    Rehab Potential:  [] Good  [x] Fair  [] Poor   Suggested Professional Referral:  [x] No  [] Yes:  Barriers to Goal Achievement:  [] No  [x] Yes: supraspinatus tear  Domestic Concerns:  [x] No  [] Yes:    Pt. Education:  [x] Plans/Goals, Risks/Benefits discussed  [x] Home exercise program  Method of Education: [x] Verbal  [x] Demo  [] Written  Comprehension of Education:  [x] Verbalizes understanding. [x] Demonstrates understanding. [x] Needs Review. [] Demonstrates/verbalizes understanding of HEP/Ed previously given. Treatment Plan:  [x] Therapeutic Exercise   47655  [] Iontophoresis: 4 mg/mL Dexamethasone Sodium Phosphate  mAmin  96683   [] Therapeutic Activity  49659 [x] Vasopneumatic cold with compression  44932    [] Gait Training   72640 [] Ultrasound   28648   [x] Neuromuscular Re-education  73396 [] Electrical Stimulation Unattended  34773   [x] Manual Therapy  78721 [] Electrical Stimulation Attended  25318   [x] Instruction in HEP  [] Lumbar/Cervical Traction  56306   [] Aquatic Therapy   22607 [x] Cold/hotpack    [x] Massage   38841      [] Dry Needling, 1 or 2 muscles  65975   [] Biofeedback, first 15 minutes   68459  [] Biofeedback, additional 15 minutes   91687 [] Dry Needling, 3 or more muscles  10985     []  Medication allergies reviewed for use of    Dexamethasone Sodium Phosphate 4mg/ml     with iontophoresis treatments. Pt is not allergic. Frequency: 2 x/week for 12 visits    Todays Treatment:  Modalities: IFC/MH in supine to L shoulder x 15 minutes  Precautions:  Exercises:  Exercise Reps/ Time Weight/ Level Comments   Pulleys (flex, abd) 2'/2'     Scapular retraction 10x5\"     Resistive rows 10x orange                Other: discussed other HEP that patient has been performing (pendulums, wall slides) as well as improving postural awareness due to pt initially not wanting to complete any exercises due to increased pain following evaluation.  Able to convince patient to demonstrate exercises in order to ensure that she was performing

## 2020-06-25 ENCOUNTER — HOSPITAL ENCOUNTER (OUTPATIENT)
Dept: PHYSICAL THERAPY | Facility: CLINIC | Age: 50
Setting detail: THERAPIES SERIES
Discharge: HOME | End: 2020-06-25
Payer: MEDICAID

## 2020-06-25 PROCEDURE — 97140 MANUAL THERAPY 1/> REGIONS: CPT

## 2020-06-25 PROCEDURE — 97110 THERAPEUTIC EXERCISES: CPT

## 2020-06-25 PROCEDURE — G0283 ELEC STIM OTHER THAN WOUND: HCPCS

## 2020-06-25 NOTE — FLOWSHEET NOTE
[] [facility]) [facility] &  Therapy  [address]  [phone]  F: [phone] [x] [address]  [facility] [address]   [address]  P: [phone]  F: [phone]     Physical Therapy Daily Treatment Note    Date:  2020  Patient Name:  Lilly Cuevas    :  1970  MRN: [id number]  Physician: Dr. Vince Mcgovern MD                               Insurance: [employer]  Medical Diagnosis: M25.512 - Chronic left shoulder pain                 Rehab Codes: G07.827, M25.511, M62.81  Onset Date: 2020 referral                      Next 's appt: 2020    Cancels/No Shows: 0    Subjective:    Pain:  [x] Yes  [] No Location: LUE shoulder ant, post cuff  Pain Rating: (0-10 scale) 7/10  Pain altered Tx:  [] No  [] Yes  Action:  Comments: Pt reports today with moderate pain 7/10 in the LUE shoulder. Pain is focused in inferior AC jt and post cuff     Objective:  Exercises:  Modalities: IFC/MH in supine to L shoulder x 15 minutes  Exercise    LUE shoulder pain, RTC tear Reps/ Time Weight/ Level Comments         Pulleys (flex, abd) 3'/3'             Scapular retraction 5\" 2x10       Wall walks flex x10      Wall walks Abd x10             Tband:      Retraction 2x10 Green     Ext 2x10 Green    Isodynamic ER/IR 2x10 Green    bicep 2x10 Green    tricep 2x10 Green                      Manual: LUE Shoulder PROM/manual stretches into all planes, GH inf glides      Treatment Charges: Mins Units   [x]  Modalities 15 1   [x]  Ther Exercise 30 2   [x]  Manual Therapy 15 1   []  Ther Activities     []  Aquatics     []  Vasocompression     []  Other     Total Treatment time 60 4       Assessment: [] Progressing toward goals. [] No change.      [] Other:  [x] Patient would continue to benefit from skilled physical therapy services in order to improve strength in scapular stabilization muscles and decrease pain with

## 2020-07-01 ENCOUNTER — HOSPITAL ENCOUNTER (OUTPATIENT)
Dept: PHYSICAL THERAPY | Facility: CLINIC | Age: 50
Setting detail: THERAPIES SERIES
Discharge: HOME | End: 2020-07-01
Payer: MEDICAID

## 2020-07-06 ENCOUNTER — HOSPITAL ENCOUNTER (OUTPATIENT)
Dept: PHYSICAL THERAPY | Facility: CLINIC | Age: 50
Setting detail: THERAPIES SERIES
Discharge: HOME | End: 2020-07-06
Payer: MEDICAID

## 2020-07-06 PROCEDURE — 97110 THERAPEUTIC EXERCISES: CPT

## 2020-07-06 PROCEDURE — G0283 ELEC STIM OTHER THAN WOUND: HCPCS

## 2020-07-06 PROCEDURE — 97140 MANUAL THERAPY 1/> REGIONS: CPT

## 2020-07-06 NOTE — FLOWSHEET NOTE
[] [facility] &  Therapy  [address]  [phone]  F: [phone] [x] [address]  [facility] 36   [address]  P: [phone]  F: [phone]     Physical Therapy Daily Treatment Note    Date:  2020  Patient Name:  Pebbles Brush    :  1970  MRN: [id number]  Physician: Dr. Yani Mireles MD                               Insurance: [employer] Vs  Medical Diagnosis: M25.512 - Chronic left shoulder pain                 Rehab Codes: B18.113, M25.511, M62.81  Onset Date: 2020 referral                      Next 's appt: 2020  Visits: 3     Cancels/No Shows: 0    Subjective:    Pain:  [x] Yes  [] No Location: LUE shoulder ant, post cuff  Pain Rating: (0-10 scale) 5/10  Pain altered Tx:  [] No  [] Yes  Action:  Comments: Pt reports she feels better overall. Objective:  Exercises:  Modalities: IFC/MH in supine to L shoulder x 15 minutes  Exercise    LUE shoulder pain, RTC tear Reps/ Time Weight/ Level Comments         Pulleys (flex, abd) 3'/3'    when transitioning patient hurt her shoulder when going from flex to abd. Scapular retraction 5\" 2x10       Wall walks flex x10      Wall walks Abd x10       Push ups  wall 10x  Added 7/6   Weighted ball rolls 15x ea dir White 1# Added 7/6         Tband:      Retraction 2x10 Green     Ext 2x10 Green    Isodynamic ER/IR 2x10 Green    bicep 2x10 Green    tricep 2x10 Green                      Manual: LUE Shoulder PROM/manual stretches into all planes, GH inf glides      Treatment Charges: Mins Units   [x]  Modalities 15 1   [x]  Ther Exercise 30 2   [x]  Manual Therapy 10 1   []  Ther Activities     []  Aquatics     []  Vasocompression     []  Other     Total Treatment time 55 4       Assessment: [x] Progressing toward goals. When patient switching positions using pulleys from flexion to abduction, she shouted and \"turned it weird\". Ever since that transition patient had a 8/10 pain and fair tolerance to any other exercise initiated. [] No change. [] Other:  [x] Patient would continue to benefit from skilled physical therapy services in order to improve strength in scapular stabilization muscles and decrease pain with ADLs      STG/LTG:  STG: (to be met in 6 treatments)  1. ? Pain: Pt will report decreased L shoulder pain to no greater than 6/10 with overhead activities and lifting/carrying. 2. ? ROM: Pt will increase L shoulder AROM in all directions by 10 degrees in order to improve tolerance to doing her hair and dressing. 3. ? Strength: Pt will increase L elbow strength to 5/5 globally and  strength by 10 pounds in order to improve tolerance to carrying. 4. Patient to be independent with home exercise program as demonstrated by performance with correct form without cues. LTG: (to be met in 12 treatments)  5. ? Pain: Pt will report decreased L shoulder pain to no greater than 4/10 with overhead activities and lifting/carrying. 6. ? Strength: Pt will increase L abduction strength to 4/5 in order to improve tolerance to ADL's above waist level. 7. ? Function:  Pt will demonstrate improved functional activity tolerance as evident by an improved score on the UEFI to less than 50% functional impairment.       Pt. Education:  [x] Yes  [] No  [x] Reviewed Prior HEP/Ed  Method of Education: [x] Verbal  [x] Demo  [] Written  Comprehension of Education:  [x] Verbalizes understanding. [x] Demonstrates understanding. [x] Needs review. [] Demonstrates/verbalizes HEP/Ed previously given. Plan: [x] Continue current frequency toward long and short term goals. [x] Specific Instructions for subsequent treatments: Progress strength exercises as able.       Time In: 3:00pm             Time Out: 4:00 pm    Electronically signed by:  Danica Remy PTA

## 2020-07-08 ENCOUNTER — HOSPITAL ENCOUNTER (OUTPATIENT)
Dept: PHYSICAL THERAPY | Facility: CLINIC | Age: 50
Setting detail: THERAPIES SERIES
Discharge: HOME | End: 2020-07-08
Payer: MEDICAID

## 2020-07-08 PROCEDURE — 97140 MANUAL THERAPY 1/> REGIONS: CPT

## 2020-07-08 PROCEDURE — G0283 ELEC STIM OTHER THAN WOUND: HCPCS

## 2020-07-08 PROCEDURE — 97110 THERAPEUTIC EXERCISES: CPT

## 2020-07-08 NOTE — FLOWSHEET NOTE
[] [facility]) - [facility] &  Therapy  [address]  [phone]  F: [phone] [x] [address]  [facility] 36   [address]  P: [phone]  F: [phone]     Physical Therapy Daily Treatment Note    Date:  2020  Patient Name:  Mercedes Velazquez    :  1970  MRN: [id number]  Physician: Dr. Marika Boyd MD                               Insurance: [employer] 30 Vs  Medical Diagnosis: M25.512 - Chronic left shoulder pain                 Rehab Codes: R60.772, M25.511, M62.81  Onset Date: 2020 referral                      Next 's appt: 2020  Visit #  4/12   Cancels/No Shows: 0    Subjective:    Pain:  [x] Yes  [] No Location: LUE shoulder ant, post cuff  Pain Rating: (0-10 scale) not bad /10  Pain altered Tx:  [] No  [] Yes  Action:    Comments: Pt states she had some increased pain after last visit. She feels good today as she has not done much. Objective:  Exercises:  Modalities: IFC/MH in supine to L shoulder x 15 minutes  Exercise Reps/Time Weight/Level Comments          UBE      Pulleys   1min ea   Flex, abd          SUPINE      PROM 10min   With mobs    Flexion  15x A    Protraction 15x A    Horiz. Add 15x A    Triceps press             SIDE LYING      Abduction  15x A    ER 15x A    Horiz. Abd 15x A          PRONE      Retraction 15x A    Extension  15x A    Horiz.  Abd  15x A          STANDING      Wall slides   10xea  Flex, abd          Flexion       Abduction       scaption       Biceps curls            T-Band      Extension  2x10 Green     triceps  2x10 Green     Depression       Retraction  2x10 Green     IR 2x10 Green     ER  2x10 Green     Bicep  2x10 Green           Other       Treatment Charges: Mins Units   [x]  Modalities 15 1   [x]  Ther Exercise 35 2   [x]  Manual Therapy 10 1   []  Ther Activities     []  Aquatics     []  Vasocompression     []  Other     Total Treatment time 60 4       Assessment: [x] Progressing toward goals. Added active (A) exercises today to promote ROM and strength in the shoulder. Pt did well with all, she did have some palpable popping in the superior shoulder with A flexion. [] No change. [] Other:  [x] Patient would continue to benefit from skilled physical therapy services in order to improve strength in scapular stabilization muscles and decrease pain with ADLs      STG/LTG:  STG: (to be met in 6 treatments)  1. ? Pain: Pt will report decreased L shoulder pain to no greater than 6/10 with overhead activities and lifting/carrying. 2. ? ROM: Pt will increase L shoulder AROM in all directions by 10 degrees in order to improve tolerance to doing her hair and dressing. 3. ? Strength: Pt will increase L elbow strength to 5/5 globally and  strength by 10 pounds in order to improve tolerance to carrying. 4. Patient to be independent with home exercise program as demonstrated by performance with correct form without cues. LTG: (to be met in 12 treatments)  5. ? Pain: Pt will report decreased L shoulder pain to no greater than 4/10 with overhead activities and lifting/carrying. 6. ? Strength: Pt will increase L abduction strength to 4/5 in order to improve tolerance to ADL's above waist level. 7. ? Function:  Pt will demonstrate improved functional activity tolerance as evident by an improved score on the UEFI to less than 50% functional impairment.       Pt. Education:  [x] Yes  [] No  [x] Reviewed Prior HEP/Ed  Method of Education: [x] Verbal  [x] Demo  [] Written  Comprehension of Education:  [x] Verbalizes understanding. [x] Demonstrates understanding. [x] Needs review. [] Demonstrates/verbalizes HEP/Ed previously given. Plan: [x] Continue current frequency toward long and short term goals.     [] Specific Instructions for subsequent treatments:       Time In: 2:00pm             Time Out: 3:05     Electronically signed by:  Fatoumata Malik

## 2020-07-13 ENCOUNTER — HOSPITAL ENCOUNTER (OUTPATIENT)
Dept: PHYSICAL THERAPY | Facility: CLINIC | Age: 50
Setting detail: THERAPIES SERIES
Discharge: HOME | End: 2020-07-13
Payer: MEDICAID

## 2020-07-13 NOTE — FLOWSHEET NOTE
[] [address]  [address]    [phone]  F: [phone]   [x] [address]  [facility]   [address]  P: [phone]  F: [phone]  [] [place]  [address]  P: [phone]  F: [phone]  [] [address]  [address] [facility] [address]   [address]   [place]: [phone]  F: [phone]   [] [facility]  [address]  [place]: [phone]   F: [phone]     Physical Therapy Cancel/No Show note    Date: 2020  Patient: Pebbles Brush  : 1970  MRN: [id number]    Cancels/No Shows to date:     For today's appointment patient:    [x]  Cancelled    [] Rescheduled appointment    [] No-show     Reason given by patient:    []  Patient ill    []  Conflicting appointment    [] No transportation      [] Conflict with work    [] No reason given    [] Weather related    [] [id number]    [x] Other:      Comments:   Both shoulders hurt      [x] Next appointment was confirmed    Electronically signed by: Fariba Butterfield PTA

## 2020-07-14 ENCOUNTER — HOSPITAL ENCOUNTER (OUTPATIENT)
Dept: PHYSICAL THERAPY | Facility: CLINIC | Age: 50
Setting detail: THERAPIES SERIES
Discharge: HOME | End: 2020-07-14
Payer: MEDICAID

## 2020-07-14 NOTE — FLOWSHEET NOTE
[] [address]  [address]    [phone]  F: [phone]   [x] [address] [facility] 36   [address]  P: [phone]  F: [phone]  [] [address]  [address]  P: [phone]  F: [phone]  [] [address] [facility]   [address]   [place]: [phone]  F: [phone]   [] [address]  [place]: [phone]   F: [phone]     Physical Therapy Cancel/No Show note    Date: 2020  Patient: Astrid Logan  : 1970  MRN: [id number]    Cancels/No Shows to date:     For today's appointment patient:    [x]  Cancelled    [] Rescheduled appointment    [] No-show     Reason given by patient:    []  Patient ill    []  Conflicting appointment    [] No transportation      [] Conflict with work    [] No reason given    [] Weather related    [] [id number]    [x] Other:      Comments:  Still hurting      [x] Next appointment was confirmed    Electronically signed by: Niurka Mireles, PT

## 2020-07-15 ENCOUNTER — HOSPITAL ENCOUNTER (OUTPATIENT)
Dept: PHYSICAL THERAPY | Facility: CLINIC | Age: 50
Setting detail: THERAPIES SERIES
Discharge: HOME | End: 2020-07-15
Payer: MEDICAID

## 2020-07-15 PROCEDURE — 97140 MANUAL THERAPY 1/> REGIONS: CPT

## 2020-07-15 PROCEDURE — 97110 THERAPEUTIC EXERCISES: CPT

## 2020-07-15 NOTE — FLOWSHEET NOTE
[] [facility]) - [facility] CENTER &  Therapy  [address]  [phone]  F: [phone] [x] [address]  [facility]   [address]  P: [phone]  F: [phone]     Physical Therapy Daily Treatment Note    Date:  7/15/2020  Patient Name:  Letty Nation    :  1970  MRN: [id number]  Physician: Dr. Chalo Hamilton MD                               Insurance: [employer] 30 Vs  Medical Diagnosis: M25.512 - Chronic left shoulder pain                 Rehab Codes: P50.520, M25.511, M62.81  Onset Date: 2020 referral                      Next 's appt: 2020  Visit #  5/12   Cancels/No Shows: 2/0    Subjective:    Pain:  [x] Yes  [] No Location: LUE shoulder ant, post cuff  Pain Rating: (0-10 scale) 3 /10  Pain altered Tx:  [] No  [] Yes  Action:    Comments: Pt states her pain has improved since last week. She is completing HEP and stretching. Objective:  Exercises:  Modalities: IFC/MH in supine to L shoulder x 15 minutes  Exercise Reps/Time Weight/Level Comments          UBE Skipped      Pulleys   1min ea   Flex, abd          SUPINE      PROM 10min   With mobs    Flexion  15x A    Protraction 15x A    Horiz. Add 15x A    Triceps press             SIDE LYING      Abduction  15x A    ER 15x A    Horiz. Abd 15x A          PRONE      Retraction 15x A    Extension  15x A    Horiz.  Abd  15x A    Row 15x A Added 7/15         STANDING      Wall slides   10xea  Flex, abd          Flexion       Abduction       scaption       Biceps curls            T-Band NOT TODAY     Extension  2x10 Green     triceps  2x10 Green     Depression       Retraction  2x10 Green     IR 2x10 Green     ER  2x10 Green     Bicep  2x10 Green           Other       Treatment Charges: Mins Units   [x]  Modalities     [x]  Ther Exercise 30 2   [x]  Manual Therapy 10 1   []  Ther Activities     []  Aquatics     []  Vasocompression     []  Other Total Treatment time 40 3       Assessment: [x] Progressing toward goals. Patient arrived 15 minutes late and therefore not all exercises were completed. Overall better tolerance to joint mobilizations and PROM. [] No change. [] Other:  [x] Patient would continue to benefit from skilled physical therapy services in order to improve strength in scapular stabilization muscles and decrease pain with ADLs      STG/LTG:  STG: (to be met in 6 treatments)  1. ? Pain: Pt will report decreased L shoulder pain to no greater than 6/10 with overhead activities and lifting/carrying. 2. ? ROM: Pt will increase L shoulder AROM in all directions by 10 degrees in order to improve tolerance to doing her hair and dressing. 3. ? Strength: Pt will increase L elbow strength to 5/5 globally and  strength by 10 pounds in order to improve tolerance to carrying. 4. Patient to be independent with home exercise program as demonstrated by performance with correct form without cues. LTG: (to be met in 12 treatments)  5. ? Pain: Pt will report decreased L shoulder pain to no greater than 4/10 with overhead activities and lifting/carrying. 6. ? Strength: Pt will increase L abduction strength to 4/5 in order to improve tolerance to ADL's above waist level. 7. ? Function:  Pt will demonstrate improved functional activity tolerance as evident by an improved score on the UEFI to less than 50% functional impairment.       Pt. Education:  [x] Yes  [] No  [x] Reviewed Prior HEP/Ed  Method of Education: [x] Verbal  [x] Demo  [] Written  Comprehension of Education:  [x] Verbalizes understanding. [x] Demonstrates understanding. [x] Needs review. [] Demonstrates/verbalizes HEP/Ed previously given. Plan: [x] Continue current frequency toward long and short term goals. [] Specific Instructions for subsequent treatments: Complete Tband activities as they were held today due to time.        Time In: 2:15 pm             Time Out: 3:00 Electronically signed by:  Monica Randall PTA

## 2020-07-17 ENCOUNTER — HOSPITAL ENCOUNTER (OUTPATIENT)
Dept: PHYSICAL THERAPY | Facility: CLINIC | Age: 50
Setting detail: THERAPIES SERIES
Discharge: HOME | End: 2020-07-17
Payer: MEDICAID

## 2020-07-17 PROCEDURE — 97140 MANUAL THERAPY 1/> REGIONS: CPT

## 2020-07-17 PROCEDURE — 97110 THERAPEUTIC EXERCISES: CPT

## 2020-07-17 PROCEDURE — G0283 ELEC STIM OTHER THAN WOUND: HCPCS

## 2020-07-17 NOTE — FLOWSHEET NOTE
[] [facility]) - [facility] &  Therapy  [address]  [phone]  F: [phone] [x] [address]  [facility] 36   [address]  P: [phone]  F: [phone]     Physical Therapy Daily Treatment Note    Date:  2020  Patient Name:  Flavia Rich    :  1970  MRN: [id number]  Physician: Dr. Mitch Reed MD                               Insurance: [employer] 30 Vs  Medical Diagnosis: M25.512 - Chronic left shoulder pain                 Rehab Codes: J12.841, M25.511, M62.81  Onset Date: 2020 referral                      Next 's appt: 2020  Visit #  6/12   Cancels/No Shows: 2/0    Subjective:    Pain:  [x] Yes  [] No Location: LUE shoulder  Pain Rating: (0-10 scale) 5/10  Pain altered Tx:  [] No  [] Yes  Action:    Comments: Pt reports her pain to be consistent. Requests to be out by 2:45 for another appt which limits treatment time. Objective:  Exercises:  Modalities: IFC/MH in supine to L shoulder x 15 minutes  Exercise Reps/Time Weight/Level Comments          UBE      Pulleys   1min ea   Flex, abd          SUPINE      PROM 10min   With mobs    Flexion  15x A    Protraction 15x A    Horiz. Add 15x A    Triceps press             SIDE LYING      Abduction  15x A    ER 15x A    Horiz. Abd 15x A          PRONE      Retraction 15x A    Extension  15x A    Horiz.  Abd  15x A                STANDING   Not  d/t pt needing to leave   Wall slides   10xea  Flex, abd          Flexion       Abduction       scaption       Biceps curls            T-Band   Not  d/t pt needing to leave   Extension  2x10 Green     triceps  2x10 Green     Depression       Retraction  2x10 Green     IR 2x10 Green     ER  2x10 Green     Bicep  2x10 Green           Other       Treatment Charges: Mins Units   [x]  Modalities- HP/IFC 15/15 0/1   [x]  Ther Exercise 13 1   [x]  Manual Therapy 10 1   []  Ther Activities recall of exercises. Plan: [x] Continue current frequency toward long and short term goals.     [x] Specific Instructions for subsequent treatments: resume all exercises and progress PREs      Time In: 2:07pm             Time Out: 2:45pm    Electronically signed by:  Tom William PTA

## 2020-07-20 ENCOUNTER — HOSPITAL ENCOUNTER (OUTPATIENT)
Dept: PHYSICAL THERAPY | Facility: CLINIC | Age: 50
Setting detail: THERAPIES SERIES
Discharge: HOME | End: 2020-07-20
Payer: MEDICAID

## 2020-07-20 PROCEDURE — 97110 THERAPEUTIC EXERCISES: CPT

## 2020-07-20 PROCEDURE — G0283 ELEC STIM OTHER THAN WOUND: HCPCS

## 2020-07-20 PROCEDURE — 97140 MANUAL THERAPY 1/> REGIONS: CPT

## 2020-07-20 NOTE — FLOWSHEET NOTE
Progressing toward goals. [] No change. [x] Other: resumed all exercises today. pt again displays good recall of exercises needing only a few cues throughout. After the mat exercises she states the pain has elevated. [x] Patient would continue to benefit from skilled physical therapy services in order to improve strength in scapular stabilization muscles and decrease pain with ADLs      STG/LTG:  STG: (to be met in 6 treatments)  1. ? Pain: Pt will report decreased L shoulder pain to no greater than 6/10 with overhead activities and lifting/carrying. 2. ? ROM: Pt will increase L shoulder AROM in all directions by 10 degrees in order to improve tolerance to doing her hair and dressing. 3. ? Strength: Pt will increase L elbow strength to 5/5 globally and  strength by 10 pounds in order to improve tolerance to carrying. 4. Patient to be independent with home exercise program as demonstrated by performance with correct form without cues. LTG: (to be met in 12 treatments)  5. ? Pain: Pt will report decreased L shoulder pain to no greater than 4/10 with overhead activities and lifting/carrying. 6. ? Strength: Pt will increase L abduction strength to 4/5 in order to improve tolerance to ADL's above waist level. 7. ? Function:  Pt will demonstrate improved functional activity tolerance as evident by an improved score on the UEFI to less than 50% functional impairment.       Pt. Education:  [x] Yes  [] No  [] Reviewed Prior HEP/Ed  Method of Education: [x] Verbal  [] Demo  [] Written  Comprehension of Education:  [x] Verbalizes understanding. [] Demonstrates understanding. [] Needs review. [x] Demonstrates/verbalizes HEP/Ed previously given. Good memory recall of exercises. Plan: [x] Continue current frequency toward long and short term goals.     [x] Specific Instructions for subsequent treatments: resume all exercises and progress PREs      Time In: 2:10pm             Time Out: 3:00pm    Electronically signed by:  John Leyva PTA

## 2020-07-21 ENCOUNTER — APPOINTMENT (OUTPATIENT)
Dept: PHYSICAL THERAPY | Facility: CLINIC | Age: 50
End: 2020-07-21
Payer: MEDICAID

## 2020-07-22 ENCOUNTER — HOSPITAL ENCOUNTER (OUTPATIENT)
Dept: PHYSICAL THERAPY | Facility: CLINIC | Age: 50
Setting detail: THERAPIES SERIES
Discharge: HOME | End: 2020-07-22
Payer: MEDICAID

## 2020-07-22 PROCEDURE — 97140 MANUAL THERAPY 1/> REGIONS: CPT

## 2020-07-22 PROCEDURE — G0283 ELEC STIM OTHER THAN WOUND: HCPCS

## 2020-07-22 PROCEDURE — 97110 THERAPEUTIC EXERCISES: CPT

## 2020-07-22 NOTE — FLOWSHEET NOTE
Other : pt refuses completing tband ex this date due to increased pain. Empty end feels flexion and abduction PROM    Completed by primary PT 7/22/2020 ROM  °A/P STRENGTH     Left Right Left Right   Sit Shld Flex 126  4- 4   Sit Shld Abd 105  4- 4   Sit Shld IR          Shoulder Flex          Ext          ABD          ER @ 0 68  4 4+   IR     4+ 4+   Elbow Flex.     5 5   Elbow Ext.     5 5   Pronation           Supination           Wrist Flex.           Wrist Ext.           Rad. Dev.           Ulnar Dev.                15# 26#          Treatment Charges: Mins Units   [x]  Modalities- HP/IFC 15/15 0/1   [x]  Ther Exercise 20 1   [x]  Manual Therapy 15 1   []  Ther Activities     []  Aquatics     []  Vasocompression     []  Other     Total Treatment time 50 3       Assessment: [] Progressing toward goals. [] No change. [x] Other: Pt reporting increased pain today and thus is not able to complete full routine including tband ex. Empty end feels with PROM, especially in abduction and flexion planes   [x] Patient would continue to benefit from skilled physical therapy services in order to improve strength in scapular stabilization muscles and decrease pain with ADLs      STG/LTG:  STG: (to be met in 6 treatments)  1. ? Pain: Pt will report decreased L shoulder pain to no greater than 6/10 with overhead activities and lifting/carrying. - Ongoing 7/22/2020  2. ? ROM: Pt will increase L shoulder AROM in all directions by 10 degrees in order to improve tolerance to doing her hair and dressing. - Partially met 7/22/2020, met for flexion and ER at this date not ER  3. ? Strength: Pt will increase L elbow strength to 5/5 globally and  strength by 10 pounds in order to improve tolerance to carrying. - Partially met 7/22/2020, met for elbow strength,  strength remains unchanged  4. Patient to be independent with home exercise program as demonstrated by performance with correct form without cues.  - MET

## 2020-07-28 ENCOUNTER — HOSPITAL ENCOUNTER (OUTPATIENT)
Dept: PHYSICAL THERAPY | Facility: CLINIC | Age: 50
Setting detail: THERAPIES SERIES
Discharge: HOME | End: 2020-07-28
Payer: MEDICAID

## 2020-07-30 ENCOUNTER — HOSPITAL ENCOUNTER (OUTPATIENT)
Dept: PHYSICAL THERAPY | Facility: CLINIC | Age: 50
Setting detail: THERAPIES SERIES
Discharge: HOME | End: 2020-07-30
Payer: MEDICAID

## 2020-07-30 PROCEDURE — 97140 MANUAL THERAPY 1/> REGIONS: CPT

## 2020-07-30 PROCEDURE — 97110 THERAPEUTIC EXERCISES: CPT

## 2020-07-30 PROCEDURE — G0283 ELEC STIM OTHER THAN WOUND: HCPCS

## 2020-07-30 NOTE — FLOWSHEET NOTE
[] [facility]) [facility] CENTER &  Therapy  [address]  [phone]  F: [phone] [x] [address]  [facility]   [address]  P: [phone]  F: [phone]     Physical Therapy Daily Treatment Note    Date:  2020  Patient Name:  Esther Jimenez    :  1970  MRN: [id number]  Physician: Dr. Cathy Marmolejo MD                               Insurance: [employer]  Medical Diagnosis: M25.512 - Chronic left shoulder pain                 Rehab Codes: C14.968, M25.511, M62.81  Onset Date: 2020 referral                      Next 's appt: 2020  Visit #     Cancels/No Shows:     Subjective:    Pain:  [x] Yes  [] No Location: LUE shoulder  Pain Rating: (0-10 scale) 810  Pain altered Tx:  [] No  [] Yes  Action:    Comments: Pt arrives with increased pain and no cause. States she almost didn't come today. Objective:  Exercises: bold completed   Modalities: IFC/MH in supine to L shoulder x 15 minutes at end of session: Pt shouted when @ 6.0 but ended up with machiene turned up to 9.8. Exercise Reps/Time Weight/Level Comments          UBE 1 1/2'F; 1 1/2'R Not today    Pulleys   1min ea   Flex, abd          SUPINE      PROM 15 min   With mobs, also oscillations and distraction   Flexion  10x 1# wand Pt preferred use of wand today with supine PRE's    Protraction 15x 1# wand    Horiz. Add 10x 1# WAND    Triceps press             SIDE LYING      Abduction  15x A    ER 15x A    Horiz. Abd ~10x A Stopped early due to pain         PRONE      Retraction 15x A    Extension  15x A    Horiz.  Abd  15x A    Flexion  10x A          Seated      Upper trap 20\"x2 ea  Added    Levator stretch 20\"x2 ea  Added          STANDING      Wall slides   10x           Flexion       Abduction       scaption       Biceps curls            T-Band      Extension  10x Blue  Added    triceps       Depression by 10 pounds in order to improve tolerance to carrying. - Partially met 7/22/2020, met for elbow strength,  strength remains unchanged  4. Patient to be independent with home exercise program as demonstrated by performance with correct form without cues. - MET 7/22/2020  LTG: (to be met in 12 treatments)  5. ? Pain: Pt will report decreased L shoulder pain to no greater than 4/10 with overhead activities and lifting/carrying. 6. ? Strength: Pt will increase L abduction strength to 4/5 in order to improve tolerance to ADL's above waist level. 7. ? Function:  Pt will demonstrate improved functional activity tolerance as evident by an improved score on the UEFI to less than 50% functional impairment.       Pt. Education:  [x] Yes  [] No  [] Reviewed Prior HEP/Ed  Method of Education: [x] Verbal  [] Demo  [] Written  Comprehension of Education:  [x] Verbalizes understanding. [] Demonstrates understanding. [] Needs review. [x] Demonstrates/verbalizes HEP/Ed previously given. Good memory recall of exercises. Plan: [x] Continue current frequency toward long and short term goals. [x] Specific Instructions for subsequent treatments: resume all exercises and progress PREs as able.        Time In: 2:00 pm             Time Out: 3: 02 pm    Electronically signed by:  Joeseph Canavan, PTA

## 2020-08-05 ENCOUNTER — HOSPITAL ENCOUNTER (OUTPATIENT)
Dept: PHYSICAL THERAPY | Facility: CLINIC | Age: 50
Setting detail: THERAPIES SERIES
Discharge: HOME | End: 2020-08-05
Payer: MEDICAID

## 2020-08-05 NOTE — FLOWSHEET NOTE
[] [address]  [address]    [phone]  F: [phone]   [] [address]  [place] [address]   [address]  P: [phone]  F: [phone]  [] [place]  [address] [place] [address]  P: [phone]  F: [phone]  [] [address]  [address] [address] [facility]   [address]   [place]: [phone]  F: [phone]   [] [facility]  [address] [facility] [address]  [place]: [phone]   F: [phone]     Physical Therapy Cancel/No Show note    Date: 2020  Patient: Ninoska Lopez  : 1970  MRN: [id number]    Cancels/No Shows to date: 3/1    For today's appointment patient:    [x]  Cancelled    [] Rescheduled appointment    [] No-show     Reason given by patient:    []  Patient ill    []  Conflicting appointment    [] No transportation      [] Conflict with work    [] No reason given    [] Weather related    [] [id number]    [x] Other:      Comments:   Too much pain      [x] Next appointment was confirmed    Electronically signed by: Brooklyn Bray, PT

## 2020-08-06 ENCOUNTER — HOSPITAL ENCOUNTER (OUTPATIENT)
Dept: PHYSICAL THERAPY | Facility: CLINIC | Age: 50
Setting detail: THERAPIES SERIES
Discharge: HOME | End: 2020-08-06
Payer: MEDICAID

## 2020-08-06 NOTE — FLOWSHEET NOTE
[] [address]  [address]    [phone]  F: [phone]   [] [address]  [facility]   [address]  P: [phone]  F: [phone]  [] [place]  [address] [facility]  P: [phone]  F: [phone]  [] [address]  [address] [address] [facility] [address]   [address]   [place]: [phone]  F: [phone]   [] [place] [address] [facility] [address]  [place]: [phone]   F: [phone]     Physical Therapy Cancel/No Show note    Date: 2020  Patient: Keri Hare  : 1970  MRN: [id number]    Cancels/No Shows to date:     For today's appointment patient:    [x]  Cancelled    [] Rescheduled appointment    [] No-show     Reason given by patient:    []  Patient ill    []  Conflicting appointment    [] No transportation      [] Conflict with work    [] No reason given    [] Weather related    [] [id number]    [x] Other:      Comments:   Too much pain      [x] Next appointment was confirmed    Electronically signed by: Janice Nunez PTA

## 2020-08-12 ENCOUNTER — HOSPITAL ENCOUNTER (OUTPATIENT)
Dept: PHYSICAL THERAPY | Facility: CLINIC | Age: 50
Setting detail: THERAPIES SERIES
Discharge: HOME | End: 2020-08-12
Payer: MEDICAID

## 2020-08-12 PROCEDURE — 97110 THERAPEUTIC EXERCISES: CPT

## 2020-08-12 NOTE — FLOWSHEET NOTE
[] [facility]) [facility] &  Therapy  [address]  [phone]  F: [phone] [x] [email or web address]  [facility] 36   [address]  P: [phone]  F: [phone]     Physical Therapy Daily Treatment Note    Date:  2020  Patient Name:  Dafne Brown    :  1970  MRN: [id number]  Physician: Dr. Seema Lorenzo MD                               Insurance: [place] [employer] 30 Vs  Medical Diagnosis: M25.512 - Chronic left shoulder pain                 Rehab Codes: P61.226, M25.511, M62.81  Onset Date: 2020 referral                      Next 's appt: 2020  Visit #  10/12   Cancels/No Shows:     Subjective:    Pain:  [x] Yes  [] No Location: LUE shoulder  Pain Rating: (0-10 scale) 4-5/10  Pain altered Tx:  [] No  [] Yes  Action:    Comments: Pt arrives stating her pain is better and she is \"over\" what was causing her a lot of pain. Objective:  Exercises: bold completed   Modalities: MH in supine to L shoulder x 15 minutes at end of session:   Exercise Reps/Time Weight/Level Comments          UBE 1 1/2'F; 1 1/2'R Not today    Pulleys   1min ea   Flex, abd          SUPINE      PROM 15 min   With mobs, also oscillations and distraction   Flexion  10x 1# wand Pt preferred use of wand today with supine PRE's    Protraction 15x 1# wand    Horiz. Add 10x 1# WAND    Abduction 15 cane Added    ER 15 Cane  Added    Triceps press             SIDE LYING      Abduction  15x A    ER 15x A    Horiz. Abd ~10x A Stopped early due to pain         PRONE      Retraction 15x A    Extension  15x A    Horiz.  Abd  15x A    Flexion  10x A          Seated      Upper trap 20\"x2 ea  Added    Levator stretch 20\"x2 ea  Added          STANDING      Wall slides   10x  abd and flexion /         Flexion       Abduction       scaption       Biceps curls            T-Band      Extension  10x Blue  Added    triceps Depression       Retraction  10x blue Added 7/30   IR      ER  10x blue Added 7/30   Bicep             Other:    Completed by primary PT 7/22/2020 ROM  °A/P STRENGTH     Left Right Left Right   Sit Shld Flex 126  4- 4   Sit Shld Abd 105  4- 4   Sit Shld IR          Shoulder Flex          Ext          ABD          ER @ 0 68  4 4+   IR     4+ 4+   Elbow Flex.     5 5   Elbow Ext.     5 5   Pronation           Supination           Wrist Flex.           Wrist Ext.           Rad. Dev.           Ulnar Dev.                15# 26#          Treatment Charges: Mins Units   [x]  Modalities- HP/IFC 15 0   [x]  Ther Exercise 40 3   []  Manual Therapy     []  Ther Activities     []  Aquatics     []  Vasocompression     []  Other     Total Treatment time 40 3       Assessment: [x] Progressing toward goals. Progressed cane exercises and resumed exercises once held due to pain. Better tolerance to all treatment and PROM this date. Patient only wanting heat this date. [] No change. [] Other:   [x] Patient would continue to benefit from skilled physical therapy services in order to improve strength in scapular stabilization muscles and decrease pain with ADLs      STG/LTG:  STG: (to be met in 6 treatments)  1. ? Pain: Pt will report decreased L shoulder pain to no greater than 6/10 with overhead activities and lifting/carrying. - Ongoing 7/22/2020  2. ? ROM: Pt will increase L shoulder AROM in all directions by 10 degrees in order to improve tolerance to doing her hair and dressing. - Partially met 7/22/2020, met for flexion and ER at this date not ER  3. ? Strength: Pt will increase L elbow strength to 5/5 globally and  strength by 10 pounds in order to improve tolerance to carrying. - Partially met 7/22/2020, met for elbow strength,  strength remains unchanged  4. Patient to be independent with home exercise program as demonstrated by performance with correct form without cues.  - MET 7/22/2020  LTG: (to be met in 12 treatments)  5. ? Pain: Pt will report decreased L shoulder pain to no greater than 4/10 with overhead activities and lifting/carrying. 6. ? Strength: Pt will increase L abduction strength to 4/5 in order to improve tolerance to ADL's above waist level. 7. ? Function:  Pt will demonstrate improved functional activity tolerance as evident by an improved score on the UEFI to less than 50% functional impairment.       Pt. Education:  [x] Yes  [] No  [] Reviewed Prior HEP/Ed  Method of Education: [x] Verbal  [] Demo  [] Written  Comprehension of Education:  [x] Verbalizes understanding. [] Demonstrates understanding. [] Needs review. [x] Demonstrates/verbalizes HEP/Ed previously given. Good memory recall of exercises. Plan: [x] Continue current frequency toward long and short term goals. [x] Specific Instructions for subsequent treatments: resume all exercises and progress PREs as able.        Time In: 3:00 pm             Time Out: 3:55 pm    Electronically signed by:  Kerry Reyes PTA

## 2020-08-18 ENCOUNTER — HOSPITAL ENCOUNTER (OUTPATIENT)
Dept: PHYSICAL THERAPY | Facility: CLINIC | Age: 50
Setting detail: THERAPIES SERIES
Discharge: HOME | End: 2020-08-18
Payer: MEDICAID

## 2020-08-18 PROCEDURE — 97110 THERAPEUTIC EXERCISES: CPT

## 2020-08-18 NOTE — FLOWSHEET NOTE
[] [facility]) [facility] &  Therapy  [address]  [phone]  F: [phone] [x] [address]  [facility] 36   [address]  P: [phone]  F: [phone]     Physical Therapy Daily Treatment Note    Date:  2020  Patient Name:  Dafne Brown    :  1970  MRN: [id number]  Physician: Dr. Seema Lorenzo MD                               Insurance: [employer] 30 Vs  Medical Diagnosis: M25.512 - Chronic left shoulder pain                 Rehab Codes: G78.656, M25.511, M62.81  Onset Date: 2020 referral                      Next 's appt: 2020  Visit #  11/12   Cancels/No Shows:     Subjective:    Pain:  [x] Yes  [] No Location: LUE shoulder  Pain Rating: (0-10 scale) 3/10  Pain altered Tx:  [] No  [] Yes  Action:    Comments: Pt arrives 10 minutes late to therapy. Pt reports her shoulder is feeling much better this date. Pt reports she took a \"sleeping pill\" at 8am this morning to help her sleep throughout the day because she did not get any sleep last night. Objective:  Exercises: bold completed   Modalities: MH in supine to L shoulder x 15 minutes at end of session:   Exercise Reps/Time Weight/Level Comments          UBE 1 1/2'F; 1 /2'R Not today    Pulleys   2min ea   Flex, abd          SUPINE      PROM 5 min   With mobs, also oscillations and distraction   Flexion  10x 1# wand Pt preferred use of wand today with supine PRE's    Protraction 15x 1# wand    Horiz. Add 10x 1# wand    Abduction 15 1# wand Added , progressed    ER 15 1# wand  Added , progressed    Triceps press             SIDE LYING      Abduction  15x A    ER 15x A    Horiz. Abd 15x A          PRONE      Retraction 15x A    Extension  15x A    Horiz.  Abd  15x A    Flexion  10x A          SEATED      Upper trap 20\"x2 ea  Added    Levator stretch 20\"x2 ea  Added    Scapular retraction  15x  Added all directions by 10 degrees in order to improve tolerance to doing her hair and dressing. - Partially met 7/22/2020, met for flexion and ER at this date not ER  3. ? Strength: Pt will increase L elbow strength to 5/5 globally and  strength by 10 pounds in order to improve tolerance to carrying. - Partially met 7/22/2020, met for elbow strength,  strength remains unchanged  4. Patient to be independent with home exercise program as demonstrated by performance with correct form without cues. - MET 7/22/2020  LTG: (to be met in 12 treatments)  5. ? Pain: Pt will report decreased L shoulder pain to no greater than 4/10 with overhead activities and lifting/carrying. 6. ? Strength: Pt will increase L abduction strength to 4/5 in order to improve tolerance to ADL's above waist level. 7. ? Function:  Pt will demonstrate improved functional activity tolerance as evident by an improved score on the UEFI to less than 50% functional impairment.       Pt. Education:  [x] Yes  [] No  [] Reviewed Prior HEP/Ed  Method of Education: [x] Verbal  [] Demo  [] Written  Comprehension of Education:  [x] Verbalizes understanding. [] Demonstrates understanding. [] Needs review. [x] Demonstrates/verbalizes HEP/Ed previously given. Good memory recall of exercises. Plan: [x] Continue current frequency toward long and short term goals. [x] Specific Instructions for subsequent treatments: resume all exercises and progress PREs as able.        Time In: 3:10 pm             Time Out: 3:53 pm    Electronically signed by:  Milly PRADO  Entirety of treatment was under direct supervision of [facility]

## 2020-08-20 ENCOUNTER — OFFICE VISIT (OUTPATIENT)
Dept: ORTHOPEDIC SURGERY | Age: 50
End: 2020-08-20
Payer: COMMERCIAL

## 2020-08-20 VITALS
BODY MASS INDEX: 22.18 KG/M2 | SYSTOLIC BLOOD PRESSURE: 129 MMHG | WEIGHT: 110 LBS | DIASTOLIC BLOOD PRESSURE: 78 MMHG | HEART RATE: 60 BPM | HEIGHT: 59 IN

## 2020-08-20 ASSESSMENT — ENCOUNTER SYMPTOMS
ABDOMINAL DISTENTION: 0
COUGH: 0
ABDOMINAL PAIN: 0
NAUSEA: 0
VOMITING: 0
APNEA: 0
CONSTIPATION: 0
SHORTNESS OF BREATH: 0
DIARRHEA: 0
CHEST TIGHTNESS: 0
COLOR CHANGE: 0

## 2020-08-20 NOTE — PROGRESS NOTES
[address] AND SPORTS MEDICINE  [address] [place] [address]  Dept: [phone]  Dept Fax: [phone]        Right Shoulder - Follow Up - [facility] Injury    Chief Complaint:     Chief Complaint   Patient presents with    Shoulder Pain     [facility], right shoulder pain, last cortisone- 6/8/20   [facility] claim #: [id number]  DOI: 07/02/2008  Agusto Hemp        F59.629        L44.916B        C94. 9        M75.41        E62.074  [id number]  HPI:     Aydin Alejandro is a 44y.o. year old right hand dominant female that has had pain in the right shoulder for 1 year. As far as any trauma to the shoulder, the patient indicates she aggravated the shoulder at work when she was lifting a car seat in February 2019. The pain is worse at night and when doing overhead activities. Weakness of the shoulder has been noted. The pain restricts activities such as reaching above the head, lifting heavy objects and sleeping without any pain at night. The pain does not seem to improve with time. The following medications have been tried: percocet, lyrica, and celebrex. The patient has had a corticosteroid injection into the glenohumeral joint on 06/08/2020 with mild pain relief. The patient has tried physical therapy and she does home exercises. The patient has has surgery 4 times and the last one was done by Dr. Esteban Jarvis. The surgery was a right shoulder arthroscopy with debridement of glenoid, humeral head, rotator interval scarring, labral fraying, intraarticular release of anterior interval slide, subacromial extensive debridement, lysis of adhesions, and removal of suture granuloma. The date of surgery was on 02/20/2018. Therefore we are 2 years and 6 months post op. The opposite shoulder is okay. Neck pain has not been present. Patient states that her shoulder is still having pain and she also has been having some catching in the shoulder as well.  Patient states that she would like to get an MRI of the shoulder because she is tired of dealing with the pain. Review of Systems   Constitutional: Positive for activity change. Negative for appetite change, fatigue and fever. Respiratory: Negative for apnea, cough, chest tightness and shortness of breath. Cardiovascular: Negative for chest pain, palpitations and leg swelling. Gastrointestinal: Negative for abdominal distention, abdominal pain, constipation, diarrhea, nausea and vomiting. Genitourinary: Negative for difficulty urinating, dysuria and hematuria. Musculoskeletal: Positive for arthralgias. Negative for gait problem, joint swelling and myalgias. Skin: Negative for color change and rash. Neurological: Negative for dizziness, weakness, numbness and headaches. Psychiatric/Behavioral: Negative for sleep disturbance. Past Medical History:    Past Medical History:   Diagnosis Date    Anemia     Anxiety     Chronic right shoulder pain     Depression     Irregular heartbeat     Sciatica      Past Surgical History:    Past Surgical History:   Procedure Laterality Date    BRAIN ANEURYSM SURGERY  12/2014    Coiling at [facility]      x4     COLONOSCOPY  04/18/2019    COLONOSCOPY N/A 4/18/2019    COLORECTAL CANCER SCREENING, NOT HIGH RISK performed by Abilio Jenkins MD at [address] [facility] Right 2/20/2018    RIGHT SHOULDER ARTHROSCOPY ROTATOR CUFF LYSIS OF ADHESIONS performed by Deni Vizcaino DO at [address] [facility] ARTHROSCOPY Right 02/20/2018    extensive lysis of adhesions; Open suture granuloma removal     SHOULDER SURGERY Right     x 3    TONSILLECTOMY       Current Medications:   Current Outpatient Medications   Medication Sig Dispense Refill    ipratropium (ATROVENT) 0.06 % nasal spray 2 sprays by Each Nostril route 4 times daily 1 Bottle 3    pregabalin (LYRICA) 100 MG capsule Take 100 mg by mouth 2 times daily.       celecoxib (CELEBREX) 200 MG capsule take 1 capsule by mouth once daily prn  0     No current facility-administered medications for this visit. Allergies:    Patient has no known allergies. Social History:   Social History     Socioeconomic History    Marital status: Legally      Spouse name: None    Number of children: None    Years of education: None    Highest education level: None   Occupational History    None   Social Needs    Financial resource strain: None    Food insecurity     Worry: None     Inability: None    Transportation needs     Medical: None     Non-medical: None   Tobacco Use    Smoking status: Current Every Day Smoker     Packs/day: 0.50     Years: 12.00     Pack years: 6.00     Types: Cigarettes    Smokeless tobacco: Never Used    Tobacco comment: pt smokes about 4 ciggs daily   Substance and Sexual Activity    Alcohol use: No    Drug use: No    Sexual activity: None   Lifestyle    Physical activity     Days per week: None     Minutes per session: None    Stress: None   Relationships    Social connections     Talks on phone: None     Gets together: None     Attends Gnosticism service: None     Active member of club or organization: None     Attends meetings of clubs or organizations: None     Relationship status: None    Intimate partner violence     Fear of current or ex partner: None     Emotionally abused: None     Physically abused: None     Forced sexual activity: None   Other Topics Concern    None   Social History Narrative    None     Family History:  Family History   Problem Relation Age of Onset    Asthma Mother     Cancer Father         throat     I have reviewed the CC, HPI, ROS, PMH, FHX, Social History, and if not present in this note, I have reviewed in the patient's chart. I agree with the documentation provided by other staff and have reviewed their documentation prior to providing my signature indicating agreement.     Vitals:   /78   Pulse 60 Ht 4' 11\" (1.499 m)   Wt 110 lb (49.9 kg)   BMI 22.22 kg/m²  Body mass index is 22.22 kg/m². Physical Examination:     Orthopedics:    GENERAL: Alert and oriented X3 in no acute distress. SKIN: Intact without lesions or ulcerations. NEURO: Musculoskeletal and axillary nerves intact to sensory and motor testing. VASC: Capillary refill is less than 3 seconds. Right Shoulder Exam    GEN: Alert and oriented X 3, in no acute distress. SKIN: Intact without rashes, lesions, or ulcerations. NEURO: Musculoskeletal ans axillary nerves intact to sensory and motor testing. VASC: Cap refill less than than 3 secs. Negative Adson's test, Negative Christina's test.  ROM: 160 degrees of forward elevation, 30 degrees of external rotation in neutral, 90 degrees of external rotation in abduction, internal rotation to back pocket. STRENGTH: Supraspinatus 4+/5, external rotators 5/5. MUSC: No atrophy, negative subscap lift off or belly press test.  IMP: mild Neer's sign, mild Hawkin's sign, (-) Coracoid impingement, no painful arc, no pain with cross body abduction. PALP: no pain over anterolateral acromion, no pain over AC joint, no pain over traps/rhomboids. INST: (+) Juneau's test, (-) Speed's test  Assessment:     1. Right shoulder pain, unspecified chronicity    2. Impingement syndrome of right shoulder    3. Sprain of right shoulder girdle, subsequent encounter    4. Sprain of right rotator cuff capsule, subsequent encounter    5. Arthritis of right shoulder region      Procedures:    Procedure: no  Radiology:   SHOULDER X-RAY    Two views of the right shoulder and 2 views of the scapula, including AP, scapular Y, outlet and axillary views reveal: The glenohumeral joint is well reduced without osteoarthritic changes. Proximal migration of the humeral head has not occurred. Acromion is a type I. The acromioclavicular joint has been resected. Hopkins void noted in humeral head compared to 02/2019 no change. Impression: Post operative changes of the right shoulder     Plan:   Treatment : I reviewed the X-ray with the patient. We discussed the etiologies and natural histories of  s/p Right shoulder arthroscopy with debridement of glenoid, humeral head, rotator interval scarring, labral fraying, intraarticular release of anterior interval slide, subacromial extensive debridement, lysis of adhesions, and removal of suture granuloma. We discussed the various treatment alternatives including anti-inflammatory medications, physical therapy, injections, further imaging studies and as a last result surgery. During today's visit, I explained to the patient that I have no problem ordering an MRI but I informed her that I am not sure if another surgery is going to help her shoulder feel better since she has had four previous surgeries. The patient then stated that she understands the plan and at this time, the patient has opted for a C9 to be filled out for an MRI for the right shoulder. I then asked the patient if the shoulder is in enough pain for her to have another surgery and she stated that it is. However, I informed her that if a surgery is needed then she should not get anymore cortisone injections because it will increase her risk of infection. Patient should return to the clinic after the MRI and she is to follow up with Seferino Walden D.O. . The patient will call the office immediately with any problems. No orders of the defined types were placed in this encounter.     Orders Placed This Encounter   Procedures    XR SHOULDER RIGHT (MIN 2 VIEWS)     Standing Status:   Future     Number of Occurrences:   1     Standing Expiration Date:   8/20/2021     Order Specific Question:   Reason for exam:     Answer:   pain     I, Edward Day V, am scribing for and in the presence of Seferino Walden D.O. 8/23/2020  6:00 PM      Seferino LIND DO, have personally seen this patient and I have reviewed the CC, PMH, FHX and Social History as provided by other clinical staff. I reassessed the HPI and ROS as scribed by Arturo Rodriguezibjohn in my presence and it is both accurate and complete. Thereafter, I personally performed the PE, reviewed the imaging and established the DX and POC. I agree with the documentation provided by the Medical Scribe. I have reviewed all documentation in its entirety prior to providing my signature indicating agreement. Any areas of disagreement are noted on the chart.     Electronically signed by Jorge Vann DO on 8/23/2020 at 6:00 PM        Electronically signed by Jorge Vann DO, on 8/23/2020 at 6:00 PM

## 2020-08-21 ENCOUNTER — HOSPITAL ENCOUNTER (OUTPATIENT)
Dept: PHYSICAL THERAPY | Facility: CLINIC | Age: 50
Setting detail: THERAPIES SERIES
Discharge: HOME | End: 2020-08-21
Payer: MEDICAID

## 2020-08-21 NOTE — FLOWSHEET NOTE
[] [address]  [address]    [phone]  F: [phone]   [] [address]  [facility]   [address]  P: [phone]  F: [phone]  [] [address]  [address] [facility]  P: [phone]  F: [phone]  [] [address]  [facility]   [place]: [phone]  F: [phone]   [] [address]  [place]: [phone]   F: [phone]     Physical Therapy Cancel/No Show note    Date: 2020  Patient: Dafne Brown  : 1970  MRN: [id number]    Cancels/No Shows to date:     For today's appointment patient:    [x]  Cancelled    [x] Rescheduled appointment    [] No-show     Reason given by patient:    []  Patient ill    []  Conflicting appointment    [] No transportation      [] Conflict with work    [] No reason given    [] Weather related    [] COVID-19    [x] Other:      Comments:  No reason given      [x] Next appointment was confirmed    Electronically signed by: Shweta Moeller PT

## 2020-09-01 ENCOUNTER — TELEPHONE (OUTPATIENT)
Dept: ORTHOPEDIC SURGERY | Age: 50
End: 2020-09-01

## 2020-09-02 ENCOUNTER — HOSPITAL ENCOUNTER (OUTPATIENT)
Dept: MRI IMAGING | Age: 50
Discharge: HOME | End: 2020-09-04
Payer: COMMERCIAL

## 2020-09-02 PROCEDURE — 73221 MRI JOINT UPR EXTREM W/O DYE: CPT

## 2020-09-04 ENCOUNTER — NURSE TRIAGE (OUTPATIENT)
Dept: OTHER | Facility: CLINIC | Age: 50
End: 2020-09-04

## 2020-09-04 NOTE — TELEPHONE ENCOUNTER
Reason for Disposition   Patient wants to be seen    Answer Assessment - Initial Assessment Questions  1. ONSET: \"When did the pain start? \"       A couple days   2. LOCATION: \"Where is the pain located? \"       From the ball of the foot and it shoots up her leg. Alsohas a sore feeling when the pain is not there  3. PAIN: \"How bad is the pain? \"    (Scale 1-10; or mild, moderate, severe)    -  MILD (1-3): doesn't interfere with normal activities     -  MODERATE (4-7): interferes with normal activities (e.g., work or school) or awakens from sleep, limping     -  SEVERE (8-10): excruciating pain, unable to do any normal activities, unable to walk      8/10 when squeezed, when left alone is an ache  4. WORK OR EXERCISE: \"Has there been any recent work or exercise that involved this part of the body? \"       none  5. CAUSE: \"What do you think is causing the leg pain? \"      Does have new shoes no injury  6. OTHER SYMPTOMS: \"Do you have any other symptoms? \" (e.g., chest pain, back pain, breathing difficulty, swelling, rash, fever, numbness, weakness)      HA   7. PREGNANCY: \"Is there any chance you are pregnant? \" \"When was your last menstrual period? \"      None    Protocols used: LEG PAIN-ADULT-OH    Caller provided care advice and instructed to call back with worsening symptoms. Pt given instructions for pain management and foot pain. Pt states she does not want to see the doc. Pt wants to wait and see if her foot gets worse. Pt declined scheduling. Please do not respond to the triage nurse through this encounter. Any subsequent communication should be directly with the patient.

## 2020-09-10 ENCOUNTER — TELEPHONE (OUTPATIENT)
Dept: ORTHOPEDIC SURGERY | Age: 50
End: 2020-09-10

## 2020-09-10 NOTE — TELEPHONE ENCOUNTER
Angie Augustin from [employer] called to request MRI Report.      Report was Faxed via Epic @ [facility] 9/10/2020 RG

## 2020-09-14 ENCOUNTER — OFFICE VISIT (OUTPATIENT)
Dept: ORTHOPEDIC SURGERY | Age: 50
End: 2020-09-14
Payer: COMMERCIAL

## 2020-09-14 VITALS
HEIGHT: 59 IN | BODY MASS INDEX: 22.18 KG/M2 | WEIGHT: 110 LBS | DIASTOLIC BLOOD PRESSURE: 89 MMHG | HEART RATE: 65 BPM | SYSTOLIC BLOOD PRESSURE: 147 MMHG

## 2020-09-14 PROCEDURE — 4004F PT TOBACCO SCREEN RCVD TLK: CPT | Performed by: ORTHOPAEDIC SURGERY

## 2020-09-14 PROCEDURE — G8420 CALC BMI NORM PARAMETERS: HCPCS | Performed by: ORTHOPAEDIC SURGERY

## 2020-09-14 PROCEDURE — 99213 OFFICE O/P EST LOW 20 MIN: CPT | Performed by: ORTHOPAEDIC SURGERY

## 2020-09-14 PROCEDURE — G8427 DOCREV CUR MEDS BY ELIG CLIN: HCPCS | Performed by: ORTHOPAEDIC SURGERY

## 2020-09-14 PROCEDURE — 3017F COLORECTAL CA SCREEN DOC REV: CPT | Performed by: ORTHOPAEDIC SURGERY

## 2020-09-14 PROCEDURE — 20610 DRAIN/INJ JOINT/BURSA W/O US: CPT | Performed by: ORTHOPAEDIC SURGERY

## 2020-09-14 ASSESSMENT — ENCOUNTER SYMPTOMS
COLOR CHANGE: 0
COUGH: 0
ABDOMINAL PAIN: 0
DIARRHEA: 0
CHEST TIGHTNESS: 0
APNEA: 0
ABDOMINAL DISTENTION: 0
SHORTNESS OF BREATH: 0
NAUSEA: 0
CONSTIPATION: 0
VOMITING: 0

## 2020-09-14 NOTE — PROGRESS NOTES
the right shoulder. Review of Systems   Constitutional: Positive for activity change. Negative for appetite change, fatigue and fever. Respiratory: Negative for apnea, cough, chest tightness and shortness of breath. Cardiovascular: Negative for chest pain, palpitations and leg swelling. Gastrointestinal: Negative for abdominal distention, abdominal pain, constipation, diarrhea, nausea and vomiting. Genitourinary: Negative for difficulty urinating, dysuria and hematuria. Musculoskeletal: Positive for arthralgias. Negative for gait problem, joint swelling and myalgias. Skin: Negative for color change and rash. Neurological: Negative for dizziness, weakness, numbness and headaches. Psychiatric/Behavioral: Negative for sleep disturbance. Past Medical History:    Past Medical History:   Diagnosis Date    Anemia     Anxiety     Chronic right shoulder pain     Depression     Irregular heartbeat     Sciatica      Past Surgical History:    Past Surgical History:   Procedure Laterality Date    BRAIN ANEURYSM SURGERY  12/2014    Coiling at [address] [facility]      x4     COLONOSCOPY  04/18/2019    COLONOSCOPY N/A 4/18/2019    COLORECTAL CANCER SCREENING, NOT HIGH RISK performed by Netta Thompson MD at [address] [facility] Right 2/20/2018    RIGHT SHOULDER ARTHROSCOPY ROTATOR CUFF LYSIS OF ADHESIONS performed by Vannessa Taveras DO at [facility] [facility] ARTHROSCOPY Right 02/20/2018    extensive lysis of adhesions; Open suture granuloma removal     SHOULDER SURGERY Right     x 3    TONSILLECTOMY       Current Medications:   Current Outpatient Medications   Medication Sig Dispense Refill    ipratropium (ATROVENT) 0.06 % nasal spray 2 sprays by Each Nostril route 4 times daily 1 Bottle 3    pregabalin (LYRICA) 100 MG capsule Take 100 mg by mouth 2 times daily.       celecoxib (CELEBREX) 200 MG capsule take 1 capsule by mouth once daily prn  0 No current facility-administered medications for this visit. Allergies:    Patient has no known allergies. Social History:   Social History     Socioeconomic History    Marital status: Legally      Spouse name: Not on file    Number of children: Not on file    Years of education: Not on file    Highest education level: Not on file   Occupational History    Not on file   Social Needs    Financial resource strain: Not on file    Food insecurity     Worry: Not on file     Inability: Not on file   [employer] needs     Medical: Not on file     Non-medical: Not on file   Tobacco Use    Smoking status: Current Every Day Smoker     Packs/day: 0.50     Years: 12.00     Pack years: 6.00     Types: Cigarettes    Smokeless tobacco: Never Used    Tobacco comment: pt smokes about 4 ciggs daily   Substance and Sexual Activity    Alcohol use: No    Drug use: No    Sexual activity: Not on file   Lifestyle    Physical activity     Days per week: Not on file     Minutes per session: Not on file    Stress: Not on file   Relationships    Social connections     Talks on phone: Not on file     Gets together: Not on file     Attends Islam service: Not on file     Active member of club or organization: Not on file     Attends meetings of clubs or organizations: Not on file     Relationship status: Not on file    Intimate partner violence     Fear of current or ex partner: Not on file     Emotionally abused: Not on file     Physically abused: Not on file     Forced sexual activity: Not on file   Other Topics Concern    Not on file   Social History Narrative    Not on file     Family History:  Family History   Problem Relation Age of Onset    Asthma Mother     Cancer Father         throat     I have reviewed the CC, HPI, ROS, PMH, FHX, Social History, and if not present in this note, I have reviewed in the patient's chart.  I agree with the documentation provided by other staff and have reviewed their documentation prior to providing my signature indicating agreement. Vitals:   BP (!) 147/89   Pulse 65   Ht 4' 11\" (1.499 m)   Wt 110 lb (49.9 kg)   BMI 22.22 kg/m²  Body mass index is 22.22 kg/m². Physical Examination:     Orthopedics:    GENERAL: Alert and oriented X3 in no acute distress. SKIN: Intact without lesions or ulcerations. NEURO: Musculoskeletal and axillary nerves intact to sensory and motor testing. VASC: Capillary refill is less than 3 seconds. Right Shoulder Exam    GEN: Alert and oriented X 3, in no acute distress. SKIN: Intact without rashes, lesions, or ulcerations. NEURO: Musculoskeletal and axillary nerves intact to sensory and motor testing. VASC: Cap refill less than than 3 secs. Negative Adson's test, Negative Christina's test.  ROM: 160 degrees of forward elevation, 30 degrees of external rotation in neutral, 90 degrees of external rotation in abduction, internal rotation to back pocket. STRENGTH: Supraspinatus 4+/5, external rotators 5/5. MUSC: No atrophy, negative subscap lift off or belly press test.   IMP: (+) Neer's sign, (+) Hawkin's sign, (-) Coracoid impingement, no painful arc, no pain with cross body abduction. PALP: no pain over anterolateral acromion, no pain over AC joint, no pain over traps/rhomboids. INST: (+) Daniels's test, (-) Speed's test.  Assessment:     1. Sprain of right rotator cuff capsule, subsequent encounter      Procedures:    Procedure: yes    Glenohumeral/Subacromial Joint Injection    Location: Right Shoulder  Procedure: Corticosteroid injection into the right shoulder. The patient was placed in the Seated position on the exam table. The posterior soft spot approximately 2 cm distal and 2 cm medial to the posterior acromial edge was identified and marked. The skin was prepped with betadine in a sterile fashion.  Utilizing clean technique with sterile gloves, a 5 cc solution containing 4 cc of 1 % Lidocaine with 1 cc containing 40mg of Depo medrol was injected into the glenohumeral joint the needle was then repositioned into the subacromial space and with clean technique with sterile gloves, a 5 cc solution containing 4 cc of 1 % Lidocaine with 1 cc containing 40mg of Depo medrol was injected into the subacromial joint. . There was no resistance to injection. The wound was cleansed and a Band-Aid was placed. The patient tolerated the procedure without difficulty. Adverse reactions of the injection was discussed with the patient including signs of infection (increasing pain, redness, swelling) and the patient was instructed to call immediately with any of these symptoms. Radiology:    Mri Shoulder Right Wo Contrast     Result Date: 9/2/2020    1. Low-grade partial-thickness bursal surface tearing of supraspinatus. Evidence of prior rotator cuff repair with granulation tissue, moderate tendinosis and susceptibility artifact involving the supraspinatus tendon. No recurrent retracted full-thickness supraspinatus tear evident. Moderate infraspinatus tendinopathy. 2. Nonvisualization of a normal intra-articular long head of biceps tendon which could be related to prior biceps tenodesis. 3. Mild diffuse labral degeneration. Mild glenohumeral chondromalacia. 4. Mild degenerative change of the right AC joint. MRI from 09/02/2020 was compared to the MRI from August 11, 2017. No significant changes in my opinion. Plan:   Treatment : I reviewed the MRI with the patient and I informed them that the shoulder has some wear from use over the years and that is the pain that she is feeling. We discussed the etiologies and natural histories of s/p right shoulder arthroscopy. We discussed the various treatment alternatives including anti-inflammatory medications, physical therapy, injections, further imaging studies and as a last result surgery.  During today's visit, I explained to the patient that I do not think a fifth surgery

## 2020-10-07 ENCOUNTER — OFFICE VISIT (OUTPATIENT)
Dept: FAMILY MEDICINE CLINIC | Age: 50
End: 2020-10-07
Payer: MEDICAID

## 2020-10-07 VITALS
TEMPERATURE: 97.5 F | HEART RATE: 67 BPM | WEIGHT: 111.6 LBS | SYSTOLIC BLOOD PRESSURE: 127 MMHG | BODY MASS INDEX: 22.54 KG/M2 | DIASTOLIC BLOOD PRESSURE: 76 MMHG

## 2020-10-07 PROBLEM — R22.42 SKIN LUMP OF LEG, LEFT: Status: ACTIVE | Noted: 2020-10-07

## 2020-10-07 PROCEDURE — G8427 DOCREV CUR MEDS BY ELIG CLIN: HCPCS | Performed by: STUDENT IN AN ORGANIZED HEALTH CARE EDUCATION/TRAINING PROGRAM

## 2020-10-07 PROCEDURE — 4004F PT TOBACCO SCREEN RCVD TLK: CPT | Performed by: STUDENT IN AN ORGANIZED HEALTH CARE EDUCATION/TRAINING PROGRAM

## 2020-10-07 PROCEDURE — 99213 OFFICE O/P EST LOW 20 MIN: CPT | Performed by: STUDENT IN AN ORGANIZED HEALTH CARE EDUCATION/TRAINING PROGRAM

## 2020-10-07 PROCEDURE — G8420 CALC BMI NORM PARAMETERS: HCPCS | Performed by: STUDENT IN AN ORGANIZED HEALTH CARE EDUCATION/TRAINING PROGRAM

## 2020-10-07 PROCEDURE — 3017F COLORECTAL CA SCREEN DOC REV: CPT | Performed by: STUDENT IN AN ORGANIZED HEALTH CARE EDUCATION/TRAINING PROGRAM

## 2020-10-07 PROCEDURE — 99211 OFF/OP EST MAY X REQ PHY/QHP: CPT | Performed by: STUDENT IN AN ORGANIZED HEALTH CARE EDUCATION/TRAINING PROGRAM

## 2020-10-07 PROCEDURE — G8484 FLU IMMUNIZE NO ADMIN: HCPCS | Performed by: STUDENT IN AN ORGANIZED HEALTH CARE EDUCATION/TRAINING PROGRAM

## 2020-10-07 RX ORDER — TRAZODONE HYDROCHLORIDE 100 MG/1
100 TABLET ORAL NIGHTLY
COMMUNITY
Start: 2020-09-16

## 2020-10-07 RX ORDER — FAMOTIDINE 20 MG/1
10 TABLET, FILM COATED ORAL 2 TIMES DAILY
Qty: 60 TABLET | Refills: 1 | Status: SHIPPED | OUTPATIENT
Start: 2020-10-07 | End: 2021-01-15

## 2020-10-07 RX ORDER — FLUOXETINE HYDROCHLORIDE 20 MG/1
20 CAPSULE ORAL DAILY
COMMUNITY
Start: 2020-09-16 | End: 2020-10-07

## 2020-10-07 RX ORDER — OXYCODONE AND ACETAMINOPHEN 7.5; 325 MG/1; MG/1
1 TABLET ORAL 4 TIMES DAILY
Status: ON HOLD | COMMUNITY
Start: 2020-09-15 | End: 2022-10-25 | Stop reason: HOSPADM

## 2020-10-07 ASSESSMENT — ENCOUNTER SYMPTOMS
VOMITING: 0
SHORTNESS OF BREATH: 0
EYES NEGATIVE: 1
DIARRHEA: 0
ALLERGIC/IMMUNOLOGIC NEGATIVE: 1
ABDOMINAL PAIN: 0
WHEEZING: 0
BACK PAIN: 0
GASTROINTESTINAL NEGATIVE: 1
CHEST TIGHTNESS: 0
SORE THROAT: 1
NAUSEA: 0

## 2020-10-07 ASSESSMENT — PATIENT HEALTH QUESTIONNAIRE - PHQ9
SUM OF ALL RESPONSES TO PHQ QUESTIONS 1-9: 0
2. FEELING DOWN, DEPRESSED OR HOPELESS: 0
1. LITTLE INTEREST OR PLEASURE IN DOING THINGS: 0
SUM OF ALL RESPONSES TO PHQ QUESTIONS 1-9: 0
SUM OF ALL RESPONSES TO PHQ9 QUESTIONS 1 & 2: 0

## 2020-10-07 NOTE — PATIENT INSTRUCTIONS
Thank you for letting us take care of you today. We hope all your questions were addressed. If a question was overlooked or something else comes to mind after you return home, please contact a member of your Care Team listed below. Your Care Team at [place] [address] is Team #4  Efren Chen MD (Faculty)  Mahendra Tirado MD (Resident)  Anahy Emanuel MD (Resident)  Lauro Cm MD (Resident)  Miguel Noonan MD (Resident)  IVY Gil RMA Kaye Skipper., [facility] office)  Sudheer Quinones, [address] (Clinical Practice Manager)  Elif Jaime, [address] [place] [address] (Clinical Pharmacist)       Office phone number: [phone]    If you need to get in right away due to illness, please be advised we have \"Same Day\" appointments available Monday-Friday. Please call us at [phone] option #3 to schedule your \"Same Day\" appointment.

## 2020-10-07 NOTE — PROGRESS NOTES
Visit Information    Have you changed or started any medications since your last visit including any over-the-counter medicines, vitamins, or herbal medicines? no   Have you stopped taking any of your medications? Is so, why? -  no  Are you having any side effects from any of your medications? - no    Have you seen any other physician or provider since your last visit?  no   Have you had any other diagnostic tests since your last visit?  no   Have you been seen in the emergency room and/or had an admission in a hospital since we last saw you?  no   Have you had your routine dental cleaning in the past 6 months?  no     Do you have an active MyChart account? If no, what is the barrier?   No:     Patient Care Team:  Teresa Keita MD as PCP - General (Family Medicine)    Medical History Review  Past Medical, Family, and Social History reviewed and does not contribute to the patient presenting condition    Health Maintenance   Topic Date Due    Pneumococcal 0-64 years Vaccine (1 of 1 - PPSV23) 08/04/1974    Shingles Vaccine (1 of 2) 08/04/2018    Cervical cancer screen  04/20/2020    Flu vaccine (1) 09/01/2020    A1C test (Diabetic or Prediabetic)  10/09/2020    Breast cancer screen  02/13/2021    Lipid screen  08/09/2023    DTaP/Tdap/Td vaccine (2 - Td) 03/17/2025    Colon cancer screen colonoscopy  04/18/2029    HIV screen  Completed    Hepatitis A vaccine  Aged Out    Hepatitis B vaccine  Aged Out    Hib vaccine  Aged Out    Meningococcal (ACWY) vaccine  Aged Out

## 2020-10-07 NOTE — PROGRESS NOTES
Attending Physician Statement    Wt Readings from Last 3 Encounters:   10/07/20 111 lb 9.6 oz (50.6 kg)   09/14/20 110 lb (49.9 kg)   08/20/20 110 lb (49.9 kg)     Temp Readings from Last 3 Encounters:   10/07/20 97.5 °F (36.4 °C) (Temporal)   06/08/20 98.7 °F (37.1 °C)   05/13/20 97.7 °F (36.5 °C) (Temporal)     BP Readings from Last 3 Encounters:   10/07/20 127/76   09/14/20 (!) 147/89   08/20/20 129/78     Pulse Readings from Last 3 Encounters:   10/07/20 67   09/14/20 65   08/20/20 60       I have discussed the care of Merryl Baumgarten  including pertinent history and exam findings,  with the resident. I have seen and examined the patient and the key elements of all parts of the encounter have been performed by me. I agree with the assessment, plan and orders as documented by the resident. (GC Modifier)  Pt seen, anxious worrier, close follow up for gerd and leg pain, will repeat doppler and will see GI if not improving.

## 2020-10-07 NOTE — PROGRESS NOTES
Subjective:      Patient ID: Laly Escamilla is a 44 y.o. female. 49-year-old female came with complaint of burning sensation in the chest for the past 4 weeks. The sensation is usually post meals and awakens her during the night. Patient complains of coughing up sputum and itchy sensation in the throat. She denies any chest pain, nausea, vomiting or diarrhea. She has not take any medications and usually resolves on its own. Patient drinks 3-4 carbonated beverages every day. She also complains of a left leg skin lump for the past 6 months which was golf ball in size and then completely resolved on its own. Patient complains of mild sensation of lump in the left leg but denies any pain or difficulty walking. Patient went to the emergency room where Doppler ultrasound showed no superficial or deep venous thrombosis. Review of Systems   Constitutional: Negative. Negative for chills and fever. HENT: Positive for sore throat. Eyes: Negative. Respiratory: Negative for chest tightness, shortness of breath and wheezing. Cardiovascular: Negative for chest pain and leg swelling. Burning sensation in the chest.   Gastrointestinal: Negative. Negative for abdominal pain, diarrhea, nausea and vomiting. Endocrine: Negative. Genitourinary: Negative. Negative for urgency. Musculoskeletal: Negative. Negative for back pain. Allergic/Immunologic: Negative. Neurological: Negative. Hematological: Negative. Psychiatric/Behavioral: Negative. Objective:   Physical Exam  Constitutional:       Appearance: Normal appearance. She is normal weight. HENT:      Head: Normocephalic and atraumatic. Nose: Nose normal.      Mouth/Throat:      Mouth: Mucous membranes are moist.      Pharynx: Oropharynx is clear. Eyes:      Pupils: Pupils are equal, round, and reactive to light. Neck:      Musculoskeletal: Normal range of motion.    Cardiovascular:      Rate and Rhythm: Normal rate and regular rhythm. Pulses: Normal pulses. Heart sounds: Normal heart sounds. Pulmonary:      Effort: Pulmonary effort is normal.      Breath sounds: Normal breath sounds. Abdominal:      General: Abdomen is flat. Palpations: Abdomen is soft. Musculoskeletal: Normal range of motion. Skin:     General: Skin is warm and dry. Comments: Mild sensation of lump in the left leg over the calf region. Skin shows healing lesion. Neurological:      Mental Status: She is alert. Psychiatric:         Mood and Affect: Mood normal.         Behavior: Behavior normal.         Thought Content: Thought content normal.               Assessment and plan:      1. GERD  Counseling regarding abstinence from carbonated beverages. Pepcid 10 mg twice daily for 4 weeks. 2.  Lump in the left leg  Doppler ultrasound of the left leg.         Yoli Velásquez MD

## 2020-10-13 ENCOUNTER — HOSPITAL ENCOUNTER (OUTPATIENT)
Dept: VASCULAR LAB | Age: 50
Discharge: HOME | End: 2020-10-13
Payer: MEDICAID

## 2020-10-13 PROCEDURE — 93971 EXTREMITY STUDY: CPT

## 2020-10-20 ENCOUNTER — OFFICE VISIT (OUTPATIENT)
Dept: FAMILY MEDICINE CLINIC | Age: 50
End: 2020-10-20
Payer: MEDICAID

## 2020-10-20 VITALS
DIASTOLIC BLOOD PRESSURE: 82 MMHG | BODY MASS INDEX: 22.46 KG/M2 | WEIGHT: 111.4 LBS | TEMPERATURE: 97.2 F | SYSTOLIC BLOOD PRESSURE: 132 MMHG | HEART RATE: 74 BPM | HEIGHT: 59 IN

## 2020-10-20 PROBLEM — Z13.1 SCREENING FOR DIABETES MELLITUS: Status: ACTIVE | Noted: 2020-10-20

## 2020-10-20 PROCEDURE — 99213 OFFICE O/P EST LOW 20 MIN: CPT | Performed by: STUDENT IN AN ORGANIZED HEALTH CARE EDUCATION/TRAINING PROGRAM

## 2020-10-20 ASSESSMENT — ENCOUNTER SYMPTOMS
SHORTNESS OF BREATH: 0
VOICE CHANGE: 0
ALLERGIC/IMMUNOLOGIC NEGATIVE: 1
CHEST TIGHTNESS: 0
GASTROINTESTINAL NEGATIVE: 1
SORE THROAT: 0
EYES NEGATIVE: 1

## 2020-10-20 NOTE — PROGRESS NOTES
Attending Physician Statement  I have seen and discussed the care of Juliann Vick 44 y.o. female, including pertinent history and exam findings, with the resident Dr. Geno Agosto MD.    History and Exam:   Chief Complaint   Patient presents with    2 Week Follow-Up     gerd    Health Maintenance     declined flu, pneumo, shingles, pended A1C      Past Medical History:   Diagnosis Date    Anemia     Anxiety     Chronic right shoulder pain     Depression     Irregular heartbeat     Sciatica      No Known Allergies   I have seen and examined the patient and the key elements of the encounter have been performed by me. BP Readings from Last 3 Encounters:   10/20/20 132/82   10/07/20 127/76   09/14/20 (!) 147/89     /82 (Site: Left Upper Arm, Position: Sitting, Cuff Size: Medium Adult) Comment: machine  Pulse 74   Temp 97.2 °F (36.2 °C) (Temporal)   Ht 4' 11\" (1.499 m)   Wt 111 lb 6.4 oz (50.5 kg)   LMP 09/10/2020 (Approximate)   BMI 22.50 kg/m²   Lab Results   Component Value Date    WBC 4.7 05/12/2020    HGB 13.4 05/12/2020    HCT 42.4 05/12/2020     05/12/2020    CHOL 174 08/09/2018    TRIG 69 08/09/2018    HDL 59 08/09/2018    ALT 33 05/12/2020    AST 37 (H) 05/12/2020     05/12/2020    K 3.9 05/12/2020     05/12/2020    CREATININE 0.81 05/12/2020    BUN 11 05/12/2020    CO2 25 05/12/2020    INR 1.1 03/20/2019    LABA1C 6.4 10/09/2019     Lab Results   Component Value Date    LABALBU 4.5 05/12/2020     No results found for: IRON, TIBC, FERRITIN  Lab Results   Component Value Date    LDLCHOLESTEROL 101 08/09/2018     I agree with the assessment, plan and the diagnosis of    Diagnosis Orders   1. Gastroesophageal reflux disease without esophagitis     2. Screening for diabetes mellitus  Hemoglobin A1C    . I agree with orders as documented by the resident.      Recommendations:  Patient is a 49-year-old female here for follow-up of GERD  Patient has not been taking Pepcid consistently but burning chest pain has improved  Patient consult to properly take medication. patient agreeable  Health maintenance updated    More than 25 minutes spent  in face to face encounter with the patient and more than half in counseling. Patient's questions were answered. Patient Voiced understanding to the counseling. Return in about 8 weeks (around 12/15/2020) for schedule for pap.    (GC Modifier)-Dr. Андрей Carter MD

## 2020-10-20 NOTE — PROGRESS NOTES
10/20/2020     Maryellen Tyler (:  1970) is a 44 y.o. female, here for evaluation of the following medical concerns:    52-year-old female came with complaint of throat discomfort for the past 2 weeks. The discomfort is primarily present at night and causes the patient's to wake up. She denies any shortness of breath, sputum production or change of voice. Patient was seen 2 weeks ago for GERD and was prescribed Pepcid 10 mg twice daily. Patient states that she has been taking medication only once. Patient was counseled about compliance and behavior modification. Review of Systems   Constitutional: Negative. HENT: Negative. Negative for sore throat and voice change. Throat discomfort. Eyes: Negative. Respiratory: Negative for chest tightness and shortness of breath. Cardiovascular: Negative. Gastrointestinal: Negative. Endocrine: Negative. Genitourinary: Negative. Musculoskeletal: Negative. Allergic/Immunologic: Negative. Neurological: Negative. Hematological: Negative. Psychiatric/Behavioral: Negative. Prior to Visit Medications    Medication Sig Taking? Authorizing Provider   famotidine (PEPCID) 20 MG tablet Take 0.5 tablets by mouth 2 times daily Yes Fernando Guerrero MD   diclofenac sodium (VOLTAREN) 1 % GEL Apply 4 g topically 4 times daily Yes Historical Provider, MD   oxyCODONE-acetaminophen (PERCOCET) 7.5-325 MG per tablet Take 1 tablet by mouth 4 times daily. Yes Historical Provider, MD   traZODone (DESYREL) 100 MG tablet Take 100 mg by mouth daily Yes Historical Provider, MD   pregabalin (LYRICA) 100 MG capsule Take 100 mg by mouth 2 times daily.  Yes Historical Provider, MD   celecoxib (CELEBREX) 200 MG capsule take 1 capsule by mouth once daily prn Yes Historical Provider, MD   ipratropium (ATROVENT) 0.06 % nasal spray 2 sprays by Each Nostril route 4 times daily  Patient not taking: Reported on 10/20/2020  Adilene Parker MD Social History     Tobacco Use    Smoking status: Current Every Day Smoker     Packs/day: 0.50     Years: 12.00     Pack years: 6.00     Types: Cigarettes    Smokeless tobacco: Never Used    Tobacco comment: pt smokes about 4 ciggs daily   Substance Use Topics    Alcohol use: No        Vitals:    10/20/20 1045   BP: 132/82  Comment: machine   Site: Left Upper Arm   Position: Sitting   Cuff Size: Medium Adult   Pulse: 74   Temp: 97.2 °F (36.2 °C)   TempSrc: Temporal   Weight: 111 lb 6.4 oz (50.5 kg)   Height: 4' 11\" (1.499 m)     Estimated body mass index is 22.5 kg/m² as calculated from the following:    Height as of this encounter: 4' 11\" (1.499 m). Weight as of this encounter: 111 lb 6.4 oz (50.5 kg). Physical Exam  Constitutional:       Appearance: Normal appearance. She is normal weight. HENT:      Head: Normocephalic and atraumatic. Nose: Nose normal.      Mouth/Throat:      Mouth: Mucous membranes are moist.   Eyes:      Extraocular Movements: Extraocular movements intact. Pupils: Pupils are equal, round, and reactive to light. Neck:      Musculoskeletal: Normal range of motion. Cardiovascular:      Rate and Rhythm: Normal rate and regular rhythm. Pulses: Normal pulses. Heart sounds: Normal heart sounds. Pulmonary:      Effort: Pulmonary effort is normal.      Breath sounds: Normal breath sounds. Abdominal:      General: Abdomen is flat. Skin:     General: Skin is warm. Neurological:      General: No focal deficit present. Mental Status: She is alert and oriented to person, place, and time. Psychiatric:         Mood and Affect: Mood normal.         Behavior: Behavior normal.         Thought Content: Thought content normal.         Judgment: Judgment normal.         ASSESSMENT/PLAN:  1. Screening for diabetes mellitus  - Hemoglobin A1C; Future    2.  Gastroesophageal reflux disease without esophagitis  Pepcid 10 mg twice daily      Return in about 8 weeks (around 12/15/2020) for schedule for pap. An electronic signature was used to authenticate this note.     --Esteban Malik MD on 10/20/2020 at 11:14 AM

## 2020-10-20 NOTE — PROGRESS NOTES
Visit Information    Have you changed or started any medications since your last visit including any over-the-counter medicines, vitamins, or herbal medicines? no   Have you stopped taking any of your medications? Is so, why? -  See list   Are you having any side effects from any of your medications? - no    Have you seen any other physician or provider since your last visit?  no   Have you had any other diagnostic tests since your last visit?  no   Have you been seen in the emergency room and/or had an admission in a hospital since we last saw you?  no   Have you had your routine dental cleaning in the past 6 months?  no     Do you have an active MyChart account? If no, what is the barrier?   yes    Patient Care Team:  Maricruz Stoner MD as PCP - General (Family Medicine)    Medical History Review  Past Medical, Family, and Social History reviewed and does not contribute to the patient presenting condition    Health Maintenance   Topic Date Due    Pneumococcal 0-64 years Vaccine (1 of 1 - PPSV23) 08/04/1974    Shingles Vaccine (1 of 2) 08/04/2018    Cervical cancer screen  04/20/2020    Flu vaccine (1) 09/01/2020    A1C test (Diabetic or Prediabetic)  10/09/2020    Breast cancer screen  02/13/2021    Lipid screen  08/09/2023    DTaP/Tdap/Td vaccine (2 - Td) 03/17/2025    Colon cancer screen colonoscopy  04/18/2029    HIV screen  Completed    Hepatitis A vaccine  Aged Out    Hepatitis B vaccine  Aged Out    Hib vaccine  Aged Out    Meningococcal (ACWY) vaccine  Aged Out

## 2020-10-20 NOTE — PATIENT INSTRUCTIONS
Patient Education        Gastroesophageal Reflux Disease (GERD): Care Instructions  Overview     Gastroesophageal reflux disease (GERD) is the backward flow of stomach acid into the esophagus. The esophagus is the tube that leads from your throat to your stomach. A one-way valve prevents the stomach acid from backing up into this tube. But when you have GERD, this valve does not close tightly enough. This can also cause pain and swelling in your esophagus. (This is called esophagitis.)  If you have mild GERD symptoms including heartburn, you may be able to control the problem with antacids or over-the-counter medicine. You can also make lifestyle changes to help reduce your symptoms. These include changing your diet and eating habits, such as not eating late at night and losing weight. Follow-up care is a key part of your treatment and safety. Be sure to make and go to all appointments, and call your doctor if you are having problems. It's also a good idea to know your test results and keep a list of the medicines you take. How can you care for yourself at home? · Take your medicines exactly as prescribed. Call your doctor if you think you are having a problem with your medicine. · Your doctor may recommend over-the-counter medicine. For mild or occasional indigestion, antacids, such as Tums, Gaviscon, Mylanta, or Maalox, may help. Your doctor also may recommend over-the-counter acid reducers, such as famotidine (Pepcid AC), cimetidine (Tagamet HB), or omeprazole (Prilosec). Read and follow all instructions on the label. If you use these medicines often, talk with your doctor. · Change your eating habits. ? It's best to eat several small meals instead of two or three large meals. ? After you eat, wait 2 to 3 hours before you lie down. ? Chocolate, mint, and alcohol can make GERD worse. ?  Spicy foods, foods that have a lot of acid (like tomatoes and oranges), and coffee can make GERD symptoms worse in some people. If your symptoms are worse after you eat a certain food, you may want to stop eating that food to see if your symptoms get better. · Do not smoke or chew tobacco. Smoking can make GERD worse. If you need help quitting, talk to your doctor about stop-smoking programs and medicines. These can increase your chances of quitting for good. · If you have GERD symptoms at night, raise the head of your bed 6 to 8 inches by putting the frame on blocks or placing a foam wedge under the head of your mattress. (Adding extra pillows does not work.)  · Do not wear tight clothing around your middle. · Lose weight if you need to. Losing just 5 to 10 pounds can help. When should you call for help? Call your doctor now or seek immediate medical care if:    · You have new or different belly pain.     · Your stools are black and tarlike or have streaks of blood. Watch closely for changes in your health, and be sure to contact your doctor if:    · Your symptoms have not improved after 2 days.     · Food seems to catch in your throat or chest.   Where can you learn more? Go to https://[employer]. org and sign in to your [employer] account. Enter V375 in the [facility] box to learn more about \"Gastroesophageal Reflux Disease (GERD): Care Instructions. \"     If you do not have an account, please click on the \"Sign Up Now\" link. Current as of: April 15, 2020               Content Version: 12.6  © 8438-3989 [employer]. Care instructions adapted under license by [facility] ([facility]). If you have questions about a medical condition or this instruction, always ask your healthcare professional. [place] [address] any warranty or liability for your use of this information.

## 2020-10-21 ENCOUNTER — HOSPITAL ENCOUNTER (OUTPATIENT)
Age: 50
Discharge: HOME | End: 2020-10-21
Payer: MEDICAID

## 2020-10-21 ENCOUNTER — HOSPITAL ENCOUNTER (OUTPATIENT)
Age: 50
Discharge: HOME | End: 2020-10-21
Payer: COMMERCIAL

## 2020-10-21 LAB
ABSOLUTE EOS #: 0.03 K/UL (ref 0–0.44)
ABSOLUTE IMMATURE GRANULOCYTE: <0.03 K/UL (ref 0–0.3)
ABSOLUTE LYMPH #: 1.88 K/UL (ref 1.1–3.7)
ABSOLUTE MONO #: 0.32 K/UL (ref 0.1–1.2)
ALBUMIN SERPL-MCNC: 4.6 G/DL (ref 3.5–5.2)
ALBUMIN/GLOBULIN RATIO: 1.6 (ref 1–2.5)
ALP BLD-CCNC: 84 U/L (ref 35–104)
ALT SERPL-CCNC: 17 U/L (ref 5–33)
ANION GAP SERPL CALCULATED.3IONS-SCNC: 11 MMOL/L (ref 9–17)
AST SERPL-CCNC: 25 U/L
BASOPHILS # BLD: 1 % (ref 0–2)
BASOPHILS ABSOLUTE: 0.04 K/UL (ref 0–0.2)
BILIRUB SERPL-MCNC: 0.24 MG/DL (ref 0.3–1.2)
BILIRUBIN DIRECT: <0.08 MG/DL
BILIRUBIN, INDIRECT: ABNORMAL MG/DL (ref 0–1)
BUN BLDV-MCNC: 11 MG/DL (ref 6–20)
CALCIUM SERPL-MCNC: 9.5 MG/DL (ref 8.6–10.4)
CHLORIDE BLD-SCNC: 103 MMOL/L (ref 98–107)
CO2: 27 MMOL/L (ref 20–31)
CREAT SERPL-MCNC: 0.82 MG/DL (ref 0.5–0.9)
DIFFERENTIAL TYPE: NORMAL
EOSINOPHILS RELATIVE PERCENT: 1 % (ref 1–4)
GFR AFRICAN AMERICAN: >60 ML/MIN
GFR NON-AFRICAN AMERICAN: >60 ML/MIN
GFR SERPL CREATININE-BSD FRML MDRD: ABNORMAL ML/MIN/{1.73_M2}
GFR SERPL CREATININE-BSD FRML MDRD: ABNORMAL ML/MIN/{1.73_M2}
GLUCOSE BLD-MCNC: 85 MG/DL (ref 70–99)
HCT VFR BLD CALC: 39.4 % (ref 36.3–47.1)
HEMOGLOBIN: 12.4 G/DL (ref 11.9–15.1)
IMMATURE GRANULOCYTES: 0 %
LYMPHOCYTES # BLD: 34 % (ref 24–43)
MCH RBC QN AUTO: 28 PG (ref 25.2–33.5)
MCHC RBC AUTO-ENTMCNC: 31.5 G/DL (ref 28.4–34.8)
MCV RBC AUTO: 88.9 FL (ref 82.6–102.9)
MONOCYTES # BLD: 6 % (ref 3–12)
NRBC AUTOMATED: 0 PER 100 WBC
PDW BLD-RTO: 13.4 % (ref 11.8–14.4)
PLATELET # BLD: 250 K/UL (ref 138–453)
PLATELET ESTIMATE: NORMAL
PMV BLD AUTO: 10.7 FL (ref 8.1–13.5)
POTASSIUM SERPL-SCNC: 4.7 MMOL/L (ref 3.7–5.3)
RBC # BLD: 4.43 M/UL (ref 3.95–5.11)
RBC # BLD: NORMAL 10*6/UL
SEG NEUTROPHILS: 58 % (ref 36–65)
SEGMENTED NEUTROPHILS ABSOLUTE COUNT: 3.33 K/UL (ref 1.5–8.1)
SODIUM BLD-SCNC: 141 MMOL/L (ref 135–144)
TOTAL PROTEIN: 7.4 G/DL (ref 6.4–8.3)
WBC # BLD: 5.6 K/UL (ref 3.5–11.3)
WBC # BLD: NORMAL 10*3/UL

## 2020-10-21 PROCEDURE — 80053 COMPREHEN METABOLIC PANEL: CPT

## 2020-10-21 PROCEDURE — 36415 COLL VENOUS BLD VENIPUNCTURE: CPT

## 2020-10-21 PROCEDURE — 83036 HEMOGLOBIN GLYCOSYLATED A1C: CPT

## 2020-10-21 PROCEDURE — 85025 COMPLETE CBC W/AUTO DIFF WBC: CPT

## 2020-10-21 PROCEDURE — 82248 BILIRUBIN DIRECT: CPT

## 2020-10-22 LAB
ESTIMATED AVERAGE GLUCOSE: 140 MG/DL
HBA1C MFR BLD: 6.5 % (ref 4–6)

## 2020-10-29 ENCOUNTER — OFFICE VISIT (OUTPATIENT)
Dept: ORTHOPEDIC SURGERY | Age: 50
End: 2020-10-29
Payer: MEDICAID

## 2020-10-29 VITALS
DIASTOLIC BLOOD PRESSURE: 85 MMHG | BODY MASS INDEX: 22.38 KG/M2 | WEIGHT: 111 LBS | HEIGHT: 59 IN | SYSTOLIC BLOOD PRESSURE: 137 MMHG | HEART RATE: 85 BPM | TEMPERATURE: 97.2 F

## 2020-10-29 PROCEDURE — G8420 CALC BMI NORM PARAMETERS: HCPCS | Performed by: ORTHOPAEDIC SURGERY

## 2020-10-29 PROCEDURE — G8427 DOCREV CUR MEDS BY ELIG CLIN: HCPCS | Performed by: ORTHOPAEDIC SURGERY

## 2020-10-29 PROCEDURE — 99213 OFFICE O/P EST LOW 20 MIN: CPT | Performed by: ORTHOPAEDIC SURGERY

## 2020-10-29 PROCEDURE — 4004F PT TOBACCO SCREEN RCVD TLK: CPT | Performed by: ORTHOPAEDIC SURGERY

## 2020-10-29 PROCEDURE — G8484 FLU IMMUNIZE NO ADMIN: HCPCS | Performed by: ORTHOPAEDIC SURGERY

## 2020-10-29 PROCEDURE — 3017F COLORECTAL CA SCREEN DOC REV: CPT | Performed by: ORTHOPAEDIC SURGERY

## 2020-10-29 ASSESSMENT — ENCOUNTER SYMPTOMS
COLOR CHANGE: 0
NAUSEA: 0
DIARRHEA: 0
ABDOMINAL PAIN: 0
VOMITING: 0
APNEA: 0
COUGH: 0
CHEST TIGHTNESS: 0
SHORTNESS OF BREATH: 0
CONSTIPATION: 0
ABDOMINAL DISTENTION: 0

## 2020-10-29 NOTE — PROGRESS NOTES
[facility]  [address] [place] [id number]  Dept: [phone]  Dept Fax: [phone]        Left Shoulder - Follow Up     Chief Complaint:     Chief Complaint   Patient presents with    Shoulder Pain     Left shoulder supraspinatus tendon tear      HPI:     Akua Piper is a 44y.o. year old right hand dominant female that has had pain in the left shoulder for several years. As far as any trauma to the shoulder, the patient indicates that there was no injury or trauma but she feels the shoulder is hurting because she had to use it the most because of her right shoulder being injured over the past few years. The pain is worse at night and when doing overhead activities. Weakness of the shoulder has been noted. The pain restricts activities such as driving, doing household chores, washing the hair and reaching above the shoulder. The pain does not seem to improve with time. The following medications have been tried: Percocet and Celebrex which she gets from pain management. However, the patient has also tried rest, heat, exercises and ice with mild improvement. The patient has not had a corticosteroid injection. The patient has tried physical therapy and she does home exercises with no improvement The patient has not had surgery. The opposite shoulder is okay. Neck pain has not been present. Review of Systems   Constitutional: Positive for activity change. Negative for appetite change, fatigue and fever. Respiratory: Negative for apnea, cough, chest tightness and shortness of breath. Cardiovascular: Negative for chest pain, palpitations and leg swelling. Gastrointestinal: Negative for abdominal distention, abdominal pain, constipation, diarrhea, nausea and vomiting. Genitourinary: Negative for difficulty urinating, dysuria and hematuria. Musculoskeletal: Positive for arthralgias.  Negative for gait problem, joint swelling and myalgias. Skin: Negative for color change and rash. Neurological: Negative for dizziness, weakness, numbness and headaches. Psychiatric/Behavioral: Negative for sleep disturbance. Past Medical History:    Past Medical History:   Diagnosis Date    Anemia     Anxiety     Chronic right shoulder pain     Depression     Irregular heartbeat     Sciatica      Past Surgical History:    Past Surgical History:   Procedure Laterality Date    BRAIN ANEURYSM SURGERY  12/2014    Coiling at [address]      x4     COLONOSCOPY  04/18/2019    COLONOSCOPY N/A 4/18/2019    COLORECTAL CANCER SCREENING, NOT HIGH RISK performed by Chester Wynne MD at [id number] [email or web address] Right 2/20/2018    RIGHT SHOULDER ARTHROSCOPY ROTATOR CUFF LYSIS OF ADHESIONS performed by Jazmine Chiang DO at [address] [facility] ARTHROSCOPY Right 02/20/2018    extensive lysis of adhesions; Open suture granuloma removal     SHOULDER SURGERY Right     x 3    TONSILLECTOMY       Current Medications:   Current Outpatient Medications   Medication Sig Dispense Refill    famotidine (PEPCID) 20 MG tablet Take 0.5 tablets by mouth 2 times daily 60 tablet 1    diclofenac sodium (VOLTAREN) 1 % GEL Apply 4 g topically 4 times daily      oxyCODONE-acetaminophen (PERCOCET) 7.5-325 MG per tablet Take 1 tablet by mouth 4 times daily.  traZODone (DESYREL) 100 MG tablet Take 100 mg by mouth daily      pregabalin (LYRICA) 100 MG capsule Take 100 mg by mouth 2 times daily.  celecoxib (CELEBREX) 200 MG capsule take 1 capsule by mouth once daily prn  0    ipratropium (ATROVENT) 0.06 % nasal spray 2 sprays by Each Nostril route 4 times daily (Patient not taking: Reported on 10/20/2020) 1 Bottle 3     No current facility-administered medications for this visit. Allergies:    Patient has no known allergies.     Social History:   Social History     Socioeconomic History    ulcerations. NEURO: Musculoskeletal and axillary nerves intact to sensory and motor testing. VASC: Capillary refill is less than 3 seconds. Left Shoulder Exam    GEN: Alert and oriented X 3, in no acute distress. SKIN: Intact without rashes, lesions, or ulcerations. NEURO: Musculoskeletal ans axillary nerves intact to sensory and motor testing. VASC: Cap refill less than than 3 secs. Negative Adson's test, Negative Christina's test.  ROM: 160 degrees of forward elevation, 30 degrees of external rotation in neutral, 90 degrees of external rotation in abduction, internal rotation to L1. Subacromial crepitation present. STRENGTH: Supraspinatus 4+/5, external rotators 5/5. MUSC: No atrophy, negative subscap lift off or belly press test.  IMP: (+) Neer's sign, (+) Hawkin's sign, painful arc, no pain with cross body abduction. PALP: no pain over anterolateral acromion, no pain over AC joint, no pain over traps/rhomboids. Left trapezius trigger point present. INST: (+) Pender's test, (-) Speed's test.   Assessment:     1. Rotator cuff syndrome of left shoulder      Procedures:    Procedure: no  Radiology:   X-ray was reviewed from 08/20/2020. Plan:   Treatment : I reviewed the X-ray and MRI with the patient. We discussed the etiologies and natural histories of rotator cuff syndrome of the left shoulder. We discussed the various treatment alternatives including anti-inflammatory medications, physical therapy, injections, further imaging studies and as a last result surgery. During today's visit, I explained to the patient that since her shoulder is in pain after doing physical therapy and home exercises, I feel it would be best for us to acquire an MRI because I am skeptical that the soft tissue within the shoulder may have something wrong with it and it is precipitating her pain within the joint.  The patient then stated that she understands the plan and at this time, the patient has opted to get an MRI of the left shoulder. Patient should return to the clinic after the MRI and she is to follow up with Vickey [email or web address]. The patient will call the office immediately with any problems. No orders of the defined types were placed in this encounter. Orders Placed This Encounter   Procedures    MRI SHOULDER LEFT WO CONTRAST     Standing Status:   Future     Standing Expiration Date:   10/29/2021     Order Specific Question:   Reason for exam:     Answer:   R/o RTCT     I, Edward Day V, am scribing for and in the presence of Vickey Tian D.O. 11/2/2020  6:53 AM      I, Vickey Tian DO, have personally seen this patient and I have reviewed the CC, PMH, FHX and Social History as provided by other clinical staff. I reassessed the HPI and ROS as scribed by Arturo Linnibjohn in my presence and it is both accurate and complete. Thereafter, I personally performed the PE, reviewed the imaging and established the DX and POC. I agree with the documentation provided by the Medical Scribe. I have reviewed all documentation in its entirety prior to providing my signature indicating agreement. Any areas of disagreement are noted on the chart.     Electronically signed by Silvia Daley DO on 11/2/2020 at 6:53 AM        Electronically signed by Silvia Daley DO, on 11/2/2020 at 6:53 AM

## 2020-11-19 PROBLEM — Z13.1 SCREENING FOR DIABETES MELLITUS: Status: RESOLVED | Noted: 2020-10-20 | Resolved: 2020-11-19

## 2020-11-21 ENCOUNTER — HOSPITAL ENCOUNTER (OUTPATIENT)
Dept: MRI IMAGING | Age: 50
Discharge: HOME | End: 2020-11-23
Payer: MEDICAID

## 2020-11-21 PROCEDURE — 73221 MRI JOINT UPR EXTREM W/O DYE: CPT

## 2020-11-23 ENCOUNTER — HOSPITAL ENCOUNTER (EMERGENCY)
Age: 50
Discharge: HOME | End: 2020-11-23
Attending: EMERGENCY MEDICINE
Payer: MEDICAID

## 2020-11-23 ENCOUNTER — APPOINTMENT (OUTPATIENT)
Dept: GENERAL RADIOLOGY | Age: 50
End: 2020-11-23
Payer: MEDICAID

## 2020-11-23 ENCOUNTER — VIRTUAL VISIT (OUTPATIENT)
Dept: FAMILY MEDICINE CLINIC | Age: 50
End: 2020-11-23
Payer: MEDICAID

## 2020-11-23 ENCOUNTER — APPOINTMENT (OUTPATIENT)
Dept: CT IMAGING | Age: 50
End: 2020-11-23
Payer: MEDICAID

## 2020-11-23 VITALS
HEART RATE: 73 BPM | TEMPERATURE: 99 F | RESPIRATION RATE: 16 BRPM | WEIGHT: 105 LBS | OXYGEN SATURATION: 100 % | SYSTOLIC BLOOD PRESSURE: 157 MMHG | BODY MASS INDEX: 21.17 KG/M2 | DIASTOLIC BLOOD PRESSURE: 81 MMHG | HEIGHT: 59 IN

## 2020-11-23 PROCEDURE — 70450 CT HEAD/BRAIN W/O DYE: CPT

## 2020-11-23 PROCEDURE — 6360000002 HC RX W HCPCS: Performed by: EMERGENCY MEDICINE

## 2020-11-23 PROCEDURE — 99283 EMERGENCY DEPT VISIT LOW MDM: CPT

## 2020-11-23 PROCEDURE — 96372 THER/PROPH/DIAG INJ SC/IM: CPT

## 2020-11-23 PROCEDURE — 99442 PR PHYS/QHP TELEPHONE EVALUATION 11-20 MIN: CPT | Performed by: STUDENT IN AN ORGANIZED HEALTH CARE EDUCATION/TRAINING PROGRAM

## 2020-11-23 PROCEDURE — 71045 X-RAY EXAM CHEST 1 VIEW: CPT

## 2020-11-23 PROCEDURE — U0003 INFECTIOUS AGENT DETECTION BY NUCLEIC ACID (DNA OR RNA); SEVERE ACUTE RESPIRATORY SYNDROME CORONAVIRUS 2 (SARS-COV-2) (CORONAVIRUS DISEASE [COVID-19]), AMPLIFIED PROBE TECHNIQUE, MAKING USE OF HIGH THROUGHPUT TECHNOLOGIES AS DESCRIBED BY CMS-2020-01-R: HCPCS

## 2020-11-23 PROCEDURE — 6370000000 HC RX 637 (ALT 250 FOR IP): Performed by: EMERGENCY MEDICINE

## 2020-11-23 RX ORDER — CETIRIZINE HYDROCHLORIDE, PSEUDOEPHEDRINE HYDROCHLORIDE 5; 120 MG/1; MG/1
1 TABLET, FILM COATED, EXTENDED RELEASE ORAL 2 TIMES DAILY
Qty: 30 TABLET | Refills: 0 | Status: SHIPPED | OUTPATIENT
Start: 2020-11-23 | End: 2020-12-23

## 2020-11-23 RX ORDER — DEXAMETHASONE SODIUM PHOSPHATE 10 MG/ML
10 INJECTION, SOLUTION INTRAMUSCULAR; INTRAVENOUS ONCE
Status: COMPLETED | OUTPATIENT
Start: 2020-11-23 | End: 2020-11-23

## 2020-11-23 RX ORDER — IPRATROPIUM BROMIDE 42 UG/1
2 SPRAY, METERED NASAL 4 TIMES DAILY
Qty: 1 BOTTLE | Refills: 3 | Status: SHIPPED | OUTPATIENT
Start: 2020-11-23 | End: 2021-09-20

## 2020-11-23 RX ORDER — HYDROCODONE BITARTRATE AND ACETAMINOPHEN 5; 325 MG/1; MG/1
2 TABLET ORAL ONCE
Status: DISCONTINUED | OUTPATIENT
Start: 2020-11-23 | End: 2020-11-24 | Stop reason: HOSPADM

## 2020-11-23 RX ORDER — GUAIFENESIN AND DEXTROMETHORPHAN HYDROBROMIDE 1200; 60 MG/1; MG/1
1 TABLET, EXTENDED RELEASE ORAL 2 TIMES DAILY
Qty: 28 TABLET | Refills: 0 | Status: SHIPPED | OUTPATIENT
Start: 2020-11-23 | End: 2021-01-15

## 2020-11-23 RX ORDER — CEPHALEXIN 500 MG/1
500 CAPSULE ORAL 2 TIMES DAILY
Qty: 14 CAPSULE | Refills: 0 | Status: SHIPPED | OUTPATIENT
Start: 2020-11-27 | End: 2020-12-04

## 2020-11-23 RX ORDER — ONDANSETRON 4 MG/1
4 TABLET, ORALLY DISINTEGRATING ORAL ONCE
Status: COMPLETED | OUTPATIENT
Start: 2020-11-23 | End: 2020-11-23

## 2020-11-23 RX ORDER — PREDNISONE 20 MG/1
20 TABLET ORAL 2 TIMES DAILY
Qty: 10 TABLET | Refills: 0 | Status: SHIPPED | OUTPATIENT
Start: 2020-11-23 | End: 2020-11-28

## 2020-11-23 RX ORDER — BENZONATATE 100 MG/1
100 CAPSULE ORAL 2 TIMES DAILY PRN
Qty: 20 CAPSULE | Refills: 0 | Status: SHIPPED | OUTPATIENT
Start: 2020-11-23 | End: 2020-11-30

## 2020-11-23 RX ADMIN — DEXAMETHASONE SODIUM PHOSPHATE 10 MG: 10 INJECTION, SOLUTION INTRAMUSCULAR; INTRAVENOUS at 21:47

## 2020-11-23 RX ADMIN — ONDANSETRON 4 MG: 4 TABLET, ORALLY DISINTEGRATING ORAL at 21:47

## 2020-11-23 ASSESSMENT — PAIN SCALES - GENERAL: PAINLEVEL_OUTOF10: 10

## 2020-11-23 NOTE — PROGRESS NOTES
Visit Information    Have you changed or started any medications since your last visit including any over-the-counter medicines, vitamins, or herbal medicines? no   Have you stopped taking any of your medications? Is so, why? -  no  Are you having any side effects from any of your medications? - no    Have you seen any other physician or provider since your last visit?  no   Have you had any other diagnostic tests since your last visit?  no   Have you been seen in the emergency room and/or had an admission in a hospital since we last saw you?  no   Have you had your routine dental cleaning in the past 6 months?  no     Do you have an active MyChart account? If no, what is the barrier?   Yes    Patient Care Team:  Rolando Clancy MD as PCP - General (Family Medicine)    Medical History Review  Past Medical, Family, and Social History reviewed and does not contribute to the patient presenting condition    Health Maintenance   Topic Date Due    Pneumococcal 0-64 years Vaccine (1 of 1 - PPSV23) 08/04/1974    Diabetic foot exam  08/04/1978    Diabetic retinal exam  08/04/1978    Diabetic microalbuminuria test  08/04/1986    Shingles Vaccine (1 of 2) 08/04/2018    Lipid screen  08/09/2019    Cervical cancer screen  04/20/2020    Flu vaccine (1) 09/01/2020    Breast cancer screen  02/13/2021    A1C test (Diabetic or Prediabetic)  10/21/2021    DTaP/Tdap/Td vaccine (2 - Td) 03/17/2025    Colon cancer screen colonoscopy  04/18/2029    HIV screen  Completed    Hepatitis A vaccine  Aged Out    Hepatitis B vaccine  Aged Out    Hib vaccine  Aged Out    Meningococcal (ACWY) vaccine  Aged Out

## 2020-11-23 NOTE — PATIENT INSTRUCTIONS
Thank you for letting us take care of you today. We hope all your questions were addressed. If a question was overlooked or something else comes to mind after you return home, please contact a member of your Care Team listed below. Your Care Team at [place] [address] is Team #2  Floirda Gill DO (Faculty)  Ravinder Rebolledo (Faculty)  Too Preciado MD (Resident)  Yancy Tsang MD (Resident)  Leanne Stoner MD (Resident)  Tam Chiu MD (Resident)  Dagoberto Cohen MD (Resident)  IVY Mancia. ,DARRYL CLAYTON [facility] ([place] ([address] [facility] office)  Agueda Winterst, [address] (Clinical Practice Manager)  Cheryl Newton, [address] [place] [address] (Clinical Pharmacist)     Office phone number: [phone]    If you need to get in right away due to illness, please be advised we have \"Same Day\" appointments available Monday-Friday. Please call us at [phone] option #3 to schedule your \"Same Day\" appointment.

## 2020-11-23 NOTE — PROGRESS NOTES
Attending Physician Statement  I  have discussed the care of Antoniette Osler including pertinent history and exam findings with the resident. I agree with the assessment, plan and orders as documented by the resident. There were no vitals taken for this visit. BP Readings from Last 3 Encounters:   10/29/20 137/85   10/20/20 132/82   10/07/20 127/76     Wt Readings from Last 3 Encounters:   10/29/20 111 lb (50.3 kg)   10/20/20 111 lb 6.4 oz (50.5 kg)   10/07/20 111 lb 9.6 oz (50.6 kg)          Diagnosis Orders   1. Viral URI  Microalbumin, Ur   2. Screening for hyperlipidemia  Lipid Panel   3. Need for prophylactic vaccination and inoculation against varicella     4.  Rhinorrhea  ipratropium (ATROVENT) 0.06 % nasal spray           Monica Manriquez DO 11/23/2020 4:46 PM

## 2020-11-24 ENCOUNTER — TELEPHONE (OUTPATIENT)
Dept: ORTHOPEDIC SURGERY | Age: 50
End: 2020-11-24

## 2020-11-24 ENCOUNTER — CARE COORDINATION (OUTPATIENT)
Dept: CARE COORDINATION | Age: 50
End: 2020-11-24

## 2020-11-24 LAB
SARS-COV-2, RAPID: NORMAL
SARS-COV-2: NORMAL
SARS-COV-2: NOT DETECTED
SOURCE: NORMAL

## 2020-11-24 NOTE — ED PROVIDER NOTES
[address] [place] [address] ED  eMERGENCY dEPARTMENT eNCOUnter      Pt Name: Akua Piper  MRN: [id number]  [place] 1970  Date of evaluation: 11/23/2020  Provider: Ector Chen MD    [address] [place] [address]       Chief Complaint   Patient presents with    Headache    Chest Pain    Congestion    Cough         HISTORY OF PRESENT ILLNESS  (Location/Symptom, Timing/Onset, Context/Setting, Quality, Duration, Modifying Factors, Severity.)   Akua Piper is a 44 y.o. female who presents to the emergency department for evaluation of fever cough and congestive symptoms. Patient has had symptoms for about 3 days. She is also had a slight headache. She has a remote history of coiling procedure she is concerned that the coiling may be related to her headache symptoms today. She has had some diffuse aches and pains. She denies any specific ill contacts. She is taken no over-the-counter products for her symptoms      Nursing Notes were reviewed. ALLERGIES     Patient has no known allergies.     CURRENT MEDICATIONS       Discharge Medication List as of 11/23/2020 10:46 PM      CONTINUE these medications which have NOT CHANGED    Details   benzonatate (TESSALON) 100 MG capsule Take 1 capsule by mouth 2 times daily as needed for Cough, Disp-20 capsule,R-0Normal      ipratropium (ATROVENT) 0.06 % nasal spray 2 sprays by Each Nostril route 4 times daily, Disp-1 Bottle,R-3Normal      cetirizine-psuedoephedrine (ZYRTEC-D) 5-120 MG per extended release tablet Take 1 tablet by mouth 2 times daily, Disp-30 tablet,R-0Normal      cephALEXin (KEFLEX) 500 MG capsule Take 1 capsule by mouth 2 times daily for 7 days Please fill after 11/27/2020, Disp-14 capsule,R-0Normal      famotidine (PEPCID) 20 MG tablet Take 0.5 tablets by mouth 2 times daily, Disp-60 tablet,R-1Normal      diclofenac sodium (VOLTAREN) 1 % GEL Apply 4 g topically 4 times daily, Topical, 4 TIMES DAILY Starting Tue 9/15/2020, Historical Med oxyCODONE-acetaminophen (PERCOCET) 7.5-325 MG per tablet Take 1 tablet by mouth 4 times daily. Historical Med      traZODone (DESYREL) 100 MG tablet Take 100 mg by mouth dailyHistorical Med      pregabalin (LYRICA) 100 MG capsule Take 100 mg by mouth 2 times daily. Historical Med      celecoxib (CELEBREX) 200 MG capsule take 1 capsule by mouth once daily prn, R-0Historical Med             PAST MEDICAL HISTORY         Diagnosis Date    Anemia     Anxiety     Chronic right shoulder pain     Depression     Irregular heartbeat     Sciatica        SURGICAL HISTORY           Procedure Laterality Date    BRAIN ANEURYSM SURGERY  2014    Coiling at [address]      x4     COLONOSCOPY  2019    COLONOSCOPY N/A 2019    COLORECTAL CANCER SCREENING, NOT HIGH RISK performed by Saúl Lake MD at [address] Right 2018    RIGHT SHOULDER ARTHROSCOPY ROTATOR CUFF LYSIS OF ADHESIONS performed by Julia Colunga DO at [address] ARTHROSCOPY Right 2018    extensive lysis of adhesions; Open suture granuloma removal     SHOULDER SURGERY Right     x 3    TONSILLECTOMY           FAMILY HISTORY           Problem Relation Age of Onset    Asthma Mother     Cancer Father         throat     Family Status   Relation Name Status    Mother  Alive    Father      Sister  Alive    Brother  Alive        SOCIAL HISTORY      reports that she has been smoking cigarettes. She has a 6.00 pack-year smoking history. She has never used smokeless tobacco. She reports that she does not drink alcohol or use drugs. REVIEW OF SYSTEMS    (2-9 systems for level 4, 10 or more for level 5)     Review of Systems   All other systems reviewed and are negative. Except as noted above the remainder of the review of systems was reviewed and negative.      PHYSICAL EXAM    (up to 7 for level 4, 8 or more for level 5)     Vitals:    20 2129 11/23/20 2134   BP: (!) 157/81    Pulse: 73    Resp: 16    Temp:  99 °F (37.2 °C)   TempSrc:  Oral   SpO2: 100%    Weight: 105 lb (47.6 kg)    Height: 4' 11\" (1.499 m)        Physical exam reflects a relatively well-nourished well-hydrated female with low-grade temp. Vital signs otherwise stable to include pulse ox of 100% on room air. She is not hypoxic. She is alert conversive appropriate in behavior. Oropharyngeal exam is without lesion. Trachea midline no stridor no cervical lymphadenopathy JVD or bruits. Neck soft and supple no meningismus. Cranial nerves II through XII grossly intact corneal margins are distinct. Globes are soft and symmetric no periorbital inflammation. Heart regular rate and rhythm normal S1-S2 no murmurs rubs gallops. Lungs are clear to auscultation. Abdomen is soft throughout no focal pain extremities show no cyanosis clubbing or edema. Visualized integument without rash or lesion. She has no acute neurovascular deficits      DIAGNOSTIC RESULTS       RADIOLOGY:   Non-plain film images such as CT, Ultrasound and MRI are read by the radiologist. Plain radiographic images are visualized and preliminarily interpreted by the emergency physician with the below findings:      Interpretation per the Radiologist below, if available at the time of this note:    CT HEAD WO CONTRAST   Final Result   No acute disease. Left cavernous aneurysm coiling again noted. XR CHEST PORTABLE   Final Result   No acute cardiopulmonary disease. LABS:  Labs Reviewed   COVID-19       All other labs were within normal range or not returned as of this dictation. EMERGENCY DEPARTMENT COURSE and DIFFERENTIAL DIAGNOSIS/MDM:   Vitals:    Vitals:    11/23/20 2129 11/23/20 2134   BP: (!) 157/81    Pulse: 73    Resp: 16    Temp:  99 °F (37.2 °C)   TempSrc:  Oral   SpO2: 100%    Weight: 105 lb (47.6 kg)    Height: 4' 11\" (1.499 m)      Patient presents with a viral symptoms.   Covid swab obtained. Chest x-ray clear. Vital signs otherwise stable she is treated symptomatically. She is appropriate for outpatient management. Presentation consistent with viral syndrome no indication for antibiotics. She is discharged home on further symptomatic management. She is advised to follow-up in the outpatient setting. CONSULTS:  None    PROCEDURES:  None    FINAL IMPRESSION      1. Viral illness          DISPOSITION/PLAN   DISPOSITION Decision To Discharge 11/23/2020 10:44:13 PM      PATIENT REFERRED TO:   Teresa Keita MD  [address] [address]  [phone]    Schedule an appointment as soon as possible for a visit   As needed      DISCHARGE MEDICATIONS:     Discharge Medication List as of 11/23/2020 10:46 PM      START taking these medications    Details   predniSONE (DELTASONE) 20 MG tablet Take 1 tablet by mouth 2 times daily for 5 days, Disp-10 tablet,R-0Print      Dextromethorphan-guaiFENesin (MUCINEX DM MAXIMUM STRENGTH)  MG TB12 Take 1 tablet by mouth 2 times daily, Disp-28 tablet,R-0Print             Controlled Substance Monitoring:    Acute and Chronic Pain Monitoring:   RX Monitoring 11/23/2020   Attestation -   Periodic Controlled Substance Monitoring Possible medication side effects, risk of tolerance/dependence & alternative treatments discussed. ;Obtaining appropriate analgesic effect of treatment.          (Please note that portions of this note were completed with a voice recognition program.  Efforts were made to edit the dictations but occasionally words are mis-transcribed.)    Mellissa Edmondson MD  Attending Emergency Physician          Mellissa Edmondson MD  11/24/20 3452

## 2020-11-24 NOTE — TELEPHONE ENCOUNTER
Patient has been seeing Dr Mj Stockton for [facility] Right shoulder but is now having pain in Right shoulder (not WC)    She would like to schedule with Dr Musa Ryan at [facility] for the right shoulder     Is it okay for her to schedule with Musa Ryan or does she need to see Dr Mj Stockton?     Please call pt with direction  Thank you

## 2020-11-24 NOTE — TELEPHONE ENCOUNTER
Called Jacy Joshua She read the results of the MRI. Patient would like to follow closer to her home. I will forward this msg to ortho specialist.  She would like to follow up for both her right and left shoulders. The left shoulder is new with MRI and the right shoulder is WC.   Will dr Dee Sotelo see the patient and if so please reach out to her for an appt  Thank you

## 2020-11-24 NOTE — TELEPHONE ENCOUNTER
----- Message from Ariel Adams DO sent at 11/22/2020  3:41 PM EST -----  Call with yarelis loyd to review  ----- Message -----  From: Ariana Fuentes Incoming Radiant Results From [facility]  Sent: 11/21/2020  11:40 AM EST  To: Ariel Adams DO

## 2020-11-24 NOTE — ED NOTES
Pt. Presents with c/o Chest Pain, Congestion, Cough, and right sided Headache. Patient is alert, oriented, and ambulatory.       Justine Jules RN  11/23/20 3031

## 2020-12-18 ENCOUNTER — OFFICE VISIT (OUTPATIENT)
Dept: ORTHOPEDIC SURGERY | Age: 50
End: 2020-12-18
Payer: MEDICAID

## 2020-12-18 VITALS — HEIGHT: 59 IN | BODY MASS INDEX: 21.17 KG/M2 | WEIGHT: 105 LBS

## 2020-12-18 PROCEDURE — 20610 DRAIN/INJ JOINT/BURSA W/O US: CPT | Performed by: ORTHOPAEDIC SURGERY

## 2020-12-18 PROCEDURE — G8484 FLU IMMUNIZE NO ADMIN: HCPCS | Performed by: ORTHOPAEDIC SURGERY

## 2020-12-18 PROCEDURE — G8420 CALC BMI NORM PARAMETERS: HCPCS | Performed by: ORTHOPAEDIC SURGERY

## 2020-12-18 PROCEDURE — G8427 DOCREV CUR MEDS BY ELIG CLIN: HCPCS | Performed by: ORTHOPAEDIC SURGERY

## 2020-12-18 PROCEDURE — 99213 OFFICE O/P EST LOW 20 MIN: CPT | Performed by: ORTHOPAEDIC SURGERY

## 2020-12-18 PROCEDURE — 4004F PT TOBACCO SCREEN RCVD TLK: CPT | Performed by: ORTHOPAEDIC SURGERY

## 2020-12-18 PROCEDURE — 3017F COLORECTAL CA SCREEN DOC REV: CPT | Performed by: ORTHOPAEDIC SURGERY

## 2020-12-18 RX ORDER — BUPIVACAINE HYDROCHLORIDE 2.5 MG/ML
2 INJECTION, SOLUTION INFILTRATION; PERINEURAL ONCE
Status: COMPLETED | OUTPATIENT
Start: 2020-12-18 | End: 2020-12-18

## 2020-12-18 RX ORDER — METHYLPREDNISOLONE ACETATE 80 MG/ML
80 INJECTION, SUSPENSION INTRA-ARTICULAR; INTRALESIONAL; INTRAMUSCULAR; SOFT TISSUE ONCE
Status: COMPLETED | OUTPATIENT
Start: 2020-12-18 | End: 2020-12-18

## 2020-12-18 RX ADMIN — BUPIVACAINE HYDROCHLORIDE 5 MG: 2.5 INJECTION, SOLUTION INFILTRATION; PERINEURAL at 13:22

## 2020-12-18 RX ADMIN — METHYLPREDNISOLONE ACETATE 80 MG: 80 INJECTION, SUSPENSION INTRA-ARTICULAR; INTRALESIONAL; INTRAMUSCULAR; SOFT TISSUE at 13:22

## 2020-12-18 ASSESSMENT — ENCOUNTER SYMPTOMS
APNEA: 0
COUGH: 0
ABDOMINAL PAIN: 0
CHEST TIGHTNESS: 0
VOMITING: 0

## 2020-12-18 NOTE — PROGRESS NOTES
[facility]  [address] [facility] [address]  Dept: [phone]  Dept Fax: [phone]        Ambulatory Follow Up      Subjective:   Stacia Addison is a 44y.o. year old female who presents to our office today for routine followup regarding her   1. Tendinitis of left rotator cuff    2. Rotator cuff tear arthropathy of left shoulder    3. Osteoarthritis of left shoulder, unspecified osteoarthritis type    . Chief Complaint   Patient presents with    New Patient      LEFT SHOULDER pain since 2011       HPI- patient in the office today for left shoulder pain. Patient was currently seeing Dr. Jerel Crook for both shoulders. The patient's right shoulder is a [facility] case with Dr. Jerel Crook with a date of injury being in 2008. The patient lives closer to the this office so she would like to establish care for the left shoulder only today. The patient has had left shoulder pain since approximately 2011. The patient states that the pain was bearable to the left shoulder until 2018. The patient had an MRI of the left shoulder on 11/21/20. The patient has not had any treatment to the left shoulder except for formal therapy and Celebrex. Patient has had right shoulder corticosteroid injections in the past with good results. Review of Systems   Constitutional: Negative for chills and fever. Respiratory: Negative for apnea, cough and chest tightness. Cardiovascular: Negative for chest pain and palpitations. Gastrointestinal: Negative for abdominal pain and vomiting. Genitourinary: Negative for difficulty urinating. Musculoskeletal: Positive for arthralgias (left shoulder). Negative for gait problem, joint swelling and myalgias. Neurological: Negative for dizziness, weakness and numbness. I have reviewed the CC, HPI, ROS, PMH, FHX, Social History, and if not present in this note, I have reviewed in the patient's chart.    I agree with the documentation provided by other staff and have reviewed their documentation prior to providing my signature indicating agreement. Objective :   Ht 4' 11\" (1.499 m)   Wt 105 lb (47.6 kg)   BMI 21.21 kg/m²  Body mass index is 21.21 kg/m². General: Sadi Choi is a 44 y.o. female who is alert and oriented and sitting comfortably in our office. Ortho Exam  MS:  Left shoulder F=110, GZ=073 left shoulder. No significant shoulder girdle muscle atrophy is noted on the left. The patient has a positive impingement test and a positive supraspinatus test to the left shoulder with a grossly positive Bozeman's test on the left. No instability of the left shoulder is appreciated. Motor, sensory, vascular examination of the left upper extremity is grossly intact without focal deficits. Rotator cuff strength appears to be intact and symmetric bilaterally. Neuro: alert and oriented to person and place. Eyes: Extra-ocular muscles intact  Mouth: Oral mucosa moist. No perioral lesions  Pulm: Respirations unlabored and regular. Symmetric chest excursion without outward deformity is noted. Skin: warm, well perfused  Psych:   Patient has good fund of knowledge and displays understanging of exam, diagnosis, and plan. Radiology:     Ct Head Wo Contrast    Result Date: 11/23/2020  EXAMINATION: CT OF THE HEAD WITHOUT CONTRAST  11/23/2020 10:21 pm TECHNIQUE: CT of the head was performed without the administration of intravenous contrast. Dose modulation, iterative reconstruction, and/or weight based adjustment of the mA/kV was utilized to reduce the radiation dose to as low as reasonably achievable. COMPARISON: September 30, 2019 CT head HISTORY: ORDERING SYSTEM PROVIDED HISTORY: ha hx of coiling TECHNOLOGIST PROVIDED HISTORY: ha hx of coiling Is the patient pregnant?->No Reason for Exam: Pt reports headache and right sided sharp shooting pains to head and neck, starting today.  Hx aneurysm coil Acuity: Acute Type of Exam: Initial FINDINGS: BRAIN/VENTRICLES: There is no acute intracranial hemorrhage, mass effect or midline shift. No abnormal extra-axial fluid collection. The gray-white differentiation is maintained without evidence of an acute infarct. There is no evidence of hydrocephalus. Aneurysm coil ball left cavernous segment unchanged. Calcifications along the tentorium again noted. ORBITS: The visualized portion of the orbits demonstrate no acute abnormality. SINUSES: The visualized paranasal sinuses and mastoid air cells demonstrate no acute abnormality. SOFT TISSUES/SKULL:  No acute abnormality of the visualized skull or soft tissues. No acute disease. Left cavernous aneurysm coiling again noted. Xr Shoulder Left (min 2 Views)    Result Date: 12/18/2020  History: Left shoulder pain Findings: AP, scapular Y, axial view x-rays of the left shoulder done in the office today shows type II acromion morphology with moderate degenerative narrowing and para-articular osteophytosis at the acromioclavicular joint. Mild glenohumeral joint space narrowing is appreciated. Mild sclerotic changes at the greater tuberosity insertion of the rotator cuff is appreciated. No further evidence of fracture, subluxation, dislocation, radiopaque foreign body, radiopaque tumors noted. Impression: Mild degenerative changes left shoulder as described above. Xr Chest Portable    Result Date: 11/23/2020  EXAMINATION: ONE XRAY VIEW OF THE CHEST 11/23/2020 9:49 pm COMPARISON: December 4, 2019 HISTORY: ORDERING SYSTEM PROVIDED HISTORY: cough fever TECHNOLOGIST PROVIDED HISTORY: cough fever Reason for Exam: Pt states cough, sob x 1 week Acuity: Acute Type of Exam: Initial FINDINGS: No lines or tubes. Normal cardiomediastinal silhouette. The lungs are clear without focal consolidation or pleural effusion. No suspicious pulmonary nodules. No pulmonary edema. No pneumothorax. No acute osseous abnormality. No acute cardiopulmonary disease.      Mri or dislocation involving the osseous components of the shoulder. OUTLET SPACES: Suprascapular notch and quadrilateral space grossly unremarkable in appearance. No left axillary lymphadenopathy. 1. Less than 50% partial-thickness interstitial tearing of anterior superior infraspinatus between musculotendinous junction and critical zone with retrograde ganglion formation. 2. Moderate underlying supraspinatus and infraspinatus tendinopathy. 3. Mild subscapularis tendinopathy. 4. Mild labral degeneration. 5. Nonvisualization of the long head of biceps tendon in the bicipital groove, likely torn/ruptured. 6. Mild degenerative change of the left AC joint. SHOULDER INJECTION PROCEDURE NOTE:  The patient was identified. The left shoulder was confirmed with the patient. After a sterile prep with Betadine the shoulder  was injected using a posterior approach to the subacromial space with a mixture of 2 mL of 0.25% Marcaine and 80 mg of Depo-Medrol. Patient tolerated the procedure well without post injection complications. I instructed the patient to call our office immediately if they have any swelling or increased pain at the injection site. Assessment:      1. Tendinitis of left rotator cuff    2. Rotator cuff tear arthropathy of left shoulder    3. Osteoarthritis of left shoulder, unspecified osteoarthritis type       Plan:      Reviewed x-ray images with the patient today in the office. Discussed with the patient that she has an incomplete rotator cuff tear of the left shoulder and degenerative changes. At this point in time I would like her to go to formal therapy and get a corticosteroid injection. The patient would like to go forward with above plan. Patient tolerates injection well. Patient to follow up in the office in 2 months for follow up after therapy for the left shoulder only. Follow up:Return in about 2 months (around 2/18/2021).     Orders Placed This Encounter   Medications   

## 2020-12-22 ENCOUNTER — HOSPITAL ENCOUNTER (OUTPATIENT)
Age: 50
Discharge: HOME | End: 2020-12-22
Payer: MEDICAID

## 2020-12-22 LAB
CHOLESTEROL/HDL RATIO: 2.7
CHOLESTEROL: 198 MG/DL
CREATININE URINE: 329.4 MG/DL (ref 28–217)
HDLC SERPL-MCNC: 73 MG/DL
LDL CHOLESTEROL: 100 MG/DL (ref 0–130)
MICROALBUMIN/CREAT 24H UR: 14 MG/L
MICROALBUMIN/CREAT UR-RTO: 4 MCG/MG CREAT
TRIGL SERPL-MCNC: 127 MG/DL
VLDLC SERPL CALC-MCNC: NORMAL MG/DL (ref 1–30)

## 2020-12-22 PROCEDURE — 82043 UR ALBUMIN QUANTITATIVE: CPT

## 2020-12-22 PROCEDURE — 80061 LIPID PANEL: CPT

## 2020-12-22 PROCEDURE — 82570 ASSAY OF URINE CREATININE: CPT

## 2020-12-22 PROCEDURE — 36415 COLL VENOUS BLD VENIPUNCTURE: CPT

## 2020-12-28 ENCOUNTER — PATIENT MESSAGE (OUTPATIENT)
Dept: GASTROENTEROLOGY | Age: 50
End: 2020-12-28

## 2021-01-06 ENCOUNTER — HOSPITAL ENCOUNTER (OUTPATIENT)
Dept: PHYSICAL THERAPY | Facility: CLINIC | Age: 51
Setting detail: THERAPIES SERIES
Discharge: HOME | End: 2021-01-06
Payer: MEDICAID

## 2021-01-06 NOTE — FLOWSHEET NOTE
[] [facility] &  Therapy  [address]    [phone]  F: [phone]   [x] [address] [facility] [address]   [address]  P: [phone]  F: [phone]  [] [address] &  Therapy  [address]  P: [phone]  F: [phone] [] [address] [place]  P: [phone]  F: [phone]  [] [address]  [address] [facility] [address]   [address]   [place]: [phone]  F: [phone]   [] [facility]  [address]  [place]: [phone]   F: [phone]     Physical Therapy Cancel/No Show note    Date: 2021  Patient: [place]  : 1970  MRN: [id number]    Cancels/No Shows to date:     For today's appointment patient:    [x]  Cancelled    [x] Rescheduled appointment    [] No-show     Reason given by patient:    []  Patient ill    []  Conflicting appointment    [] No transportation      [] Conflict with work    [] No reason given    [] Weather related    [] COVID-19    [x] Other:      Comments:  Pt called at time of evaluation to reschedule because she could not find her car keys.       [x] Next appointment was confirmed -      Electronically signed by: Domingo Balbuena, PT

## 2021-01-08 ENCOUNTER — HOSPITAL ENCOUNTER (OUTPATIENT)
Dept: PHYSICAL THERAPY | Facility: CLINIC | Age: 51
Setting detail: THERAPIES SERIES
Discharge: HOME | End: 2021-01-08
Payer: MEDICAID

## 2021-01-08 PROCEDURE — 97161 PT EVAL LOW COMPLEX 20 MIN: CPT

## 2021-01-08 PROCEDURE — 97110 THERAPEUTIC EXERCISES: CPT

## 2021-01-08 NOTE — CONSULTS
[] [facility]  [address]  [phone]  F: [phone] [x] [address] [facility] [address]   [address]  P: [phone]  F: [phone] [] [place]  [address] [facility]  P: [phone]  F: [phone] [] [address]  [facility]   [address]   [place]: [phone]  F: [phone]      Physical Therapy Upper Extremity Evaluation    Date:  2021  Patient: Hansa Stokes  : 1970  MRN: [id number]  Physician: Paula Zelaya  Insurance: [employer]- 30 visits combined all disciplines   Medical Diagnosis: M75.102, M12.812- tendinitis of left rotator cuff, rotator cuff tear arthropathy of left shoulder; M19.012- osteoarthritis of left shoulder, unspecified osteoarthritis type  Rehab Codes: M75.102; M12.812; M19.012; M25.512; M62.81   Onset Date: chronic; 2019   Next 's appt: TBD- pending success with PT services     Subjective:   CC:    Reports chronic L shoulder, and R shoulder pain. Reports previous x4 R shoulder surgeries, none to L shoulder thus far. Refuses L shoulder surgery. Reports relief with recent cortisone injection within L shoulder thus far. Worse symptoms with attempting to lift car seat frame while at work last year- 2019. HPI: chronic;     PMHx: [x] Unremarkable [] Diabetes [] HTN  [] Pacemaker   [] MI/Heart Problems [] Cancer [x] Arthritis [x] Other: anxiety; depression; current smoker.                [x] Refer to full medical chart  In EPIC     Tests: [x] X-Ray: [x] MRI:  [] Other:    Medications: [x] Refer to full medical record [] None [] Other:  Allergies:      [x] Refer to full medical record [] None [] Other:    Function:  Hand Dominance  [x] Right  [] Left  Employer -   Job Status []  Normal duty   [] Light duty   [] Off due to condition    []  Retired   [x] Not employed   [] Disability  [] Other:  []  Return to work: Work activities/duties -     Pain:  [x] Yes  [] No Location: L shoulder and upper arm   Pain Rating: (0-10 scale) 8-9/10- at present/MAX/OVER THIS PAST WEEK; 2/10- MIN/OVER THIS PAST WEEK   Pain altered Tx:  [] Yes  [x] No  Action: N/A  Symptoms:  [] Improving [] Worsening [x] Same  Better:  [] AM    [] PM    [] Sit    [] Rise/Sit    []Stand    [] Walk    [] Lying    [x] Other: \"depends. \"   Worse: [] AM    [] PM    [] Sit    [] Rise/Sit    []Stand    [] Walk    [] Lying    [] Bend                      [] Valsalva    [x] Other: \"depends. \"   Sleep: [] OK    [x] Disturbed- unable to sleep in L or R S/L secondary to inc pain of both shoulders. Objective:     ROM  °A/P END FEEL STRENGTH TESTS (+/-) Left Right Not Tested    Left Right  Left Right Drop Arm - - []   Sit Shld Flex 115- A; limited by fear of pain; 140- P; P 140- A; 160- P  4 4; P Sulcus Sign   [x]   Sit Shld Abd 110- A; limited by fear of pain; 160- P; P  100- A; WNL- P  4 4-; P Apprehension - - []   Sit Shld IR    4-; P 4-; P Yergasons   [x]   Shoulder Flex      Speeds   [x]   Ext      Neer   [x]   ABD      Parry    [x]   ER @ 0 45 90 80- A/P @ 45 deg; P at end  70- A/P @ 45 deg  4+ @ 0 deg 4+ @ 0 deg Painful Arc - - []   IR WNL; P WNL    Tinel   [x]   Supraspinatus      [place]   x   Infraspinatus      Empty can +; weakn and P +; weak and P    Serratus Ant      IR behind the back L5; P L1; P    Pectoralis            Lats            Mid Trap            Lower Trap              OBSERVATION No Deficit Deficit Not Tested Comments   Forward Head [] [x] []    Rounded Shoulders [] [x] []    Kyphosis [x] [] []    Scap Height/Position [x] [] []    Winging [x] [] []    SH Rhythm [] [x] [] Limited d/t limited L shoulder ABD/FLEX AROM. INSPECTION/PALPATION    Denies TTP to all- however- experiences intermittent to L UT.    SC/AC Joint [x] [] []    Supraspinatus [x] [] [] Biceps tendon/groove [x] [] []    Posterior shld [x] [] []    Subscapularis [x] [] []      Normal L GHJ mobility- also empty end-feel with all PROM- limited by pt's muscle guard and inc P only, not inc tissue tension. Functional Test: UEFS Score: 74% functionally impaired    [21/80]      Assessment:    Patient is a 44 y.o. pleasant female who presents to physical therapy initial evaluation with signs and symptoms consistent with potential chronic L shoulder pain- potential partial tear RTC. Patient demonstrates impairments and symptoms as detailed below in therapy goals. Patient currently limited in ADLs with L UE- specifically heavy lifting- secondary to these impairments and increased symptoms. Patient would benefit from continued physical therapy services in order to address these impairments and symptoms, and return to QOL, ADLs, work. Anticipated frequency detailed above. Prognosis limited secondary to prolonged or chronic h/o of current condition; potential partial L RTC tear; sig pain at initial evaluation; and sig surgical h/o R shoulder- justifying a low complexity evaluation. If unable to achieve significant improvements within six to twelve visits, will consult referring physician and consider a follow-up visit with said physician. Patient verbalized understanding and agreement to all aforementioned statements. Patient would benefit from skilled physical therapy services in order to: inc L shoulder A/PROM; L shoulder gross strength; and overall improve tolerance for ADLs with L UE. Problems:    [x] ? Pain:  [x] ? ROM:  [x] ? Strength:  [x] ? Function:  [] Other:      STG: (to be met in 6 treatments)  1. ? Pain: Patient will report < 8/10 pain at rest and < 8/10 pain with active movement in order to improve QOL. 2. ? ROM: Will demo L shoulder FLEX/ABD PROM to 175 deg; AROM to 125 deg with < 8/10 P in order to better match R shoulder and improve functional mobility. 3. ? Strength:  Will demo 4+/5 L shoulder FLEX/ABD; and 4/5 IR strength with < 8/10 P in order to better match R and improve light ADLs. 4. ? Function: Patient will report decreased TTP to L UT in order to indicate decreased inflammation and improved healing of injured site. 5. Patient to be independent with home exercise program as demonstrated by performance with correct form without cues. LTG: (to be met in 12 treatments)  1. Will report < 6/10 P with performing light ADLs with B UEs- dec frequency of arms \"giving out\" for improved QOL. 2. Will report improved tolerance for sleeping upon either side for inc QOL. 3. Improve UEFS to 35/80                 Patient goals: none listed. Rehab Potential:  [] Good  [x] Fair  [x] Poor   Suggested Professional Referral:  [] No  [x] Yes: potential back to referring MD if no success with PT services. Barriers to Goal Achievement:  [] No  [x] Yes: prolonged or chronic h/o of current condition; potential partial L RTC tear; sig pain at initial evaluation; and sig surgical h/o R shoulder. Domestic Concerns:  [x] No  [] Yes:    Pt. Education:  [x] Plans/Goals, Risks/Benefits discussed  [x] Home exercise program  Method of Education: [x] Verbal  [x] Demo  [x] Written  Comprehension of Education:  [x] Verbalizes understanding. [] Demonstrates understanding. [x] Needs Review. [] Demonstrates/verbalizes understanding of HEP/Ed previously given.     Treatment Plan:  [x] Therapeutic Exercise   28750  [] Iontophoresis: 4 mg/mL Dexamethasone Sodium Phosphate  mAmin  55655   [x] Therapeutic Activity  86626 [x] Vasopneumatic cold with compression  97447    [] Gait Training   51920 [] Ultrasound   47687   [] Neuromuscular Re-education  95558 [] Electrical Stimulation Unattended  74733   [x] Manual Therapy  99723 [] Electrical Stimulation Attended  00737   [x] Instruction in HEP  [] Lumbar/Cervical Traction  [id number]   [] Aquatic Therapy   59107 [x] Cold/hotpack    [] Massage   69545 [] Dry Needling, 1 or 2 muscles  44525   [] Biofeedback, first 15 minutes   56120  [] Biofeedback, additional 15 minutes   20007 [] Dry Needling, 3 or more muscles  19932     []  Medication allergies reviewed for use of    Dexamethasone Sodium Phosphate 4mg/ml     with iontophoresis treatments. Pt is not allergic. Frequency: 2 x/week for 12 visits    Todays Treatment:  Modalities: HP to L shoulder- PRN   Precautions: chronic L shoulder pain- potential partial RTC tear; relief with recent cortisone injection; striving to avoid surgery  Exercises: will be late to most visits- treat as able; h/o x4 surgeries for R shoulder- all with PT services- continued pain   Exercise Reps/ Time Weight/ Level Comments   Seated L UT str.- hand grasping chair edge    HEP   Seated scapular retractions    HEP                           Standing AAROM wall-slide FLEX/ABD   HEP                                       Other:    Specific Instructions for next treatment: Review all previously issued HEP interventions. Consider UBE- light resistance; AAROM pulleys- FLEX/ABD; standing AROM FLEX/SCAPT/ABD to 90 deg in mirror; and lightly resisted rows, extensions next visit. May expand L shoulder PROM- FLEX/SCAPT.       Evaluation Complexity:  History (Personal factors, comorbidities) [] 0 [x] 1-2 [] 3+   Exam (limitations, restrictions) [] 1-2 [x] 3 [] 4+   Clinical presentation (progression) [x] Stable [] Evolving  [] Unstable   Decision Making [x] Low [] Moderate [] High    [x] Low Complexity [] Moderate Complexity [] High Complexity     Treatment Charges: Mins Units   [x] Evaluation       [x]  Low       []  Moderate       []  High 40 1   []  Modalities     [x]  Ther Exercise 10 1   []  Manual Therapy     []  Ther Activities     []  Aquatics     []  Vasocompression     []  Other       TOTAL TREATMENT TIME: 50    Time in: 10:03AM    Time Out: 10:55AM    Electronically signed by: Saida Ying, PT

## 2021-01-11 ENCOUNTER — HOSPITAL ENCOUNTER (OUTPATIENT)
Dept: PHYSICAL THERAPY | Facility: CLINIC | Age: 51
Setting detail: THERAPIES SERIES
Discharge: HOME | End: 2021-01-11
Payer: MEDICAID

## 2021-01-11 NOTE — FLOWSHEET NOTE
[] [facility]  [address]    [phone]  F: [phone]   [x] [address]  [facility] [address]   [address]  P: [phone]  F: [phone]  [] [address] [facility]  [address] [facility]  P: [phone]  F: [phone] [] [address]  P: [phone]  F: [phone]  [] [address]  [address] [facility] [address]   [address]   [place]: [phone]  F: [phone]   [] [address]  [place]: [phone]   F: [phone]     Physical Therapy Cancel/No Show note    Date: 2021  Patient: Hansa Stokes  : 1970  MRN: [id number]    Cancels/No Shows to date:     For today's appointment patient:    [x]  Cancelled    [] Rescheduled appointment    [] No-show     Reason given by patient:    []  Patient ill    []  Conflicting appointment    [] No transportation      [] Conflict with work    [] No reason given    [] Weather related    [] COVID-19    [x] Other:      Comments:  Pt cx d/t pain in B feet this date.        [x] Next appointment was confirmed for 1/15/21 at 2 pm    Electronically signed by: Jean Fabian PTA

## 2021-01-15 ENCOUNTER — OFFICE VISIT (OUTPATIENT)
Dept: FAMILY MEDICINE CLINIC | Age: 51
End: 2021-01-15
Payer: MEDICAID

## 2021-01-15 ENCOUNTER — HOSPITAL ENCOUNTER (OUTPATIENT)
Dept: PHYSICAL THERAPY | Facility: CLINIC | Age: 51
Setting detail: THERAPIES SERIES
Discharge: HOME | End: 2021-01-15
Payer: MEDICAID

## 2021-01-15 VITALS
WEIGHT: 110 LBS | SYSTOLIC BLOOD PRESSURE: 128 MMHG | DIASTOLIC BLOOD PRESSURE: 76 MMHG | HEART RATE: 80 BPM | BODY MASS INDEX: 22.22 KG/M2 | TEMPERATURE: 97.2 F

## 2021-01-15 DIAGNOSIS — Z00.00 HEALTHCARE MAINTENANCE: ICD-10-CM

## 2021-01-15 DIAGNOSIS — Z12.31 ENCOUNTER FOR SCREENING MAMMOGRAM FOR BREAST CANCER: ICD-10-CM

## 2021-01-15 DIAGNOSIS — E11.9 TYPE 2 DIABETES MELLITUS WITHOUT COMPLICATION, WITHOUT LONG-TERM CURRENT USE OF INSULIN (HCC): ICD-10-CM

## 2021-01-15 DIAGNOSIS — R53.83 FATIGUE, UNSPECIFIED TYPE: ICD-10-CM

## 2021-01-15 DIAGNOSIS — M79.672 LEFT FOOT PAIN: Primary | ICD-10-CM

## 2021-01-15 LAB — HBA1C MFR BLD: 6.8 %

## 2021-01-15 PROCEDURE — 83036 HEMOGLOBIN GLYCOSYLATED A1C: CPT | Performed by: STUDENT IN AN ORGANIZED HEALTH CARE EDUCATION/TRAINING PROGRAM

## 2021-01-15 PROCEDURE — 99211 OFF/OP EST MAY X REQ PHY/QHP: CPT | Performed by: STUDENT IN AN ORGANIZED HEALTH CARE EDUCATION/TRAINING PROGRAM

## 2021-01-15 PROCEDURE — 97110 THERAPEUTIC EXERCISES: CPT

## 2021-01-15 PROCEDURE — 99212 OFFICE O/P EST SF 10 MIN: CPT | Performed by: STUDENT IN AN ORGANIZED HEALTH CARE EDUCATION/TRAINING PROGRAM

## 2021-01-15 ASSESSMENT — PATIENT HEALTH QUESTIONNAIRE - PHQ9
SUM OF ALL RESPONSES TO PHQ QUESTIONS 1-9: 0
SUM OF ALL RESPONSES TO PHQ9 QUESTIONS 1 & 2: 0
1. LITTLE INTEREST OR PLEASURE IN DOING THINGS: 0
SUM OF ALL RESPONSES TO PHQ QUESTIONS 1-9: 0

## 2021-01-15 ASSESSMENT — ENCOUNTER SYMPTOMS
SHORTNESS OF BREATH: 0
CONSTIPATION: 0
DIARRHEA: 0
ABDOMINAL PAIN: 0
WHEEZING: 0

## 2021-01-15 NOTE — PROGRESS NOTES
Attending Physician Statement  I have discussed the care of Michelle Weaver 44 y.o. female, including pertinent history and exam findings, with the resident Dr. Gunjan Haynes MD.    History and Exam:   Chief Complaint   Patient presents with    1 Month Follow-Up     patient states she is doing well    Health Maintenance     patient refused flu shot    Foot Pain     patient states having left foot pain for a couple weeks     Past Medical History:   Diagnosis Date    Anemia     Anxiety     Chronic right shoulder pain     Depression     Irregular heartbeat     Sciatica      No Known Allergies   I have seen and examined the patient and the key elements of the encounter have been performed by me. BP Readings from Last 3 Encounters:   01/15/21 128/76   11/23/20 (!) 157/81   10/29/20 137/85     /76 (Site: Left Upper Arm, Position: Sitting, Cuff Size: Medium Adult)   Pulse 80   Temp 97.2 °F (36.2 °C) (Temporal)   Wt 110 lb (49.9 kg)   LMP 10/21/2020   BMI 22.22 kg/m²   Lab Results   Component Value Date    WBC 5.6 10/21/2020    HGB 12.4 10/21/2020    HCT 39.4 10/21/2020     10/21/2020    CHOL 198 12/22/2020    TRIG 127 12/22/2020    HDL 73 12/22/2020    ALT 17 10/21/2020    AST 25 10/21/2020     10/21/2020    K 4.7 10/21/2020     10/21/2020    CREATININE 0.82 10/21/2020    BUN 11 10/21/2020    CO2 27 10/21/2020    INR 1.1 03/20/2019    LABA1C 6.8 01/15/2021    LABMICR 4 12/22/2020     Lab Results   Component Value Date    LABALBU 4.6 10/21/2020     No results found for: IRON, TIBC, FERRITIN  Lab Results   Component Value Date    LDLCHOLESTEROL 100 12/22/2020     I agree with the assessment, plan and the diagnosis of    Diagnosis Orders   1. Left foot pain  XR FOOT LEFT (MIN 3 VIEWS)   2. Encounter for screening mammogram for breast cancer  BRITTANEY Digital Screen Bilateral [[id number]]   3. Healthcare maintenance  POCT glycosylated hemoglobin (Hb A1C)   4.  Fatigue, unspecified type  TSH With Reflex Ft4   5. Type 2 diabetes mellitus without complication, without long-term current use of insulin ([facility] [address]      . I agree with orders as documented by the resident. More than 25 minutes spent  in face to face encounter with the patient and more than half in counseling. Patient's questions were answered. Patient Voiced understanding to the counseling. Return in about 4 weeks (around 2/12/2021), or follow up with .    ( Modifier)-Dr. Tom Hardin MD

## 2021-01-15 NOTE — PROGRESS NOTES
Visit Information    Have you changed or started any medications since your last visit including any over-the-counter medicines, vitamins, or herbal medicines? no   Have you stopped taking any of your medications? Is so, why? -  no  Are you having any side effects from any of your medications? - no    Have you seen any other physician or provider since your last visit?  no   Have you had any other diagnostic tests since your last visit?  no   Have you been seen in the emergency room and/or had an admission in a hospital since we last saw you?  no   Have you had your routine dental cleaning in the past 6 months?  no     Do you have an active MyChart account? If no, what is the barrier?   No:     Patient Care Team:  Conor Ricardo MD as PCP - General (Family Medicine)    Medical History Review  Past Medical, Family, and Social History reviewed and does not contribute to the patient presenting condition    Health Maintenance   Topic Date Due    Hepatitis C screen  1970    Pneumococcal 0-64 years Vaccine (1 of 1 - PPSV23) 08/04/1974    Diabetic foot exam  08/04/1978    Diabetic retinal exam  08/04/1978    Shingles Vaccine (1 of 2) 08/04/2018    Cervical cancer screen  04/20/2020    Flu vaccine (1) 09/01/2020    Breast cancer screen  02/13/2021    A1C test (Diabetic or Prediabetic)  10/21/2021    Diabetic microalbuminuria test  12/22/2021    Lipid screen  12/22/2021    DTaP/Tdap/Td vaccine (2 - Td) 03/17/2025    Colon cancer screen colonoscopy  04/18/2029    HIV screen  Completed    Hepatitis A vaccine  Aged Out    Hepatitis B vaccine  Aged Out    Hib vaccine  Aged Out    Meningococcal (ACWY) vaccine  Aged Out

## 2021-01-15 NOTE — FLOWSHEET NOTE
[] [facility]  [address]  [phone]  F: [phone] [x] [address]  [place] [address]   [address]  P: [phone]  F: [phone] [] [place]  [address] [facility] [address]  P: [phone]  F: [phone] [] [address]  P: [phone]  F: [phone] [] [address]  [facility]   [address]   [place]: [phone]  F: [phone]      Physical Therapy Daily Treatment Note    Date:  1/15/2021  Patient Name:  Gregory Muhammad    :  1970  MRN: [id number]  Physician: Brionna Whitehead                 Insurance: [facility]- 27 visits combined all disciplines   Medical Diagnosis: M75.102, M12.812- tendinitis of left rotator cuff, rotator cuff tear arthropathy of left shoulder; M19.012- osteoarthritis of left shoulder, unspecified osteoarthritis type  Rehab Codes: M75.102; L11.891; M19.012; M25.512; M62.81   Onset Date: chronic; 2019                       Next 's appt: TBD- pending success with PT services   Visit# / total visits:      Cancels/No Shows: 1/0    Subjective:    Pain:  [x] Yes  [] No Location: L shoulder Pain Rating: (0-10 scale) 5/10  Pain altered Tx:  [x] No  [] Yes  Action: N/A  Comments: \"Right now- pretty good [despite 5/10 P rating at rest]. \" Reports more so limited secondary to L foot pain this date VS L shoulder pain. Reports plans to visit PCP regarding foot pain immediately post this treatment session. Requests to complete PT services at x3 per week- declined- educated regarding great L shoulder AROM- and only need to focus upon RTC and postural strengthening- verbalized understanding to all.       Todays Treatment:  Modalities: HP, CP to L shoulder- PRN- DECLINES ALL   Precautions: chronic L shoulder pain- potential partial RTC tear; relief with recent cortisone injection; striving to avoid surgery  Exercises: will be late to most visits- treat as able; h/o x4 surgeries for R shoulder- all with PT services- continued pain   Exercise Reps/ Time Weight/ Level Comments   UBE x3'/x3' L2 B UEs to single L UE   AAROM pulleys- FLEX/ABD x2' ea  For L UE- cues for inc hold duration at [employer] FLEX/SCAPT/ABD- for L UE x10, 3\" ea  HEP- reviewed    [employer], 3\" ea  NEW- 1/15/2021   Challenge- d/t LBP etc.- seated rest break         TB rows, extensions- B x20 ea BTB TCs- performs tricep extension instead    Standing AROM FLEX/SCAPT/ABD to 90 deg x10 ea AROM only  In mirror for visual feedback   No compensations          Seated L LS str. 2x30\" ea  B this date    Seated L UT str.- hand grasping chair edge  2x30\" ea   HEP- reviewed   Performs B this date    Seated scapular retractions  x20   HEP- reviewed              Prone row, extension- single-arm x20 ea AROM Off EOB                                                                                         Other:    Denies inc L shoulder pain with all new interventions and progressions.      Treatment Charges: Mins Units   []  Modalities     [x]  Ther Exercise 42 3   []  Manual Therapy     []  Ther Activities     []  Aquatics     []  Vasocompression     []  Other     Total Treatment time 42 3     [x6' on UBE]    Assessment: [] Progressing toward goals. [x] No change. Pt presents with MOD L shoulder pain, but reports good tolerance to initial evaluation. However, reports fair compliance to current HEP interventions. Therefore, began session with UBE and proceeded with review of all HEP interventions in order to ensure proper form. Able to begin standing AROM FLEX/SCAPT/ABD to 90 deg; snow angels; TB rows, extensions; and prone single-arm extensions, rows this date.  Pt limited in snow angels secondary to inc LBP- requires seated rest break. However, performs all other interventions with ease- may add resistance next visit and consider ideas below. Declines HP, CP to L shoulder this date. [] Other:  [x] Patient would continue to benefit from skilled physical therapy services in order to: inc L shoulder A/PROM; L shoulder gross strength; and overall improve tolerance for ADLs with L UE.    STG: (to be met in 6 treatments)  1. ? Pain: Patient will report < 8/10 pain at rest and < 8/10 pain with active movement in order to improve QOL. 2. ? ROM: Will demo L shoulder FLEX/ABD PROM to 175 deg; AROM to 125 deg with < 8/10 P in order to better match R shoulder and improve functional mobility. 3. ? Strength: Will demo 4+/5 L shoulder FLEX/ABD; and 4/5 IR strength with < 8/10 P in order to better match R and improve light ADLs. 4. ? Function: Patient will report decreased TTP to L UT in order to indicate decreased inflammation and improved healing of injured site. 5. Patient to be independent with home exercise program as demonstrated by performance with correct form without cues. LTG: (to be met in 12 treatments)  1. Will report < 6/10 P with performing light ADLs with B UEs- dec frequency of arms \"giving out\" for improved QOL. 2. Will report improved tolerance for sleeping upon either side for inc QOL. 3. Improve UEFS to 35/80                 Patient goals: none listed. Pt. Education:  [x] Yes  [] No  [x] Reviewed Prior HEP/Ed  Method of Education: [] Verbal  [] Demo  [] Written  Comprehension of Education:  [] Verbalizes understanding. [] Demonstrates understanding. [] Needs review. [x] Demonstrates/verbalizes HEP/Ed previously given. Plan: [x] Continue current frequency toward long and short term goals. [x] Specific Instructions for subsequent treatments: Consider SB wall roll-ups; and S/L ER/ABD AROM next visit.        Time In: 2:02PM            Time Out: 2:50PM    Electronically signed by:  Saida Ying, PT

## 2021-01-15 NOTE — PATIENT INSTRUCTIONS
more stretch, add another book or use a thicker book, such as a phone book, a dictionary, or an encyclopedia. 4. Hold the stretch for at least 15 to 30 seconds. 5. Repeat 2 to 4 times. [place] pick-ups   1. Put some marbles on the floor next to a cup.  2. Sit down, and use the toes of your affected foot to lift up one marble from the floor at a time. Then try to put the marble in the cup.  3. Repeat 8 to 12 times. Towel scrunches   1. Sit down, and place your affected foot on a towel on the floor. You may also do this with both feet on the towel. 2. Scrunch the towel toward you with your toes. Then use your toes to push the towel back into place. 3. Repeat 8 to 12 times. Heel raises on a step   1. Stand on the bottom step of a staircase, facing up toward the stairs. Put the balls of your feet on the step. If you are not steady on your feet, hold on to the banister or wall. 2. Keeping both knees straight, slowly lift your heels above the step so that you are standing on your toes. Then slowly lower your heels below the step and toward the floor. 3. Return to the starting position, with your feet even with the step. 4. Repeat 8 to 12 times. Follow-up care is a key part of your treatment and safety. Be sure to make and go to all appointments, and call your doctor if you are having problems. It's also a good idea to know your test results and keep a list of the medicines you take. Where can you learn more? Go to https://[employer]ber[employer] SystemslathaMitomics.Snipshot. org and sign in to your [employer] account. Enter H119 in the Syncplicity box to learn more about \"Arch Pain: Exercises. \"     If you do not have an account, please click on the \"Sign Up Now\" link. Current as of: March 2, 2020               Content Version: 12.6  © 8569-7215 [employer]. Care instructions adapted under license by [facility] ([facility]).  If you have questions about a medical condition or this instruction, always ask your healthcare professional. [place] [address] any warranty or liability for your use of this information. Patient Education        Ankle: Exercises  Introduction  Here are some examples of exercises for you to try. The exercises may be suggested for a condition or for rehabilitation. Start each exercise slowly. Ease off the exercises if you start to have pain. You will be told when to start these exercises and which ones will work best for you. Alphabet exercise  \"Alphabet\" exercise   1. Trace the alphabet with your toe. This helps your ankle move in all directions. Side-to-side knee swing exercise   1. Sit in a chair with your foot flat on the floor. 2. Slowly move your knee from side to side while keeping your foot pressed flat. 3. Continue this exercise for 2 to 3 minutes. Towel curl   1. While sitting, place your foot on a towel on the floor and scrunch the towel toward you with your toes. 2. Then use your toes to push the towel away from you. 3. Make this exercise more challenging by placing a weighted object, such as a soup can, on the other end of the towel. Towel stretch   1. Sit with your legs extended and knees straight. 2. Place a towel around your foot just under the toes. 3. Hold each end of the towel in each hand, with your hands above your knees. 4. Pull back with the towel so that your foot stretches toward you. 5. Hold the position for at least 15 to 30 seconds. 6. Repeat 2 to 4 times a session, up to 5 sessions a day. Ankle eversion exercise   1. Start by sitting with your foot flat on the floor and pushing it outward against an immovable object such as the wall or heavy furniture. Hold for about 6 seconds, then relax. Repeat 8 to 12 times. 2. After you feel comfortable with this, try using rubber tubing looped around the outside of your feet for resistance.  Push your foot out to the side against the tubing, and then count to 10 as you slowly bring your foot back to the middle. Repeat 8 to 12 times. Isometric opposition exercises   1. While sitting, put your feet together flat on the floor. 2. Press your injured foot inward against your other foot. Hold for about 6 seconds, and relax. Repeat 8 to 12 times. 3. Then place the heel of your other foot on top of the injured one. Push down with the top heel while trying to push up with your injured foot. Hold for about 6 seconds, and relax. Repeat 8 to 12 times. Follow-up care is a key part of your treatment and safety. Be sure to make and go to all appointments, and call your doctor if you are having problems. It's also a good idea to know your test results and keep a list of the medicines you take. Where can you learn more? Go to https://Elepath.[employer]. org and sign in to your [employer] account. Enter M049 in the [facility] box to learn more about \"Ankle: Exercises. \"     If you do not have an account, please click on the \"Sign Up Now\" link. Current as of: March 2, 2020               Content Version: 12.6  © 2799-8064 [employer]. Care instructions adapted under license by [facility] ([facility]). If you have questions about a medical condition or this instruction, always ask your healthcare professional. [address] any warranty or liability for your use of this information.

## 2021-01-15 NOTE — PROGRESS NOTES
Subjective:    Lasha Ga is a 44 y.o. female with  has a past medical history of Anemia, Anxiety, Chronic right shoulder pain, Depression, Irregular heartbeat, and Sciatica. Presented to the office today for:  Chief Complaint   Patient presents with    1 Month Follow-Up     patient states she is doing well    Health Maintenance     patient refused flu shot    Foot Pain     patient states having left foot pain for a couple weeks       HPI     Is a 18-year-old female here today for medial left foot pain. Patient states the foot pain started 2 weeks ago. Patient denies any trauma or injury. Patient states the pain comes and goes. Describes the pain as sharp last few minutes to hours. Patient has been taking ibuprofen which has been providing relief. She denies any swelling. Patient is able to ambulate. Patient is able to move her foot without any discomfort. Type 2 diabetes  Last hemoglobin A1c was 6.5 and today is 6.8. Patient is not on Metformin. Patient states she does not want to be on medication and would like to continue to work on her diet and exercise. Diabetic eye exam patient will schedule this year  Diabetic foot exam at next visit    Review of Systems   Constitutional: Negative for activity change, appetite change, chills and fever. Respiratory: Negative for shortness of breath and wheezing. Cardiovascular: Negative for chest pain, palpitations and leg swelling. Gastrointestinal: Negative for abdominal pain, constipation and diarrhea. Genitourinary: Negative for difficulty urinating. Musculoskeletal: Positive for arthralgias (left foot pain). Negative for gait problem, joint swelling and myalgias. Neurological: Negative for dizziness, weakness, numbness and headaches.          The patient has a   Family History   Problem Relation Age of Onset    Asthma Mother     Cancer Father         throat       Objective:    /76 (Site: Left Upper Arm, Position: Sitting, Cuff Size: Medium Adult)   Pulse 80   Temp 97.2 °F (36.2 °C) (Temporal)   Wt 110 lb (49.9 kg)   LMP 10/21/2020   BMI 22.22 kg/m²    BP Readings from Last 3 Encounters:   01/15/21 128/76   11/23/20 (!) 157/81   10/29/20 137/85       Physical Exam  Vitals signs reviewed. Constitutional:       Appearance: Normal appearance. Cardiovascular:      Rate and Rhythm: Normal rate and regular rhythm. Pulses:           Dorsalis pedis pulses are 3+ on the right side and 3+ on the left side. Heart sounds: Normal heart sounds. Pulmonary:      Effort: Pulmonary effort is normal.      Breath sounds: Normal breath sounds. No wheezing. Musculoskeletal:      Right foot: Normal range of motion. No deformity. Left foot: Normal range of motion. No deformity. Feet:      Right foot:      Skin integrity: No ulcer or blister. Left foot:      Skin integrity: No ulcer or blister. Comments: Left foot pain medial no tenderness on palpation. No pain on first metatarsal joint. Pedal pulses intact  Skin:     General: Skin is warm. Neurological:      Mental Status: She is alert. Lab Results   Component Value Date    WBC 5.6 10/21/2020    HGB 12.4 10/21/2020    HCT 39.4 10/21/2020     10/21/2020    CHOL 198 12/22/2020    TRIG 127 12/22/2020    HDL 73 12/22/2020    ALT 17 10/21/2020    AST 25 10/21/2020     10/21/2020    K 4.7 10/21/2020     10/21/2020    CREATININE 0.82 10/21/2020    BUN 11 10/21/2020    CO2 27 10/21/2020    INR 1.1 03/20/2019    LABA1C 6.8 01/15/2021    LABMICR 4 12/22/2020     Lab Results   Component Value Date    CALCIUM 9.5 10/21/2020     Lab Results   Component Value Date    LDLCHOLESTEROL 100 12/22/2020       Assessment and Plan:    1. Encounter for screening mammogram for breast cancer  - BRITTANEY Digital Screen Bilateral [[id number]]; Future    2. Healthcare maintenance  - POCT glycosylated hemoglobin (Hb A1C)    3.  Left foot pain  Educated patient to use ibuprofen as

## 2021-01-18 ENCOUNTER — HOSPITAL ENCOUNTER (OUTPATIENT)
Dept: PHYSICAL THERAPY | Facility: CLINIC | Age: 51
Setting detail: THERAPIES SERIES
Discharge: HOME | End: 2021-01-18
Payer: MEDICAID

## 2021-01-18 PROCEDURE — 97110 THERAPEUTIC EXERCISES: CPT

## 2021-01-22 ENCOUNTER — HOSPITAL ENCOUNTER (OUTPATIENT)
Dept: PHYSICAL THERAPY | Facility: CLINIC | Age: 51
Setting detail: THERAPIES SERIES
Discharge: HOME | End: 2021-01-22
Payer: MEDICAID

## 2021-01-22 PROCEDURE — 97110 THERAPEUTIC EXERCISES: CPT

## 2021-01-22 NOTE — FLOWSHEET NOTE
[] [address]  [address]  [phone]  F: [phone] [x] [address] [facility] 36   [address]  P: [phone]  F: [phone] [] [address]  [address] [facility]  P: [phone]  F: [phone] [] [address]  P: [phone]  F: [phone] [] [address]  [facility]   [address]   [place]: [phone]  F: [phone]      Physical Therapy Daily Treatment Note    Date:  2021  Patient Name:  Donya Roberson    :  1970  MRN: [id number]  Physician: Han Eason                 Insurance: [employer]- 27 visits combined all disciplines   Medical Diagnosis: M75.102, M12.812- tendinitis of left rotator cuff, rotator cuff tear arthropathy of left shoulder; M19.012- osteoarthritis of left shoulder, unspecified osteoarthritis type  Rehab Codes: M75.102; Y91.952; M19.012; M25.512; M62.81   Onset Date: chronic; 2019                       Next 's appt: TBD- pending success with PT services   Visit# / total visits:      Cancels/No Shows: 1/0    Subjective:    Pain:  [x] Yes  [] No Location: L shoulder Pain Rating: (0-10 scale) 3/10  Pain altered Tx:  [x] No  [] Yes  Action: N/A  Comments: \"I felt great [regarding tolerance to last visit]. \" However, inc sharp, shooting pain within both shoulders and unable to sleep last PM. Reports improved L foot pain since recent visit with referring MD- instructed to undergo L foot xray- plans to obtain soon. No other treatments provided to L foot thus far.      Todays Treatment:  Modalities: HP, CP to L shoulder- PRN- DECLINES ALL   Precautions: chronic L shoulder pain- potential partial RTC tear; relief with recent cortisone injection; striving to avoid surgery  Exercises: will be late to most visits- treat as able; h/o x4 surgeries for R shoulder- all with PT services- continued pain    Bolded performed 01/22/21  Exercise Reps/ Time Weight/ Level Comments   UBE x3'/x3' L3.5 B UEs   AAROM pulleys- FLEX/ABD x3' ea  For L UE- cues for inc hold duration at end-ranges    Wall-slide FLEX/SCAPT/ABD- for L UE x15, 3\" ea  HEP- reviewed    Snow angels  x10, 3\" ea  NEW- 1/15/2021   Much improved- 1/22/2021- no pain          TB rows, extensions- B x30 ea BTB TCs   Standing AROM       FLEX/SCAPT/ABD to 90 deg x15 ea 1# In mirror for visual feedback   No compensations   Slow release          Seated         Seated L LS str. 2x30\" ea  Bilateral    Seated L UT str.- hand grasping chair edge  2x30\" ea   HEP- reviewed   Bilateral    Seated scapular retractions  x20   HEP- reviewed   Bilateral          Supine      AAROM wand FLEX/ABD > 90 deg x30 ea 3#          Prone           Row  Extension x20   x20  AROM  AROM Off EOB         Side lying       External rotation  Abduction x20 ea AROM only Towel roll for RTC blood supply during ER   Other: BOLD is completed. Treatment Charges: Mins Units   []  Modalities     [x]  Ther Exercise 50 3   []  Manual Therapy     []  Ther Activities     []  Aquatics     []  Vasocompression     []  Other     Total Treatment time 50 3     [x6' UBE]    Assessment: [x] Progressing toward goals. Pt presents with 3/10 L shoulder pain, and reports great tolerance to last treatment session. However, continued difficulty sleeping each PM secondary to inc B shoulder pain. Therefore, continued progression of both L RTC and postural strengthening this date. Able to inc reps, sets of previous interventions, as well as inc resistance, and perform S/L interventions- all without inc pain. Reports slightly elevated pain at immediate finish of treatment session. Consider ideas below next visit. [] No change.       [] Other:  [x] Patient would continue to benefit from skilled physical therapy services in order to: inc L shoulder A/PROM; L shoulder gross strength; and overall improve tolerance for ADLs with L UE.    STG: (to be met in 6 treatments)  1. ? Pain: Patient will report < 8/10 pain at rest and < 8/10 pain with active movement in order to improve QOL. 2. ? ROM: Will demo L shoulder FLEX/ABD PROM to 175 deg; AROM to 125 deg with < 8/10 P in order to better match R shoulder and improve functional mobility. 3. ? Strength: Will demo 4+/5 L shoulder FLEX/ABD; and 4/5 IR strength with < 8/10 P in order to better match R and improve light ADLs. 4. ? Function: Patient will report decreased TTP to L UT in order to indicate decreased inflammation and improved healing of injured site. 5. Patient to be independent with home exercise program as demonstrated by performance with correct form without cues. LTG: (to be met in 12 treatments)  1. Will report < 6/10 P with performing light ADLs with B UEs- dec frequency of arms \"giving out\" for improved QOL. 2. Will report improved tolerance for sleeping upon either side for inc QOL. 3. Improve UEFS to 35/80               Patient goals: none listed. Pt. Education:  [] Yes  [] No  [] Reviewed Prior HEP/Ed  Method of Education: [] Verbal  [] Demo  [] Written  Comprehension of Education:  [] Verbalizes understanding. [] Demonstrates understanding. [] Needs review. [] Demonstrates/verbalizes HEP/Ed previously given. Plan: [x] Continue current frequency toward long and short term goals. [x] Specific Instructions for subsequent treatments: Consider SB wall roll-ups; add resistance to S/L interventions; and resume prone interventions next visit.        Time In: 1:03PM         Time Out: 2PM    Electronically signed by:  Saida Ying, PT

## 2021-01-26 ENCOUNTER — HOSPITAL ENCOUNTER (OUTPATIENT)
Dept: PHYSICAL THERAPY | Facility: CLINIC | Age: 51
Setting detail: THERAPIES SERIES
Discharge: HOME | End: 2021-01-26
Payer: MEDICAID

## 2021-01-26 ENCOUNTER — HOSPITAL ENCOUNTER (OUTPATIENT)
Dept: GENERAL RADIOLOGY | Age: 51
Discharge: HOME | End: 2021-01-28
Payer: MEDICAID

## 2021-01-26 ENCOUNTER — HOSPITAL ENCOUNTER (OUTPATIENT)
Age: 51
Discharge: HOME | End: 2021-01-28
Payer: MEDICAID

## 2021-01-26 DIAGNOSIS — M79.672 LEFT FOOT PAIN: ICD-10-CM

## 2021-01-26 PROCEDURE — 97110 THERAPEUTIC EXERCISES: CPT

## 2021-01-26 PROCEDURE — 73630 X-RAY EXAM OF FOOT: CPT

## 2021-01-26 NOTE — FLOWSHEET NOTE
[] [facility]) - [facility] &  Therapy  [address]  [phone]  F: [phone] [x] [address] [facility] [address]   [address]  P: [phone]  F: [phone] [] [address] &  Therapy  [address]  P: [phone]  F: [phone] [] [address]  P: [phone]  F: [phone] [] [address]  [facility]   [address]   [place]: [phone]  F: [phone]      Physical Therapy Daily Treatment Note    Date:  2021  Patient Name:  Mat Keene    :  1970  MRN: [id number]  Physician: Jhonathan Hyatt: Queenie Hernandez- 27 visits combined all disciplines   Medical Diagnosis: M75.102, M12.812- tendinitis of left rotator cuff, rotator cuff tear arthropathy of left shoulder; M19.012- osteoarthritis of left shoulder, unspecified osteoarthritis type  Rehab Codes: M75.102; I11.263; M19.012; M25.512; M62.81   Onset Date: chronic; 2019                       Next 's appt: TBD- pending success with PT services   Visit# / total visits:      Cancels/No Shows: 1/0    Subjective:    Pain:  [x] Yes  [] No Location: L shoulder Pain Rating: (0-10 scale) 2/10  Pain altered Tx:  [x] No  [] Yes  Action: N/A  Comments: pt reports she is doing good today, felt good after last treatment. Pt is slow to respond to questions asked, staes she feels weird and weak.      Todays Treatment:  Modalities: HP, CP to L shoulder- PRN- DECLINES ALL   Precautions: chronic L shoulder pain- potential partial RTC tear; relief with recent cortisone injection; striving to avoid surgery  Exercises: will be late to most visits- treat as able; h/o x4 surgeries for R shoulder- all with PT services- continued pain    Bolded performed 01/26/21  Exercise Reps/ Time Weight/ Level Comments   UBE x3'/x3' L3.5 B UEs   AAROM pulleys- FLEX/ABD x3' ea  For L UE- cues for inc hold duration at end-ranges    Wall-slide FLEX/SCAPT/ABD- for L UE x15, 3\" ea  HEP- reviewed    Snow angels  x10, 3\" ea  NEW- 1/15/2021   Much improved- 1/22/2021- no pain          TB rows, extensions- B x30 ea BTB TCs   Standing AROM       FLEX/SCAPT/ABD to 90 deg x15 ea 1# In mirror for visual feedback   No compensations   Slow release          Seated         Seated L LS str. 2x30\" ea  Bilateral    Seated L UT str.- hand grasping chair edge  2x30\" ea   HEP- reviewed   Bilateral    Seated scapular retractions  x20   HEP- reviewed   Bilateral          Supine      PROM x     AAROM wand FLEX/ABD > 90 deg x30 ea 3#          Prone           Row  Extension x20   x20  AROM  AROM Off EOB         Side lying       External rotation  Abduction x20 ea AROM only Towel roll for RTC blood supply during ER   Other: BOLD is completed. Treatment Charges: Mins Units   []  Modalities     [x]  Ther Exercise 10 1   []  Manual Therapy     []  Ther Activities     []  Aquatics     []  Vasocompression     []  Other     Total Treatment time 10 1     [x6' UBE]    Assessment: [] Progressing toward goals. [] No change. [x] Other: after the stretching and a few reps of flexion with the cane pt reports she \"just has no energy\". She was not her talkative self and was slow to respond to questions. She decided at this point she should probably go home. Therapist asked if she was diabetic, she said she was not. She had not eaten much today besides some crackers. She also complained of a slight HA. She called her  to come get her. At this point the therapist gave her a glucose tablet. After this the pt felt much better and reports she was able to drive home at this point. Pt apologized for the situation.    [x] Patient would continue to benefit from skilled physical therapy services in order to: inc

## 2021-01-29 ENCOUNTER — HOSPITAL ENCOUNTER (OUTPATIENT)
Dept: PHYSICAL THERAPY | Facility: CLINIC | Age: 51
Setting detail: THERAPIES SERIES
Discharge: HOME | End: 2021-01-29
Payer: MEDICAID

## 2021-01-29 NOTE — FLOWSHEET NOTE
[] [address]  [address]    [phone]  F: [phone]   [x] [address]  [facility] 36   [address]  P: [phone]  F: [phone]  [] [address] &  Therapy  [address]  P: [phone]  F: [phone] [] [address]  P: [phone]  F: [phone]  [] [address]  [address] [facility] [address]   [address]   [place]: [phone]  F: [phone]   [] [address]  [place]: [phone]   F: [phone]     Physical Therapy Cancel/No Show note    Date: 2021  Patient: Kenton Sutton  : 1970  MRN: [id number]    Cancels/No Shows to date:     For today's appointment patient:    [x]  Cancelled    [] Rescheduled appointment    [] No-show     Reason given by patient:    []  Patient ill    []  Conflicting appointment    [] No transportation      [] Conflict with work    [] No reason given    [] Weather related    [] COVID-19    [x] Other:      Comments:  Pt cancelled due to being sick.        [x] Next appointment was confirmed     Electronically signed by: Yen Hollis PTA

## 2021-02-02 ENCOUNTER — HOSPITAL ENCOUNTER (OUTPATIENT)
Dept: PHYSICAL THERAPY | Facility: CLINIC | Age: 51
Setting detail: THERAPIES SERIES
Discharge: HOME | End: 2021-02-02
Payer: MEDICAID

## 2021-02-02 NOTE — FLOWSHEET NOTE
[] [facility]) - [facility] &  Therapy  [address]    [phone]  F: [phone]   [x] [address]  [facility] 36   [address]  P: [phone]  F: [phone]  [] [address] &  Therapy  [address]  P: [phone]  F: [phone] [] [address]  P: [phone]  F: [phone]  [] [address]  [address] [facility] 70   [address]   [place]: [phone]  F: [phone]   [] [facility]  [address] [facility] [address]  [place]: [phone]   F: [phone]     Physical Therapy Cancel/No Show note    Date: 2021  Patient: Hansa Stokes  : 1970  MRN: [id number]    Cancels/No Shows to date: 3/0    For today's appointment patient:    [x]  Cancelled    [] Rescheduled appointment    [] No-show     Reason given by patient:    [x]  Patient ill    []  Conflicting appointment    [] No transportation      [] Conflict with work    [] No reason given    [] Weather related    [] COVID-19    [] Other:      Comments:     No more appts scheduled a this time.        [] Next appointment was confirmed    Electronically signed by: Swetha Byrd

## 2021-02-03 ENCOUNTER — OFFICE VISIT (OUTPATIENT)
Dept: FAMILY MEDICINE CLINIC | Age: 51
End: 2021-02-03
Payer: MEDICAID

## 2021-02-03 VITALS
HEIGHT: 59 IN | SYSTOLIC BLOOD PRESSURE: 123 MMHG | TEMPERATURE: 97.2 F | BODY MASS INDEX: 22.34 KG/M2 | DIASTOLIC BLOOD PRESSURE: 76 MMHG | WEIGHT: 110.8 LBS | HEART RATE: 73 BPM

## 2021-02-03 DIAGNOSIS — M79.672 LEFT FOOT PAIN: Primary | ICD-10-CM

## 2021-02-03 PROCEDURE — 99213 OFFICE O/P EST LOW 20 MIN: CPT | Performed by: FAMILY MEDICINE

## 2021-02-03 PROCEDURE — G8427 DOCREV CUR MEDS BY ELIG CLIN: HCPCS | Performed by: FAMILY MEDICINE

## 2021-02-03 PROCEDURE — G8484 FLU IMMUNIZE NO ADMIN: HCPCS | Performed by: FAMILY MEDICINE

## 2021-02-03 PROCEDURE — 4004F PT TOBACCO SCREEN RCVD TLK: CPT | Performed by: FAMILY MEDICINE

## 2021-02-03 PROCEDURE — 99211 OFF/OP EST MAY X REQ PHY/QHP: CPT | Performed by: FAMILY MEDICINE

## 2021-02-03 PROCEDURE — 3017F COLORECTAL CA SCREEN DOC REV: CPT | Performed by: FAMILY MEDICINE

## 2021-02-03 PROCEDURE — G8420 CALC BMI NORM PARAMETERS: HCPCS | Performed by: FAMILY MEDICINE

## 2021-02-03 NOTE — PROGRESS NOTES
Visit Information    Have you changed or started any medications since your last visit including any over-the-counter medicines, vitamins, or herbal medicines? no   Have you stopped taking any of your medications? Is so, why? -  no  Are you having any side effects from any of your medications? - no    Have you seen any other physician or provider since your last visit? yes - PT    Have you had any other diagnostic tests since your last visit?  no   Have you been seen in the emergency room and/or had an admission in a hospital since we last saw you?  no   Have you had your routine dental cleaning in the past 6 months?  no     Do you have an active MyChart account? If no, what is the barrier?   Yes    Patient Care Team:  Baron Leija MD as PCP - General (Family Medicine)    Medical History Review  Past Medical, Family, and Social History reviewed and does contribute to the patient presenting condition    Health Maintenance   Topic Date Due    Hepatitis C screen  1970    Pneumococcal 0-64 years Vaccine (1 of 1 - PPSV23) 08/04/1974    Diabetic foot exam  08/04/1978    Diabetic retinal exam  08/04/1978    Shingles Vaccine (1 of 2) 08/04/2018    Cervical cancer screen  04/20/2020    Flu vaccine (1) 09/01/2020    Breast cancer screen  02/13/2021    Diabetic microalbuminuria test  12/22/2021    Lipid screen  12/22/2021    A1C test (Diabetic or Prediabetic)  01/15/2022    DTaP/Tdap/Td vaccine (2 - Td) 03/17/2025    Colon cancer screen colonoscopy  04/18/2029    HIV screen  Completed    Hepatitis A vaccine  Aged Out    Hepatitis B vaccine  Aged Out    Hib vaccine  Aged Out    Meningococcal (ACWY) vaccine  Aged Out
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
## 2021-02-17 ENCOUNTER — OFFICE VISIT (OUTPATIENT)
Dept: FAMILY MEDICINE CLINIC | Age: 51
End: 2021-02-17
Payer: MEDICAID

## 2021-02-17 ENCOUNTER — NURSE TRIAGE (OUTPATIENT)
Dept: OTHER | Facility: CLINIC | Age: 51
End: 2021-02-17

## 2021-02-17 VITALS
DIASTOLIC BLOOD PRESSURE: 73 MMHG | HEIGHT: 59 IN | SYSTOLIC BLOOD PRESSURE: 123 MMHG | HEART RATE: 76 BPM | WEIGHT: 112.4 LBS | TEMPERATURE: 97.3 F | BODY MASS INDEX: 22.66 KG/M2

## 2021-02-17 DIAGNOSIS — Z12.11 SCREENING FOR COLON CANCER: ICD-10-CM

## 2021-02-17 DIAGNOSIS — Z11.59 ENCOUNTER FOR HEPATITIS C SCREENING TEST FOR LOW RISK PATIENT: ICD-10-CM

## 2021-02-17 DIAGNOSIS — G56.01 ACUTE CARPAL TUNNEL SYNDROME, RIGHT: Primary | ICD-10-CM

## 2021-02-17 DIAGNOSIS — R53.83 FATIGUE, UNSPECIFIED TYPE: ICD-10-CM

## 2021-02-17 DIAGNOSIS — Z12.31 ENCOUNTER FOR SCREENING MAMMOGRAM FOR BREAST CANCER: ICD-10-CM

## 2021-02-17 PROCEDURE — 3017F COLORECTAL CA SCREEN DOC REV: CPT | Performed by: STUDENT IN AN ORGANIZED HEALTH CARE EDUCATION/TRAINING PROGRAM

## 2021-02-17 PROCEDURE — G8420 CALC BMI NORM PARAMETERS: HCPCS | Performed by: STUDENT IN AN ORGANIZED HEALTH CARE EDUCATION/TRAINING PROGRAM

## 2021-02-17 PROCEDURE — G8427 DOCREV CUR MEDS BY ELIG CLIN: HCPCS | Performed by: STUDENT IN AN ORGANIZED HEALTH CARE EDUCATION/TRAINING PROGRAM

## 2021-02-17 PROCEDURE — 99213 OFFICE O/P EST LOW 20 MIN: CPT | Performed by: STUDENT IN AN ORGANIZED HEALTH CARE EDUCATION/TRAINING PROGRAM

## 2021-02-17 PROCEDURE — 4004F PT TOBACCO SCREEN RCVD TLK: CPT | Performed by: STUDENT IN AN ORGANIZED HEALTH CARE EDUCATION/TRAINING PROGRAM

## 2021-02-17 PROCEDURE — G8484 FLU IMMUNIZE NO ADMIN: HCPCS | Performed by: STUDENT IN AN ORGANIZED HEALTH CARE EDUCATION/TRAINING PROGRAM

## 2021-02-17 ASSESSMENT — ENCOUNTER SYMPTOMS
ABDOMINAL PAIN: 0
SHORTNESS OF BREATH: 0
DIARRHEA: 0
WHEEZING: 0
CONSTIPATION: 0

## 2021-02-17 NOTE — TELEPHONE ENCOUNTER
pt transferred from NT with Alexandr Ache, pt c/o her fingers on right hand near bed of nail are swallon and feeling of numbness, she did state she had shoulder injection in November and only wanted to see her PCP scheduled with pt  For sameday for today, thankyou

## 2021-02-17 NOTE — TELEPHONE ENCOUNTER
Reason for Disposition   [1] Small area of LOCALIZED swelling AND [2] not better after 3 days    Answer Assessment - Initial Assessment Questions  1. ONSET: \"When did the swelling start? \" (e.g., minutes, hours, days)      Off and on Since November, but constant for past 2 weeks    2. LOCATION: \"What part of the arm is swollen? \"  \"Are both arms swollen or just one arm? \"      All five fingers on the right hand    3. SEVERITY: \"How bad is the swelling? \" (e.g., localized; mild, moderate, severe)    - LOCALIZED: Small area of puffiness or swelling on just one arm    - JOINT SWELLING: Swelling of one joint    - MILD: Puffiness or swelling of hand    - MODERATE: Puffiness or swollen feeling of entire arm     - SEVERE: All of arm looks swollen; pitting edema      Joint swelling- joint closest to nail    4. REDNESS: \"Does the swelling look red or infected? \"      No    5. PAIN: \"Is the swelling painful to touch? \" If so, ask: \"How painful is it? \"   (Scale 1-10; mild, moderate or severe)      No pain    6. FEVER: \"Do you have a fever? \" If so, ask: \"What is it, how was it measured, and when did it start? \"       No    7. CAUSE: \"What do you think is causing the arm swelling? \"      I don't know. 8. MEDICAL HISTORY: \"Do you have a history of heart failure, kidney disease, liver failure, or cancer? \"      No    9. RECURRENT SYMPTOM: \"Have you had arm swelling before? \" If so, ask: \"When was the last time? \" \"What happened that time? \"      No    10. OTHER SYMPTOMS: \"Do you have any other symptoms? \" (e.g., chest pain, difficulty breathing)        Numbness, hot and cold flashes every time I wake up feel like I'm on fire. 11. PREGNANCY: \"Is there any chance you are pregnant? \" \"When was your last menstrual period? \"        No    Protocols used: ARM SWELLING AND EDEMA-ADULT-AH    Patient [facility]  with red flag complaint.      Brief description of triage: finger swelling and numbness, hot/cold flashes. Gets injection in right shoulder and this swelling/numbness began after last injection    Triage indicates for patient to be seen within 3 days. Prefers VV    Care advice provided, patient verbalizes understanding; denies any other questions or concerns; instructed to call back for any new or worsening symptoms. Writer provided warm transfer to Robby Kussmaul at [facility] for appointment scheduling. Attention Provider: Thank you for allowing me to participate in the care of your patient. The patient was connected to triage in response to information provided to the ECC. Please do not respond through this encounter as the response is not directed to a shared pool.

## 2021-02-17 NOTE — PROGRESS NOTES
Subjective:    Denzel Plaza is a 44 y.o. female with  has a past medical history of Anemia, Anxiety, Chronic right shoulder pain, Depression, Irregular heartbeat, and Sciatica. Presented to the office today for:  Chief Complaint   Patient presents with    Numbness     swollen nailbeds and numbness in fingers on right hand    Injections     had shoulder injection in november    Fatigue     feelings of being more tired/fatigued/not sleeping right because of depression       HPI     Patient is a 49-year-old today for follow-up. Right wrist numbness and tingling: She states for the last 3 months she has been experiencing numbness and tingling that occurs multiple times in a day. Patient denies any neck pain. Patient states the numbness and tingling will last for a few minutes and go away. She denies any repetitive movements. Chronic fatigue: She states she has been feeling tired for many months now. Patient does have history of depression currently not on medication. Patient denies any constipation, diarrhea, palpitation, chest pain, shortness of breath, headaches. Health maintenance  Patient will schedule cervical cancer screening at next appointment  Mammogram ordered    Review of Systems   Constitutional: Negative for activity change, appetite change, chills and fever. Respiratory: Negative for shortness of breath and wheezing. Cardiovascular: Negative for chest pain, palpitations and leg swelling. Gastrointestinal: Negative for abdominal pain, constipation and diarrhea. Genitourinary: Negative for difficulty urinating. Musculoskeletal: Positive for arthralgias (right wrist). Neurological: Negative for dizziness, weakness, numbness and headaches.          The patient has a   Family History   Problem Relation Age of Onset    Asthma Mother     Cancer Father         throat       Objective:    /73 (Site: Right Upper Arm, Position: Sitting, Cuff Size: Medium Adult)   Pulse 76 Temp 97.3 °F (36.3 °C) (Temporal)   Ht 4' 11\" (1.499 m)   Wt 112 lb 6.4 oz (51 kg)   BMI 22.70 kg/m²    BP Readings from Last 3 Encounters:   02/17/21 123/73   02/03/21 123/76   01/15/21 128/76       Physical Exam  Vitals signs reviewed. Constitutional:       Appearance: Normal appearance. Cardiovascular:      Rate and Rhythm: Normal rate and regular rhythm. Pulses: Normal pulses. Heart sounds: Normal heart sounds. Pulmonary:      Effort: Pulmonary effort is normal.      Breath sounds: Normal breath sounds. No wheezing. Musculoskeletal:      Comments: Tinel and phalen test positive right wrist   Skin:     General: Skin is warm. Neurological:      Mental Status: She is alert. Lab Results   Component Value Date    WBC 5.6 10/21/2020    HGB 12.4 10/21/2020    HCT 39.4 10/21/2020     10/21/2020    CHOL 198 12/22/2020    TRIG 127 12/22/2020    HDL 73 12/22/2020    ALT 17 10/21/2020    AST 25 10/21/2020     10/21/2020    K 4.7 10/21/2020     10/21/2020    CREATININE 0.82 10/21/2020    BUN 11 10/21/2020    CO2 27 10/21/2020    INR 1.1 03/20/2019    LABA1C 6.8 01/15/2021    LABMICR 4 12/22/2020     Lab Results   Component Value Date    CALCIUM 9.5 10/21/2020     Lab Results   Component Value Date    LDLCHOLESTEROL 100 12/22/2020       Assessment and Plan:    1. Encounter for screening mammogram for breast cancer  - [facility] Digital Screen Bilateral [[id number]]; Future    2. Encounter for hepatitis C screening test for low risk patient  - Hepatitis C Antibody; Future    3. Fatigue, unspecified type  - TSH With Reflex Ft4; Future  - CBC With Auto Differential; Future  - Comprehensive Metabolic Panel; Future    4. Acute carpal tunnel syndrome, right  Patient provided exercises for carpal tunnel to do at home. Patient to take Celebrex as needed for pain. Patient sent hand brace to wear nightly for 6 to 8 weeks.   - EMG; Future          Requested Prescriptions     Signed Prescriptions Disp Refills    Elastic Bandages & Supports (WRIST BRACE DELUXE) MISC 1 each 0     Sig: Wear nightly right brace       There are no discontinued medications. Monica Duque received counseling on the following healthy behaviors: nutrition, exercise and medication adherence    Discussed use,benefit, and side effects of prescribed medications. Barriers to medication compliance addressed. All patient questions answered. Pt voiced understanding. Return in about 4 weeks (around 3/17/2021), or right wrist follow up, for pap smear . Disclaimer: Some orall of this note was transcribed using voice-recognition software. This may cause typographical errors occasionally. Although all effort is made to fix these errors, please do not hesitate to contact our office if there [place] concern with the understanding of this note.

## 2021-02-17 NOTE — PATIENT INSTRUCTIONS
Thank you for letting us take care of you today. We hope all your questions were addressed. If a question was overlooked or something else comes to mind after you return home, please contact a member of your Care Team listed below. Your Care Team at [place] [address] is Team #2  Lawrence Parkinson DO (Faculty)  Sarah Medrano (Faculty)  Brendan Bence, MD (Resident)  Diana Clancy MD (Resident)  Lori Womack MD (Resident)  Ganesh Ramsay MD (Resident)  Neil Eilas MD (Resident)  Virgia Goodpasture, LPN Judie Smothers. ,Abby Cortez ([address] [facility] office)  Jaylene Barreto, [address] (Clinical Practice Manager)  Kareen Ferguson, [facility] (Clinical Pharmacist)     Office phone number: [phone]    If you need to get in right away due to illness, please be advised we have \"Same Day\" appointments available Monday-Friday. Please call us at [phone] option #3 to schedule your \"Same Day\" appointment. Patient Education        Carpal Tunnel Syndrome: Exercises  Introduction  Here are some examples of exercises for you to try. The exercises may be suggested for a condition or for rehabilitation. Start each exercise slowly. Ease off the exercises if you start to have pain. You will be told when to start these exercises and which ones will work best for you. Warm-up stretches  When you no longer have pain or numbness, you can do exercises to help prevent carpal tunnel syndrome from coming back. Do not do any stretch or movement that is uncomfortable or painful. 1. Rotate your wrist up, down, and from side to side. Repeat 4 times. 2. Stretch your fingers far apart. Relax them, and then stretch them again. Repeat 4 times. 3. Stretch your thumb by pulling it back gently, holding it, and then releasing it. Repeat 4 times. How to do the exercises  Prayer stretch   1. Start with your palms together in front of your chest just below your chin.   2. Slowly lower your hands toward your waistline, keeping your hands close to your stomach and your palms together until you feel a mild to moderate stretch under your forearms. 3. Hold for at least 15 to 30 seconds. Repeat 2 to 4 times. Wrist flexor stretch   1. Extend your arm in front of you with your palm up. 2. Bend your wrist, pointing your hand toward the floor. 3. With your other hand, gently bend your wrist farther until you feel a mild to moderate stretch in your forearm. 4. Hold for at least 15 to 30 seconds. Repeat 2 to 4 times. Wrist extensor stretch   1. Repeat steps 1 through 4 of the stretch above, but begin with your extended hand palm down. Follow-up care is a key part of your treatment and safety. Be sure to make and go to all appointments, and call your doctor if you are having problems. It's also a good idea to know your test results and keep a list of the medicines you take. Where can you learn more? Go to https://[employer].AddIn [employer]. org and sign in to your [employer] account. Enter K728 in the [employer] box to learn more about \"Carpal Tunnel Syndrome: Exercises. \"     If you do not have an account, please click on the \"Sign Up Now\" link. Current as of: March 2, 2020               Content Version: 12.6  © 4862-5804 [employer]. Care instructions adapted under license by [facility] ([facility]). If you have questions about a medical condition or this instruction, always ask your healthcare professional. [place] [address] any warranty or liability for your use of this information.

## 2021-02-18 NOTE — PROGRESS NOTES
Attending Physician Statement    Wt Readings from Last 3 Encounters:   02/17/21 112 lb 6.4 oz (51 kg)   02/03/21 110 lb 12.8 oz (50.3 kg)   01/15/21 110 lb (49.9 kg)     Temp Readings from Last 3 Encounters:   02/17/21 97.3 °F (36.3 °C) (Temporal)   02/03/21 97.2 °F (36.2 °C) (Temporal)   01/15/21 97.2 °F (36.2 °C) (Temporal)     BP Readings from Last 3 Encounters:   02/17/21 123/73   02/03/21 123/76   01/15/21 128/76     Pulse Readings from Last 3 Encounters:   02/17/21 76   02/03/21 73   01/15/21 80         I have discussed the care of Kenton Sutton, including pertinent history and exam findings with the resident. I have reviewed the key elements of all parts of the encounter with the resident. I agree with the assessment, plan and orders as documented by the resident.   (GE Modifier)

## 2021-02-19 ENCOUNTER — TELEPHONE (OUTPATIENT)
Dept: FAMILY MEDICINE CLINIC | Age: 51
End: 2021-02-19

## 2021-02-19 ENCOUNTER — HOSPITAL ENCOUNTER (OUTPATIENT)
Age: 51
Discharge: HOME | End: 2021-02-19
Payer: MEDICAID

## 2021-02-19 ENCOUNTER — OFFICE VISIT (OUTPATIENT)
Dept: ORTHOPEDIC SURGERY | Age: 51
End: 2021-02-19
Payer: MEDICAID

## 2021-02-19 VITALS — HEIGHT: 59 IN | BODY MASS INDEX: 22.58 KG/M2 | WEIGHT: 112 LBS

## 2021-02-19 DIAGNOSIS — M75.102 ROTATOR CUFF TEAR ARTHROPATHY OF LEFT SHOULDER: ICD-10-CM

## 2021-02-19 DIAGNOSIS — M25.531 RIGHT WRIST PAIN: Primary | ICD-10-CM

## 2021-02-19 DIAGNOSIS — M75.82 TENDINITIS OF LEFT ROTATOR CUFF: Primary | ICD-10-CM

## 2021-02-19 DIAGNOSIS — M12.812 ROTATOR CUFF TEAR ARTHROPATHY OF LEFT SHOULDER: ICD-10-CM

## 2021-02-19 DIAGNOSIS — Z11.59 ENCOUNTER FOR HEPATITIS C SCREENING TEST FOR LOW RISK PATIENT: ICD-10-CM

## 2021-02-19 DIAGNOSIS — M19.012 OSTEOARTHRITIS OF LEFT SHOULDER, UNSPECIFIED OSTEOARTHRITIS TYPE: ICD-10-CM

## 2021-02-19 DIAGNOSIS — R53.83 FATIGUE, UNSPECIFIED TYPE: ICD-10-CM

## 2021-02-19 LAB
ABSOLUTE EOS #: 0.09 K/UL (ref 0–0.44)
ABSOLUTE IMMATURE GRANULOCYTE: <0.03 K/UL (ref 0–0.3)
ABSOLUTE LYMPH #: 1.92 K/UL (ref 1.1–3.7)
ABSOLUTE MONO #: 0.46 K/UL (ref 0.1–1.2)
ALBUMIN SERPL-MCNC: 4.4 G/DL (ref 3.5–5.2)
ALBUMIN/GLOBULIN RATIO: 1.6 (ref 1–2.5)
ALP BLD-CCNC: 86 U/L (ref 35–104)
ALT SERPL-CCNC: 17 U/L (ref 5–33)
ANION GAP SERPL CALCULATED.3IONS-SCNC: 6 MMOL/L (ref 9–17)
AST SERPL-CCNC: 24 U/L
BASOPHILS # BLD: 1 % (ref 0–2)
BASOPHILS ABSOLUTE: 0.05 K/UL (ref 0–0.2)
BILIRUB SERPL-MCNC: 0.17 MG/DL (ref 0.3–1.2)
BUN BLDV-MCNC: 13 MG/DL (ref 6–20)
BUN/CREAT BLD: ABNORMAL (ref 9–20)
CALCIUM SERPL-MCNC: 9.6 MG/DL (ref 8.6–10.4)
CHLORIDE BLD-SCNC: 107 MMOL/L (ref 98–107)
CO2: 29 MMOL/L (ref 20–31)
CREAT SERPL-MCNC: 0.83 MG/DL (ref 0.5–0.9)
DIFFERENTIAL TYPE: NORMAL
EOSINOPHILS RELATIVE PERCENT: 2 % (ref 1–4)
GFR AFRICAN AMERICAN: >60 ML/MIN
GFR NON-AFRICAN AMERICAN: >60 ML/MIN
GFR SERPL CREATININE-BSD FRML MDRD: ABNORMAL ML/MIN/{1.73_M2}
GFR SERPL CREATININE-BSD FRML MDRD: ABNORMAL ML/MIN/{1.73_M2}
GLUCOSE BLD-MCNC: 96 MG/DL (ref 70–99)
HCT VFR BLD CALC: 40.1 % (ref 36.3–47.1)
HEMOGLOBIN: 12.6 G/DL (ref 11.9–15.1)
HEPATITIS C ANTIBODY: NONREACTIVE
IMMATURE GRANULOCYTES: 0 %
LYMPHOCYTES # BLD: 32 % (ref 24–43)
MCH RBC QN AUTO: 27.7 PG (ref 25.2–33.5)
MCHC RBC AUTO-ENTMCNC: 31.4 G/DL (ref 28.4–34.8)
MCV RBC AUTO: 88.1 FL (ref 82.6–102.9)
MONOCYTES # BLD: 8 % (ref 3–12)
NRBC AUTOMATED: 0 PER 100 WBC
PDW BLD-RTO: 14.2 % (ref 11.8–14.4)
PLATELET # BLD: 237 K/UL (ref 138–453)
PLATELET ESTIMATE: NORMAL
PMV BLD AUTO: 9.7 FL (ref 8.1–13.5)
POTASSIUM SERPL-SCNC: 4.4 MMOL/L (ref 3.7–5.3)
RBC # BLD: 4.55 M/UL (ref 3.95–5.11)
RBC # BLD: NORMAL 10*6/UL
SEG NEUTROPHILS: 57 % (ref 36–65)
SEGMENTED NEUTROPHILS ABSOLUTE COUNT: 3.53 K/UL (ref 1.5–8.1)
SODIUM BLD-SCNC: 142 MMOL/L (ref 135–144)
TOTAL PROTEIN: 7.2 G/DL (ref 6.4–8.3)
TSH SERPL DL<=0.05 MIU/L-ACNC: 2.35 MIU/L (ref 0.3–5)
WBC # BLD: 6.1 K/UL (ref 3.5–11.3)
WBC # BLD: NORMAL 10*3/UL

## 2021-02-19 PROCEDURE — 80053 COMPREHEN METABOLIC PANEL: CPT

## 2021-02-19 PROCEDURE — 99214 OFFICE O/P EST MOD 30 MIN: CPT | Performed by: ORTHOPAEDIC SURGERY

## 2021-02-19 PROCEDURE — 86803 HEPATITIS C AB TEST: CPT

## 2021-02-19 PROCEDURE — 4004F PT TOBACCO SCREEN RCVD TLK: CPT | Performed by: ORTHOPAEDIC SURGERY

## 2021-02-19 PROCEDURE — 85025 COMPLETE CBC W/AUTO DIFF WBC: CPT

## 2021-02-19 PROCEDURE — G8484 FLU IMMUNIZE NO ADMIN: HCPCS | Performed by: ORTHOPAEDIC SURGERY

## 2021-02-19 PROCEDURE — G8420 CALC BMI NORM PARAMETERS: HCPCS | Performed by: ORTHOPAEDIC SURGERY

## 2021-02-19 PROCEDURE — 84443 ASSAY THYROID STIM HORMONE: CPT

## 2021-02-19 PROCEDURE — 3017F COLORECTAL CA SCREEN DOC REV: CPT | Performed by: ORTHOPAEDIC SURGERY

## 2021-02-19 PROCEDURE — G8427 DOCREV CUR MEDS BY ELIG CLIN: HCPCS | Performed by: ORTHOPAEDIC SURGERY

## 2021-02-19 PROCEDURE — 36415 COLL VENOUS BLD VENIPUNCTURE: CPT

## 2021-02-19 ASSESSMENT — ENCOUNTER SYMPTOMS
APNEA: 0
VOMITING: 0
COUGH: 0
ABDOMINAL PAIN: 0
CHEST TIGHTNESS: 0

## 2021-02-19 NOTE — PROGRESS NOTES
[facility]  [address]  Dept: [phone]  Dept Fax: [phone]        Ambulatory Follow Up      Subjective:   Dee Dee Alcocer is a 44y.o. year old female who presents to our office today for routine followup regarding her   1. Tendinitis of left rotator cuff    2. Rotator cuff tear arthropathy of left shoulder    3. Osteoarthritis of left shoulder, unspecified osteoarthritis type    . Chief Complaint   Patient presents with    Follow-up     left shoulder pain       HPI- Patient is in the office today in follow up for left shoulder pain. The patient was last seen on 12/18/2020 and at that time the patient received a corticosteroid injection to the left shoulder. The patient did not relief to the shoulder until approximately 1-2 weeks ago. The patient was also prescribed therapy. Patient has been in therapy but had to cancel the last 3 visits due to person issues. The patient does note that when she is not in therapy she does a home exercise program. The patient cannot lay on her left shoulder due to pain. The patient also has taken Celebrex for her pain as well. MRI of the left shoulder was completed on 11/21/2020. Review of Systems   Constitutional: Negative for chills and fever. Respiratory: Negative for apnea, cough and chest tightness. Cardiovascular: Negative for chest pain and palpitations. Gastrointestinal: Negative for abdominal pain and vomiting. Genitourinary: Negative for difficulty urinating. Musculoskeletal: Positive for arthralgias (left shoulder). Negative for gait problem, joint swelling and myalgias. Neurological: Negative for dizziness, weakness and numbness. I have reviewed the CC, HPI, ROS, PMH, FHX, Social History, and if not present in this note, I have reviewed in the patient's chart.    I agree with the documentation provided by other staff and have reviewed their documentation prior to providing my signature indicating agreement. Objective :   Ht 4' 11\" (1.499 m)   Wt 112 lb (50.8 kg)   BMI 22.62 kg/m²  Body mass index is 22.62 kg/m². General: Alexandre Bravo is a 44 y.o. female who is alert and oriented and sitting comfortably in our office. Ortho Exam  MS:  Nearly full range of motion of the left shoulder. Good rotator cuff strength. No outward deformity of the left shoulder is appreciated. No instability of the left shoulder is noted. Motor, sensory, vascular examination to the left upper extremity is grossly intact without focal deficits. Neuro: alert and oriented to person and place. Eyes: Extra-ocular muscles intact  Mouth: Oral mucosa moist. No perioral lesions  Pulm: Respirations unlabored and regular. Symmetric chest excursion without outward deformity is noted. Skin: warm, well perfused  Psych:   Patient has good fund of knowledge and displays understanging of exam, diagnosis, and plan. Radiology:     Xr Foot Left (min 3 Views)    Result Date: 1/26/2021  EXAMINATION: THREE XRAY VIEWS OF THE LEFT FOOT 1/26/2021 3:36 pm COMPARISON: 03/07/2016 HISTORY: ORDERING SYSTEM PROVIDED HISTORY: Left foot pain TECHNOLOGIST PROVIDED HISTORY: Reason for Exam: Left foot pain x 2 weeks Acuity: Unknown Type of Exam: Unknown FINDINGS: Old nonunited fracture along the medial margin of the base of the distal phalanx great toe. No acute fracture or dislocation. No lytic or blastic lesions. Soft tissues grossly intact     No acute osseous abnormality. Assessment:      1. Tendinitis of left rotator cuff    2. Rotator cuff tear arthropathy of left shoulder    3. Osteoarthritis of left shoulder, unspecified osteoarthritis type       Plan:      Discussed with the patient that I do feel that long term formal therapy will be what helps the patient. Patient should continue to go to formal therapy. A new referral given to the patient today in the office.  The patient to follow up in the office in 6-8 weeks after formal therapy. The patient knows to call with any questions or concerns. Follow up:Return in about 6 weeks (around 4/2/2021). No orders of the defined types were placed in this encounter. No orders of the defined types were placed in this encounter. Hawa Webb LPN am scribing for and in the presence of Dr. Michael Hall  2/20/2021 12:20 PM      I have reviewed and made changes accordingly to the work scribed by Saji Reyonso LPN. The documentation accurately reflects work and decisions made by me. I have also reviewed documentation completed by clinical staff.     Michael Hall DO, [address]  2/20/2021 12:21 PM    This note is created with the assistance of a speech recognition program.  While intending to generate a document that actually reflects the content of the visit, the document can still have some errors including those of syntax and sound a like substitutions which may escape proof reading.  In such instances, actual meaning can be extrapolated by contextual diversion      Electronically signed by Ellwood Gowers on 2/20/2021 at 12:20 PM

## 2021-02-19 NOTE — TELEPHONE ENCOUNTER
Patient called in stating that [facility] will not fill her wrist brace. Patient need a new order put in so writer can have the script fax [facility]. Please let writer know when new order is put in so I can call patient.  Thank yo

## 2021-02-19 NOTE — TELEPHONE ENCOUNTER
----- Message from Niko Thomas MD sent at 2/18/2021 10:16 AM EST -----  Records show colonoscopy was done for the patient however only 1 page out of the 17 pages. Can we please get the complete records.     Thank  you

## 2021-02-19 NOTE — TELEPHONE ENCOUNTER
Ordered, Printed and signed in my inbasket, thank you.   Electronically signed by Philippe Talley MD on 2/19/2021 at 3:42 PM

## 2021-02-24 ENCOUNTER — HOSPITAL ENCOUNTER (OUTPATIENT)
Dept: PHYSICAL THERAPY | Facility: CLINIC | Age: 51
Setting detail: THERAPIES SERIES
Discharge: HOME | End: 2021-02-24
Payer: MEDICAID

## 2021-02-24 ENCOUNTER — HOSPITAL ENCOUNTER (OUTPATIENT)
Dept: NEUROLOGY | Age: 51
Discharge: HOME | End: 2021-02-24
Payer: MEDICAID

## 2021-02-24 PROCEDURE — 95909 NRV CNDJ TST 5-6 STUDIES: CPT | Performed by: PHYSICAL MEDICINE & REHABILITATION

## 2021-02-24 PROCEDURE — 95886 MUSC TEST DONE W/N TEST COMP: CPT | Performed by: PHYSICAL MEDICINE & REHABILITATION

## 2021-02-24 NOTE — FLOWSHEET NOTE
[] [facility]  [address]    [phone]  F: [phone]   [] [address]  [facility]   [address]  P: [phone]  F: [phone]  [] [address] &  Therapy  [address]  P: [phone]  F: [phone] [] [address]  P: [phone]  F: [phone]  [] [address]  [address] [address] [facility] [address]   [address]   [place]: [phone]  F: [phone]   [] [address]  [place]: [phone]   F: [phone]     Physical Therapy Cancel/No Show note    Date: 2021  Patient: Gregory Muhammad  : 1970  MRN: [id number]    Cancels/No Shows to date:     For today's appointment patient:    [x]  Cancelled    [] Rescheduled appointment    [] No-show     Reason given by patient:    []  Patient ill    []  Conflicting appointment    [] No transportation      [] Conflict with work    [x] No reason given    [] Weather related    [] [id number]    [x] Other:      Comments: Will call back to reschedule. No further appointments on this schedule at this time.          [] Next appointment was confirmed    Electronically signed by: Jackelyn Becerra, PT

## 2021-03-04 ENCOUNTER — HOSPITAL ENCOUNTER (OUTPATIENT)
Dept: MAMMOGRAPHY | Age: 51
Discharge: HOME | End: 2021-03-06
Payer: MEDICAID

## 2021-03-04 DIAGNOSIS — Z12.31 ENCOUNTER FOR SCREENING MAMMOGRAM FOR BREAST CANCER: ICD-10-CM

## 2021-03-04 PROCEDURE — 77063 BREAST TOMOSYNTHESIS BI: CPT

## 2021-03-08 ENCOUNTER — TELEPHONE (OUTPATIENT)
Dept: FAMILY MEDICINE CLINIC | Age: 51
End: 2021-03-08

## 2021-03-19 ENCOUNTER — OFFICE VISIT (OUTPATIENT)
Dept: FAMILY MEDICINE CLINIC | Age: 51
End: 2021-03-19
Payer: MEDICAID

## 2021-03-19 VITALS
WEIGHT: 111 LBS | SYSTOLIC BLOOD PRESSURE: 123 MMHG | HEART RATE: 73 BPM | TEMPERATURE: 97.7 F | HEIGHT: 59 IN | DIASTOLIC BLOOD PRESSURE: 81 MMHG | BODY MASS INDEX: 22.38 KG/M2

## 2021-03-19 DIAGNOSIS — G56.01 CARPAL TUNNEL SYNDROME OF RIGHT WRIST: Primary | ICD-10-CM

## 2021-03-19 DIAGNOSIS — Z23 NEED FOR PROPHYLACTIC VACCINATION AND INOCULATION AGAINST VARICELLA: ICD-10-CM

## 2021-03-19 PROCEDURE — 4004F PT TOBACCO SCREEN RCVD TLK: CPT | Performed by: STUDENT IN AN ORGANIZED HEALTH CARE EDUCATION/TRAINING PROGRAM

## 2021-03-19 PROCEDURE — G8484 FLU IMMUNIZE NO ADMIN: HCPCS | Performed by: STUDENT IN AN ORGANIZED HEALTH CARE EDUCATION/TRAINING PROGRAM

## 2021-03-19 PROCEDURE — 3017F COLORECTAL CA SCREEN DOC REV: CPT | Performed by: STUDENT IN AN ORGANIZED HEALTH CARE EDUCATION/TRAINING PROGRAM

## 2021-03-19 PROCEDURE — G8420 CALC BMI NORM PARAMETERS: HCPCS | Performed by: STUDENT IN AN ORGANIZED HEALTH CARE EDUCATION/TRAINING PROGRAM

## 2021-03-19 PROCEDURE — G8427 DOCREV CUR MEDS BY ELIG CLIN: HCPCS | Performed by: STUDENT IN AN ORGANIZED HEALTH CARE EDUCATION/TRAINING PROGRAM

## 2021-03-19 PROCEDURE — 99213 OFFICE O/P EST LOW 20 MIN: CPT | Performed by: STUDENT IN AN ORGANIZED HEALTH CARE EDUCATION/TRAINING PROGRAM

## 2021-03-19 ASSESSMENT — ENCOUNTER SYMPTOMS
ABDOMINAL PAIN: 0
COUGH: 0
NAUSEA: 0
SHORTNESS OF BREATH: 0
TROUBLE SWALLOWING: 0
VOMITING: 0

## 2021-03-19 NOTE — PROGRESS NOTES
[facility]    Subjective:    Kevan Alex is a 44 y.o. female with  has a past medical history of Anemia, Anxiety, Chronic right shoulder pain, Depression, Irregular heartbeat, and Sciatica. Family History   Problem Relation Age of Onset    Asthma Mother     Cancer Father         throat       Presented to the office today for:  Chief Complaint   Patient presents with    Numbness       HPI    43 yo female here for follow up for right hand numbness/tingling. Previously seen for this on 2/17/21. At that time clinical impression was carpal tunnel syndrome. Patient given carpal tunnel exercises and a wrist brace to wear nightly and order for EMG. Labs from last visit including TSH, CBC, CMP, Hep C unremarkable. Patient did get EMG on 2/25 which showed \"mild median nerve mononeuropathy. \" Patient got right wrist brace 2 weeks ago. Has not been wearing it every night because it is uncomfortable. Says her symptoms have worsened some, continues to have numbness of her right hand's fingers 4-5 times a day. Lasts for 3-4 minutes each time. When it happens it makes things like using her phone uncomfortable. Not taking any medications for this. No activities trigger an episode. Does not engage in any activities using her right hand a lot like writing, knitting/sewing, etc.  Denies any pain or weakness. Review of Systems   Constitutional: Negative for chills and fever. HENT: Negative for trouble swallowing. Respiratory: Negative for cough and shortness of breath. Cardiovascular: Negative for chest pain. Gastrointestinal: Negative for abdominal pain, nausea and vomiting. Musculoskeletal: Negative for arthralgias. Skin: Negative for rash. Neurological: Positive for numbness. Negative for weakness.        Objective:    /81 (Site: Right Upper Arm, Position: Sitting, Cuff Size: Medium Adult)   Pulse 73   Temp 97.7 °F (36.5 °C) (Temporal)   Ht 4' 11\" (1.499 m)   Wt 111 lb (50.3 kg)   BMI 22.42 kg/m²    BP Readings from Last 3 Encounters:   03/19/21 123/81   02/17/21 123/73   02/03/21 123/76     Physical Exam  Constitutional:       Appearance: Normal appearance. HENT:      Head: Normocephalic and atraumatic. Eyes:      General: No scleral icterus. Conjunctiva/sclera: Conjunctivae normal.   Cardiovascular:      Rate and Rhythm: Normal rate and regular rhythm. Pulses: Normal pulses. Heart sounds: Normal heart sounds. No murmur. No gallop. Pulmonary:      Effort: Pulmonary effort is normal. No respiratory distress. Breath sounds: Normal breath sounds. No wheezing or rales. Abdominal:      General: Bowel sounds are normal.      Tenderness: There is no abdominal tenderness. Musculoskeletal:      Right lower leg: No edema. Left lower leg: No edema. Comments: No deformity of both hands. No injury to both hands. Normal ROM of all joints of hands and fingers bilaterally with no swelling or tenderness. No erythema. Skin:     General: Skin is warm and dry. Neurological:      Mental Status: She is alert. Comments: Sensation intact to both hands bilaterally. Motor function intact with strength 5/5. No atrophy or tremor. Negative phalen and tinel testing. Lab Results   Component Value Date    WBC 6.1 02/19/2021    HGB 12.6 02/19/2021    HCT 40.1 02/19/2021     02/19/2021    CHOL 198 12/22/2020    TRIG 127 12/22/2020    HDL 73 12/22/2020    ALT 17 02/19/2021    AST 24 02/19/2021     02/19/2021    K 4.4 02/19/2021     02/19/2021    CREATININE 0.83 02/19/2021    BUN 13 02/19/2021    CO2 29 02/19/2021    TSH 2.35 02/19/2021    INR 1.1 03/20/2019    LABA1C 6.8 01/15/2021    LABMICR 4 12/22/2020     Lab Results   Component Value Date    CALCIUM 9.6 02/19/2021     Lab Results   Component Value Date    LDLCHOLESTEROL 100 12/22/2020       Assessment:     1.  Carpal tunnel syndrome of right wrist    2. Need for prophylactic vaccination and inoculation against varicella        Plan:   1. Carpal tunnel syndrome of right wrist  - EMG did show mild median nerve mononeuropathy  - Carpal tunnel exercises provided at last visit, encouraged patient to try these  - Encouraged compliance with right wrist brace at night to help improve symptoms  - Discussed that after 4 weeks of using the wrist brace if symptoms worsen or fail to improve other options like glucocorticoid injection or oral corticosteroids can be considered. Will consider at 2 week follow up. 2. Need for prophylactic vaccination and inoculation against varicella  - zoster recombinant adjuvanted vaccine [facility]) 50 MCG/0.5ML SUSR injection; Inject 0.5 mLs into the muscle once for 1 dose 50 MCG IM then repeat 2-6 months. Dispense: 1 each; Refill: 1        Cynthiareceived counseling on the following healthy behaviors: nutrition, exercise and medication adherence    Patient given educational materials : see patient instruction   Isabelarj Pace received counseling on the following healthy behaviors: exercise  Reviewed prior labs and health maintenance  Continue current medications, diet and exercise. Discussed use, benefit, and side effects of prescribed medications. Barriers to medication compliance addressed. Patient given educational materials - see patient instructions  Was a self-tracking handout given in paper form or via [employer]? No.  They left before check out. Requested Prescriptions     Pending Prescriptions Disp Refills    zoster recombinant adjuvanted vaccine (SHINGRIX) 50 MCG/0.5ML SUSR injection 1 each 1     Sig: Inject 0.5 mLs into the muscle once for 1 dose 50 MCG IM then repeat 2-6 months. All patient questions answered. Patient voiced understanding. Quality Measures    Body mass index is 22.42 kg/m². Normal. Weight control planned discussed Healthy diet and regular exercise.     BP: 123/81 Blood pressure is normal. Treatment plan consists of No treatment change needed. Lab Results   Component Value Date    LDLCHOLESTEROL 100 12/22/2020    (goal LDL reduction with dx if diabetes is 50% LDL reduction)      PHQ Scores 1/15/2021 10/7/2020 12/19/2019 4/2/2019 10/9/2018 10/19/2017 10/19/2017   PHQ2 Score 0 0 4 0 0 - 2   PHQ9 Score 0 0 4 0 0 14 2     Interpretation of Total Score Depression Severity: 1-4 = Minimal depression, 5-9 = Mild depression, 10-14 = Moderate depression, 15-19 = Moderately severe depression, 20-27 = Severe depression  Discussed use, benefit, and side effects of prescribed medications. Barriers to medicationcompliance addressed. All patient questions answered. Pt voiced understanding. There are no discontinued medications. Return in about 3 weeks (around 4/9/2021) for numbness follow up.

## 2021-03-19 NOTE — PROGRESS NOTES
Visit Information    Have you changed or started any medications since your last visit including any over-the-counter medicines, vitamins, or herbal medicines? no   Have you stopped taking any of your medications? Is so, why? -  no  Are you having any side effects from any of your medications? - no    Have you seen any other physician or provider since your last visit?  no   Have you had any other diagnostic tests since your last visit?  no   Have you been seen in the emergency room and/or had an admission in a hospital since we last saw you?  no   Have you had your routine dental cleaning in the past 6 months?  no     Do you have an active MyChart account? If no, what is the barrier?   Yes    Patient Care Team:  Philippe Talley MD as PCP - General (Family Medicine)    Medical History Review  Past Medical, Family, and Social History reviewed and does not contribute to the patient presenting condition    Health Maintenance   Topic Date Due    Pneumococcal 0-64 years Vaccine (1 of 1 - PPSV23) Never done    Diabetic foot exam  Never done    Diabetic retinal exam  Never done    COVID-19 Vaccine (1) Never done    Shingles Vaccine (1 of 2) Never done    Cervical cancer screen  04/20/2020    Flu vaccine (1) Never done    Diabetic microalbuminuria test  12/22/2021    Lipid screen  12/22/2021    A1C test (Diabetic or Prediabetic)  01/15/2022    Breast cancer screen  03/04/2023    DTaP/Tdap/Td vaccine (2 - Td) 03/17/2025    Colon cancer screen colonoscopy  03/08/2031    Hepatitis C screen  Completed    HIV screen  Completed    Hepatitis A vaccine  Aged Out    Hepatitis B vaccine  Aged Out    Hib vaccine  Aged Out    Meningococcal (ACWY) vaccine  Aged Out

## 2021-03-22 NOTE — PROGRESS NOTES
Attending Physician Statement  I have discussed the care of Kenton Benavidez 44 y.o. female, including pertinent history and exam findings, with the resident Dr. Carlotta Ku MD.    History and Exam:   Chief Complaint   Patient presents with    Numbness     Past Medical History:   Diagnosis Date    Anemia     Anxiety     Chronic right shoulder pain     Depression     Irregular heartbeat     Sciatica      No Known Allergies   I have seen and examined the patient and the key elements of the encounter have been performed by me. BP Readings from Last 3 Encounters:   03/19/21 123/81   02/17/21 123/73   02/03/21 123/76     /81 (Site: Right Upper Arm, Position: Sitting, Cuff Size: Medium Adult)   Pulse 73   Temp 97.7 °F (36.5 °C) (Temporal)   Ht 4' 11\" (1.499 m)   Wt 111 lb (50.3 kg)   BMI 22.42 kg/m²   Lab Results   Component Value Date    WBC 6.1 02/19/2021    HGB 12.6 02/19/2021    HCT 40.1 02/19/2021     02/19/2021    CHOL 198 12/22/2020    TRIG 127 12/22/2020    HDL 73 12/22/2020    ALT 17 02/19/2021    AST 24 02/19/2021     02/19/2021    K 4.4 02/19/2021     02/19/2021    CREATININE 0.83 02/19/2021    BUN 13 02/19/2021    CO2 29 02/19/2021    TSH 2.35 02/19/2021    INR 1.1 03/20/2019    LABA1C 6.8 01/15/2021    LABMICR 4 12/22/2020     Lab Results   Component Value Date    LABALBU 4.4 02/19/2021     No results found for: IRON, TIBC, FERRITIN  Lab Results   Component Value Date    LDLCHOLESTEROL 100 12/22/2020     I agree with the assessment, plan and the diagnosis of    Diagnosis Orders   1. Carpal tunnel syndrome of right wrist     2. Need for prophylactic vaccination and inoculation against varicella  zoster recombinant adjuvanted vaccine [facility]) 50 MCG/0.5ML SUSR injection    . I agree with orders as documented by the resident. Recommendations:  Carpal tunnel, pt non-compliant with splint.   Counseled on splint benefits  Pt to use more regularly and come back for follow up. Can consider steroids injection down the line but currently, symptoms are very mild. More than 25 minutes spent  in face to face encounter with the patient and more than half in counseling. Patient's questions were answered. Patient Voiced understanding to the counseling. Return in about 3 weeks (around 4/9/2021) for numbness follow up.    (GC Modifier)-Dr. Augusta Stafford MD

## 2021-03-24 DIAGNOSIS — M75.41 IMPINGEMENT SYNDROME OF RIGHT SHOULDER: Primary | ICD-10-CM

## 2021-03-25 ENCOUNTER — OFFICE VISIT (OUTPATIENT)
Dept: ORTHOPEDIC SURGERY | Age: 51
End: 2021-03-25
Payer: COMMERCIAL

## 2021-03-25 VITALS
SYSTOLIC BLOOD PRESSURE: 126 MMHG | HEIGHT: 59 IN | OXYGEN SATURATION: 98 % | DIASTOLIC BLOOD PRESSURE: 83 MMHG | HEART RATE: 72 BPM | BODY MASS INDEX: 22.18 KG/M2 | TEMPERATURE: 97.1 F | WEIGHT: 110 LBS

## 2021-03-25 DIAGNOSIS — G89.29 CHRONIC RIGHT SHOULDER PAIN: ICD-10-CM

## 2021-03-25 DIAGNOSIS — S43.91XD SPRAIN OF RIGHT SHOULDER GIRDLE, SUBSEQUENT ENCOUNTER: ICD-10-CM

## 2021-03-25 DIAGNOSIS — M75.41 IMPINGEMENT SYNDROME OF RIGHT SHOULDER: Primary | ICD-10-CM

## 2021-03-25 DIAGNOSIS — S43.421D SPRAIN OF RIGHT ROTATOR CUFF CAPSULE, SUBSEQUENT ENCOUNTER: ICD-10-CM

## 2021-03-25 DIAGNOSIS — M25.511 CHRONIC RIGHT SHOULDER PAIN: ICD-10-CM

## 2021-03-25 PROCEDURE — 99213 OFFICE O/P EST LOW 20 MIN: CPT | Performed by: ORTHOPAEDIC SURGERY

## 2021-03-25 PROCEDURE — 20611 DRAIN/INJ JOINT/BURSA W/US: CPT | Performed by: ORTHOPAEDIC SURGERY

## 2021-03-25 RX ORDER — METHYLPREDNISOLONE ACETATE 40 MG/ML
40 INJECTION, SUSPENSION INTRA-ARTICULAR; INTRALESIONAL; INTRAMUSCULAR; SOFT TISSUE ONCE
Status: COMPLETED | OUTPATIENT
Start: 2021-03-25 | End: 2021-03-25

## 2021-03-25 RX ORDER — LIDOCAINE HYDROCHLORIDE 10 MG/ML
4 INJECTION, SOLUTION INFILTRATION; PERINEURAL ONCE
Status: COMPLETED | OUTPATIENT
Start: 2021-03-25 | End: 2021-03-25

## 2021-03-25 RX ADMIN — LIDOCAINE HYDROCHLORIDE 4 ML: 10 INJECTION, SOLUTION INFILTRATION; PERINEURAL at 12:47

## 2021-03-25 RX ADMIN — METHYLPREDNISOLONE ACETATE 40 MG: 40 INJECTION, SUSPENSION INTRA-ARTICULAR; INTRALESIONAL; INTRAMUSCULAR; SOFT TISSUE at 12:47

## 2021-03-25 ASSESSMENT — ENCOUNTER SYMPTOMS
SHORTNESS OF BREATH: 0
APNEA: 0
ABDOMINAL PAIN: 0
VOMITING: 0
NAUSEA: 0
CHEST TIGHTNESS: 0
COLOR CHANGE: 0
DIARRHEA: 0
COUGH: 0
ABDOMINAL DISTENTION: 0
CONSTIPATION: 0

## 2021-03-25 NOTE — PROGRESS NOTES
[address] AND SPORTS MEDICINE  [address] [place] [address]  Dept: [phone]  Dept Fax: [phone]        Right Shoulder - Follow Up - [facility] Injury    Chief Complaint:     Chief Complaint   Patient presents with    Follow-up     [facility] f/u Rt shoulder pain. Wants to request an injection. Pt still having pain and lack of ROM, Last cortisone injection 9/14/20 with relief. [facility] claim #: [id number]  DOI: 07/02/2008  Dx: S43.91XA        W30.567        U63.124U        Y79. 9        M75.41        W05.210  [id number]  HPI:     Pascual Siddiqui is a 44y.o. year old right hand dominant female that has had pain in the right shoulder for 13 years. As far as any trauma to the shoulder, the patient indicates that she injured the shoulder when she was working in 2008 but she reaggravated it in when she was lifting a car seat in February 2019. The pain is worse at night and when doing overhead activities. Weakness of the shoulder has been noted. The pain restricts activities such as working without pain at her computer, sleeping comfortably at night and reaching behind her back. The pain does not seem to improve with time. The following medications have been tried: percocet, lyrica and celebrex. The patient has had a corticosteroid injection on 09/14/2020 with excellent pain relief. The patient has tried physical therapy and she does home exercises with minimal improvement. The patient has had surgery and it was a right shoulder arthroscopy with debridement of glenoid, humeral head, rotator interval scarring, labral fraying, intraarticular release of anterior interval slide, subacromial extensive debridement, lysis of adhesions, and removal of suture granuloma. The date of surgery was on 02/20/2018. Therefore we are 3 years and 5 weeks post op. The opposite shoulder is okay. Neck pain has not been present.  Patient states that her shoulder has been giving her Elastic Bandages & Supports (WRIST BRACE DELUXE) MISC Wear nightly right brace 1 each 0    diclofenac sodium (VOLTAREN) 1 % GEL Apply 4 g topically 4 times daily      oxyCODONE-acetaminophen (PERCOCET) 7.5-325 MG per tablet Take 1 tablet by mouth 4 times daily.  traZODone (DESYREL) 100 MG tablet Take 100 mg by mouth daily      pregabalin (LYRICA) 100 MG capsule Take 100 mg by mouth 2 times daily.  celecoxib (CELEBREX) 200 MG capsule take 1 capsule by mouth once daily prn  0    ipratropium (ATROVENT) 0.06 % nasal spray 2 sprays by Each Nostril route 4 times daily (Patient not taking: Reported on 3/25/2021) 1 Bottle 3     No current facility-administered medications for this visit. Allergies:    Patient has no known allergies.     Social History:   Social History     Socioeconomic History    Marital status: Legally      Spouse name: None    Number of children: None    Years of education: None    Highest education level: None   Occupational History    None   Social Needs    Financial resource strain: None    Food insecurity     Worry: None     Inability: None    Transportation needs     Medical: None     Non-medical: None   Tobacco Use    Smoking status: Current Every Day Smoker     Packs/day: 0.50     Years: 12.00     Pack years: 6.00     Types: Cigarettes    Smokeless tobacco: Never Used    Tobacco comment: pt smokes about 4 ciggs daily   Substance and Sexual Activity    Alcohol use: No    Drug use: No    Sexual activity: None   Lifestyle    Physical activity     Days per week: None     Minutes per session: None    Stress: None   Relationships    Social connections     Talks on phone: None     Gets together: None     Attends Church service: None     Active member of club or organization: None     Attends meetings of clubs or organizations: None     Relationship status: None    Intimate partner violence     Fear of current or ex partner: None     Emotionally abused: None Sprain of right rotator cuff capsule, subsequent encounter    3. Sprain of right shoulder girdle, subsequent encounter    4. Chronic right shoulder pain      Procedures:    Procedure: yes    Glenohumeral Joint Injection    Location: Right Shoulder  Procedure: I discussed in detail the risks, benefits and complications of an injection which included but are not limited to infection, skin reactions, hot swollen joint and anaphylaxis with the patient. The patient verbalized understanding and gave informed consent for the injection. The patient was then placed in the Seated position on the exam table. The posterior soft spot approximately 2 cm distal and 2 cm medial to the posterior acromial edge was identified and marked. The skin was prepped with betadine in a sterile fashion. Utilizing a clean technique with sterile gloves, a 5 cc solution containing 4 cc of 1 % Lidocaine with 1 cc containing 40mg of Depo medrol was injected into the glenohumeral joint. There was no resistance to injection with a 22-gauge spinal needle. The needled was withdrawn and the injection site was cleansed. A Band-Aid was placed over the injection site. There was no resistance to the injection and the patient tolerated the procedure well without difficulty. Adverse reactions of the injection was discussed with the patient including signs of infection, increasing pain, redness, swelling, warmth, fever, chills and the patient was instructed to call immediately with any of these symptoms. Successful needle placement was achieved and final images were taken and saved in the patient's chart for the permanent record. The images are stored on SD card in the machine until downloaded to the patient's chart. Radiology:   No results found. Plan:   Treatment : I reviewed the X-ray with the patient. We discussed the etiologies and natural histories of Sprain of right rotator cuff capsule and s/p right shoulder arthroscopy.  We discussed the various treatment alternatives including anti-inflammatory medications, physical therapy, injections, further imaging studies and as a last result surgery. During today's visit, I explained to the patient that we can ask [facility] for an injection to help her get good pain relief because I do not know of a surgery that we can do to help alleviate her pain. The patient then stated that she is willing to get the injection done today because her shoulder pain is unbearable right now. I informed her that I understand and I will inject it today. So at this time, the patient has opted for a cortisone injection into the glenohumeral joint capsule of the Right shoulder to help reduce inflammation and pain. The injection site should never get red, hot, or swollen and if it does the patient will contact our office right away. The patient may experience a increase in soreness the first 24-48 hours due to a cortisone flair and can take anti-inflammatories for a short period of time to reduce that soreness. The patient should not submerge the injection site in water for a minimum of 24 hours to avoid infection. This means no lakes, pools, ponds, or hot tubs for 24 hours. If the patient is diabetic the injection may increase their blood sugar for up to one week. The patient can do this cortisone injection once every 3 months as needed. If the injections stop working and do not give the patient relief the patient should consider surgical interventions to produce long term relief. Patient should return to the clinic in 3 months to follow up with Anny Knight PA-C. The patient will call the office immediately with any problems. Orders Placed This Encounter   Medications    lidocaine 1 % injection 4 mL    methylPREDNISolone acetate (DEPO-MEDROL) injection 40 mg     No orders of the defined types were placed in this encounter. Nathaniel Bottom Day V, am scribing for and in the presence of Devika Presume D. O.. 4/4/2021  4:12 PM      Devika LIND Presume DO, have personally seen this patient and I have reviewed the CC, PMH, FHX and Social History as provided by other clinical staff. I reassessed the HPI and ROS as scribed by Arturo Littlejohn Scribjohn in my presence and it is both accurate and complete. Thereafter, I personally performed the PE, reviewed the imaging and established the DX and POC. I agree with the documentation provided by the Medical Scribe. I have reviewed all documentation in its entirety prior to providing my signature indicating agreement. Any areas of disagreement are noted on the chart.     Electronically signed by Paola Lei DO on 4/4/2021 a    Electronically signed by Paola Lei DO, on 4/4/2021 at 4:12 PM

## 2021-04-20 ENCOUNTER — HOSPITAL ENCOUNTER (OUTPATIENT)
Age: 51
Setting detail: OBSERVATION
Discharge: HOME | End: 2021-04-21
Attending: EMERGENCY MEDICINE
Payer: MEDICAID

## 2021-04-20 DIAGNOSIS — R07.9 CHEST PAIN, UNSPECIFIED TYPE: Primary | ICD-10-CM

## 2021-04-20 PROCEDURE — 99283 EMERGENCY DEPT VISIT LOW MDM: CPT

## 2021-04-20 ASSESSMENT — PAIN SCALES - GENERAL: PAINLEVEL_OUTOF10: 10

## 2021-04-20 NOTE — Clinical Note
Patient Class: Observation [104]   REQUIRED: Diagnosis: Chest pain [400937]   Estimated Length of Stay: Estimated stay of more than 2 midnights   Telemetry Bed Required?: Yes

## 2021-04-21 ENCOUNTER — APPOINTMENT (OUTPATIENT)
Dept: GENERAL RADIOLOGY | Age: 51
End: 2021-04-21
Payer: MEDICAID

## 2021-04-21 ENCOUNTER — HOSPITAL ENCOUNTER (EMERGENCY)
Age: 51
Discharge: HOME | End: 2021-04-21
Attending: EMERGENCY MEDICINE
Payer: MEDICAID

## 2021-04-21 VITALS
BODY MASS INDEX: 22.18 KG/M2 | OXYGEN SATURATION: 99 % | HEART RATE: 61 BPM | SYSTOLIC BLOOD PRESSURE: 131 MMHG | DIASTOLIC BLOOD PRESSURE: 76 MMHG | WEIGHT: 110 LBS | HEIGHT: 59 IN | RESPIRATION RATE: 13 BRPM

## 2021-04-21 VITALS
DIASTOLIC BLOOD PRESSURE: 68 MMHG | SYSTOLIC BLOOD PRESSURE: 113 MMHG | OXYGEN SATURATION: 100 % | HEART RATE: 71 BPM | RESPIRATION RATE: 14 BRPM | TEMPERATURE: 98.4 F | BODY MASS INDEX: 23.03 KG/M2 | WEIGHT: 114 LBS

## 2021-04-21 DIAGNOSIS — R07.9 CHEST PAIN, UNSPECIFIED TYPE: Primary | ICD-10-CM

## 2021-04-21 LAB
ABSOLUTE EOS #: 0.07 K/UL (ref 0–0.44)
ABSOLUTE EOS #: 0.09 K/UL (ref 0–0.44)
ABSOLUTE IMMATURE GRANULOCYTE: 0.02 K/UL (ref 0–0.3)
ABSOLUTE IMMATURE GRANULOCYTE: 0.02 K/UL (ref 0–0.3)
ABSOLUTE LYMPH #: 1.74 K/UL (ref 1.1–3.7)
ABSOLUTE LYMPH #: 2.48 K/UL (ref 1.1–3.7)
ABSOLUTE MONO #: 0.34 K/UL (ref 0.1–1.2)
ABSOLUTE MONO #: 0.4 K/UL (ref 0.1–1.2)
ANION GAP SERPL CALCULATED.3IONS-SCNC: 10 MMOL/L (ref 9–17)
ANION GAP SERPL CALCULATED.3IONS-SCNC: 14 MMOL/L (ref 9–17)
BASOPHILS # BLD: 1 % (ref 0–2)
BASOPHILS # BLD: 1 % (ref 0–2)
BASOPHILS ABSOLUTE: 0.03 K/UL (ref 0–0.2)
BASOPHILS ABSOLUTE: 0.03 K/UL (ref 0–0.2)
BUN BLDV-MCNC: 15 MG/DL (ref 6–20)
BUN BLDV-MCNC: 19 MG/DL (ref 6–20)
BUN/CREAT BLD: 18 (ref 9–20)
BUN/CREAT BLD: 22 (ref 9–20)
CALCIUM SERPL-MCNC: 9.4 MG/DL (ref 8.6–10.4)
CALCIUM SERPL-MCNC: 9.4 MG/DL (ref 8.6–10.4)
CHLORIDE BLD-SCNC: 103 MMOL/L (ref 98–107)
CHLORIDE BLD-SCNC: 104 MMOL/L (ref 98–107)
CO2: 23 MMOL/L (ref 20–31)
CO2: 27 MMOL/L (ref 20–31)
CREAT SERPL-MCNC: 0.84 MG/DL (ref 0.5–0.9)
CREAT SERPL-MCNC: 0.85 MG/DL (ref 0.5–0.9)
D-DIMER QUANTITATIVE: 0.31 MG/L FEU (ref 0–0.59)
DIFFERENTIAL TYPE: ABNORMAL
DIFFERENTIAL TYPE: ABNORMAL
EKG ATRIAL RATE: 58 BPM
EKG ATRIAL RATE: 68 BPM
EKG P AXIS: 24 DEGREES
EKG P AXIS: 35 DEGREES
EKG P-R INTERVAL: 150 MS
EKG P-R INTERVAL: 152 MS
EKG Q-T INTERVAL: 364 MS
EKG Q-T INTERVAL: 394 MS
EKG QRS DURATION: 68 MS
EKG QRS DURATION: 70 MS
EKG QTC CALCULATION (BAZETT): 386 MS
EKG QTC CALCULATION (BAZETT): 387 MS
EKG R AXIS: -10 DEGREES
EKG R AXIS: -13 DEGREES
EKG T AXIS: 21 DEGREES
EKG T AXIS: 31 DEGREES
EKG VENTRICULAR RATE: 58 BPM
EKG VENTRICULAR RATE: 68 BPM
EOSINOPHILS RELATIVE PERCENT: 1 % (ref 1–4)
EOSINOPHILS RELATIVE PERCENT: 2 % (ref 1–4)
GFR AFRICAN AMERICAN: >60 ML/MIN
GFR AFRICAN AMERICAN: >60 ML/MIN
GFR NON-AFRICAN AMERICAN: >60 ML/MIN
GFR NON-AFRICAN AMERICAN: >60 ML/MIN
GFR SERPL CREATININE-BSD FRML MDRD: ABNORMAL ML/MIN/{1.73_M2}
GFR SERPL CREATININE-BSD FRML MDRD: ABNORMAL ML/MIN/{1.73_M2}
GFR SERPL CREATININE-BSD FRML MDRD: NORMAL ML/MIN/{1.73_M2}
GFR SERPL CREATININE-BSD FRML MDRD: NORMAL ML/MIN/{1.73_M2}
GLUCOSE BLD-MCNC: 130 MG/DL (ref 70–99)
GLUCOSE BLD-MCNC: 98 MG/DL (ref 70–99)
HCT VFR BLD CALC: 37.9 % (ref 36.3–47.1)
HCT VFR BLD CALC: 38.4 % (ref 36.3–47.1)
HEMOGLOBIN: 11.8 G/DL (ref 11.9–15.1)
HEMOGLOBIN: 11.9 G/DL (ref 11.9–15.1)
IMMATURE GRANULOCYTES: 0 %
IMMATURE GRANULOCYTES: 0 %
LYMPHOCYTES # BLD: 26 % (ref 24–43)
LYMPHOCYTES # BLD: 41 % (ref 24–43)
MCH RBC QN AUTO: 27.7 PG (ref 25.2–33.5)
MCH RBC QN AUTO: 28.1 PG (ref 25.2–33.5)
MCHC RBC AUTO-ENTMCNC: 31 G/DL (ref 28.4–34.8)
MCHC RBC AUTO-ENTMCNC: 31.1 G/DL (ref 28.4–34.8)
MCV RBC AUTO: 89.3 FL (ref 82.6–102.9)
MCV RBC AUTO: 90.2 FL (ref 82.6–102.9)
MONOCYTES # BLD: 6 % (ref 3–12)
MONOCYTES # BLD: 6 % (ref 3–12)
MYOGLOBIN: 22 NG/ML (ref 25–58)
NRBC AUTOMATED: 0 PER 100 WBC
NRBC AUTOMATED: ABNORMAL PER 100 WBC
PDW BLD-RTO: 13.9 % (ref 11.8–14.4)
PDW BLD-RTO: 14.1 % (ref 11.8–14.4)
PLATELET # BLD: 217 K/UL (ref 138–453)
PLATELET # BLD: 265 K/UL (ref 138–453)
PLATELET ESTIMATE: ABNORMAL
PLATELET ESTIMATE: ABNORMAL
PMV BLD AUTO: 9.2 FL (ref 8.1–13.5)
PMV BLD AUTO: 9.6 FL (ref 8.1–13.5)
POTASSIUM SERPL-SCNC: 3.9 MMOL/L (ref 3.7–5.3)
POTASSIUM SERPL-SCNC: 4 MMOL/L (ref 3.7–5.3)
RBC # BLD: 4.2 M/UL (ref 3.95–5.11)
RBC # BLD: 4.3 M/UL (ref 3.95–5.11)
RBC # BLD: ABNORMAL 10*6/UL
RBC # BLD: ABNORMAL 10*6/UL
SEG NEUTROPHILS: 51 % (ref 36–65)
SEG NEUTROPHILS: 66 % (ref 36–65)
SEGMENTED NEUTROPHILS ABSOLUTE COUNT: 3.03 K/UL (ref 1.5–8.1)
SEGMENTED NEUTROPHILS ABSOLUTE COUNT: 4.37 K/UL (ref 1.5–8.1)
SODIUM BLD-SCNC: 140 MMOL/L (ref 135–144)
SODIUM BLD-SCNC: 141 MMOL/L (ref 135–144)
TROPONIN INTERP: ABNORMAL
TROPONIN INTERP: NORMAL
TROPONIN INTERP: NORMAL
TROPONIN T: ABNORMAL NG/ML
TROPONIN T: NORMAL NG/ML
TROPONIN T: NORMAL NG/ML
TROPONIN, HIGH SENSITIVITY: <6 NG/L (ref 0–14)
WBC # BLD: 6 K/UL (ref 3.5–11.3)
WBC # BLD: 6.6 K/UL (ref 3.5–11.3)
WBC # BLD: ABNORMAL 10*3/UL
WBC # BLD: ABNORMAL 10*3/UL

## 2021-04-21 PROCEDURE — 84484 ASSAY OF TROPONIN QUANT: CPT

## 2021-04-21 PROCEDURE — 6370000000 HC RX 637 (ALT 250 FOR IP): Performed by: EMERGENCY MEDICINE

## 2021-04-21 PROCEDURE — 85025 COMPLETE CBC W/AUTO DIFF WBC: CPT

## 2021-04-21 PROCEDURE — 93005 ELECTROCARDIOGRAM TRACING: CPT | Performed by: NURSE PRACTITIONER

## 2021-04-21 PROCEDURE — 96376 TX/PRO/DX INJ SAME DRUG ADON: CPT

## 2021-04-21 PROCEDURE — 96375 TX/PRO/DX INJ NEW DRUG ADDON: CPT

## 2021-04-21 PROCEDURE — 96374 THER/PROPH/DIAG INJ IV PUSH: CPT

## 2021-04-21 PROCEDURE — 71045 X-RAY EXAM CHEST 1 VIEW: CPT

## 2021-04-21 PROCEDURE — 6370000000 HC RX 637 (ALT 250 FOR IP): Performed by: NURSE PRACTITIONER

## 2021-04-21 PROCEDURE — 85379 FIBRIN DEGRADATION QUANT: CPT

## 2021-04-21 PROCEDURE — G0378 HOSPITAL OBSERVATION PER HR: HCPCS

## 2021-04-21 PROCEDURE — 6360000002 HC RX W HCPCS: Performed by: NURSE PRACTITIONER

## 2021-04-21 PROCEDURE — 80048 BASIC METABOLIC PNL TOTAL CA: CPT

## 2021-04-21 PROCEDURE — 93010 ELECTROCARDIOGRAM REPORT: CPT | Performed by: INTERNAL MEDICINE

## 2021-04-21 PROCEDURE — 93005 ELECTROCARDIOGRAM TRACING: CPT | Performed by: EMERGENCY MEDICINE

## 2021-04-21 PROCEDURE — 6360000002 HC RX W HCPCS: Performed by: EMERGENCY MEDICINE

## 2021-04-21 PROCEDURE — 83874 ASSAY OF MYOGLOBIN: CPT

## 2021-04-21 RX ORDER — BUTALBITAL, ACETAMINOPHEN AND CAFFEINE 50; 325; 40 MG/1; MG/1; MG/1
1 TABLET ORAL EVERY 4 HOURS PRN
Qty: 10 TABLET | Refills: 0 | Status: SHIPPED | OUTPATIENT
Start: 2021-04-21 | End: 2021-09-20

## 2021-04-21 RX ORDER — ACETAMINOPHEN 325 MG/1
650 TABLET ORAL EVERY 6 HOURS PRN
Status: CANCELLED | OUTPATIENT
Start: 2021-04-21

## 2021-04-21 RX ORDER — POTASSIUM CHLORIDE 7.45 MG/ML
10 INJECTION INTRAVENOUS PRN
Status: CANCELLED | OUTPATIENT
Start: 2021-04-21

## 2021-04-21 RX ORDER — PROMETHAZINE HYDROCHLORIDE 12.5 MG/1
12.5 TABLET ORAL EVERY 6 HOURS PRN
Status: CANCELLED | OUTPATIENT
Start: 2021-04-21

## 2021-04-21 RX ORDER — SODIUM CHLORIDE 0.9 % (FLUSH) 0.9 %
5-40 SYRINGE (ML) INJECTION EVERY 12 HOURS SCHEDULED
Status: CANCELLED | OUTPATIENT
Start: 2021-04-21

## 2021-04-21 RX ORDER — SODIUM CHLORIDE 9 MG/ML
25 INJECTION, SOLUTION INTRAVENOUS PRN
Status: CANCELLED | OUTPATIENT
Start: 2021-04-21

## 2021-04-21 RX ORDER — SODIUM CHLORIDE 0.9 % (FLUSH) 0.9 %
10 SYRINGE (ML) INJECTION PRN
Status: CANCELLED | OUTPATIENT
Start: 2021-04-21

## 2021-04-21 RX ORDER — TRAZODONE HYDROCHLORIDE 50 MG/1
100 TABLET ORAL DAILY
Status: CANCELLED | OUTPATIENT
Start: 2021-04-21

## 2021-04-21 RX ORDER — ONDANSETRON 2 MG/ML
4 INJECTION INTRAMUSCULAR; INTRAVENOUS EVERY 6 HOURS PRN
Status: CANCELLED | OUTPATIENT
Start: 2021-04-21

## 2021-04-21 RX ORDER — NITROGLYCERIN 0.4 MG/1
0.4 TABLET SUBLINGUAL EVERY 5 MIN PRN
Status: CANCELLED | OUTPATIENT
Start: 2021-04-21

## 2021-04-21 RX ORDER — ATORVASTATIN CALCIUM 40 MG/1
40 TABLET, FILM COATED ORAL NIGHTLY
Status: CANCELLED | OUTPATIENT
Start: 2021-04-21

## 2021-04-21 RX ORDER — ACETAMINOPHEN 650 MG/1
650 SUPPOSITORY RECTAL EVERY 6 HOURS PRN
Status: CANCELLED | OUTPATIENT
Start: 2021-04-21

## 2021-04-21 RX ORDER — MAGNESIUM SULFATE 1 G/100ML
1000 INJECTION INTRAVENOUS PRN
Status: CANCELLED | OUTPATIENT
Start: 2021-04-21

## 2021-04-21 RX ORDER — MORPHINE SULFATE 2 MG/ML
2 INJECTION, SOLUTION INTRAMUSCULAR; INTRAVENOUS ONCE
Status: COMPLETED | OUTPATIENT
Start: 2021-04-21 | End: 2021-04-21

## 2021-04-21 RX ORDER — IPRATROPIUM BROMIDE 42 UG/1
2 SPRAY, METERED NASAL 4 TIMES DAILY
Status: CANCELLED | OUTPATIENT
Start: 2021-04-21

## 2021-04-21 RX ORDER — NITROGLYCERIN 0.4 MG/1
0.4 TABLET SUBLINGUAL ONCE
Status: COMPLETED | OUTPATIENT
Start: 2021-04-21 | End: 2021-04-21

## 2021-04-21 RX ORDER — POTASSIUM CHLORIDE 20 MEQ/1
40 TABLET, EXTENDED RELEASE ORAL PRN
Status: CANCELLED | OUTPATIENT
Start: 2021-04-21

## 2021-04-21 RX ORDER — ASPIRIN 81 MG/1
324 TABLET, CHEWABLE ORAL ONCE
Status: COMPLETED | OUTPATIENT
Start: 2021-04-21 | End: 2021-04-21

## 2021-04-21 RX ORDER — ONDANSETRON 2 MG/ML
4 INJECTION INTRAMUSCULAR; INTRAVENOUS ONCE
Status: COMPLETED | OUTPATIENT
Start: 2021-04-21 | End: 2021-04-21

## 2021-04-21 RX ADMIN — ONDANSETRON 4 MG: 2 INJECTION INTRAMUSCULAR; INTRAVENOUS at 12:46

## 2021-04-21 RX ADMIN — MORPHINE SULFATE 2 MG: 2 INJECTION, SOLUTION INTRAMUSCULAR; INTRAVENOUS at 12:47

## 2021-04-21 RX ADMIN — Medication 0.4 MG: at 00:19

## 2021-04-21 RX ADMIN — ASPIRIN 324 MG: 81 TABLET, CHEWABLE ORAL at 00:17

## 2021-04-21 RX ADMIN — Medication 2 MG: at 02:04

## 2021-04-21 RX ADMIN — ASPIRIN 324 MG: 81 TABLET, CHEWABLE ORAL at 12:16

## 2021-04-21 ASSESSMENT — ENCOUNTER SYMPTOMS
APNEA: 0
SINUS PAIN: 0
VOMITING: 0
COLOR CHANGE: 0
SHORTNESS OF BREATH: 0
COUGH: 0
NAUSEA: 0
EYES NEGATIVE: 1
ABDOMINAL DISTENTION: 0
COLOR CHANGE: 0
ABDOMINAL PAIN: 0
DIARRHEA: 0
CONSTIPATION: 0
BACK PAIN: 0
CHEST TIGHTNESS: 0
PHOTOPHOBIA: 0
EYE PAIN: 0
SHORTNESS OF BREATH: 0
VOMITING: 0

## 2021-04-21 ASSESSMENT — PAIN SCALES - GENERAL
PAINLEVEL_OUTOF10: 6
PAINLEVEL_OUTOF10: 7

## 2021-04-21 NOTE — ED PROVIDER NOTES
[facility] ED  eMERGENCY dEPARTMENT eNCOUnter      Pt Name: Susanna Torres  MRN: [id number]  [place] 1970  Date of evaluation: 4/21/2021  Provider: YEE Naranjo 9398       Chief Complaint   Patient presents with    Chest Pain    Headache    Fatigue         HISTORY OF PRESENT ILLNESS  (Location/Symptom, Timing/Onset, Context/Setting, Quality, Duration, Modifying Factors, Severity.)   Susanna Torres is a 44 y.o. female who presents to the emergency department via private auto for right-sided chest pain. Reports she was evaluated here in the ED for the same yesterday. The pain returned this morning which brought her back. States admission to the hospital was recommended yesterday and she declined. Denies fever, chills, injury, cough, SOB. The pain is not as severe as it was yesterday. Rates her pain 6/10 at this time. States the pain flared up her anxiety today. Also reports a headache today. Nursing Notes were reviewed. ALLERGIES     Patient has no known allergies. CURRENT MEDICATIONS       Discharge Medication List as of 4/21/2021 12:50 PM      CONTINUE these medications which have NOT CHANGED    Details   Elastic Bandages & Supports (WRIST BRACE DELUXE) [facility] nightly right brace, Disp-1 each, R-0Print      ipratropium (ATROVENT) 0.06 % nasal spray 2 sprays by Each Nostril route 4 times daily, Disp-1 Bottle,R-3Normal      diclofenac sodium (VOLTAREN) 1 % GEL Apply 4 g topically 4 times daily, Topical, 4 TIMES DAILY Starting Tue 9/15/2020, Historical Med      oxyCODONE-acetaminophen (PERCOCET) 7.5-325 MG per tablet Take 1 tablet by mouth 4 times daily. Historical Med      traZODone (DESYREL) 100 MG tablet Take 100 mg by mouth dailyHistorical Med      pregabalin (LYRICA) 100 MG capsule Take 100 mg by mouth 2 times daily. Historical Med      celecoxib (CELEBREX) 200 MG capsule take 1 capsule by mouth once daily prn, R-0Historical Med             PAST MEDICAL HISTORY         Diagnosis Date    Anemia     Anxiety     Chronic right shoulder pain     Depression     Irregular heartbeat     Sciatica        SURGICAL HISTORY           Procedure Laterality Date    BRAIN ANEURYSM SURGERY  2014    Coiling at [address]      x4     COLONOSCOPY  2019    COLONOSCOPY N/A 2019    COLORECTAL CANCER SCREENING, NOT HIGH RISK performed by Zack Bautista MD at [address] Right 2018    RIGHT SHOULDER ARTHROSCOPY ROTATOR CUFF LYSIS OF ADHESIONS performed by Mike Mcrae DO at [address] [facility] ARTHROSCOPY Right 2018    extensive lysis of adhesions; Open suture granuloma removal     SHOULDER SURGERY Right     x 3    TONSILLECTOMY           FAMILY HISTORY           Problem Relation Age of Onset    Asthma Mother     Cancer Father         throat     Family Status   Relation Name Status    Mother  Alive    Father      Sister  Alive    Brother  Alive        SOCIAL HISTORY      reports that she has been smoking cigarettes. She has a 6.00 pack-year smoking history. She has never used smokeless tobacco. She reports that she does not drink alcohol or use drugs. REVIEW OF SYSTEMS    (2-9 systems for level 4, 10 or more for level 5)     Review of Systems   Constitutional: Negative for chills, diaphoresis, fatigue and fever. HENT: Negative for congestion. Eyes: Negative for photophobia, pain and visual disturbance. Respiratory: Negative for cough and shortness of breath. Cardiovascular: Positive for chest pain. Gastrointestinal: Negative for abdominal pain, nausea and vomiting. Musculoskeletal: Negative for back pain, myalgias, neck pain and neck stiffness. Skin: Negative for color change, rash and wound. Neurological: Positive for headaches. Negative for dizziness, syncope, facial asymmetry, speech difficulty, weakness, light-headedness and numbness. Psychiatric/Behavioral: Negative for confusion. Except as noted above the remainder of the review of systems was reviewed and negative. PHYSICAL EXAM    (up to 7 for level 4, 8 or more for level 5)     ED Triage Vitals [04/21/21 1121]   BP Temp Temp src Pulse Resp SpO2 Height Weight   113/68 98.4 °F (36.9 °C) -- 71 14 100 % -- 114 lb (51.7 kg)     Physical Exam  Vitals signs reviewed. Constitutional:       General: She is not in acute distress. Appearance: She is well-developed. She is not diaphoretic. HENT:      Right Ear: External ear normal.      Left Ear: External ear normal.   Eyes:      General: No scleral icterus. Conjunctiva/sclera: Conjunctivae normal.   Neck:      Musculoskeletal: Neck supple. Cardiovascular:      Rate and Rhythm: Normal rate and regular rhythm. Heart sounds: Normal heart sounds. Pulmonary:      Effort: Pulmonary effort is normal. No respiratory distress. Breath sounds: Normal breath sounds. No wheezing or rales. Abdominal:      General: Bowel sounds are normal.      Palpations: Abdomen is soft. Tenderness: There is no abdominal tenderness. Musculoskeletal:      Comments: Moves extremities   Skin:     General: Skin is warm and dry. Findings: No rash. Neurological:      General: No focal deficit present. Mental Status: She is alert and oriented to person, place, and time. GCS: GCS eye subscore is 4. GCS verbal subscore is 5. GCS motor subscore is 6. Cranial Nerves: Cranial nerves are intact. No cranial nerve deficit. Motor: Motor function is intact. No weakness. Coordination: Coordination is intact. Gait: Gait is intact. Psychiatric:         Behavior: Behavior normal.         DIAGNOSTIC RESULTS     EKG: All EKG's are interpreted by the Emergency Department Physician who either signs or Co-signs this chart in the absence of a cardiologist.  EKG was interpreted by Dr. Joseph Trent after completion.       RADIOLOGY: Non-plain film images such as CT, Ultrasound and MRI are read by the radiologist. Adriana Teran radiographic images are visualized and preliminarily interpreted by the emergency physician with the below findings:    Interpretation per the Radiologist below, if available at the time of this note:    Xr Chest Portable    Result Date: 4/21/2021  EXAMINATION: ONE XRAY VIEW OF THE CHEST 4/21/2021 12:19 am COMPARISON: 11/23/2020 HISTORY: ORDERING SYSTEM PROVIDED HISTORY: chest pain TECHNOLOGIST PROVIDED HISTORY: chest pain Reason for Exam: right chest pain FINDINGS: Cardiomediastinal silhouette is normal in size. No pulmonary consolidation, pleural effusion, or pneumothorax. Scoliosis without acute osseous abnormality. No acute cardiopulmonary abnormality. LABS:  Labs Reviewed   CBC WITH AUTO DIFFERENTIAL - Abnormal; Notable for the following components:       Result Value    Seg Neutrophils 66 (*)     All other components within normal limits   TROP/MYOGLOBIN - Abnormal; Notable for the following components:    Myoglobin 22 (*)     All other components within normal limits   BASIC METABOLIC PANEL       All other labs were within normal range or not returned as of this dictation. EMERGENCY DEPARTMENT COURSE and DIFFERENTIAL DIAGNOSIS/MDM:   Vitals:    Vitals:    04/21/21 1121   BP: 113/68   Pulse: 71   Resp: 14   Temp: 98.4 °F (36.9 °C)   SpO2: 100%   Weight: 114 lb (51.7 kg)         MEDICATIONS GIVEN IN THE ED:  Medications   aspirin chewable tablet 324 mg (324 mg Oral Given 4/21/21 1216)   ondansetron (ZOFRAN) injection 4 mg (4 mg Intravenous Given 4/21/21 1246)   morphine (PF) injection 2 mg (2 mg Intravenous Given 4/21/21 1247)       CLINICAL DECISION MAKING:  The patient presented alert with a nontoxic appearance and was seen in conjunction with Dr. Shilpa Phan. She had a negative d-dimer and two negative troponin levels yesterday. Laboratory studies were unremarkable today.  She was advised to follow up with her

## 2021-04-21 NOTE — ED NOTES
During set up for IV access, pt clutches her chest with increased pain then belches.  Pt states pain improved considerably after that     Jovita Henriquez RN  04/21/21 7384
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
## 2021-04-21 NOTE — ED PROVIDER NOTES
The patient was seen and examined by me in conjunction with the mid-level provider. I agree with his/her assessment and treatment plan. Her work-up here today is negative. She does not need to be admitted to the hospital but she will follow-up as an outpatient with her doctor.      Dar Alaniz MD  04/21/21 4081

## 2021-04-22 LAB
EKG ATRIAL RATE: 64 BPM
EKG P AXIS: 22 DEGREES
EKG P-R INTERVAL: 144 MS
EKG Q-T INTERVAL: 368 MS
EKG QRS DURATION: 74 MS
EKG QTC CALCULATION (BAZETT): 379 MS
EKG R AXIS: 19 DEGREES
EKG T AXIS: 43 DEGREES
EKG VENTRICULAR RATE: 64 BPM

## 2021-04-22 PROCEDURE — 93010 ELECTROCARDIOGRAM REPORT: CPT | Performed by: INTERNAL MEDICINE

## 2021-04-27 ENCOUNTER — TELEPHONE (OUTPATIENT)
Dept: ORTHOPEDIC SURGERY | Age: 51
End: 2021-04-27

## 2021-04-27 NOTE — TELEPHONE ENCOUNTER
Elwood from [address] [place] [address] office called asking status of medical records jorge sent 3/16/2021 for patients [facility] claim. I told her it looks like jorge was received by medical records on 4/6/2021 and she would need to contact medical records for status. Phone number for medical records was provided.

## 2021-05-12 ENCOUNTER — NURSE TRIAGE (OUTPATIENT)
Dept: OTHER | Facility: CLINIC | Age: 51
End: 2021-05-12

## 2021-05-12 NOTE — TELEPHONE ENCOUNTER
Reason for Disposition   Hoarseness persists > 2 weeks    Answer Assessment - Initial Assessment Questions  1. DESCRIPTION: \"Describe your voice. \"      Constant hoarseness worse today but present for 3 weeks    2. ONSET: \"When did the hoarseness begin? \"      3 weeks    3. COUGH: \"Is there a cough? \" If so, ask: \"How bad? \"      No cough, just bringing up mucous. 4. FEVER: \"Do you have a fever? \" If so, ask: \"What is your temperature, how was it measured, and when did it start? \"      No    5. RESPIRATORY STATUS: \"Describe your breathing. \"       No SOB    6. ALLERGIES: \"Any allergy symptoms? \" If so, ask: \"What are they? \"      No allergies    7. IRRITANTS: \"Do you smoke? \" \"Have you been exposed to any irritating fumes? \"      Smoker    8. CAUSE: \"What do you think is causing the hoarseness? \"      Change in weather    9. OTHER SYMPTOMS: \"Do you have any other symptoms? \" (e.g., sore throat, swelling, foreign body, rash)      Throat burning, runny nose, chest feels \"gassy\" upper right chest-- improved with belching. Hospitalized for chest pain a few weeks ago but gassy feeling is different. Belching while on phone. 10. PREGNANCY: \"Is there any chance you are pregnant? \" \"When was your last menstrual period? \"        No-- going through menopause    Protocols used: HOARSENESS-ADULT-[place]    Received call from [place] at pre-service center [facility] with [employer] Complaint. Brief description of triage: hoarseness  X 3 weeks-- pt states that she is NOT coughing, just clearing her throat and bringing up mucous that is in her throat. Believes sx are from change in seasons. R upper \"gassy feeling\" in chest relieved by belching. Triage indicates for patient to be seen in next three days. Go to [facility] if unable to be seen or worsening sx. Care advice provided, patient verbalizes understanding; denies any other questions or concerns; instructed to call back for any new or worsening symptoms.     Writer provided warm transfer to [facility] for appointment scheduling. Attention Provider: Thank you for allowing me to participate in the care of your patient. The patient was connected to triage in response to information provided to the E[facility]. Please do not respond through this encounter as the response is not directed to a shared pool.

## 2021-05-13 ENCOUNTER — HOSPITAL ENCOUNTER (OUTPATIENT)
Age: 51
Setting detail: SPECIMEN
Discharge: HOME | End: 2021-05-13
Payer: MEDICAID

## 2021-05-13 ENCOUNTER — OFFICE VISIT (OUTPATIENT)
Dept: PRIMARY CARE CLINIC | Age: 51
End: 2021-05-13
Payer: MEDICAID

## 2021-05-13 VITALS
HEIGHT: 59 IN | SYSTOLIC BLOOD PRESSURE: 113 MMHG | OXYGEN SATURATION: 98 % | TEMPERATURE: 98.6 F | DIASTOLIC BLOOD PRESSURE: 76 MMHG | HEART RATE: 73 BPM | WEIGHT: 114 LBS | BODY MASS INDEX: 22.98 KG/M2

## 2021-05-13 DIAGNOSIS — R05.9 COUGH: ICD-10-CM

## 2021-05-13 DIAGNOSIS — R09.81 SINUS CONGESTION: ICD-10-CM

## 2021-05-13 DIAGNOSIS — J40 BRONCHITIS: Primary | ICD-10-CM

## 2021-05-13 PROCEDURE — 3017F COLORECTAL CA SCREEN DOC REV: CPT | Performed by: NURSE PRACTITIONER

## 2021-05-13 PROCEDURE — G8427 DOCREV CUR MEDS BY ELIG CLIN: HCPCS | Performed by: NURSE PRACTITIONER

## 2021-05-13 PROCEDURE — 4004F PT TOBACCO SCREEN RCVD TLK: CPT | Performed by: NURSE PRACTITIONER

## 2021-05-13 PROCEDURE — G8420 CALC BMI NORM PARAMETERS: HCPCS | Performed by: NURSE PRACTITIONER

## 2021-05-13 PROCEDURE — 99214 OFFICE O/P EST MOD 30 MIN: CPT | Performed by: NURSE PRACTITIONER

## 2021-05-13 RX ORDER — PREDNISONE 20 MG/1
20 TABLET ORAL 2 TIMES DAILY
Qty: 10 TABLET | Refills: 0 | Status: SHIPPED | OUTPATIENT
Start: 2021-05-13 | End: 2021-05-18

## 2021-05-13 RX ORDER — FLUTICASONE PROPIONATE 50 MCG
2 SPRAY, SUSPENSION (ML) NASAL DAILY
Qty: 1 BOTTLE | Refills: 0 | Status: SHIPPED | OUTPATIENT
Start: 2021-05-13 | End: 2021-09-20

## 2021-05-13 RX ORDER — AZITHROMYCIN 250 MG/1
250 TABLET, FILM COATED ORAL SEE ADMIN INSTRUCTIONS
Qty: 6 TABLET | Refills: 0 | Status: SHIPPED | OUTPATIENT
Start: 2021-05-13 | End: 2021-05-18

## 2021-05-13 RX ORDER — BENZONATATE 100 MG/1
200 CAPSULE ORAL 3 TIMES DAILY PRN
Qty: 21 CAPSULE | Refills: 0 | Status: SHIPPED | OUTPATIENT
Start: 2021-05-13 | End: 2021-05-20

## 2021-05-13 RX ORDER — FLUOXETINE HYDROCHLORIDE 20 MG/1
20 CAPSULE ORAL DAILY
COMMUNITY
Start: 2021-02-19

## 2021-05-13 ASSESSMENT — ENCOUNTER SYMPTOMS
WHEEZING: 0
RHINORRHEA: 0
HEMOPTYSIS: 0
SORE THROAT: 0
COUGH: 1
SHORTNESS OF BREATH: 0
HEARTBURN: 0

## 2021-05-13 NOTE — PATIENT INSTRUCTIONS
Follow up with family doctor in 1 week as needed. Return immediately if worse, new symptoms develop, symptoms persist or have any questions or concerns. Push fluids, keep hydrated  Cool mist humidifier bedside  Continue all medications as prescribed  May alternate tylenol/motrin over the counter for pain or fever, take per package instructions. The COVID-19 test that was done today can take 1-6 days for results. Until then you should assume you have this disease and adhere to home isolation as described below. When we get the test results back, one of the following readings will be obtained. 1. A positive test means you have the virus. 2.  An inconclusive test means it wasn't sure if you have the virus or not. An inconclusive test result is treated as a positive result and recommendations  are the same as a positive test result. We may ask you to repeat this test in this circumstance. 3.  A negative test means you probably do not have the virus, but it is not conclusive. Prevention steps for People with positive or inconclusive test results or suspected  COVID-19 (including persons under investigation) who do not need to be hospitalized  and   People with confirmed COVID-19 who were hospitalized and determined to be medically stable to go home    You can be around others after:    10 days since symptoms first appeared and  24 hours with no fever without the use of fever-reducing medications and  Other symptoms of COVID-19 are improving*  *Loss of taste and smell may persist for weeks or months after recovery and need not delay the end of isolation    Most people do not require testing to decide when they can be around others; however, if your healthcare provider recommends testing, they will let you know when you can resume being around others based on your test results.     Note that these recommendations do not apply to persons with severe COVID-19 or with severely weakened immune systems (immunocompromised). These persons should follow the guidance below for I was severely ill with COVID-19 or have a severely weakened immune system (immunocompromised) due to a health condition or medication. When can I be around others?     [email or web address]    Contacts who are NOT healthcare providers or first responders and are asymptomatic (no fever,  cough, shortness of breath, or difficulty breathing) should self-quarantine for 14 days from the last  date of exposure to confirmed COVID-19. Your healthcare provider and public health staff will evaluate whether you can be cared for at home. If it is determined that you do not need to be hospitalized and can be isolated at home, you will be monitored by staff from your health department. You should follow the prevention steps below until a healthcare provider or local or state health department says you can return to your normal activities. Stay home except to get medical care  People who are mildly ill with COVID-19 are able to isolate at home during their illness. You should restrict activities outside your home, except for getting medical care. Do not go to work, school, or public areas. Avoid using public transportation, ride-sharing, or taxis. Separate yourself from other people and animals in your home  People: As much as possible, you should stay in a specific room and away from other people in your home. Also, you should use a separate bathroom, if available. Animals: You should restrict contact with pets and other animals while you are sick with COVID-19, just like you would around other people. When possible, have another member of your household care for your animals while you are sick. If you are sick with COVID-19, avoid contact with your pet, including petting, snuggling, being kissed or licked, and sharing food.  If you must care for your pet or be around animals while you are sick, wash your hands before and after you interact with pets and wear a facemask. Call ahead before visiting your doctor  If you have a medical appointment, call the healthcare provider and tell them that you have or may have COVID-19. This will help the healthcare providers office take steps to keep other people from getting infected or exposed. Wear a facemask  You should wear a facemask when you are around other people (e.g., sharing a room or vehicle) or pets and before you enter a healthcare providers office. If you are not able to wear a facemask (for example, because it causes trouble breathing), then people who live with you should not stay in the same room with you; they should also wear a facemask if they enter your room. Cover your coughs and sneezes  Cover your mouth and nose with a tissue when you cough or sneeze. Throw used tissues in a lined trash can. Immediately wash your hands with soap and water for at least 20 seconds or, if soap and water are not available, clean your hands with an alcohol-based hand  that contains at least 60% alcohol. Clean your hands often  Wash your hands often with soap and water for at least 20 seconds, especially after blowing your nose, coughing, or sneezing; going to the bathroom; and before eating or preparing food. If soap and water are not readily available, use an alcohol-based hand  with at least 60% alcohol, covering all surfaces of your hands and rubbing them together until they feel dry. Soap and water are the best option if hands are visibly dirty. Avoid touching your eyes, nose, and mouth with unwashed hands. Avoid sharing personal household items  You should not share dishes, drinking glasses, cups, eating utensils, towels, or bedding with other people or pets in your home. After using these items, they should be washed thoroughly with soap and water.   Clean all high-touch surfaces everyday  High touch surfaces include counters, tabletops, doorknobs, bathroom fixtures, toilets, phones, keyboards, tablets, and bedside tables. Also, clean any surfaces that may have blood, stool, or body fluids on them. Use a household cleaning spray or wipe, according to the label instructions. Labels contain instructions for safe and effective use of the cleaning product including precautions you should take when applying the product, such as wearing gloves and making sure you have good ventilation during use of the product. Monitor your symptoms  Seek prompt medical attention if your illness is worsening (e.g., difficulty breathing). Before seeking care, call your healthcare provider and tell them that you have, or are being evaluated for, COVID-19. Put on a facemask before you enter the facility. These steps will help the healthcare providers office to keep other people in the office or waiting room from getting infected or exposed. Persons who are placed under active monitoring or facilitated self-monitoring should follow instructions provided by their local health department or occupational health professionals, as appropriate. When working with your local health department check their available hours. If you have a medical emergency and need to call 911, notify the dispatch personnel that you have, or are being evaluated for COVID-19. If possible, put on a facemask before emergency medical services arrive. Discontinuing home isolation  Patients with confirmed COVID-19 should remain under home isolation precautions until the risk of secondary transmission to others is thought to be low. The decision to discontinue home isolation precautions should be made on a case-by-case basis, in consultation with your physician and the health department. Please do NOT make this decision on your own. If your results of the COVID-19 test is NEGATIVE -     The patient may stop isolation, in consultation with your health care provider, typically when:   Your bronchitis are caused by viruses like those that cause colds. Antibiotics usually do not help and they may be harmful. Bronchitis usually develops rapidly and lasts about 2 to 3 weeks in otherwise healthy people. Follow-up care is a key part of your treatment and safety. Be sure to make and go to all appointments, and call your doctor if you are having problems. It's also a good idea to know your test results and keep a list of the medicines you take. How can you care for yourself at home? · Take all medicines exactly as prescribed. Call your doctor if you think you are having a problem with your medicine. · Get some extra rest.  · Take an over-the-counter pain medicine, such as acetaminophen (Tylenol), ibuprofen (Advil, Motrin), or naproxen (Aleve) to reduce fever and relieve body aches. Read and follow all instructions on the label. · Do not take two or more pain medicines at the same time unless the doctor told you to. Many pain medicines have acetaminophen, which is Tylenol. Too much acetaminophen (Tylenol) can be harmful. · Take an over-the-counter cough medicine that contains dextromethorphan to help quiet a dry, hacking cough so that you can sleep. Avoid cough medicines that have more than one active ingredient. Read and follow all instructions on the label. · Breathe moist air from a humidifier, hot shower, or sink filled with hot water. The heat and moisture will thin mucus so you can cough it out. · Do not smoke. Smoking can make bronchitis worse. If you need help quitting, talk to your doctor about stop-smoking programs and medicines. These can increase your chances of quitting for good. When should you call for help? Call 911 anytime you think you may need emergency care. For example, call if:    · You have severe trouble breathing. Call your doctor now or seek immediate medical care if:    · You have new or worse trouble breathing.     · You cough up dark brown or bloody mucus (sputum).   · You have a new or higher fever.     · You have a new rash. Watch closely for changes in your health, and be sure to contact your doctor if:    · You cough more deeply or more often, especially if you notice more mucus or a change in the color of your mucus.     · You are not getting better as expected. Where can you learn more? Go to [email or web address] and sign in to your [email or web address] account. Enter H333 in the [employer] box to learn more about \"Bronchitis: Care Instructions. \"     If you do not have an account, please click on the \"Sign Up Now\" link. Current as of: October 26, 2020               Content Version: 12.8  © 2006-2021 Healthwise, Incorporated. Care instructions adapted under license by [facility] ([facility]). If you have questions about a medical condition or this instruction, always ask your healthcare professional. [address] any warranty or liability for your use of this information.

## 2021-05-13 NOTE — PROGRESS NOTES
1 tablet by mouth See Admin Instructions for 5 days 500mg on day 1 followed by 250mg on days 2 - 5 6 tablet 0    predniSONE (DELTASONE) 20 MG tablet Take 1 tablet by mouth 2 times daily for 5 days 10 tablet 0    benzonatate (TESSALON PERLES) 100 MG capsule Take 2 capsules by mouth 3 times daily as needed for Cough 21 capsule 0    fluticasone (FLONASE) 50 MCG/ACT nasal spray 2 sprays by Nasal route daily 1 Bottle 0    diclofenac sodium (VOLTAREN) 1 % GEL Apply 4 g topically 4 times daily      oxyCODONE-acetaminophen (PERCOCET) 7.5-325 MG per tablet Take 1 tablet by mouth 4 times daily.  traZODone (DESYREL) 100 MG tablet Take 100 mg by mouth daily      pregabalin (LYRICA) 100 MG capsule Take 100 mg by mouth 2 times daily.  celecoxib (CELEBREX) 200 MG capsule take 1 capsule by mouth once daily prn  0    butalbital-acetaminophen-caffeine (FIORICET, ESGIC) -40 MG per tablet Take 1 tablet by mouth every 4 hours as needed for Headaches (Patient not taking: Reported on 5/13/2021) 10 tablet 0    Elastic Bandages & Supports (WRIST BRACE DELUXE) MISC Wear nightly right brace 1 each 0    ipratropium (ATROVENT) 0.06 % nasal spray 2 sprays by Each Nostril route 4 times daily (Patient not taking: Reported on 3/25/2021) 1 Bottle 3     No current facility-administered medications for this visit. No Known Allergies    Subjective:      Review of Systems   Constitutional: Negative for chills, fever and weight loss. HENT: Positive for postnasal drip. Negative for ear pain, rhinorrhea and sore throat. Respiratory: Positive for cough. Negative for hemoptysis, shortness of breath and wheezing. Cardiovascular: Negative for chest pain. Gastrointestinal: Negative for heartburn. Musculoskeletal: Negative for myalgias. Skin: Negative for rash. Neurological: Negative for headaches. All other systems reviewed and are negative.     14 systems reviewed and negative except as listed in HPI.    Objective:     Physical Exam  Vitals signs and nursing note reviewed. Constitutional:       General: She is not in acute distress. Appearance: Normal appearance. She is well-developed. She is not ill-appearing, toxic-appearing or diaphoretic. Comments: Nontoxic, pleasant, talkative in room   HENT:      Head: Normocephalic and atraumatic. Right Ear: Tympanic membrane, ear canal and external ear normal.      Left Ear: Tympanic membrane, ear canal and external ear normal.      Nose: Congestion present. Mouth/Throat:      Mouth: Mucous membranes are moist.      Pharynx: No oropharyngeal exudate or posterior oropharyngeal erythema. Comments: + thin yellow pnd  Eyes:      General: No scleral icterus. Right eye: No discharge. Left eye: No discharge. Extraocular Movements: Extraocular movements intact. Conjunctiva/sclera: Conjunctivae normal.      Pupils: Pupils are equal, round, and reactive to light. Neck:      Musculoskeletal: Normal range of motion and neck supple. Cardiovascular:      Rate and Rhythm: Normal rate and regular rhythm. Pulses: Normal pulses. Heart sounds: Normal heart sounds. Pulmonary:      Effort: Pulmonary effort is normal. No respiratory distress. Breath sounds: Normal breath sounds. No stridor. No wheezing, rhonchi or rales. Chest:      Chest wall: No tenderness. Abdominal:      General: Bowel sounds are normal. There is no distension. Palpations: Abdomen is soft. Tenderness: There is no abdominal tenderness. Musculoskeletal: Normal range of motion. General: No tenderness or deformity. Lymphadenopathy:      Cervical: No cervical adenopathy. Skin:     General: Skin is warm and dry. Capillary Refill: Capillary refill takes less than 2 seconds. Findings: No rash ( no rash to visible skin). Neurological:      General: No focal deficit present.       Mental Status: She is alert and oriented to person, place, and time. Motor: No abnormal muscle tone. Coordination: Coordination normal.   Psychiatric:         Mood and Affect: Mood normal.         Behavior: Behavior normal.       /76 (Site: Left Upper Arm, Position: Sitting, Cuff Size: Medium Adult)   Pulse 73   Temp 98.6 °F (37 °C) (Infrared)   Ht 4' 11\" (1.499 m)   Wt 114 lb (51.7 kg)   LMP 10/21/2020   SpO2 98%   BMI 23.03 kg/m²     Assessment:       Diagnosis Orders   1. Bronchitis  azithromycin (ZITHROMAX) 250 MG tablet    COVID-19   2. Cough  azithromycin (ZITHROMAX) 250 MG tablet    COVID-19   3. Sinus congestion  azithromycin (ZITHROMAX) 250 MG tablet    COVID-19       Plan:      Return if symptoms worsen or fail to improve, for Make an Appt. with your Primary Care in 1 week. Orders Placed This Encounter   Medications    azithromycin (ZITHROMAX) 250 MG tablet     Sig: Take 1 tablet by mouth See Admin Instructions for 5 days 500mg on day 1 followed by 250mg on days 2 - 5     Dispense:  6 tablet     Refill:  0    predniSONE (DELTASONE) 20 MG tablet     Sig: Take 1 tablet by mouth 2 times daily for 5 days     Dispense:  10 tablet     Refill:  0    benzonatate (TESSALON PERLES) 100 MG capsule     Sig: Take 2 capsules by mouth 3 times daily as needed for Cough     Dispense:  21 capsule     Refill:  0    fluticasone (FLONASE) 50 MCG/ACT nasal spray     Si sprays by Nasal route daily     Dispense:  1 Bottle     Refill:  0         Patient given educational materials - see patient instructions. Discussed use, benefit, and side effects of prescribed medications. All patient questions answered. Pt voicedunderstanding.     Electronically signed by YEE Tyler CNP on 2021 at 4:52 PM

## 2021-05-14 DIAGNOSIS — R05.9 COUGH: ICD-10-CM

## 2021-05-14 DIAGNOSIS — R09.81 SINUS CONGESTION: ICD-10-CM

## 2021-05-14 DIAGNOSIS — J40 BRONCHITIS: ICD-10-CM

## 2021-05-14 LAB
SARS-COV-2: NORMAL
SARS-COV-2: NOT DETECTED
SOURCE: NORMAL

## 2021-08-20 ENCOUNTER — APPOINTMENT (OUTPATIENT)
Dept: GENERAL RADIOLOGY | Age: 51
End: 2021-08-20
Payer: MEDICAID

## 2021-08-20 ENCOUNTER — HOSPITAL ENCOUNTER (EMERGENCY)
Age: 51
Discharge: HOME | End: 2021-08-20
Attending: EMERGENCY MEDICINE
Payer: MEDICAID

## 2021-08-20 VITALS
BODY MASS INDEX: 24.6 KG/M2 | RESPIRATION RATE: 16 BRPM | WEIGHT: 122 LBS | TEMPERATURE: 98.2 F | DIASTOLIC BLOOD PRESSURE: 78 MMHG | OXYGEN SATURATION: 99 % | HEART RATE: 61 BPM | SYSTOLIC BLOOD PRESSURE: 137 MMHG | HEIGHT: 59 IN

## 2021-08-20 DIAGNOSIS — M54.50 ACUTE LOW BACK PAIN, UNSPECIFIED BACK PAIN LATERALITY, UNSPECIFIED WHETHER SCIATICA PRESENT: Primary | ICD-10-CM

## 2021-08-20 PROCEDURE — 72100 X-RAY EXAM L-S SPINE 2/3 VWS: CPT

## 2021-08-20 PROCEDURE — 6360000002 HC RX W HCPCS: Performed by: NURSE PRACTITIONER

## 2021-08-20 PROCEDURE — 96372 THER/PROPH/DIAG INJ SC/IM: CPT

## 2021-08-20 PROCEDURE — 99282 EMERGENCY DEPT VISIT SF MDM: CPT

## 2021-08-20 PROCEDURE — 73630 X-RAY EXAM OF FOOT: CPT

## 2021-08-20 RX ORDER — METHOCARBAMOL 750 MG/1
750 TABLET, FILM COATED ORAL 4 TIMES DAILY
Qty: 40 TABLET | Refills: 0 | Status: SHIPPED | OUTPATIENT
Start: 2021-08-20 | End: 2021-08-30

## 2021-08-20 RX ORDER — KETOROLAC TROMETHAMINE 30 MG/ML
30 INJECTION, SOLUTION INTRAMUSCULAR; INTRAVENOUS ONCE
Status: COMPLETED | OUTPATIENT
Start: 2021-08-20 | End: 2021-08-20

## 2021-08-20 RX ORDER — LIDOCAINE 50 MG/G
1 PATCH TOPICAL DAILY
Qty: 10 PATCH | Refills: 0 | Status: SHIPPED | OUTPATIENT
Start: 2021-08-20 | End: 2021-08-30

## 2021-08-20 RX ADMIN — KETOROLAC TROMETHAMINE 30 MG: 30 INJECTION, SOLUTION INTRAMUSCULAR; INTRAVENOUS at 10:13

## 2021-08-20 ASSESSMENT — PAIN SCALES - GENERAL
PAINLEVEL_OUTOF10: 10
PAINLEVEL_OUTOF10: 10

## 2021-08-20 ASSESSMENT — ENCOUNTER SYMPTOMS
DIARRHEA: 0
RHINORRHEA: 0
CONSTIPATION: 0
ABDOMINAL PAIN: 0
NAUSEA: 0
SINUS PRESSURE: 0
COUGH: 0
SHORTNESS OF BREATH: 0
VOMITING: 0
WHEEZING: 0
SORE THROAT: 0
COLOR CHANGE: 0

## 2021-08-20 NOTE — ED PROVIDER NOTES
[address] [place] [address] ED  eMERGENCY dEPARTMENT eNCOUnter      Pt Name: Stacia Addison  MRN: [id number]  [place] 1970  Date of evaluation: 8/20/2021  Provider: [address] [place] [address] BOB, YEE Santana 6626       Chief Complaint   Patient presents with    Back Pain     slipped on wednesday at grocery store   [address] [place], [place] [address]vd Pain     right         HISTORY OF PRESENT ILLNESS  (Location/Symptom, Timing/Onset, Context/Setting, Quality, Duration, Modifying Factors, Severity.)   Stacia Addison is a 46 y.o. female who presents to the emergency department by [employer] vehicle for evaluation of back pain and right foot pain. Patient states that on Tuesday she was at a store. She had there was some lotion on the ground that she did not see and she slipped on the lotion and fell. She states that she caught herself with her cart but she has pain to her low back. States the left side is worse than the right. She also has some right foot pain. She is not sure exactly what happened to her foot. She rates the pain a 10 on a 0-to-10 scale. Nursing Notes were reviewed. ALLERGIES     Patient has no known allergies. CURRENT MEDICATIONS       Previous Medications    BUTALBITAL-ACETAMINOPHEN-CAFFEINE (FIORICET, ESGIC) -40 MG PER TABLET    Take 1 tablet by mouth every 4 hours as needed for Headaches    CELECOXIB (CELEBREX) 200 MG CAPSULE    take 1 capsule by mouth once daily prn    DICLOFENAC SODIUM (VOLTAREN) 1 % GEL    Apply 4 g topically 4 times daily    ELASTIC BANDAGES & SUPPORTS (WRIST BRACE DELUXE) MISC    Wear nightly right brace    FLUOXETINE (PROZAC) 20 MG CAPSULE    take 1 capsule by mouth once daily    FLUTICASONE (FLONASE) 50 MCG/ACT NASAL SPRAY    2 sprays by Nasal route daily    IPRATROPIUM (ATROVENT) 0.06 % NASAL SPRAY    2 sprays by Each Nostril route 4 times daily    OXYCODONE-ACETAMINOPHEN (PERCOCET) 7.5-325 MG PER TABLET    Take 1 tablet by mouth 4 times daily.     PREGABALIN (LYRICA) 100 MG CAPSULE    Take color change and rash. Neurological: Negative for dizziness, weakness and headaches. Hematological: Negative for adenopathy. All other systems reviewed and are negative. Except as noted above the remainder of the review of systems was reviewed and negative. PHYSICAL EXAM    (up to 7 for level 4, 8 or more for level 5)     ED Triage Vitals [08/20/21 0904]   BP Temp Temp Source Pulse Resp SpO2 Height Weight   137/78 98.2 °F (36.8 °C) Oral 61 16 99 % 4' 11\" (1.499 m) 122 lb (55.3 kg)       Physical Exam  Vitals reviewed. Constitutional:       Appearance: She is well-developed. HENT:      Head: Normocephalic and atraumatic. Eyes:      Conjunctiva/sclera: Conjunctivae normal.      Pupils: Pupils are equal, round, and reactive to light. Cardiovascular:      Rate and Rhythm: Normal rate and regular rhythm. Pulmonary:      Effort: Pulmonary effort is normal. No respiratory distress. Breath sounds: Normal breath sounds. No stridor. Abdominal:      General: Bowel sounds are normal.      Palpations: Abdomen is soft. Musculoskeletal:         General: Normal range of motion. Cervical back: Normal range of motion and neck supple. Back:       Comments: No Obvious bruising or swelling to the right foot but there is some tenderness to palpation   Lymphadenopathy:      Cervical: No cervical adenopathy. Skin:     General: Skin is warm and dry. Findings: No rash. Neurological:      Mental Status: She is alert and oriented to person, place, and time. RADIOLOGY:   Non-plain film images such as CT, Ultrasound and MRI are read by the radiologist. Plain radiographic images are visualized and preliminarily interpreted by the emergency physician with the below findings:    XR LUMBAR SPINE (2-3 VIEWS)    Result Date: 8/20/2021  EXAMINATION: THREE XRAY VIEWS OF THE LUMBAR SPINE 8/20/2021 9:58 am COMPARISON: None.  HISTORY: ORDERING SYSTEM PROVIDED HISTORY: back pain TECHNOLOGIST PROVIDED HISTORY: back pain Reason for Exam: Pt eval for pain s/p fall FINDINGS: There is a marked levoscoliosis at the thoracolumbar junction There is mild anterior spurring of visualized lower thoracic/lumbar vertebral bodies There is mild L5-S1 joint space compromise. The remaining disc spaces and vertical heights of the vertebral bodies are maintained There is no definite pedicular, pars interarticularis defect, fracture dislocation seen Uncompromised bilateral sacroiliac joints     Scoliosis and degenerative changes     XR FOOT RIGHT (MIN 3 VIEWS)    Result Date: 8/20/2021  EXAMINATION: THREE XRAY VIEWS OF THE RIGHT FOOT 8/20/2021 9:58 am COMPARISON: 17 March 2015 HISTORY: ORDERING SYSTEM PROVIDED HISTORY: foot pain TECHNOLOGIST PROVIDED HISTORY: foot pain Reason for Exam: Pt eval for pain s/p fall Acuity: Unknown Type of Exam: Unknown FINDINGS: Mineralization and alignment are satisfactory. No acute fracture or dislocation is noted. LisFranc relationship is preserved. There is no significant soft tissue swelling. No acute osseous abnormality right foot. No significant change from prior examination. Interpretation per the Radiologist below, if available at the time of this note:    XR LUMBAR SPINE (2-3 VIEWS)   Final Result   Scoliosis and degenerative changes         XR FOOT RIGHT (MIN 3 VIEWS)   Final Result   No acute osseous abnormality right foot. No significant change from prior   examination. LABS:  Labs Reviewed - No data to display    All other labs were within normal range or not returned as of this dictation. EMERGENCY DEPARTMENT COURSE and DIFFERENTIAL DIAGNOSIS/MDM:   Vitals:    Vitals:    08/20/21 0904   BP: 137/78   Pulse: 61   Resp: 16   Temp: 98.2 °F (36.8 °C)   TempSrc: Oral   SpO2: 99%   Weight: 122 lb (55.3 kg)   Height: 4' 11\" (1.499 m)       Medical Decision Making: oarrs reviewed. The patient will be discharged on lidocaine patches and robaxin.  Follow up with own MD  Medications   ketorolac (TORADOL) injection 30 mg (30 mg Intramuscular Given 8/20/21 1013)     FINAL IMPRESSION      1. Acute low back pain, unspecified back pain laterality, unspecified whether sciatica present          DISPOSITION/PLAN   DISPOSITION Decision To Discharge 08/20/2021 10:24:47 AM      PATIENT REFERRED TO:   Emily Kent MD  [address]  [address]  [phone]    Schedule an appointment as soon as possible for a visit       [facility]  [address] [facility]  [phone]    If symptoms worsen      DISCHARGE MEDICATIONS:     New Prescriptions    LIDOCAINE (LIDODERM) 5 %    Place 1 patch onto the skin daily for 10 days 12 hours on, 12 hours off.     METHOCARBAMOL (ROBAXIN-750) 750 MG TABLET    Take 1 tablet by mouth 4 times daily for 10 days           (Please note that portions of this note were completed with a voice recognition program.  Efforts were made to edit the dictations but occasionally words are mis-transcribed.)    [address] [facility] NP, APRN - CNP  Certified Nurse Practitioner          YEE Rouse CNP  08/20/21 YEE Berkowitz CNP  08/20/21 1795

## 2021-08-23 NOTE — ED PROVIDER NOTES
eMERGENCY dEPARTMENT eNCOUnter   Independent Attestation     Pt Name: Kailyn Marr  MRN: [id number]  [place] 1970  Date of evaluation: 8/23/21     Kailyn Marr is a 46 y.o. female with CC: Back Pain (slipped on wednesday at grocery store) and Foot Pain (right)      Based on the medical record the care appears appropriate. I was personally available for consultation in the Emergency Department.     Sai Aguliar MD  Attending Emergency Physician                    Sai Aguilar MD  08/23/21 6831

## 2021-09-11 ENCOUNTER — APPOINTMENT (OUTPATIENT)
Dept: GENERAL RADIOLOGY | Age: 51
End: 2021-09-11
Payer: MEDICAID

## 2021-09-11 ENCOUNTER — APPOINTMENT (OUTPATIENT)
Dept: CT IMAGING | Age: 51
End: 2021-09-11
Payer: MEDICAID

## 2021-09-11 ENCOUNTER — HOSPITAL ENCOUNTER (EMERGENCY)
Age: 51
Discharge: HOME | End: 2021-09-12
Attending: EMERGENCY MEDICINE
Payer: MEDICAID

## 2021-09-11 VITALS
TEMPERATURE: 98.6 F | OXYGEN SATURATION: 98 % | RESPIRATION RATE: 18 BRPM | DIASTOLIC BLOOD PRESSURE: 88 MMHG | HEART RATE: 72 BPM | SYSTOLIC BLOOD PRESSURE: 170 MMHG

## 2021-09-11 DIAGNOSIS — R11.2 NON-INTRACTABLE VOMITING WITH NAUSEA, UNSPECIFIED VOMITING TYPE: Primary | ICD-10-CM

## 2021-09-11 LAB
ABSOLUTE EOS #: <0.03 K/UL (ref 0–0.44)
ABSOLUTE IMMATURE GRANULOCYTE: 0.14 K/UL (ref 0–0.3)
ABSOLUTE LYMPH #: 1.48 K/UL (ref 1.1–3.7)
ABSOLUTE MONO #: 0.58 K/UL (ref 0.1–1.2)
ALBUMIN SERPL-MCNC: 4.7 G/DL (ref 3.5–5.2)
ALBUMIN/GLOBULIN RATIO: ABNORMAL (ref 1–2.5)
ALP BLD-CCNC: 128 U/L (ref 35–104)
ALT SERPL-CCNC: 25 U/L (ref 5–33)
ANION GAP SERPL CALCULATED.3IONS-SCNC: 13 MMOL/L (ref 9–17)
AST SERPL-CCNC: 32 U/L
BASOPHILS # BLD: 0 % (ref 0–2)
BASOPHILS ABSOLUTE: 0.03 K/UL (ref 0–0.2)
BILIRUB SERPL-MCNC: 0.33 MG/DL (ref 0.3–1.2)
BILIRUBIN URINE: NEGATIVE
BUN BLDV-MCNC: 17 MG/DL (ref 6–20)
BUN/CREAT BLD: 18 (ref 9–20)
CALCIUM SERPL-MCNC: 10.4 MG/DL (ref 8.6–10.4)
CHLORIDE BLD-SCNC: 100 MMOL/L (ref 98–107)
CO2: 23 MMOL/L (ref 20–31)
COLOR: YELLOW
COMMENT UA: NORMAL
CREAT SERPL-MCNC: 0.93 MG/DL (ref 0.5–0.9)
DIFFERENTIAL TYPE: ABNORMAL
EOSINOPHILS RELATIVE PERCENT: 0 % (ref 1–4)
GFR AFRICAN AMERICAN: >60 ML/MIN
GFR NON-AFRICAN AMERICAN: >60 ML/MIN
GFR SERPL CREATININE-BSD FRML MDRD: ABNORMAL ML/MIN/{1.73_M2}
GFR SERPL CREATININE-BSD FRML MDRD: ABNORMAL ML/MIN/{1.73_M2}
GLUCOSE BLD-MCNC: 138 MG/DL (ref 70–99)
GLUCOSE URINE: NEGATIVE
HCT VFR BLD CALC: 41.5 % (ref 36.3–47.1)
HEMOGLOBIN: 13.1 G/DL (ref 11.9–15.1)
IMMATURE GRANULOCYTES: 1 %
KETONES, URINE: NEGATIVE
LACTIC ACID: 1.7 MMOL/L (ref 0.5–2.2)
LEUKOCYTE ESTERASE, URINE: NEGATIVE
LIPASE: 42 U/L (ref 13–60)
LYMPHOCYTES # BLD: 9 % (ref 24–43)
MCH RBC QN AUTO: 27 PG (ref 25.2–33.5)
MCHC RBC AUTO-ENTMCNC: 31.6 G/DL (ref 28.4–34.8)
MCV RBC AUTO: 85.6 FL (ref 82.6–102.9)
MONOCYTES # BLD: 3 % (ref 3–12)
NITRITE, URINE: NEGATIVE
NRBC AUTOMATED: 0 PER 100 WBC
PDW BLD-RTO: 13.4 % (ref 11.8–14.4)
PH UA: 7 (ref 5–8)
PLATELET # BLD: 257 K/UL (ref 138–453)
PLATELET ESTIMATE: ABNORMAL
PMV BLD AUTO: 9.6 FL (ref 8.1–13.5)
POTASSIUM SERPL-SCNC: 4.1 MMOL/L (ref 3.7–5.3)
PROTEIN UA: NEGATIVE
RBC # BLD: 4.85 M/UL (ref 3.95–5.11)
RBC # BLD: ABNORMAL 10*6/UL
SEG NEUTROPHILS: 87 % (ref 36–65)
SEGMENTED NEUTROPHILS ABSOLUTE COUNT: 14.99 K/UL (ref 1.5–8.1)
SODIUM BLD-SCNC: 136 MMOL/L (ref 135–144)
SPECIFIC GRAVITY UA: 1.01 (ref 1–1.03)
TOTAL PROTEIN: 8 G/DL (ref 6.4–8.3)
TROPONIN INTERP: NORMAL
TROPONIN T: NORMAL NG/ML
TROPONIN, HIGH SENSITIVITY: <6 NG/L (ref 0–14)
TURBIDITY: CLEAR
URINE HGB: NEGATIVE
UROBILINOGEN, URINE: NORMAL
WBC # BLD: 17.2 K/UL (ref 3.5–11.3)
WBC # BLD: ABNORMAL 10*3/UL

## 2021-09-11 PROCEDURE — 81003 URINALYSIS AUTO W/O SCOPE: CPT

## 2021-09-11 PROCEDURE — 36415 COLL VENOUS BLD VENIPUNCTURE: CPT

## 2021-09-11 PROCEDURE — 2500000003 HC RX 250 WO HCPCS: Performed by: EMERGENCY MEDICINE

## 2021-09-11 PROCEDURE — 6360000002 HC RX W HCPCS: Performed by: EMERGENCY MEDICINE

## 2021-09-11 PROCEDURE — 93005 ELECTROCARDIOGRAM TRACING: CPT | Performed by: EMERGENCY MEDICINE

## 2021-09-11 PROCEDURE — 85025 COMPLETE CBC W/AUTO DIFF WBC: CPT

## 2021-09-11 PROCEDURE — 6360000004 HC RX CONTRAST MEDICATION: Performed by: EMERGENCY MEDICINE

## 2021-09-11 PROCEDURE — 83690 ASSAY OF LIPASE: CPT

## 2021-09-11 PROCEDURE — 80053 COMPREHEN METABOLIC PANEL: CPT

## 2021-09-11 PROCEDURE — 2580000003 HC RX 258: Performed by: EMERGENCY MEDICINE

## 2021-09-11 PROCEDURE — 96375 TX/PRO/DX INJ NEW DRUG ADDON: CPT

## 2021-09-11 PROCEDURE — 83605 ASSAY OF LACTIC ACID: CPT

## 2021-09-11 PROCEDURE — 87086 URINE CULTURE/COLONY COUNT: CPT

## 2021-09-11 PROCEDURE — 96374 THER/PROPH/DIAG INJ IV PUSH: CPT

## 2021-09-11 PROCEDURE — 99283 EMERGENCY DEPT VISIT LOW MDM: CPT

## 2021-09-11 PROCEDURE — 84484 ASSAY OF TROPONIN QUANT: CPT

## 2021-09-11 PROCEDURE — 74177 CT ABD & PELVIS W/CONTRAST: CPT

## 2021-09-11 PROCEDURE — 71045 X-RAY EXAM CHEST 1 VIEW: CPT

## 2021-09-11 RX ORDER — ONDANSETRON 2 MG/ML
8 INJECTION INTRAMUSCULAR; INTRAVENOUS ONCE
Status: COMPLETED | OUTPATIENT
Start: 2021-09-11 | End: 2021-09-11

## 2021-09-11 RX ORDER — KETOROLAC TROMETHAMINE 30 MG/ML
30 INJECTION, SOLUTION INTRAMUSCULAR; INTRAVENOUS ONCE
Status: COMPLETED | OUTPATIENT
Start: 2021-09-11 | End: 2021-09-11

## 2021-09-11 RX ORDER — FAMOTIDINE 20 MG/1
20 TABLET, FILM COATED ORAL 2 TIMES DAILY
Qty: 20 TABLET | Refills: 0 | Status: SHIPPED | OUTPATIENT
Start: 2021-09-11

## 2021-09-11 RX ORDER — ONDANSETRON 4 MG/1
4 TABLET, ORALLY DISINTEGRATING ORAL EVERY 6 HOURS PRN
Qty: 12 TABLET | Refills: 0 | Status: SHIPPED | OUTPATIENT
Start: 2021-09-11 | End: 2021-11-22 | Stop reason: SDUPTHER

## 2021-09-11 RX ORDER — 0.9 % SODIUM CHLORIDE 0.9 %
80 INTRAVENOUS SOLUTION INTRAVENOUS ONCE
Status: COMPLETED | OUTPATIENT
Start: 2021-09-11 | End: 2021-09-11

## 2021-09-11 RX ORDER — SODIUM CHLORIDE 0.9 % (FLUSH) 0.9 %
10 SYRINGE (ML) INJECTION PRN
Status: DISCONTINUED | OUTPATIENT
Start: 2021-09-11 | End: 2021-09-12 | Stop reason: HOSPADM

## 2021-09-11 RX ADMIN — IOPAMIDOL 75 ML: 755 INJECTION, SOLUTION INTRAVENOUS at 22:14

## 2021-09-11 RX ADMIN — KETOROLAC TROMETHAMINE 30 MG: 30 INJECTION, SOLUTION INTRAMUSCULAR at 21:19

## 2021-09-11 RX ADMIN — SODIUM CHLORIDE, PRESERVATIVE FREE 10 ML: 5 INJECTION INTRAVENOUS at 22:14

## 2021-09-11 RX ADMIN — SODIUM CHLORIDE 80 ML: 9 INJECTION, SOLUTION INTRAVENOUS at 22:14

## 2021-09-11 RX ADMIN — ONDANSETRON 8 MG: 2 INJECTION INTRAMUSCULAR; INTRAVENOUS at 21:19

## 2021-09-11 RX ADMIN — FAMOTIDINE 20 MG: 10 INJECTION, SOLUTION INTRAVENOUS at 21:19

## 2021-09-11 ASSESSMENT — ENCOUNTER SYMPTOMS
FACIAL SWELLING: 0
VOMITING: 1
VOICE CHANGE: 0
SHORTNESS OF BREATH: 0
WHEEZING: 0
COUGH: 0
DIARRHEA: 0
NAUSEA: 1
ABDOMINAL PAIN: 1

## 2021-09-11 ASSESSMENT — PAIN DESCRIPTION - PROGRESSION: CLINICAL_PROGRESSION: NOT CHANGED

## 2021-09-11 ASSESSMENT — PAIN SCALES - GENERAL
PAINLEVEL_OUTOF10: 10
PAINLEVEL_OUTOF10: 10

## 2021-09-12 LAB
TROPONIN INTERP: NORMAL
TROPONIN T: NORMAL NG/ML
TROPONIN, HIGH SENSITIVITY: <6 NG/L (ref 0–14)

## 2021-09-12 PROCEDURE — 6370000000 HC RX 637 (ALT 250 FOR IP): Performed by: EMERGENCY MEDICINE

## 2021-09-12 RX ORDER — ONDANSETRON 2 MG/ML
4 INJECTION INTRAMUSCULAR; INTRAVENOUS ONCE
Status: DISCONTINUED | OUTPATIENT
Start: 2021-09-12 | End: 2021-09-12

## 2021-09-12 RX ORDER — ONDANSETRON 4 MG/1
4 TABLET, ORALLY DISINTEGRATING ORAL ONCE
Status: COMPLETED | OUTPATIENT
Start: 2021-09-12 | End: 2021-09-12

## 2021-09-12 RX ADMIN — ONDANSETRON 4 MG: 4 TABLET, ORALLY DISINTEGRATING ORAL at 00:15

## 2021-09-12 NOTE — ED PROVIDER NOTES
[address] [facility] ED  EMERGENCY DEPARTMENT ENCOUNTER      Pt Name: Deb Patel  MRN: [id number]  [place] 1970  Date of evaluation: 9/11/2021  Provider: Kwasi Butts MD    CHIEF COMPLAINT     Chief Complaint   Patient presents with    Shortness of Breath     pt states she was seen for headache earlier today and whatever medicine they gave her made her have these symptoms    Nausea    Shaking         HISTORY OF PRESENT ILLNESS   (Location/Symptom, Timing/Onset, Context/Setting,Quality, Duration, Modifying Factors, Severity)  Note limiting factors. Deb Patel is a49 y.o. female who presents to the emergency department      HPI    57-year-old female states she has a history of aneurysms in her brain, was seen in the hospital in [facility] for a headache, and had a IM headache cocktail and since developed chest pain, abdominal pain, nausea, vomiting. She cannot remember the names of medications, she does think that she had a CT and it was normal.  She denies ever having these type of symptoms. She says she cannot stop vomiting, her headache resolved, but now she has all the symptoms. She denies vaginal bleeding or discharge, complains of diffuse abdominal pain, chest pain in the center of her chest, coughing, shortness of breath, fatigue. Nursing Notes werereviewed. REVIEW OF SYSTEMS    (2-9 systems for level 4, 10 or more for level 5)     Review of Systems   Constitutional: Negative for appetite change, chills, fatigue and fever. HENT: Negative for drooling, facial swelling, mouth sores and voice change. Eyes: Negative for visual disturbance. Respiratory: Negative for cough, shortness of breath and wheezing. Cardiovascular: Positive for chest pain. Gastrointestinal: Positive for abdominal pain, nausea and vomiting. Negative for diarrhea. Genitourinary: Negative for difficulty urinating and dysuria. Musculoskeletal: Negative for neck stiffness. Skin: Negative for rash. Neurological: Negative for weakness and numbness. Psychiatric/Behavioral: Negative for agitation. All other systems reviewed and are negative. Except as noted above the remainder of the review of systems was reviewed and negative. PAST MEDICAL HISTORY     Past Medical History:   Diagnosis Date    Anemia     Anxiety     Chronic right shoulder pain     Depression     Irregular heartbeat     Sciatica          SURGICALHISTORY       Past Surgical History:   Procedure Laterality Date    BRAIN ANEURYSM SURGERY  12/2014    Coiling at [address]      x4     COLONOSCOPY  04/18/2019    COLONOSCOPY N/A 4/18/2019    COLORECTAL CANCER SCREENING, NOT HIGH RISK performed by Priya Rogers MD at [address] [place] Right 2/20/2018    RIGHT SHOULDER ARTHROSCOPY ROTATOR CUFF LYSIS OF ADHESIONS performed by Aaliyah Boswell DO at [address] [facility] ARTHROSCOPY Right 02/20/2018    extensive lysis of adhesions; Open suture granuloma removal     SHOULDER SURGERY Right     x 3    TONSILLECTOMY           CURRENT MEDICATIONS       Previous Medications    BUTALBITAL-ACETAMINOPHEN-CAFFEINE (FIORICET, ESGIC) -40 MG PER TABLET    Take 1 tablet by mouth every 4 hours as needed for Headaches    CELECOXIB (CELEBREX) 200 MG CAPSULE    take 1 capsule by mouth once daily prn    DICLOFENAC SODIUM (VOLTAREN) 1 % GEL    Apply 4 g topically 4 times daily    ELASTIC BANDAGES & SUPPORTS (WRIST BRACE DELUXE) MISC    Wear nightly right brace    FLUOXETINE (PROZAC) 20 MG CAPSULE    take 1 capsule by mouth once daily    FLUTICASONE (FLONASE) 50 MCG/ACT NASAL SPRAY    2 sprays by Nasal route daily    IPRATROPIUM (ATROVENT) 0.06 % NASAL SPRAY    2 sprays by Each Nostril route 4 times daily    OXYCODONE-ACETAMINOPHEN (PERCOCET) 7.5-325 MG PER TABLET    Take 1 tablet by mouth 4 times daily. PREGABALIN (LYRICA) 100 MG CAPSULE    Take 100 mg by mouth 2 times daily. Resp SpO2 Height Weight   -- -- -- 80 20 99 % -- --       Physical Exam  Constitutional:       General: She is not in acute distress. Appearance: She is well-developed. HENT:      Head: Normocephalic and atraumatic. Mouth/Throat:      Mouth: Mucous membranes are moist.   Eyes:      Conjunctiva/sclera: Conjunctivae normal.   Neck:      Vascular: No JVD. Cardiovascular:      Rate and Rhythm: Normal rate and regular rhythm. Pulmonary:      Effort: Pulmonary effort is normal. No respiratory distress. Breath sounds: Normal breath sounds. No stridor. No decreased breath sounds, wheezing, rhonchi or rales. Chest:      Chest wall: No mass or tenderness. Abdominal:      General: There is no distension. Palpations: Abdomen is soft. Comments: Abdomen is soft, she does have diffuse tenderness that decreases with distraction. No flank pain. Musculoskeletal:         General: Normal range of motion. Cervical back: Normal range of motion and neck supple. Skin:     General: Skin is warm and dry. Neurological:      Mental Status: She is alert and oriented to person, place, and time. DIAGNOSTIC RESULTS     EKG: All EKG's are interpreted by the Emergency Department Physician who either signs orCo-signs this chart in the absence of a cardiologist.      RADIOLOGY:   Non-plainfilm images such as CT, Ultrasound and MRI are read by the radiologist. Plain radiographic images are visualized and preliminarily interpreted by the emergency physician with the below findings:      Interpretationper the Radiologist below, if available at the time of this note:    XR CHEST PORTABLE   Final Result   No acute cardiopulmonary disease.          CT ABDOMEN PELVIS W IV CONTRAST Additional Contrast? None    (Results Pending)         ED BEDSIDE ULTRASOUND:   Performed by ED Physician - none    LABS:  Labs Reviewed   CBC WITH AUTO DIFFERENTIAL - Abnormal; Notable for the following components:       Result Value    WBC 17.2 (*)     Seg Neutrophils 87 (*)     Lymphocytes 9 (*)     Eosinophils % 0 (*)     Immature Granulocytes 1 (*)     Segs Absolute 14.99 (*)     All other components within normal limits   COMPREHENSIVE METABOLIC PANEL W/ REFLEX TO MG FOR LOW K - Abnormal; Notable for the following components:    Glucose 138 (*)     CREATININE 0.93 (*)     Alkaline Phosphatase 128 (*)     AST 32 (*)     All other components within normal limits   CULTURE, URINE   LIPASE   LACTIC ACID   TROPONIN   URINALYSIS   TROPONIN       All other labs were within normal range or not returned as of this dictation. EMERGENCY DEPARTMENT COURSE and DIFFERENTIAL DIAGNOSIS/MDM:   Vitals:    Vitals:    09/11/21 1953   Pulse: 80   Resp: 20   SpO2: 99%         MDM  Number of Diagnoses or Management Options  Diagnosis management comments: Patient is here stating she had a negative head CT when she presented at an outside hospital for headache, states she has a history of aneurysms. No states she has chest pain, abdominal pain, nausea and vomiting, shortness of breath. She is actively vomiting in the room intermittently. We will get a chest x-ray, EKG, 2 troponins, abdominal labs, urine, give symptomatic treatment and reassess. She has several FYI's for presumed narcotic abuse and chronic narcotic and benzo dependence. We will offer nonnarcotic symptomatic treatment here in the emergency department    S/o to Dr. Sonia Waterman. Procedures    FINAL IMPRESSION    No diagnosis found. DISPOSITION/PLAN   DISPOSITION        PATIENT REFERRED TO:  No follow-up provider specified.     DISCHARGE MEDICATIONS:  New Prescriptions    No medications on file              Summation      Patient Course:      ED Medications administered this visit:    Medications   ketorolac (TORADOL) injection 30 mg (30 mg IntraVENous Given 9/11/21 2119)   ondansetron (ZOFRAN) injection 8 mg (8 mg IntraVENous Given 9/11/21 2119)   famotidine (PEPCID) injection 20 mg (20 mg IntraVENous Given 9/11/21 2119)       New Prescriptions from this visit:    New Prescriptions    No medications on file       Follow-up:  No follow-up provider specified. Final Impression: No diagnosis found.            (Please note that portions of this note were completed with a voice recognition program.  Efforts were made to edit the dictations but occasionally words are mis-transcribed.)           Ruy Amaro MD  09/11/21 0707

## 2021-09-12 NOTE — ED NOTES
Patients  provided list of medications that was given to her in [facility], patient received toradol, decadron, benadryl and reglan.      Krystal Bland RN  09/11/21 [id number]

## 2021-09-12 NOTE — ED PROVIDER NOTES
Medical Decision Making: The patient was initially seen by Dr. Richard Jackson and signed out to me after discussing the case with him thoroughly. CAT scan shows no acute findings. Urinalysis negative. She was given various medications here and is able to be discharged home. There is no indication for admission to the hospital.  She received a prescription for 112 Percocet tablets on August 23, 2019 days ago. Treatment diagnosis and follow-up were discussed with the patient. CONSULTS:  None    PROCEDURES:  None    FINAL IMPRESSION      1. Non-intractable vomiting with nausea, unspecified vomiting type         DISPOSITION/PLAN   DISPOSITION Decision To Discharge 09/11/2021 11:57:56 PM       PATIENT REFERRED TO:   Phillip Milian MD  [address]   [address]  [address]  [phone]    In 3 days      [facility] ED  [address] [facility]  [phone]    If symptoms worsen      DISCHARGE MEDICATIONS:     New Prescriptions    FAMOTIDINE (PEPCID) 20 MG TABLET    Take 1 tablet by mouth 2 times daily    ONDANSETRON (ZOFRAN ODT) 4 MG DISINTEGRATING TABLET    Take 1 tablet by mouth every 6 hours as needed for Nausea or Vomiting         (Please note that portions of this note were completed with a voice recognition program.  Efforts were made to edit the dictations but occasionally words are mis-transcribed.)    Guille Cano MD  Attending Emergency Physician         Guille Cano MD  09/11/21 [id number]

## 2021-09-13 LAB
CULTURE: NORMAL
EKG ATRIAL RATE: 80 BPM
EKG P AXIS: 63 DEGREES
EKG P-R INTERVAL: 160 MS
EKG Q-T INTERVAL: 356 MS
EKG QRS DURATION: 78 MS
EKG QTC CALCULATION (BAZETT): 410 MS
EKG R AXIS: 47 DEGREES
EKG T AXIS: 43 DEGREES
EKG VENTRICULAR RATE: 80 BPM
Lab: NORMAL
SPECIMEN DESCRIPTION: NORMAL

## 2021-09-20 ENCOUNTER — OFFICE VISIT (OUTPATIENT)
Dept: GASTROENTEROLOGY | Age: 51
End: 2021-09-20
Payer: MEDICAID

## 2021-09-20 VITALS — WEIGHT: 121.8 LBS | BODY MASS INDEX: 24.6 KG/M2

## 2021-09-20 DIAGNOSIS — K21.9 GASTROESOPHAGEAL REFLUX DISEASE, UNSPECIFIED WHETHER ESOPHAGITIS PRESENT: Primary | ICD-10-CM

## 2021-09-20 PROCEDURE — 4004F PT TOBACCO SCREEN RCVD TLK: CPT | Performed by: INTERNAL MEDICINE

## 2021-09-20 PROCEDURE — G8420 CALC BMI NORM PARAMETERS: HCPCS | Performed by: INTERNAL MEDICINE

## 2021-09-20 PROCEDURE — 3017F COLORECTAL CA SCREEN DOC REV: CPT | Performed by: INTERNAL MEDICINE

## 2021-09-20 PROCEDURE — 1111F DSCHRG MED/CURRENT MED MERGE: CPT | Performed by: INTERNAL MEDICINE

## 2021-09-20 PROCEDURE — G8427 DOCREV CUR MEDS BY ELIG CLIN: HCPCS | Performed by: INTERNAL MEDICINE

## 2021-09-20 PROCEDURE — 99214 OFFICE O/P EST MOD 30 MIN: CPT | Performed by: INTERNAL MEDICINE

## 2021-09-20 ASSESSMENT — ENCOUNTER SYMPTOMS
BACK PAIN: 0
ABDOMINAL DISTENTION: 0
CONSTIPATION: 0
ANAL BLEEDING: 0
VOMITING: 1
SINUS PRESSURE: 0
SORE THROAT: 0
NAUSEA: 1
CHOKING: 0
DIARRHEA: 0
TROUBLE SWALLOWING: 0
ALLERGIC/IMMUNOLOGIC NEGATIVE: 1
VOICE CHANGE: 0
BLOOD IN STOOL: 0
ABDOMINAL PAIN: 0
WHEEZING: 0
COUGH: 0
RECTAL PAIN: 0

## 2021-09-20 NOTE — PROGRESS NOTES
GI CLINIC FOLLOW UP    INTERVAL HISTORY:   No referring provider defined for this encounter. Chief Complaint   Patient presents with    Follow-Up from Hospital     She was at [facility] from the 13th - the 15th as she has some burning in her esophogus all the way to her stomach and  when released stated she needed and EGD. It was not don in the hospital.  Pain and irritation started on the 11th. She was at [facility] as well. Pepcid given at [facility]  before she was at [facility]. Still burning but not as bad as before. She was dehydrated as well. HISTORY OF PRESENT ILLNESS: Ms.Cynthia Cline Snellen Tebaga is a 46 y.o. female with nausea and vomiting. She had to go the emergency room. She reports burning in the esophagus. No blood in the stool or melena. No prior similar problems. She denies taking any NSAIDs. She was started on Pepcid. She feels some better. Past Medical,Family, and Social History reviewed and does not contribute to the patient presentingcondition. Patient's PMH/PSH,SH,PSYCH Hx, MEDs, ALLERGIES, and ROS were all reviewed and updated in the appropriate sections. PAST MEDICAL HISTORY:  Past Medical History:   Diagnosis Date    Anemia     Anxiety     Chronic right shoulder pain     Depression     Irregular heartbeat     Sciatica        Past Surgical History:   Procedure Laterality Date    BRAIN ANEURYSM SURGERY  12/2014    Coiling at [address] [facility]      x4     COLONOSCOPY  04/18/2019    COLONOSCOPY N/A 4/18/2019    COLORECTAL CANCER SCREENING, NOT HIGH RISK performed by Zack Bautista MD at [address] Right 2/20/2018    RIGHT SHOULDER ARTHROSCOPY ROTATOR CUFF LYSIS OF ADHESIONS performed by Mike Mcrae DO at [facility] [address] ARTHROSCOPY Right 02/20/2018    extensive lysis of adhesions; Open suture granuloma removal     SHOULDER SURGERY Right     x 3    TONSILLECTOMY         CURRENT MEDICATIONS:    Current Outpatient Medications:     famotidine (PEPCID) 20 MG tablet, Take 1 tablet by mouth 2 times daily, Disp: 20 tablet, Rfl: 0    ondansetron (ZOFRAN ODT) 4 MG disintegrating tablet, Take 1 tablet by mouth every 6 hours as needed for Nausea or Vomiting, Disp: 12 tablet, Rfl: 0    FLUoxetine (PROZAC) 20 MG capsule, take 1 capsule by mouth once daily, Disp: , Rfl:     fluticasone (FLONASE) 50 MCG/ACT nasal spray, 2 sprays by Nasal route daily, Disp: 1 Bottle, Rfl: 0    butalbital-acetaminophen-caffeine (FIORICET, ESGIC) -40 MG per tablet, Take 1 tablet by mouth every 4 hours as needed for Headaches (Patient not taking: Reported on 5/13/2021), Disp: 10 tablet, Rfl: 0    Elastic Bandages & Supports (WRIST BRACE DELUXE) MISC, Wear nightly right brace, Disp: 1 each, Rfl: 0    ipratropium (ATROVENT) 0.06 % nasal spray, 2 sprays by Each Nostril route 4 times daily (Patient not taking: Reported on 3/25/2021), Disp: 1 Bottle, Rfl: 3    diclofenac sodium (VOLTAREN) 1 % GEL, Apply 4 g topically 4 times daily, Disp: , Rfl:     oxyCODONE-acetaminophen (PERCOCET) 7.5-325 MG per tablet, Take 1 tablet by mouth 4 times daily. , Disp: , Rfl:     traZODone (DESYREL) 100 MG tablet, Take 100 mg by mouth daily, Disp: , Rfl:     pregabalin (LYRICA) 100 MG capsule, Take 100 mg by mouth 2 times daily. , Disp: , Rfl:     celecoxib (CELEBREX) 200 MG capsule, take 1 capsule by mouth once daily prn, Disp: , Rfl: 0    ALLERGIES:   No Known Allergies    FAMILY HISTORY:       Problem Relation Age of Onset    Asthma Mother     Cancer Father         throat         SOCIAL HISTORY:   Social History     Socioeconomic History    Marital status:      Spouse name: Not on file    Number of children: Not on file    Years of education: Not on file    Highest education level: Not on file   Occupational History    Not on file   Tobacco Use    Smoking status: Current Every Day Smoker     Packs/day: 0.50 Years: 12.00     Pack years: 6.00     Types: Cigarettes    Smokeless tobacco: Never Used    Tobacco comment: pt smokes about 4 ciggs daily   Vaping Use    Vaping Use: Never used   Substance and Sexual Activity    Alcohol use: No    Drug use: No    Sexual activity: Not on file   Other Topics Concern    Not on file   Social History Narrative    Not on file     Social Determinants of Health     Financial Resource Strain:     Difficulty of Paying Living Expenses:    Food Insecurity:     Worried About Running Out of Food in the Last Year:     Ran Out of Food in the Last Year:    Transportation Needs:     Lack of Transportation (Medical):  Lack of Transportation (Non-Medical):    Physical Activity:     Days of Exercise per Week:     Minutes of Exercise per Session:    Stress:     Feeling of Stress :    Social Connections:     Frequency of Communication with Friends and Family:     Frequency of Social Gatherings with Friends and Family:     Attends Advent Services:     Active Member of Clubs or Organizations:     Attends Club or Organization Meetings:     Marital Status:    Intimate Partner Violence:     Fear of Current or Ex-Partner:     Emotionally Abused:     Physically Abused:     Sexually Abused:        REVIEW OF SYSTEMS: A 12-point review of systemswas obtained and pertinent positives and negatives were enumerated above in the history of present illness. All other reviewed systems / symptoms were negative. Review of Systems   Constitutional: Positive for appetite change and fatigue. Negative for unexpected weight change. HENT: Negative for dental problem, postnasal drip, sinus pressure, sore throat, trouble swallowing and voice change. Eyes: Positive for visual disturbance. Respiratory: Negative for cough, choking and wheezing. Cardiovascular: Negative for chest pain, palpitations and leg swelling. Gastrointestinal: Positive for nausea and vomiting.  Negative for abdominal distention, abdominal pain, anal bleeding, blood in stool, constipation, diarrhea and rectal pain. Endocrine: Negative. Genitourinary: Negative. Negative for difficulty urinating. Musculoskeletal: Negative for arthralgias, back pain, gait problem and myalgias. Allergic/Immunologic: Negative. Negative for environmental allergies and food allergies. Neurological: Positive for weakness and light-headedness. Negative for dizziness, numbness and headaches. Hematological: Does not bruise/bleed easily. Psychiatric/Behavioral: Positive for sleep disturbance. The patient is nervous/anxious. LABORATORY DATA: Reviewed  Lab Results   Component Value Date    WBC 17.2 (H) 09/11/2021    HGB 13.1 09/11/2021    HCT 41.5 09/11/2021    MCV 85.6 09/11/2021     09/11/2021     09/11/2021    K 4.1 09/11/2021     09/11/2021    CO2 23 09/11/2021    BUN 17 09/11/2021    CREATININE 0.93 (H) 09/11/2021    LABALBU 4.7 09/11/2021    BILITOT 0.33 09/11/2021    ALKPHOS 128 (H) 09/11/2021    AST 32 (H) 09/11/2021    ALT 25 09/11/2021    INR 1.1 03/20/2019         Lab Results   Component Value Date    RBC 4.85 09/11/2021    HGB 13.1 09/11/2021    MCV 85.6 09/11/2021    MCH 27.0 09/11/2021    MCHC 31.6 09/11/2021    RDW 13.4 09/11/2021    MPV 9.6 09/11/2021    BASOPCT 0 09/11/2021    LYMPHSABS 1.48 09/11/2021    MONOSABS 0.58 09/11/2021    NEUTROABS 14.99 (H) 09/11/2021    EOSABS <0.03 09/11/2021    BASOSABS 0.03 09/11/2021         DIAGNOSTIC TESTING:     CT ABDOMEN PELVIS W IV CONTRAST Additional Contrast? None    Result Date: 9/11/2021  EXAMINATION: CT OF THE ABDOMEN AND PELVIS WITH CONTRAST 9/11/2021 10:01 pm TECHNIQUE: CT of the abdomen and pelvis was performed with the administration of intravenous contrast. Multiplanar reformatted images are provided for review.  Dose modulation, iterative reconstruction, and/or weight based adjustment of the mA/kV was utilized to reduce the radiation dose to as low as reasonably achievable. COMPARISON: 2019 HISTORY: ORDERING SYSTEM PROVIDED HISTORY: abd pain, vomiting TECHNOLOGIST PROVIDED HISTORY: abd pain, vomiting Decision Support Exception - unselect if not a suspected or confirmed emergency medical condition->Emergency Medical Condition (MA) Reason for Exam: abd pain, vomiting, Hx: , colonoscopy Acuity: Acute Type of Exam: Initial FINDINGS: Lower Chest: The lung bases are clear and the heart size is normal.  There is a small hiatal hernia. Organs: The liver, spleen, pancreas, adrenal glands and kidneys are normal. Gallbladder is partially contracted. No calcified gallstones. GI/Bowel: The appendix is normal (coronal image 20). Small hiatal hernia. No bowel obstruction. Unopacified bowel loops are otherwise unremarkable. Pelvis: There is a heterogeneous mildly lobulated fibroid uterus. Urinary bladder was not distended. Bladder wall thickening versus underdistention. Peritoneum/Retroperitoneum: There is no adenopathy, free air or free fluid. Bones/Soft Tissues: No acute bone or soft tissue abnormality. There is a moderate thoracolumbar levoscoliosis. Urinary bladder wall thickening versus underdistention. Correlate clinically for possible cystitis. No other acute finding in the abdomen or pelvis. Small hiatal hernia. Fibroid uterus. Moderate thoracolumbar levoscoliosis. XR CHEST PORTABLE    Result Date: 2021  EXAMINATION: ONE XRAY VIEW OF THE CHEST 2021 8:54 pm COMPARISON: 2020 and 2021 HISTORY: ORDERING SYSTEM PROVIDED HISTORY: CP, vomiting TECHNOLOGIST PROVIDED HISTORY: CP, vomiting Reason for Exam: chest pain Acuity: Acute Type of Exam: Initial Additional signs and symptoms: SOB, vomiting FINDINGS: Unchanged S-shaped thoracolumbar scoliosis. Heart size and configuration are normal and the lungs are clear. There is no pneumothorax or pleural fluid. No acute bone finding. No acute cardiopulmonary disease. PHYSICAL EXAMINATION: Vital signs reviewed per the nursing documentation. LMP 10/21/2020   There is no height or weight on file to calculate BMI. Physical Exam      I personally reviewed the nurse's notes and documentation and I agree with her notes. General: alert, appears stated age and cooperative Psych: Normal. and Alert and oriented, appropriate affect. . Normal affect. Mentation normal  HEENT: PERRLA. Clear conjunctivae and sclerae. Moist oral mucosae, no lesions or ulcers. The neck is supple, without lymphadenopathy or jugular venous distension. No masses. Normal thyroid. Cardiovascular: S1 S2 RRR no rubs or murmurs. Pulmonary: clear BL. No accessory muscle usage. Abdominal Exam: Soft, NT ND, no hepato or spleno megaly, +BS, no ascites. IMPRESSION: Ms. Hai Norwood is a 46 y.o. female with nausea and vomiting. GERD. Continue Pepcid twice a day. Plan for EGD. Thank you for allowing me to participate in the care of Ms. Hai Norwood. For any further questions please do not hesitate to contact me. I have reviewed and agree with the ROS entered by the MA/LPN. Note is dictated utilizing voice recognition software. Unfortunately this leads to occasional typographical errors. Please contact our office if you have any questions.     Pippa Hays MD  [facility]  O: #[phone]

## 2021-09-23 ENCOUNTER — HOSPITAL ENCOUNTER (OUTPATIENT)
Dept: PREADMISSION TESTING | Age: 51
Discharge: HOME | End: 2021-09-27
Payer: MEDICAID

## 2021-09-23 VITALS — WEIGHT: 120 LBS | BODY MASS INDEX: 24.19 KG/M2 | HEIGHT: 59 IN

## 2021-09-23 NOTE — PROGRESS NOTES
Pre-op Instructions For Out-Patient Endoscopy Surgery    Medication Instructions:  · Please stop herbs and any supplements now (includes vitamins and minerals). · Please contact your surgeon and prescribing physician for pre-op instructions for any blood thinners. Celebrex    · Please take the following medications the morning of your surgery with a sip of water:    None    Surgery Instructions:  1. After midnight before surgery:  Do not eat or drink anything, including water, mints, gum, and hard candy. You may brush your teeth without swallowing. No smoking, chewing tobacco, or street drugs. 2. Please shower or bathe before surgery. 3. Please do not wear any cologne, lotion, powder, jewelry, piercings, perfume, makeup, nail polish, hair accessories, or hair spray on the day of surgery. Wear loose comfortable clothing. 4. Leave your valuables at home. Bring a storage case for any glasses/contacts. 5. An adult who is responsible for you MUST drive you home and should be with you for the first 24 hours after surgery. The Day of Surgery:  · Arrive at [facility] AT [facility] Surgery Entrance at the time directed by your surgeon and check in at the desk. · If you have a living will or healthcare power of , please bring a copy. · You will be taken to the pre-op holding area where you will be prepared for surgery. A physical assessment will be performed by a nurse practitioner or house officer. Your IV will be started and you will meet your anesthesiologist.    · When you go to surgery, your family will be directed to the surgical waiting room, where the doctor should speak with them after your surgery. · After surgery, you will be taken to the recovery room then when you are awake and stable you will go to the short stay unit for preparation to be discharged. Only your one designated person is allowed to come to short stay for your discharge.

## 2021-09-26 ENCOUNTER — HOSPITAL ENCOUNTER (OUTPATIENT)
Dept: LAB | Age: 51
Setting detail: SPECIMEN
Discharge: HOME | End: 2021-09-26
Payer: MEDICAID

## 2021-09-26 PROCEDURE — U0005 INFEC AGEN DETEC AMPLI PROBE: HCPCS

## 2021-09-26 PROCEDURE — U0003 INFECTIOUS AGENT DETECTION BY NUCLEIC ACID (DNA OR RNA); SEVERE ACUTE RESPIRATORY SYNDROME CORONAVIRUS 2 (SARS-COV-2) (CORONAVIRUS DISEASE [COVID-19]), AMPLIFIED PROBE TECHNIQUE, MAKING USE OF HIGH THROUGHPUT TECHNOLOGIES AS DESCRIBED BY CMS-2020-01-R: HCPCS

## 2021-09-27 LAB
SARS-COV-2: NORMAL
SARS-COV-2: NOT DETECTED
SOURCE: NORMAL

## 2021-09-28 ENCOUNTER — ANESTHESIA EVENT (OUTPATIENT)
Dept: ENDOSCOPY | Age: 51
End: 2021-09-28
Payer: MEDICAID

## 2021-09-29 ENCOUNTER — ANESTHESIA (OUTPATIENT)
Dept: ENDOSCOPY | Age: 51
End: 2021-09-29
Payer: MEDICAID

## 2021-09-29 ENCOUNTER — HOSPITAL ENCOUNTER (OUTPATIENT)
Age: 51
Setting detail: OUTPATIENT SURGERY
Discharge: HOME | End: 2021-09-29
Attending: INTERNAL MEDICINE | Admitting: INTERNAL MEDICINE
Payer: MEDICAID

## 2021-09-29 VITALS — SYSTOLIC BLOOD PRESSURE: 113 MMHG | DIASTOLIC BLOOD PRESSURE: 64 MMHG | OXYGEN SATURATION: 100 %

## 2021-09-29 VITALS
HEART RATE: 60 BPM | RESPIRATION RATE: 18 BRPM | OXYGEN SATURATION: 100 % | WEIGHT: 120 LBS | SYSTOLIC BLOOD PRESSURE: 127 MMHG | DIASTOLIC BLOOD PRESSURE: 70 MMHG | TEMPERATURE: 97.1 F | HEIGHT: 59 IN | BODY MASS INDEX: 24.19 KG/M2

## 2021-09-29 PROCEDURE — 7100000030 HC ASPR PHASE II RECOVERY - FIRST 15 MIN: Performed by: INTERNAL MEDICINE

## 2021-09-29 PROCEDURE — 7100000001 HC PACU RECOVERY - ADDTL 15 MIN: Performed by: INTERNAL MEDICINE

## 2021-09-29 PROCEDURE — 3609012400 HC EGD TRANSORAL BIOPSY SINGLE/MULTIPLE: Performed by: INTERNAL MEDICINE

## 2021-09-29 PROCEDURE — 7100000000 HC PACU RECOVERY - FIRST 15 MIN: Performed by: INTERNAL MEDICINE

## 2021-09-29 PROCEDURE — 7100000010 HC PHASE II RECOVERY - FIRST 15 MIN: Performed by: INTERNAL MEDICINE

## 2021-09-29 PROCEDURE — 3700000000 HC ANESTHESIA ATTENDED CARE: Performed by: INTERNAL MEDICINE

## 2021-09-29 PROCEDURE — 2580000003 HC RX 258: Performed by: ANESTHESIOLOGY

## 2021-09-29 PROCEDURE — 2500000003 HC RX 250 WO HCPCS: Performed by: NURSE ANESTHETIST, CERTIFIED REGISTERED

## 2021-09-29 PROCEDURE — 88305 TISSUE EXAM BY PATHOLOGIST: CPT

## 2021-09-29 PROCEDURE — 43239 EGD BIOPSY SINGLE/MULTIPLE: CPT | Performed by: INTERNAL MEDICINE

## 2021-09-29 PROCEDURE — 3700000001 HC ADD 15 MINUTES (ANESTHESIA): Performed by: INTERNAL MEDICINE

## 2021-09-29 PROCEDURE — 7100000011 HC PHASE II RECOVERY - ADDTL 15 MIN: Performed by: INTERNAL MEDICINE

## 2021-09-29 PROCEDURE — 7100000031 HC ASPR PHASE II RECOVERY - ADDTL 15 MIN: Performed by: INTERNAL MEDICINE

## 2021-09-29 PROCEDURE — 6360000002 HC RX W HCPCS: Performed by: NURSE ANESTHETIST, CERTIFIED REGISTERED

## 2021-09-29 PROCEDURE — 2709999900 HC NON-CHARGEABLE SUPPLY: Performed by: INTERNAL MEDICINE

## 2021-09-29 RX ORDER — PROPOFOL 10 MG/ML
INJECTION, EMULSION INTRAVENOUS PRN
Status: DISCONTINUED | OUTPATIENT
Start: 2021-09-29 | End: 2021-09-29 | Stop reason: SDUPTHER

## 2021-09-29 RX ORDER — LIDOCAINE HYDROCHLORIDE 10 MG/ML
1 INJECTION, SOLUTION EPIDURAL; INFILTRATION; INTRACAUDAL; PERINEURAL
Status: DISCONTINUED | OUTPATIENT
Start: 2021-09-29 | End: 2021-09-29 | Stop reason: HOSPADM

## 2021-09-29 RX ORDER — LIDOCAINE HYDROCHLORIDE 10 MG/ML
INJECTION, SOLUTION EPIDURAL; INFILTRATION; INTRACAUDAL; PERINEURAL PRN
Status: DISCONTINUED | OUTPATIENT
Start: 2021-09-29 | End: 2021-09-29 | Stop reason: SDUPTHER

## 2021-09-29 RX ORDER — SODIUM CHLORIDE 9 MG/ML
25 INJECTION, SOLUTION INTRAVENOUS PRN
Status: DISCONTINUED | OUTPATIENT
Start: 2021-09-29 | End: 2021-09-29 | Stop reason: HOSPADM

## 2021-09-29 RX ORDER — SODIUM CHLORIDE, SODIUM LACTATE, POTASSIUM CHLORIDE, CALCIUM CHLORIDE 600; 310; 30; 20 MG/100ML; MG/100ML; MG/100ML; MG/100ML
INJECTION, SOLUTION INTRAVENOUS CONTINUOUS
Status: DISCONTINUED | OUTPATIENT
Start: 2021-09-29 | End: 2021-09-29 | Stop reason: HOSPADM

## 2021-09-29 RX ORDER — SODIUM CHLORIDE 0.9 % (FLUSH) 0.9 %
10 SYRINGE (ML) INJECTION PRN
Status: DISCONTINUED | OUTPATIENT
Start: 2021-09-29 | End: 2021-09-29 | Stop reason: HOSPADM

## 2021-09-29 RX ADMIN — SODIUM CHLORIDE, POTASSIUM CHLORIDE, SODIUM LACTATE AND CALCIUM CHLORIDE: 600; 310; 30; 20 INJECTION, SOLUTION INTRAVENOUS at 08:59

## 2021-09-29 RX ADMIN — PROPOFOL 200 MG: 10 INJECTION, EMULSION INTRAVENOUS at 09:49

## 2021-09-29 RX ADMIN — LIDOCAINE HYDROCHLORIDE 50 MG: 10 INJECTION, SOLUTION EPIDURAL; INFILTRATION; INTRACAUDAL; PERINEURAL at 09:49

## 2021-09-29 ASSESSMENT — PULMONARY FUNCTION TESTS
PIF_VALUE: 0
PIF_VALUE: 0
PIF_VALUE: 1
PIF_VALUE: 0
PIF_VALUE: 1
PIF_VALUE: 0

## 2021-09-29 ASSESSMENT — ENCOUNTER SYMPTOMS
RESPIRATORY NEGATIVE: 1
STRIDOR: 0
EYES NEGATIVE: 1

## 2021-09-29 ASSESSMENT — PAIN SCALES - GENERAL: PAINLEVEL_OUTOF10: 0

## 2021-09-29 ASSESSMENT — PAIN - FUNCTIONAL ASSESSMENT: PAIN_FUNCTIONAL_ASSESSMENT: 0-10

## 2021-09-29 ASSESSMENT — LIFESTYLE VARIABLES: SMOKING_STATUS: 1

## 2021-09-29 NOTE — ANESTHESIA POSTPROCEDURE EVALUATION
POST- ANESTHESIA EVALUATION       Pt Name: Maricruz Valdivia  MRN: [id number]  YOB: 1970  Date of evaluation: 9/29/2021  Time:  12:17 PM      /70   Pulse 60   Temp 97.1 °F (36.2 °C) (Infrared)   Resp 18   Ht 4' 11\" (1.499 m)   Wt 120 lb (54.4 kg)   LMP 10/21/2020   SpO2 100%   BMI 24.24 kg/m²      Consciousness Level  Awake  Cardiopulmonary Status  Stable  Pain Adequately Treated YES  Nausea / Vomiting  NO  Adequate Hydration  YES  Anesthesia Related Complications NONE      Electronically signed by Anabella Meyer MD on 9/29/2021 at 12:17 PM       Department of Anesthesiology  Postprocedure Note    Patient: Maricruz Valdivia  MRN: [id number]  YOB: 1970  Date of evaluation: 9/29/2021  Time:  12:17 PM     Procedure Summary     Date: 09/29/21 Room / Location: [address]    Anesthesia Start: 0922 Anesthesia Stop: 3632    Procedure: EGD BIOPSY (N/A Esophagus) Diagnosis: (GERD)    Surgeons: Nayan Fung MD Responsible Provider: Anabella Meyer MD    Anesthesia Type: general ASA Status: 3          Anesthesia Type: general    Lana Phase I: Lana Score: 9    Lana Phase II: Lana Score: 10    Last vitals: Reviewed and per EMR flowsheets.        Anesthesia Post Evaluation
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
## 2021-09-29 NOTE — OP NOTE
[facility]     PROCEDURE DATE: 09/29/21    REFERRING PHYSICIAN: No ref. provider found     PRIMARY CARE PROVIDER: Lety Saunders MD    ATTENDING PHYSICIAN: Dk Garcia MD     HISTORY: Ms. Rajinder Castro is a 46 y.o. female who presents to the [place] [address] Endoscopy unit for upper endoscopy. The patient's clinical history is remarkable for epigastric pain. She is currently medically stable and appropriate for the planned procedure. PREOPERATIVE DIAGNOSIS: epigastric pain. PROCEDURES:   1) Transoral Upper Endoscopy, biopsy. POSTOPERATIVE DIAGNOSIS:   Mild gastritis  Small sliding hiatal hernia  Possible 3 cm Duarte's mucosa     SPECIMENS: Biopsy    MEDICATIONS:   MAC per anesthesia    EBL: minimal    INSTRUMENT: Olympus GIF-H190 flexible Gastroscope. PREPARATION: The nature and character of the procedure as well as risks, benefits, and alternatives were discussed with the patient and informed consent was obtained. Complications were said to include, but were not limited to: medication allergy, medication reaction, cardiovascular and respiratory problems, bleeding, perforation, infection, and/or missed diagnosis. Following arrival in the endoscopy room, the patient was placed in the left lateral decubitus position and final time-out accomplished in the presence of the nursing staff. Baseline vital signs were obtained and reviewed, and IV sedation was subsequently initiated. FINDINGS:   Esophagus: The esophagus was inspected to the Z-line. The endoscopic exam showed small sliding hiatal hernia. Possible 3 cm of circumferential Duarte's mucosa. Biopsies were obtained. Stomach: The stomach was inspected in both forward and retroflex fashion and was appropriately distensible. The cardia, fundus, incisura, antrum and pylorus were identified via direct visualization. The endoscopic exam showed mild linear erythema in distal stomach.   Biopsies were obtained to exclude underlying H. pylori infection. Duodenum: The proximal small bowel was inspected through the bulb, sweep, and second portion of the duodenum. The endoscopic exam showed no pathology. RECOMMENDATIONS:   1) Follow up with referring provider, as previously scheduled. 2) Follow up on pathology report. 3) Continue current treatment. 4) Follow-up with me in the office in 1 month.          Monita Justice MD Sheryn Sicard

## 2021-09-30 LAB — SURGICAL PATHOLOGY REPORT: NORMAL

## 2021-10-08 ENCOUNTER — APPOINTMENT (OUTPATIENT)
Dept: CT IMAGING | Age: 51
End: 2021-10-08
Payer: MEDICAID

## 2021-10-08 ENCOUNTER — HOSPITAL ENCOUNTER (EMERGENCY)
Age: 51
Discharge: LEFT BEFORE DISCHARGE | End: 2021-10-08
Attending: EMERGENCY MEDICINE
Payer: MEDICAID

## 2021-10-08 ENCOUNTER — NURSE TRIAGE (OUTPATIENT)
Dept: OTHER | Facility: CLINIC | Age: 51
End: 2021-10-08

## 2021-10-08 ENCOUNTER — APPOINTMENT (OUTPATIENT)
Dept: GENERAL RADIOLOGY | Age: 51
End: 2021-10-08
Payer: MEDICAID

## 2021-10-08 VITALS
HEART RATE: 67 BPM | SYSTOLIC BLOOD PRESSURE: 139 MMHG | WEIGHT: 122 LBS | HEIGHT: 59 IN | DIASTOLIC BLOOD PRESSURE: 76 MMHG | BODY MASS INDEX: 24.6 KG/M2 | RESPIRATION RATE: 16 BRPM | OXYGEN SATURATION: 98 % | TEMPERATURE: 98.1 F

## 2021-10-08 DIAGNOSIS — R10.30 LOWER ABDOMINAL PAIN: Primary | ICD-10-CM

## 2021-10-08 LAB
ABSOLUTE EOS #: 0.04 K/UL (ref 0–0.44)
ABSOLUTE IMMATURE GRANULOCYTE: 0.03 K/UL (ref 0–0.3)
ABSOLUTE LYMPH #: 1.99 K/UL (ref 1.1–3.7)
ABSOLUTE MONO #: 0.39 K/UL (ref 0.1–1.2)
ALBUMIN SERPL-MCNC: 4.3 G/DL (ref 3.5–5.2)
ALBUMIN/GLOBULIN RATIO: 1.5 (ref 1–2.5)
ALP BLD-CCNC: 107 U/L (ref 35–104)
ALT SERPL-CCNC: 12 U/L (ref 5–33)
ANION GAP SERPL CALCULATED.3IONS-SCNC: 14 MMOL/L (ref 9–17)
AST SERPL-CCNC: 23 U/L
BASOPHILS # BLD: 1 % (ref 0–2)
BASOPHILS ABSOLUTE: 0.03 K/UL (ref 0–0.2)
BILIRUB SERPL-MCNC: 0.21 MG/DL (ref 0.3–1.2)
BILIRUBIN URINE: NEGATIVE
BUN BLDV-MCNC: 9 MG/DL (ref 6–20)
BUN/CREAT BLD: ABNORMAL (ref 9–20)
CALCIUM SERPL-MCNC: 9.2 MG/DL (ref 8.6–10.4)
CHLORIDE BLD-SCNC: 100 MMOL/L (ref 98–107)
CO2: 23 MMOL/L (ref 20–31)
COLOR: YELLOW
COMMENT UA: NORMAL
CREAT SERPL-MCNC: 0.72 MG/DL (ref 0.5–0.9)
DIFFERENTIAL TYPE: ABNORMAL
DIRECT EXAM: ABNORMAL
EOSINOPHILS RELATIVE PERCENT: 1 % (ref 1–4)
GFR AFRICAN AMERICAN: >60 ML/MIN
GFR NON-AFRICAN AMERICAN: >60 ML/MIN
GFR SERPL CREATININE-BSD FRML MDRD: ABNORMAL ML/MIN/{1.73_M2}
GFR SERPL CREATININE-BSD FRML MDRD: ABNORMAL ML/MIN/{1.73_M2}
GLUCOSE BLD-MCNC: 87 MG/DL (ref 70–99)
GLUCOSE URINE: NEGATIVE
HCT VFR BLD CALC: 41.1 % (ref 36.3–47.1)
HEMOGLOBIN: 12.8 G/DL (ref 11.9–15.1)
IMMATURE GRANULOCYTES: 1 %
KETONES, URINE: NEGATIVE
LEUKOCYTE ESTERASE, URINE: NEGATIVE
LIPASE: 19 U/L (ref 13–60)
LYMPHOCYTES # BLD: 35 % (ref 24–43)
Lab: ABNORMAL
MCH RBC QN AUTO: 27.5 PG (ref 25.2–33.5)
MCHC RBC AUTO-ENTMCNC: 31.1 G/DL (ref 28.4–34.8)
MCV RBC AUTO: 88.4 FL (ref 82.6–102.9)
MONOCYTES # BLD: 7 % (ref 3–12)
NITRITE, URINE: NEGATIVE
NRBC AUTOMATED: 0 PER 100 WBC
PDW BLD-RTO: 14.7 % (ref 11.8–14.4)
PH UA: 6.5 (ref 5–8)
PLATELET # BLD: 281 K/UL (ref 138–453)
PLATELET ESTIMATE: ABNORMAL
PMV BLD AUTO: 9.7 FL (ref 8.1–13.5)
POTASSIUM SERPL-SCNC: 4 MMOL/L (ref 3.7–5.3)
PROTEIN UA: NEGATIVE
RBC # BLD: 4.65 M/UL (ref 3.95–5.11)
RBC # BLD: ABNORMAL 10*6/UL
SEG NEUTROPHILS: 55 % (ref 36–65)
SEGMENTED NEUTROPHILS ABSOLUTE COUNT: 3.14 K/UL (ref 1.5–8.1)
SODIUM BLD-SCNC: 137 MMOL/L (ref 135–144)
SPECIFIC GRAVITY UA: 1.02 (ref 1–1.03)
SPECIMEN DESCRIPTION: ABNORMAL
TOTAL PROTEIN: 7.2 G/DL (ref 6.4–8.3)
TROPONIN INTERP: NORMAL
TROPONIN T: NORMAL NG/ML
TROPONIN, HIGH SENSITIVITY: <6 NG/L (ref 0–14)
TURBIDITY: CLEAR
URINE HGB: NEGATIVE
UROBILINOGEN, URINE: NORMAL
WBC # BLD: 5.6 K/UL (ref 3.5–11.3)
WBC # BLD: ABNORMAL 10*3/UL

## 2021-10-08 PROCEDURE — 74022 RADEX COMPL AQT ABD SERIES: CPT

## 2021-10-08 PROCEDURE — 93005 ELECTROCARDIOGRAM TRACING: CPT

## 2021-10-08 PROCEDURE — 87510 GARDNER VAG DNA DIR PROBE: CPT

## 2021-10-08 PROCEDURE — 6370000000 HC RX 637 (ALT 250 FOR IP): Performed by: STUDENT IN AN ORGANIZED HEALTH CARE EDUCATION/TRAINING PROGRAM

## 2021-10-08 PROCEDURE — 87660 TRICHOMONAS VAGIN DIR PROBE: CPT

## 2021-10-08 PROCEDURE — 81003 URINALYSIS AUTO W/O SCOPE: CPT

## 2021-10-08 PROCEDURE — 80053 COMPREHEN METABOLIC PANEL: CPT

## 2021-10-08 PROCEDURE — 84484 ASSAY OF TROPONIN QUANT: CPT

## 2021-10-08 PROCEDURE — 74177 CT ABD & PELVIS W/CONTRAST: CPT

## 2021-10-08 PROCEDURE — 83690 ASSAY OF LIPASE: CPT

## 2021-10-08 PROCEDURE — 6360000004 HC RX CONTRAST MEDICATION: Performed by: STUDENT IN AN ORGANIZED HEALTH CARE EDUCATION/TRAINING PROGRAM

## 2021-10-08 PROCEDURE — 87591 N.GONORRHOEAE DNA AMP PROB: CPT

## 2021-10-08 PROCEDURE — 85025 COMPLETE CBC W/AUTO DIFF WBC: CPT

## 2021-10-08 PROCEDURE — 2580000003 HC RX 258: Performed by: STUDENT IN AN ORGANIZED HEALTH CARE EDUCATION/TRAINING PROGRAM

## 2021-10-08 PROCEDURE — 99283 EMERGENCY DEPT VISIT LOW MDM: CPT

## 2021-10-08 PROCEDURE — 87491 CHLMYD TRACH DNA AMP PROBE: CPT

## 2021-10-08 PROCEDURE — 87480 CANDIDA DNA DIR PROBE: CPT

## 2021-10-08 RX ORDER — DICYCLOMINE HYDROCHLORIDE 10 MG/1
10 CAPSULE ORAL ONCE
Status: COMPLETED | OUTPATIENT
Start: 2021-10-08 | End: 2021-10-08

## 2021-10-08 RX ORDER — SENNOSIDES 8.6 MG
1 TABLET ORAL DAILY
Qty: 30 TABLET | Refills: 0 | Status: SHIPPED | OUTPATIENT
Start: 2021-10-08 | End: 2021-10-19 | Stop reason: ALTCHOICE

## 2021-10-08 RX ORDER — 0.9 % SODIUM CHLORIDE 0.9 %
1000 INTRAVENOUS SOLUTION INTRAVENOUS ONCE
Status: COMPLETED | OUTPATIENT
Start: 2021-10-08 | End: 2021-10-08

## 2021-10-08 RX ADMIN — DICYCLOMINE HYDROCHLORIDE 10 MG: 10 CAPSULE ORAL at 17:18

## 2021-10-08 RX ADMIN — IOPAMIDOL 75 ML: 755 INJECTION, SOLUTION INTRAVENOUS at 19:45

## 2021-10-08 RX ADMIN — SODIUM CHLORIDE 1000 ML: 9 INJECTION, SOLUTION INTRAVENOUS at 17:17

## 2021-10-08 ASSESSMENT — PAIN DESCRIPTION - LOCATION: LOCATION: ABDOMEN

## 2021-10-08 ASSESSMENT — ENCOUNTER SYMPTOMS
VOMITING: 0
ABDOMINAL PAIN: 1
COUGH: 0
SHORTNESS OF BREATH: 0
COLOR CHANGE: 0
NAUSEA: 0

## 2021-10-08 ASSESSMENT — PAIN SCALES - GENERAL: PAINLEVEL_OUTOF10: 3

## 2021-10-08 NOTE — ED PROVIDER NOTES
[facility]  Emergency Department  Faculty Attestation     I performed a history and physical examination of the patient and discussed management with the resident. I reviewed the residents note and agree with the documented findings and plan of care. Any areas of disagreement are noted on the chart. I was personally present for the key portions of any procedures. I have documented in the chart those procedures where I was not present during the key portions. I have reviewed the emergency nurses triage note. I agree with the chief complaint, past medical history, past surgical history, allergies, medications, social and family history as documented unless otherwise noted below. For Physician Assistant/ Nurse Practitioner cases/documentation I have personally evaluated this patient and have completed at least one if not all key elements of the E/M (history, physical exam, and MDM). Additional findings are as noted.       Primary Care Physician:  Lynn Moura MD    Screenings:  [unfilled]    CHIEF COMPLAINT       Chief Complaint   Patient presents with    Abdominal Pain    Dysuria       RECENT VITALS:   Temp: 98.1 °F (36.7 °C),  Pulse: 67, Resp: 16, BP: 139/76    LABS:  Labs Reviewed   VAGINITIS DNA PROBE   C.TRACHOMATIS N.GONORRHOEAE DNA   CBC WITH AUTO DIFFERENTIAL   COMPREHENSIVE METABOLIC PANEL   LIPASE   URINE RT REFLEX TO CULTURE   TROPONIN       Radiology  XR ACUTE ABD SERIES CHEST 1 VW    (Results Pending)       EKG Interpretation    Interpreted by me    Rhythm: normal sinus   Rate: normal  Axis: normal  Ectopy: none  Conduction: normal  ST Segments: no acute change  T Waves: Diffuse flattening, inversion inferiorly  Q Waves: Septal, poor R wave progression anteriorly    Clinical Impression: Possible old septal MI, nonspecific T wave flattening and inversion    Attending Physician Additional  Notes    Patient has lower abdominal pain, points to the right pelvis

## 2021-10-08 NOTE — ED NOTES
The following labs were labeled with patient stickers & tubed to lab;    []Lavender   []On Ice  []Blue  []Green/ Yellow  []Green/ Black []On Ice  []Pink  []Red  []Yellow    []COVID-19 Swab []Rapid    [x]Urine Sample  [x]Pelvic Cultures    []Blood Cultures       Layo Godoy LPN  71/29/18 [id number]

## 2021-10-08 NOTE — ED NOTES
Assisted Dr. Ardie Skiff with pelvic exam, cultures obtained, pt tolerated exam well.       Thaddeus Mitchell LPN  96/00/86 1463

## 2021-10-08 NOTE — TELEPHONE ENCOUNTER
Received call from [facility] at [facility] [employer]. Brief description of triage: Severe abdominal pain    Triage indicates for patient to go to ED now. Care advice provided, patient verbalizes understanding; denies any other questions or concerns; instructed to call back for any new or worsening symptoms. Attention Provider: Thank you for allowing me to participate in the care of your patient. The patient was connected to triage in response to information provided to the ECC/PSC. Please do not respond through this encounter as the response is not directed to a shared pool. Reason for Disposition   SEVERE abdominal pain (e.g., excruciating)    Answer Assessment - Initial Assessment Questions  1. LOCATION: \"Where does it hurt? \"       Lower right abdomen    2. RADIATION: \"Does the pain shoot anywhere else? \" (e.g., chest, back)      Flank pain    3. ONSET: \"When did the pain begin? \" (e.g., minutes, hours or days ago)       Last week    4. SUDDEN: \"Gradual or sudden onset? \"      Sudden    5. PATTERN \"Does the pain come and go, or is it constant? \"     - If constant: \"Is it getting better, staying the same, or worsening? \"       (Note: Constant means the pain never goes away completely; most serious pain is constant and it progresses)      - If intermittent: \"How long does it last?\" \"Do you have pain now? \"      (Note: Intermittent means the pain goes away completely between bouts)      Constant   but the intensity goes up and down    6. SEVERITY: \"How bad is the pain? \"  (e.g., Scale 1-10; mild, moderate, or severe)     - MILD (1-3): doesn't interfere with normal activities, abdomen soft and not tender to touch      - MODERATE (4-7): interferes with normal activities or awakens from sleep, tender to touch      - SEVERE (8-10): excruciating pain, doubled over, unable to do any normal activities        Now its a 3 mild, but goes up to 8-9 severe.      7. RECURRENT SYMPTOM: \"Have you ever had

## 2021-10-08 NOTE — ED NOTES
This patient was assessed by the doctor only. Nurse processed and completed the orders from this doctor ie labs, meds, and/or EKG.         Pati Horton LPN  30/90/05 [id number]

## 2021-10-09 NOTE — ED PROVIDER NOTES
Care of Arnulfo Linares was assumed from previous attending and is being seen for Abdominal Pain and Dysuria  . The patient's initial evaluation and plan have been discussed with the prior provider who initially evaluated the patient. Handoff taken on the following patient from prior Attending Physician:    [address]    I was available and discussed any additional care issues that arose and coordinated the management plans with the resident(s) caring for the patient during my duty period. Any areas of disagreement with residents documentation of care or procedures are noted on the chart. I was personally present for the key portions of any/all procedures during my duty period. I have documented in the chart those procedures where I was not present during the key portions. EMERGENCY DEPARTMENT COURSE / MEDICAL DECISION MAKING:       MEDICATIONS GIVEN:  Orders Placed This Encounter   Medications    0.9 % sodium chloride bolus    dicyclomine (BENTYL) capsule 10 mg    senna (SENOKOT) 8.6 MG TABS tablet     Sig: Take 1 tablet by mouth daily     Dispense:  30 tablet     Refill:  0    iopamidol (ISOVUE-370) 76 % injection 75 mL       LABS / RADIOLOGY:     Labs Reviewed   VAGINITIS DNA PROBE - Abnormal; Notable for the following components:       Result Value    Direct Exam POSITIVE for Gardnerella vaginalis.  (*)     All other components within normal limits   CBC WITH AUTO DIFFERENTIAL - Abnormal; Notable for the following components:    RDW 14.7 (*)     Immature Granulocytes 1 (*)     All other components within normal limits   COMPREHENSIVE METABOLIC PANEL - Abnormal; Notable for the following components:    Alkaline Phosphatase 107 (*)     Total Bilirubin 0.21 (*)     All other components within normal limits   C.TRACHOMATIS N.GONORRHOEAE DNA   LIPASE   URINE RT REFLEX TO CULTURE   TROPONIN       XR ACUTE ABD SERIES CHEST 1 VW    Result Date: 10/8/2021  EXAMINATION: TWO XRAY VIEWS OF THE ABDOMEN AND SINGLE  XRAY VIEW OF THE CHEST 10/8/2021 5:36 pm COMPARISON: 2008 HISTORY: ORDERING SYSTEM PROVIDED HISTORY: cp, eval stool burden TECHNOLOGIST PROVIDED HISTORY: cp, eval stool burden Reason for Exam: rt lower abd pain FINDINGS: Chest: Cardiomediastinal silhouette stable. No acute airspace disease. No pneumothorax. No pleural effusion. Abdomen: Gas seen in nondilated colon. Gas multiple small bowel loops. These are not significantly dilated. No extraluminal gas. Pelvic calcifications appear vascular. No acute abnormality in the chest Findings in the abdomen suggesting a diffuse ileus     CT ABDOMEN PELVIS W IV CONTRAST Additional Contrast? None    Result Date: 2021  EXAMINATION: CT OF THE ABDOMEN AND PELVIS WITH CONTRAST 2021 10:01 pm TECHNIQUE: CT of the abdomen and pelvis was performed with the administration of intravenous contrast. Multiplanar reformatted images are provided for review. Dose modulation, iterative reconstruction, and/or weight based adjustment of the mA/kV was utilized to reduce the radiation dose to as low as reasonably achievable. COMPARISON: 2019 HISTORY: ORDERING SYSTEM PROVIDED HISTORY: abd pain, vomiting TECHNOLOGIST PROVIDED HISTORY: abd pain, vomiting Decision Support Exception - unselect if not a suspected or confirmed emergency medical condition->Emergency Medical Condition (MA) Reason for Exam: abd pain, vomiting, Hx: , colonoscopy Acuity: Acute Type of Exam: Initial FINDINGS: Lower Chest: The lung bases are clear and the heart size is normal.  There is a small hiatal hernia. Organs: The liver, spleen, pancreas, adrenal glands and kidneys are normal. Gallbladder is partially contracted. No calcified gallstones. GI/Bowel: The appendix is normal (coronal image 20). Small hiatal hernia. No bowel obstruction. Unopacified bowel loops are otherwise unremarkable. Pelvis: There is a heterogeneous mildly lobulated fibroid uterus.   Urinary bladder was not distended. Bladder wall thickening versus underdistention. Peritoneum/Retroperitoneum: There is no adenopathy, free air or free fluid. Bones/Soft Tissues: No acute bone or soft tissue abnormality. There is a moderate thoracolumbar levoscoliosis. Urinary bladder wall thickening versus underdistention. Correlate clinically for possible cystitis. No other acute finding in the abdomen or pelvis. Small hiatal hernia. Fibroid uterus. Moderate thoracolumbar levoscoliosis. XR CHEST PORTABLE    Result Date: 9/11/2021  EXAMINATION: ONE XRAY VIEW OF THE CHEST 9/11/2021 8:54 pm COMPARISON: 11/23/2020 and 04/21/2021 HISTORY: ORDERING SYSTEM PROVIDED HISTORY: CP, vomiting TECHNOLOGIST PROVIDED HISTORY: CP, vomiting Reason for Exam: chest pain Acuity: Acute Type of Exam: Initial Additional signs and symptoms: SOB, vomiting FINDINGS: Unchanged S-shaped thoracolumbar scoliosis. Heart size and configuration are normal and the lungs are clear. There is no pneumothorax or pleural fluid. No acute bone finding. No acute cardiopulmonary disease. RECENT VITALS:     Temp: 98.1 °F (36.7 °C),  Pulse: 67, Resp: 16, BP: 139/76, SpO2: 98 %    This patient is a 46 y.o. Female with her abdominal pain, and some dysuria. Pending urinalysis. Possible imaging if no identifiable source of pain on labs.     OUTSTANDING TASKS / RECOMMENDATIONS:          Brooklyn Goode DO, DO  Attending Emergency Physician  [address]  [facility]        Mana Ignacio DO  10/08/21 2012

## 2021-10-09 NOTE — ED PROVIDER NOTES
[facility] ED  Emergency Department Encounter  Emergency Medicine Resident     Pt Name: Arcelia Weeks  MRN: [id number]  [place] 1970  Date of evaluation: 10/8/21  PCP:  Arline Power MD    [address] [place] [address]       Chief Complaint   Patient presents with    Abdominal Pain    Dysuria       HISTORY [facility]  (Location/Symptom, Timing/Onset, Context/Setting, Quality, Duration, Modifying Elby Salen.)      Arcelia Weeks is a 46 y.o. female past medical history edema, anxiety who presents with complaints of abdominal pain. Abdominal pain located in the right lower quadrant as well as suprapubic regions. Began 2 days ago however has worsened in nature. Patient states it feels like a cramping sensation. Patient also admits to some new vaginal discharge however denies dysuria. No polyuria. Patient also states she has been having right-sided nonradiating chest pain with the abdominal pain. No nausea or vomiting. Patient did have a recent EGD performed last month which demonstrated mild erythema but was negative for Helicobacter pylori, ulcers, other pathology. PAST MEDICAL / SURGICAL / SOCIAL / FAMILY HISTORY      has a past medical history of Anemia, Anxiety, Brain aneurysm, Chronic right shoulder pain, Depression, Gastrointestinal distress, Irregular heartbeat, Sciatica, and Vomiting. has a past surgical history that includes shoulder surgery (Right);  section; Brain aneurysm surgery (2014); Shoulder arthroscopy (Right, 2018); pr shldr arthroscop,surg,w/rotat cuff repr (Right, 2018); Tonsillectomy; Colonoscopy (2019); Colonoscopy (N/A, 2019); and Upper gastrointestinal endoscopy (N/A, 2021).     Social History     Socioeconomic History    Marital status:      Spouse name: Not on file    Number of children: Not on file    Years of education: Not on file    Highest education level: Not on file   Occupational History    Not on file   Tobacco Use    Smoking status: Current Every Day Smoker     Packs/day: 0.50     Years: 12.00     Pack years: 6.00     Types: Cigarettes    Smokeless tobacco: Never Used    Tobacco comment: pt smokes about 4 ciggs daily   Vaping Use    Vaping Use: Never used   Substance and Sexual Activity    Alcohol use: No    Drug use: No    Sexual activity: Not on file   Other Topics Concern    Not on file   Social History Narrative    Not on file     Social Determinants of Health     Financial Resource Strain:     Difficulty of Paying Living Expenses:    Food Insecurity:     Worried About Running Out of Food in the Last Year:     Ran Out of Food in the Last Year:    Transportation Needs:     Lack of Transportation (Medical):  Lack of Transportation (Non-Medical):    Physical Activity:     Days of Exercise per Week:     Minutes of Exercise per Session:    Stress:     Feeling of Stress :    Social Connections:     Frequency of Communication with Friends and Family:     Frequency of Social Gatherings with Friends and Family:     Attends [employer]oration[employer] Services:     Active Member of Clubs or Organizations:     Attends Club or Organization Meetings:     Marital Status:    Intimate Partner Violence:     Fear of Current or Ex-Partner:     Emotionally Abused:     Physically Abused:     Sexually Abused:        Family History   Problem Relation Age of Onset    Asthma Mother     Cancer Father         throat       Allergies:  Patient has no known allergies. Home Medications:  Prior to Admission medications    Medication Sig Start Date End Date Taking?  Authorizing Provider   senna (SENOKOT) 8.6 MG TABS tablet Take 1 tablet by mouth daily 10/8/21 11/7/21 Yes Jennifer Londono DO   famotidine (PEPCID) 20 MG tablet Take 1 tablet by mouth 2 times daily 9/11/21   Sol Olguin MD   ondansetron (ZOFRAN ODT) 4 MG disintegrating tablet Take 1 tablet by mouth every 6 hours as needed for Nausea or Vomiting 9/11/21 Deysi Hyde MD   FLUoxetine (PROZAC) 20 MG capsule take 1 capsule by mouth once daily 2/19/21   Historical Provider, MD   Elastic Bandages & Supports (WRIST BRACE DELUXE) [address] Wear nightly right brace 2/19/21   Marjorie Mace MD   diclofenac sodium (VOLTAREN) 1 % GEL Apply 4 g topically 4 times daily 9/15/20   Historical Provider, MD   oxyCODONE-acetaminophen (PERCOCET) 7.5-325 MG per tablet Take 1 tablet by mouth 4 times daily. 9/15/20   Historical Provider, MD   traZODone (DESYREL) 100 MG tablet Take 100 mg by mouth daily 9/16/20   Historical Provider, MD   pregabalin (LYRICA) 100 MG capsule Take 100 mg by mouth 2 times daily. Historical Provider, MD   celecoxib (CELEBREX) 200 MG capsule take 1 capsule by mouth once daily prn 4/4/17   Historical Provider, MD       REVIEW OF SYSTEMS    (2-9 systems for level 4, 10 or more for level 5)      Review of Systems   Constitutional: Negative for chills and fever. Respiratory: Negative for cough and shortness of breath. Cardiovascular: Positive for chest pain. Gastrointestinal: Positive for abdominal pain. Negative for nausea and vomiting. Genitourinary: Positive for vaginal discharge. Negative for dysuria. Musculoskeletal: Negative for myalgias. Skin: Negative for color change and rash. Neurological: Negative for weakness, numbness and headaches. PHYSICAL EXAM   (up to 7 for level 4, 8 or more for level 5)     INITIAL VITALS:    height is 4' 11\" (1.499 m) and weight is 122 lb (55.3 kg). Her temperature is 98.1 °F (36.7 °C). Her blood pressure is 139/76 and her pulse is 67. Her respiration is 16 and oxygen saturation is 98%. Physical Exam  Constitutional:       General: She is not in acute distress. Appearance: Normal appearance. She is not ill-appearing or toxic-appearing. HENT:      Right Ear: Tympanic membrane and ear canal normal.      Left Ear: Tympanic membrane and ear canal normal.      Nose: Nose normal. No rhinorrhea. Mouth/Throat:      Mouth: Mucous membranes are moist.      Pharynx: Oropharynx is clear. No oropharyngeal exudate. Cardiovascular:      Rate and Rhythm: Normal rate and regular rhythm. Pulses: Normal pulses. Heart sounds: No murmur heard. Pulmonary:      Effort: Pulmonary effort is normal. No respiratory distress. Breath sounds: Normal breath sounds. No wheezing. Abdominal:      General: Abdomen is flat. There is no distension. Palpations: Abdomen is soft. Tenderness: There is abdominal tenderness in the right lower quadrant and suprapubic area. There is guarding. There is no rebound. Negative signs include McBurney's sign, psoas sign and obturator sign. Hernia: No hernia is present. Genitourinary:     Vagina: No vaginal discharge or erythema. Cervix: No cervical motion tenderness or discharge. Adnexa: Right adnexa normal and left adnexa normal.        Right: No tenderness. Left: No tenderness. Musculoskeletal:         General: No tenderness. Right lower leg: No edema. Left lower leg: No edema. Skin:     General: Skin is warm and dry. Capillary Refill: Capillary refill takes less than 2 seconds. Findings: No rash. Neurological:      General: No focal deficit present. Mental Status: She is alert and oriented to person, place, and time.       Gait: Gait normal.   Psychiatric:         Mood and Affect: Mood normal.         Behavior: Behavior normal.         DIFFERENTIAL  DIAGNOSIS     PLAN (LABS / IMAGING / EKG):  Orders Placed This Encounter   Procedures    VAGINITIS DNA PROBE    C.trachomatis N.gonorrhoeae DNA    XR ACUTE ABD SERIES CHEST 1 VW    CT ABDOMEN PELVIS W IV CONTRAST Additional Contrast? None    CBC WITH AUTO DIFFERENTIAL    COMPREHENSIVE METABOLIC PANEL    LIPASE    Urinalysis Reflex to Culture    Troponin    EKG 12 Lead       MEDICATIONS ORDERED:  Orders Placed This Encounter   Medications    0.9 % sodium chloride bolus    dicyclomine (BENTYL) capsule 10 mg    senna (SENOKOT) 8.6 MG TABS tablet     Sig: Take 1 tablet by mouth daily     Dispense:  30 tablet     Refill:  0    iopamidol (ISOVUE-370) 76 % injection 75 mL       DDX: Constipation, urinary tract infection, BV, STD, appendicitis, ovarian cyst    Initial MDM/Plan: 46 y.o. female who presents with suprapubic and right lower quadrant abdominal pain worsening over the last 2 days. Patient also endorsing chest pain. Seen and examined, no distress. Vital signs are unremarkable. She is resting comfortably. Abdomen is tender suprapubic and right lower quadrant with positive guarding, no rebound, and overall nonperitoneal abdomen with negative heeltap.  exam shows no cervical motion or or adnexal tenderness, no discharge or bleeding. Pelvic cause obtain will obtain abdominal laboratory work-up, single troponin as well as EKG. DIAGNOSTIC RESULTS / EMERGENCY DEPARTMENT COURSE / MDM     LABS:  Labs Reviewed   VAGINITIS DNA PROBE - Abnormal; Notable for the following components:       Result Value    Direct Exam POSITIVE for Gardnerella vaginalis.  (*)     All other components within normal limits   CBC WITH AUTO DIFFERENTIAL - Abnormal; Notable for the following components:    RDW 14.7 (*)     Immature Granulocytes 1 (*)     All other components within normal limits   COMPREHENSIVE METABOLIC PANEL - Abnormal; Notable for the following components:    Alkaline Phosphatase 107 (*)     Total Bilirubin 0.21 (*)     All other components within normal limits   C.TRACHOMATIS N.GONORRHOEAE DNA   LIPASE   URINE RT REFLEX TO CULTURE   TROPONIN         RADIOLOGY:  XR ACUTE ABD SERIES CHEST 1 VW    Result Date: 10/8/2021  EXAMINATION: TWO XRAY VIEWS OF THE ABDOMEN AND SINGLE  XRAY VIEW OF THE CHEST 10/8/2021 5:36 pm COMPARISON: 03/17/2008 HISTORY: ORDERING SYSTEM PROVIDED HISTORY: cp, eval stool burden TECHNOLOGIST PROVIDED HISTORY: cp, eval stool burden Reason for Exam: rt lower abd pain FINDINGS: Chest: Cardiomediastinal silhouette stable. No acute airspace disease. No pneumothorax. No pleural effusion. Abdomen: Gas seen in nondilated colon. Gas multiple small bowel loops. These are not significantly dilated. No extraluminal gas. Pelvic calcifications appear vascular. No acute abnormality in the chest Findings in the abdomen suggesting a diffuse ileus     CT ABDOMEN PELVIS W IV CONTRAST Additional Contrast? None    Result Date: 10/8/2021  EXAMINATION: CT OF THE ABDOMEN AND PELVIS WITH CONTRAST 10/8/2021 7:33 pm TECHNIQUE: CT of the abdomen and pelvis was performed with the administration of intravenous contrast. Multiplanar reformatted images are provided for review. Dose modulation, iterative reconstruction, and/or weight based adjustment of the mA/kV was utilized to reduce the radiation dose to as low as reasonably achievable. COMPARISON: 2021 HISTORY: ORDERING SYSTEM PROVIDED HISTORY: RLQ abd pain TECHNOLOGIST PROVIDED HISTORY: RLQ abd pain Decision Support Exception - unselect if not a suspected or confirmed emergency medical condition->Emergency Medical Condition (MA) Reason for Exam: Right Lower Quadrant Pain with Dysuria. Acuity: Acute Type of Exam: Initial Relevant Medical/Surgical History: Surgical hx - . FINDINGS: Lower Chest: Unremarkable. Organs: Liver is normal in contour and enhancement. Gallbladder is unremarkable without biliary ductal dilatation. Pancreas divisum. Adrenals are unremarkable. Spleen is normal in size. No renal calculi or hydronephrosis. Vasculature is unremarkable. GI/Bowel: Bowel is non-dilated without wall thickening. Appendix is normal. Pelvis: Fibroid uterus. Peritoneum/Retroperitoneum:No free fluid, free air, organized fluid collection or lymphadenopathy. Bones: Multilevel degenerative disc disease. No correlate for pain.        EKG  EKG Interpretation    Interpreted by me    Rhythm: normal sinus   Rate: normal  Axis: normal  Ectopy: none  Conduction: normal  ST Segments: no acute change  T Waves: no acute change  Q Waves: none    Clinical Impression: no acute changes and normal EKG    All EKG's are interpreted by the Emergency Department Physician who either signs or Co-signs this chart in the absence of a cardiologist.    EMERGENCY DEPARTMENT COURSE:     Abdominal series does demonstrate possible ileus but chest x-ray benign. Troponin is less than 6. Urinalysis benign. Vaginitis is positive for BV given lower abdominal pain will treat. Also component of possible constipation of patient's symptoms given ileus seen on abdominal imaging. Patient was reassessed and reports no improvement in her pain so CT abdomen/pelvis was ordered. While waiting for imaging results patient requested discharge, I did tell the patient that without having her CT abdomen pelvis read not rule out life-threatening or other emergent pathologies in her abdomen. She verbalized understanding and still requested discharge. I did inform the patient that this would be against our medical advice given that her work-up is not complete. Patient verbalized understanding, she had the ability to ask questions about presumed diagnoses, has the capacity to make decisions, is clinically sober, free from distracting injury and has a normal oxygen saturation on room air and still wishes to sign out [address] given that her work-up is not complete. Patient knows she may return at any time for reevaluation. PROCEDURES:  None    CONSULTS:  None    CRITICAL CARE:  Please see attending note    FINAL IMPRESSION      1. Lower abdominal pain          DISPOSITION / PLAN     DISPOSITION [place] 10/08/2021 08:17:36 PM        PATIENTREFERRED TO:  Luis Angel Carlin MD  [address]   [address]  [address]  [phone]    Schedule an appointment as soon as possible for a visit   As needed      DISCHARGE MEDICATIONS:  Discharge Medication List as of 10/8/2021  7:21 PM      START taking these medications    Details   senna (SENOKOT) 8.6 MG TABS tablet Take 1 tablet by mouth daily, Disp-30 tablet, R-0Print             Tom Padilla DO  EmergencyMedicine Resident    (Please note that portions of this note were completed with a voice recognition program.  Efforts were made to edit the dictations but occasionally words are mis-transcribed.)        Tom Padilla DO  Resident  10/08/21 [id number]

## 2021-10-11 LAB
C. TRACHOMATIS DNA ,URINE: NEGATIVE
N. GONORRHOEAE DNA, URINE: NEGATIVE
SPECIMEN DESCRIPTION: NORMAL

## 2021-10-12 LAB
EKG ATRIAL RATE: 65 BPM
EKG P AXIS: 9 DEGREES
EKG P-R INTERVAL: 154 MS
EKG Q-T INTERVAL: 376 MS
EKG QRS DURATION: 70 MS
EKG QTC CALCULATION (BAZETT): 391 MS
EKG R AXIS: -18 DEGREES
EKG T AXIS: -16 DEGREES
EKG VENTRICULAR RATE: 65 BPM

## 2021-10-19 ENCOUNTER — OFFICE VISIT (OUTPATIENT)
Dept: FAMILY MEDICINE CLINIC | Age: 51
End: 2021-10-19
Payer: MEDICAID

## 2021-10-19 VITALS
HEART RATE: 88 BPM | BODY MASS INDEX: 24.64 KG/M2 | SYSTOLIC BLOOD PRESSURE: 133 MMHG | WEIGHT: 122 LBS | DIASTOLIC BLOOD PRESSURE: 91 MMHG

## 2021-10-19 DIAGNOSIS — B96.89 BACTERIAL VAGINOSIS: Primary | ICD-10-CM

## 2021-10-19 DIAGNOSIS — K59.00 CONSTIPATION, UNSPECIFIED CONSTIPATION TYPE: ICD-10-CM

## 2021-10-19 DIAGNOSIS — D21.9 FIBROID: ICD-10-CM

## 2021-10-19 DIAGNOSIS — N76.0 BACTERIAL VAGINOSIS: Primary | ICD-10-CM

## 2021-10-19 PROBLEM — J01.90 ACUTE BACTERIAL SINUSITIS: Status: ACTIVE | Noted: 2021-10-19

## 2021-10-19 PROCEDURE — 99213 OFFICE O/P EST LOW 20 MIN: CPT | Performed by: STUDENT IN AN ORGANIZED HEALTH CARE EDUCATION/TRAINING PROGRAM

## 2021-10-19 PROCEDURE — G8420 CALC BMI NORM PARAMETERS: HCPCS | Performed by: STUDENT IN AN ORGANIZED HEALTH CARE EDUCATION/TRAINING PROGRAM

## 2021-10-19 PROCEDURE — G8427 DOCREV CUR MEDS BY ELIG CLIN: HCPCS | Performed by: STUDENT IN AN ORGANIZED HEALTH CARE EDUCATION/TRAINING PROGRAM

## 2021-10-19 PROCEDURE — 3017F COLORECTAL CA SCREEN DOC REV: CPT | Performed by: STUDENT IN AN ORGANIZED HEALTH CARE EDUCATION/TRAINING PROGRAM

## 2021-10-19 PROCEDURE — G8484 FLU IMMUNIZE NO ADMIN: HCPCS | Performed by: STUDENT IN AN ORGANIZED HEALTH CARE EDUCATION/TRAINING PROGRAM

## 2021-10-19 PROCEDURE — 4004F PT TOBACCO SCREEN RCVD TLK: CPT | Performed by: STUDENT IN AN ORGANIZED HEALTH CARE EDUCATION/TRAINING PROGRAM

## 2021-10-19 RX ORDER — SENNA AND DOCUSATE SODIUM 50; 8.6 MG/1; MG/1
1 TABLET, FILM COATED ORAL DAILY
Qty: 30 TABLET | Refills: 3 | Status: SHIPPED | OUTPATIENT
Start: 2021-10-19

## 2021-10-19 RX ORDER — POLYETHYLENE GLYCOL 3350 17 G/17G
17 POWDER, FOR SOLUTION ORAL DAILY
Qty: 1530 G | Refills: 1 | Status: SHIPPED | OUTPATIENT
Start: 2021-10-19 | End: 2021-11-18

## 2021-10-19 RX ORDER — METRONIDAZOLE 500 MG/1
500 TABLET ORAL 2 TIMES DAILY
Qty: 14 TABLET | Refills: 0 | Status: SHIPPED | OUTPATIENT
Start: 2021-10-19 | End: 2021-10-26

## 2021-10-19 SDOH — ECONOMIC STABILITY: FOOD INSECURITY: WITHIN THE PAST 12 MONTHS, THE FOOD YOU BOUGHT JUST DIDN'T LAST AND YOU DIDN'T HAVE MONEY TO GET MORE.: NEVER TRUE

## 2021-10-19 SDOH — ECONOMIC STABILITY: FOOD INSECURITY: WITHIN THE PAST 12 MONTHS, YOU WORRIED THAT YOUR FOOD WOULD RUN OUT BEFORE YOU GOT MONEY TO BUY MORE.: NEVER TRUE

## 2021-10-19 ASSESSMENT — ENCOUNTER SYMPTOMS
DIARRHEA: 0
WHEEZING: 0
BLOOD IN STOOL: 0
COLOR CHANGE: 0
CONSTIPATION: 0
SINUS PRESSURE: 0
SINUS PAIN: 0
COUGH: 0
NAUSEA: 0
VOMITING: 0
SHORTNESS OF BREATH: 0
BACK PAIN: 0
ABDOMINAL PAIN: 0

## 2021-10-19 ASSESSMENT — SOCIAL DETERMINANTS OF HEALTH (SDOH): HOW HARD IS IT FOR YOU TO PAY FOR THE VERY BASICS LIKE FOOD, HOUSING, MEDICAL CARE, AND HEATING?: NOT HARD AT ALL

## 2021-10-19 NOTE — PROGRESS NOTES
Visit Information    Have you changed or started any medications since your last visit including any over-the-counter medicines, vitamins, or herbal medicines? no   Have you stopped taking any of your medications? Is so, why? -  no  Are you having any side effects from any of your medications? - no    Have you seen any other physician or provider since your last visit?  no   Have you had any other diagnostic tests since your last visit?  no   Have you been seen in the emergency room and/or had an admission in a hospital since we last saw you?  no   Have you had your routine dental cleaning in the past 6 months?  no     Do you have an active MyChart account? If no, what is the barrier?   Yes    Patient Care Team:  Antony Merino MD as PCP - General (Family Medicine)    Medical History Review  Past Medical, Family, and Social History reviewed and does not contribute to the patient presenting condition    Health Maintenance   Topic Date Due    Pneumococcal 0-64 years Vaccine (1 of 2 - PPSV23) Never done    Diabetic foot exam  Never done    Diabetic retinal exam  Never done    COVID-19 Vaccine (1) Never done    Shingles Vaccine (1 of 2) Never done    Cervical cancer screen  04/20/2020    Flu vaccine (1) Never done    Diabetic microalbuminuria test  12/22/2021    Lipid screen  12/22/2021    A1C test (Diabetic or Prediabetic)  01/15/2022    Breast cancer screen  03/04/2023    DTaP/Tdap/Td vaccine (2 - Td or Tdap) 03/17/2025    Colon cancer screen colonoscopy  03/08/2031    Hepatitis C screen  Completed    HIV screen  Completed    Hepatitis A vaccine  Aged Out    Hepatitis B vaccine  Aged Out    Hib vaccine  Aged Out    Meningococcal (ACWY) vaccine  Aged Out

## 2021-10-19 NOTE — PROGRESS NOTES
Subjective:    Severa Rex is a 46 y.o. female with  has a past medical history of Anemia, Anxiety, Brain aneurysm, Chronic right shoulder pain, Depression, Gastrointestinal distress, Irregular heartbeat, Sciatica, and Vomiting. Presented to the office today for:  Chief Complaint   Patient presents with    Other     10/8 was in ED for vomitting - states feeling better not nearly as sick        HPI  This is a 78-year-old female with a history of anxiety and depression, chronic back pain came in today after an ED visit. Cc: Lower abdominal pain  Patient went to the ED with the complaints of lower abdominal pain on left lower quadrant, patient said the pain is going on for almost 1 month. Patient described that the pain started when she was out of town and went to the urgent care with a similar symptoms and was given Toradol. Patient went to the ED for further evaluation, patient was found to have a BV infection but patient left the emergency department before complete evaluation. Discussed the results of labs, imaging. CT abdominal pelvis only showed fibroid which patient is already aware of. Consider abdominal pain is getting better, patient is having regular bowel movements since the ED visit. No complaints of nausea, vomiting and diarrhea at this time. Review of Systems   Constitutional: Negative for chills and fever. HENT: Negative for congestion, sinus pressure and sinus pain. Respiratory: Negative for cough, shortness of breath and wheezing. Cardiovascular: Negative for chest pain, palpitations and leg swelling. Gastrointestinal: Negative for abdominal pain, blood in stool, constipation, diarrhea, nausea and vomiting. Genitourinary: Negative for difficulty urinating, flank pain and hematuria. Musculoskeletal: Negative for arthralgias, back pain and myalgias. Skin: Negative for color change and wound. Neurological: Negative for dizziness, light-headedness and headaches. Psychiatric/Behavioral: Negative for agitation and confusion. The patient has a   Family History   Problem Relation Age of Onset    Asthma Mother     Cancer Father         throat       Objective:    BP (!) 133/91   Pulse 88   Wt 122 lb (55.3 kg)   LMP 10/21/2020   BMI 24.64 kg/m²    BP Readings from Last 3 Encounters:   10/19/21 (!) 133/91   10/08/21 139/76   09/29/21 113/64       Physical Exam  Vitals and nursing note reviewed. Constitutional:       Appearance: Normal appearance. HENT:      Head: Normocephalic and atraumatic. Mouth/Throat:      Mouth: Mucous membranes are moist.   Eyes:      Conjunctiva/sclera: Conjunctivae normal.   Cardiovascular:      Rate and Rhythm: Normal rate and regular rhythm. Pulmonary:      Effort: Pulmonary effort is normal.      Breath sounds: Normal breath sounds. Abdominal:      General: Bowel sounds are normal. There is no distension. Palpations: Abdomen is soft. There is no mass. Tenderness: There is no abdominal tenderness. There is no guarding. Musculoskeletal:      Right lower leg: No edema. Left lower leg: No edema. Neurological:      General: No focal deficit present. Mental Status: She is alert and oriented to person, place, and time.    Psychiatric:         Mood and Affect: Mood normal.         Behavior: Behavior normal.         Lab Results   Component Value Date    WBC 5.6 10/08/2021    HGB 12.8 10/08/2021    HCT 41.1 10/08/2021     10/08/2021    CHOL 198 12/22/2020    TRIG 127 12/22/2020    HDL 73 12/22/2020    ALT 12 10/08/2021    AST 23 10/08/2021     10/08/2021    K 4.0 10/08/2021     10/08/2021    CREATININE 0.72 10/08/2021    BUN 9 10/08/2021    CO2 23 10/08/2021    TSH 2.35 02/19/2021    INR 1.1 03/20/2019    LABA1C 6.8 01/15/2021    LABMICR 4 12/22/2020     Lab Results   Component Value Date    CALCIUM 9.2 10/08/2021     Lab Results   Component Value Date    LDLCHOLESTEROL 100 12/22/2020 Assessment and Plan:    1. Lower abdominal pain likely secondary to constipation versus BV infection versus compression from fibroids:  - Patient endorses that she was not on antibiotics will start metronidazole for 7 days. Patient was counseled not to use alcohol while and 2 days after completing the therapy. Patient understand and agrees with the plan. -We will give Senokot and MiraLAX to titrate bowel movement, goal is at least 1 bowel movement per day. -CT abdominal pelvis showed fibroid, will get an ultrasound transvaginal to assess the size of fibroids.  -We will do a Pap smear next visit. - metroNIDAZOLE (FLAGYL) 500 MG tablet; Take 1 tablet by mouth 2 times daily for 7 days  Dispense: 14 tablet; Refill: 0  - sennosides-docusate sodium (SENOKOT-S) 8.6-50 MG tablet; Take 1 tablet by mouth daily  Dispense: 30 tablet; Refill: 3  - polyethylene glycol (GLYCOLAX) 17 GM/SCOOP powder; Take 17 g by mouth daily  Dispense: 1530 g; Refill: 1    - US OB TRANSVAGINAL; Future          Patient was counseled about importance of taking medication which were prescribed at today visit. Patient was also counseled that lifestyle changes including diet, smoking and use of less alcohol will benefit their health in long term. All the question asked by the patient were answered in non-medical terms. Patient understands and agree to the plan. Teach back method was used while having the conversation.        Requested Prescriptions     Signed Prescriptions Disp Refills    metroNIDAZOLE (FLAGYL) 500 MG tablet 14 tablet 0     Sig: Take 1 tablet by mouth 2 times daily for 7 days    sennosides-docusate sodium (SENOKOT-S) 8.6-50 MG tablet 30 tablet 3     Sig: Take 1 tablet by mouth daily    polyethylene glycol (GLYCOLAX) 17 GM/SCOOP powder 1530 g 1     Sig: Take 17 g by mouth daily       Medications Discontinued During This Encounter   Medication Reason    senna (SENOKOT) 8.6 MG TABS tablet Therapy completed       Jamie Bacon received counseling on the following healthy behaviors: nutrition, exercise and medication adherence    Discussed use,benefit, and side effects of prescribed medications. Barriers to medication compliance addressed. All patient questions answered. Pt voiced understanding. Return in about 3 months (around 1/19/2022) for pap smear. Disclaimer: Some orall of this note was transcribed using voice-recognition software. This may cause typographical errors occasionally. Although all effort is made to fix these errors, please do not hesitate to contact our office if there Abbi Bhatia concern with the understanding of this note.

## 2021-10-21 ENCOUNTER — TELEPHONE (OUTPATIENT)
Dept: GASTROENTEROLOGY | Age: 51
End: 2021-10-21

## 2021-10-21 ENCOUNTER — OFFICE VISIT (OUTPATIENT)
Dept: GASTROENTEROLOGY | Age: 51
End: 2021-10-21
Payer: MEDICAID

## 2021-10-21 VITALS
HEIGHT: 59 IN | DIASTOLIC BLOOD PRESSURE: 81 MMHG | OXYGEN SATURATION: 98 % | BODY MASS INDEX: 23.99 KG/M2 | TEMPERATURE: 97.2 F | SYSTOLIC BLOOD PRESSURE: 136 MMHG | HEART RATE: 77 BPM | WEIGHT: 119 LBS

## 2021-10-21 DIAGNOSIS — G89.29 CHRONIC ABDOMINAL PAIN: Primary | ICD-10-CM

## 2021-10-21 DIAGNOSIS — R10.9 CHRONIC ABDOMINAL PAIN: Primary | ICD-10-CM

## 2021-10-21 PROCEDURE — G8420 CALC BMI NORM PARAMETERS: HCPCS | Performed by: INTERNAL MEDICINE

## 2021-10-21 PROCEDURE — 3017F COLORECTAL CA SCREEN DOC REV: CPT | Performed by: INTERNAL MEDICINE

## 2021-10-21 PROCEDURE — 99213 OFFICE O/P EST LOW 20 MIN: CPT | Performed by: INTERNAL MEDICINE

## 2021-10-21 PROCEDURE — 4004F PT TOBACCO SCREEN RCVD TLK: CPT | Performed by: INTERNAL MEDICINE

## 2021-10-21 PROCEDURE — G8427 DOCREV CUR MEDS BY ELIG CLIN: HCPCS | Performed by: INTERNAL MEDICINE

## 2021-10-21 PROCEDURE — G8484 FLU IMMUNIZE NO ADMIN: HCPCS | Performed by: INTERNAL MEDICINE

## 2021-10-21 RX ORDER — PANTOPRAZOLE SODIUM 20 MG/1
20 TABLET, DELAYED RELEASE ORAL DAILY
COMMUNITY
End: 2021-10-21

## 2021-10-21 RX ORDER — PANTOPRAZOLE SODIUM 20 MG/1
20 TABLET, DELAYED RELEASE ORAL
Qty: 30 TABLET | Refills: 5 | Status: SHIPPED | OUTPATIENT
Start: 2021-10-21 | End: 2021-11-22

## 2021-10-21 RX ORDER — ALUMINUM ZIRCONIUM OCTACHLOROHYDREX GLY 16 G/100G
1 GEL TOPICAL DAILY
Qty: 30 CAPSULE | Refills: 1 | Status: SHIPPED | OUTPATIENT
Start: 2021-10-21 | End: 2021-11-20

## 2021-10-21 RX ORDER — DICLOFENAC SODIUM 1 MG/ML
1 SOLUTION/ DROPS OPHTHALMIC 4 TIMES DAILY
COMMUNITY
End: 2021-11-22 | Stop reason: CLARIF

## 2021-10-21 ASSESSMENT — ENCOUNTER SYMPTOMS
NAUSEA: 1
ABDOMINAL PAIN: 1
VOMITING: 1

## 2021-10-21 NOTE — PROGRESS NOTES
GI CLINIC FOLLOW UP    INTERVAL HISTORY:   No referring provider defined for this encounter. Chief Complaint   Patient presents with    Abdominal Pain    EGD    Other     9/10/21 burning inside was given a med oraly at ER that was supposed to be given through IV     Nausea    Emesis           HISTORY OF PRESENT ILLNESS: Ms.Cynthia Stevie Greene is a 46 y.o. female with abdominal pain. She continues reports burning in her chest and upper abdomen. She attributes this to going to the hospital in [facility] where she was given Toradol and Decadron for headache. As per patient report the IV solution was not administered as injection but rather the medication was given to her by mouth and since then she has been having burning. EGD showed mild gastritis and mild erythema in distal esophagus with biopsy showing no Duarte's mucosa. She continues to smoke. She takes Celebrex as needed. She is on Protonix 20 mg/day and on Pepcid. Past Medical,Family, and Social History reviewed and does not contribute to the patient presentingcondition. Patient's PMH/PSH,SH,PSYCH Hx, MEDs, ALLERGIES, and ROS were all reviewed and updated in the appropriate sections.     PAST MEDICAL HISTORY:  Past Medical History:   Diagnosis Date    Anemia     Anxiety     Brain aneurysm     had two, had one coiled and they are watching the other    Chronic right shoulder pain     on Lyrica    Depression     Gastrointestinal distress     burning    Irregular heartbeat     Sciatica     Vomiting        Past Surgical History:   Procedure Laterality Date    BRAIN ANEURYSM SURGERY  12/2014    Coiling [address]      x4     COLONOSCOPY  04/18/2019    COLONOSCOPY N/A 4/18/2019    COLORECTAL CANCER SCREENING, NOT HIGH RISK performed by Geo Mendoza MD at [address] [facility] Right 2/20/2018    RIGHT SHOULDER ARTHROSCOPY ROTATOR CUFF LYSIS OF ADHESIONS performed by Demetrio Perdomo Augusto Stoddard DO at [address] [facility] ARTHROSCOPY Right 02/20/2018    extensive lysis of adhesions; Open suture granuloma removal     SHOULDER SURGERY Right     x 3    TONSILLECTOMY      UPPER GASTROINTESTINAL ENDOSCOPY N/A 9/29/2021    EGD BIOPSY performed by Joon Pagan MD at [address]:    Current Outpatient Medications:     metroNIDAZOLE (FLAGYL) 500 MG tablet, Take 1 tablet by mouth 2 times daily for 7 days, Disp: 14 tablet, Rfl: 0    sennosides-docusate sodium (SENOKOT-S) 8.6-50 MG tablet, Take 1 tablet by mouth daily, Disp: 30 tablet, Rfl: 3    polyethylene glycol (GLYCOLAX) 17 GM/SCOOP powder, Take 17 g by mouth daily, Disp: 1530 g, Rfl: 1    famotidine (PEPCID) 20 MG tablet, Take 1 tablet by mouth 2 times daily, Disp: 20 tablet, Rfl: 0    ondansetron (ZOFRAN ODT) 4 MG disintegrating tablet, Take 1 tablet by mouth every 6 hours as needed for Nausea or Vomiting, Disp: 12 tablet, Rfl: 0    FLUoxetine (PROZAC) 20 MG capsule, take 1 capsule by mouth once daily, Disp: , Rfl:     Elastic Bandages & Supports (WRIST BRACE DELUXE) MISC, Wear nightly right brace, Disp: 1 each, Rfl: 0    diclofenac sodium (VOLTAREN) 1 % GEL, Apply 4 g topically 4 times daily, Disp: , Rfl:     oxyCODONE-acetaminophen (PERCOCET) 7.5-325 MG per tablet, Take 1 tablet by mouth 4 times daily. , Disp: , Rfl:     traZODone (DESYREL) 100 MG tablet, Take 100 mg by mouth daily, Disp: , Rfl:     pregabalin (LYRICA) 100 MG capsule, Take 100 mg by mouth 2 times daily. , Disp: , Rfl:     celecoxib (CELEBREX) 200 MG capsule, take 1 capsule by mouth once daily prn, Disp: , Rfl: 0    ALLERGIES:   No Known Allergies    FAMILY HISTORY:       Problem Relation Age of Onset    Asthma Mother     Cancer Father         throat         SOCIAL HISTORY:   Social History     Socioeconomic History    Marital status:      Spouse name: Not on file    Number of children: Not on file    Years of education: Not on file   Dinesh Pittman Highest education level: Not on file   Occupational History    Not on file   Tobacco Use    Smoking status: Current Every Day Smoker     Packs/day: 0.50     Years: 12.00     Pack years: 6.00     Types: Cigarettes    Smokeless tobacco: Never Used    Tobacco comment: pt smokes about 4 ciggs daily   Vaping Use    Vaping Use: Never used   Substance and Sexual Activity    Alcohol use: No    Drug use: No    Sexual activity: Not on file   Other Topics Concern    Not on file   Social History Narrative    Not on file     Social Determinants of Health     Financial Resource Strain: Low Risk     Difficulty of Paying Living Expenses: Not hard at all   Food Insecurity: No Food Insecurity    Worried About Running Out of Food in the Last Year: Never true    Ximena of Food in the Last Year: Never true   Transportation Needs:     Lack of Transportation (Medical):  Lack of Transportation (Non-Medical):    Physical Activity:     Days of Exercise per Week:     Minutes of Exercise per Session:    Stress:     Feeling of Stress :    Social Connections:     Frequency of Communication with Friends and Family:     Frequency of Social Gatherings with Friends and Family:     Attends Congregation Services:     Active Member of Clubs or Organizations:     Attends Club or Organization Meetings:     Marital Status:    Intimate Partner Violence:     Fear of Current or Ex-Partner:     Emotionally Abused:     Physically Abused:     Sexually Abused:        REVIEW OF SYSTEMS: A 12-point review of systemswas obtained and pertinent positives and negatives were enumerated above in the history of present illness. All other reviewed systems / symptoms were negative. Review of Systems   Gastrointestinal: Positive for abdominal pain, nausea and vomiting.            LABORATORY DATA: Reviewed  Lab Results   Component Value Date    WBC 5.6 10/08/2021    HGB 12.8 10/08/2021    HCT 41.1 10/08/2021    MCV 88.4 10/08/2021     10/08/2021     10/08/2021    K 4.0 10/08/2021     10/08/2021    CO2 23 10/08/2021    BUN 9 10/08/2021    CREATININE 0.72 10/08/2021    LABALBU 4.3 10/08/2021    BILITOT 0.21 (L) 10/08/2021    ALKPHOS 107 (H) 10/08/2021    AST 23 10/08/2021    ALT 12 10/08/2021    INR 1.1 03/20/2019         Lab Results   Component Value Date    RBC 4.65 10/08/2021    HGB 12.8 10/08/2021    MCV 88.4 10/08/2021    MCH 27.5 10/08/2021    MCHC 31.1 10/08/2021    RDW 14.7 (H) 10/08/2021    MPV 9.7 10/08/2021    BASOPCT 1 10/08/2021    LYMPHSABS 1.99 10/08/2021    MONOSABS 0.39 10/08/2021    NEUTROABS 3.14 10/08/2021    EOSABS 0.04 10/08/2021    BASOSABS 0.03 10/08/2021         DIAGNOSTIC TESTING:     XR ACUTE ABD SERIES CHEST 1 VW    Result Date: 10/8/2021  EXAMINATION: TWO XRAY VIEWS OF THE ABDOMEN AND SINGLE  XRAY VIEW OF THE CHEST 10/8/2021 5:36 pm COMPARISON: 03/17/2008 HISTORY: ORDERING SYSTEM PROVIDED HISTORY: cp, eval stool burden TECHNOLOGIST PROVIDED HISTORY: cp, eval stool burden Reason for Exam: rt lower abd pain FINDINGS: Chest: Cardiomediastinal silhouette stable. No acute airspace disease. No pneumothorax. No pleural effusion. Abdomen: Gas seen in nondilated colon. Gas multiple small bowel loops. These are not significantly dilated. No extraluminal gas. Pelvic calcifications appear vascular. No acute abnormality in the chest Findings in the abdomen suggesting a diffuse ileus     CT ABDOMEN PELVIS W IV CONTRAST Additional Contrast? None    Result Date: 10/8/2021  EXAMINATION: CT OF THE ABDOMEN AND PELVIS WITH CONTRAST 10/8/2021 7:33 pm TECHNIQUE: CT of the abdomen and pelvis was performed with the administration of intravenous contrast. Multiplanar reformatted images are provided for review. Dose modulation, iterative reconstruction, and/or weight based adjustment of the mA/kV was utilized to reduce the radiation dose to as low as reasonably achievable.  COMPARISON: 09/11/2021 HISTORY: [address] PROVIDED HISTORY: [facility] pain TECHNOLOGIST PROVIDED HISTORY: [facility] pain Decision Support Exception - unselect if not a suspected or confirmed emergency medical condition->Emergency Medical Condition (MA) Reason for Exam: Right Lower Quadrant Pain with Dysuria. Acuity: Acute Type of Exam: Initial Relevant Medical/Surgical History: Surgical hx - . FINDINGS: Lower Chest: Unremarkable. Organs: Liver is normal in contour and enhancement. Gallbladder is unremarkable without biliary ductal dilatation. Pancreas divisum. Adrenals are unremarkable. Spleen is normal in size. No renal calculi or hydronephrosis. Vasculature is unremarkable. GI/Bowel: Bowel is non-dilated without wall thickening. Appendix is normal. Pelvis: Fibroid uterus. Peritoneum/Retroperitoneum:No free fluid, free air, organized fluid collection or lymphadenopathy. Bones: Multilevel degenerative disc disease. No correlate for pain. PHYSICAL EXAMINATION: Vital signs reviewed per the nursing documentation. Ht 4' 11\" (1.499 m)   Wt 119 lb (54 kg)   LMP 10/21/2020   BMI 24.04 kg/m²   Body mass index is 24.04 kg/m². Physical Exam      I personally reviewed the nurse's notes and documentation and I agree with her notes. General: alert, appears stated age and cooperative Psych: Normal. and Alert and oriented, appropriate affect. . Normal affect. Mentation normal  HEENT: PERRLA. Clear conjunctivae and sclerae. Moist oral mucosae, no lesions or ulcers. The neck is supple, without lymphadenopathy or jugular venous distension. No masses. Normal thyroid. Cardiovascular: S1 S2 RRR no rubs or murmurs. Pulmonary: clear BL. No accessory muscle usage. Abdominal Exam: Soft, NT ND, no hepato or spleno megaly, +BS, no ascites. IMPRESSION: Ms. Alicia Zaragoza is a 46 y.o. female with abdominal pain. Mild gastritis. Increase Protonix to 20 mg twice a day. Continue Pepcid.   The patient was asking for more than 20 minutes about the possibility of chronic problems and had issues being related to erroneously giving IV solution by mouth. I explained to the patient that I did not see any evidence of burn in the esophagus or stomach. She would need to contact the hospital in [place] to further inquire about what happened. From our standpoint we will see her back in follow-up in 1 month. Thank you for allowing me to participate in the care of Ms. Vern Lakhani. For any further questions please do not hesitate to contact me. I have reviewed and agree with the ROS entered by the MA/LPN. Note is dictated utilizing voice recognition software. Unfortunately this leads to occasional typographical errors. Please contact our office if you have any questions.     Gonzalo Burgos MD  [facility]  O: #[phone]

## 2021-11-15 ENCOUNTER — HOSPITAL ENCOUNTER (OUTPATIENT)
Dept: ULTRASOUND IMAGING | Age: 51
Discharge: HOME | End: 2021-11-17
Payer: MEDICAID

## 2021-11-15 DIAGNOSIS — D21.9 FIBROID: ICD-10-CM

## 2021-11-15 PROCEDURE — 76856 US EXAM PELVIC COMPLETE: CPT

## 2021-11-22 ENCOUNTER — OFFICE VISIT (OUTPATIENT)
Dept: GASTROENTEROLOGY | Age: 51
End: 2021-11-22
Payer: MEDICAID

## 2021-11-22 VITALS
WEIGHT: 120 LBS | OXYGEN SATURATION: 95 % | BODY MASS INDEX: 24.19 KG/M2 | HEART RATE: 75 BPM | DIASTOLIC BLOOD PRESSURE: 80 MMHG | HEIGHT: 59 IN | SYSTOLIC BLOOD PRESSURE: 154 MMHG

## 2021-11-22 DIAGNOSIS — K21.00 GASTROESOPHAGEAL REFLUX DISEASE WITH ESOPHAGITIS WITHOUT HEMORRHAGE: Primary | ICD-10-CM

## 2021-11-22 PROCEDURE — 3017F COLORECTAL CA SCREEN DOC REV: CPT | Performed by: INTERNAL MEDICINE

## 2021-11-22 PROCEDURE — 99213 OFFICE O/P EST LOW 20 MIN: CPT | Performed by: INTERNAL MEDICINE

## 2021-11-22 PROCEDURE — 4004F PT TOBACCO SCREEN RCVD TLK: CPT | Performed by: INTERNAL MEDICINE

## 2021-11-22 PROCEDURE — G8420 CALC BMI NORM PARAMETERS: HCPCS | Performed by: INTERNAL MEDICINE

## 2021-11-22 PROCEDURE — G8484 FLU IMMUNIZE NO ADMIN: HCPCS | Performed by: INTERNAL MEDICINE

## 2021-11-22 PROCEDURE — G8427 DOCREV CUR MEDS BY ELIG CLIN: HCPCS | Performed by: INTERNAL MEDICINE

## 2021-11-22 RX ORDER — PANTOPRAZOLE SODIUM 40 MG/1
40 TABLET, DELAYED RELEASE ORAL
Qty: 30 TABLET | Refills: 5 | Status: SHIPPED | OUTPATIENT
Start: 2021-11-22

## 2021-11-22 RX ORDER — ONDANSETRON 4 MG/1
4 TABLET, ORALLY DISINTEGRATING ORAL EVERY 6 HOURS PRN
Qty: 12 TABLET | Refills: 0 | Status: ON HOLD | OUTPATIENT
Start: 2021-11-22 | End: 2022-10-25 | Stop reason: HOSPADM

## 2021-11-22 ASSESSMENT — ENCOUNTER SYMPTOMS
COUGH: 1
DIARRHEA: 0
VOMITING: 0
CONSTIPATION: 1
BACK PAIN: 1
RECTAL PAIN: 0
BLOOD IN STOOL: 0
NAUSEA: 0
ABDOMINAL DISTENTION: 1
TROUBLE SWALLOWING: 1
CHEST TIGHTNESS: 1
SORE THROAT: 1
ABDOMINAL PAIN: 1
ANAL BLEEDING: 0
CHOKING: 1
WHEEZING: 0
SHORTNESS OF BREATH: 1

## 2021-11-22 NOTE — PROGRESS NOTES
GI CLINIC FOLLOW UP    INTERVAL HISTORY:   No referring provider defined for this encounter. Chief Complaint   Patient presents with    Follow-up     sensation in throat like something is going to come up.  Gastroesophageal Reflux           HISTORY OF PRESENT ILLNESS: Ms.Cynthia Serg Corbett is a 46 y.o. female with GERD. She continues to report symptoms. Some regurgitation when she bends down. She has a bowel movement every day. Soft. She reports incomplete evacuation at times. She had some bright red blood per rectum recently. She takes Protonix 40 mg twice a day and Pepcid 20 mg twice a day. She is trying to quit smoking. She is down to 1 cigarette/day. Past Medical,Family, and Social History reviewed and does not contribute to the patient presentingcondition. Patient's PMH/PSH,SH,PSYCH Hx, MEDs, ALLERGIES, and ROS were all reviewed and updated in the appropriate sections. PAST MEDICAL HISTORY:  Past Medical History:   Diagnosis Date    Anemia     Anxiety     Brain aneurysm     had two, had one coiled and they are watching the other    Chronic right shoulder pain     on Lyrica    Depression     Gastrointestinal distress     burning    Irregular heartbeat     Sciatica     Vomiting        Past Surgical History:   Procedure Laterality Date    BRAIN ANEURYSM SURGERY  12/2014    [address]iling [address]      x4     COLONOSCOPY  04/18/2019    COLONOSCOPY N/A 4/18/2019    COLORECTAL CANCER SCREENING, NOT HIGH RISK performed by Rin Quinn MD at [address] Right 2/20/2018    RIGHT SHOULDER ARTHROSCOPY ROTATOR CUFF LYSIS OF ADHESIONS performed by Rodríguez Garza DO at [address] ARTHROS[address] Right 02/20/2018    extensive lysis of adhesions; Open suture granuloma removal     SHOULDER SURGERY Right     x 3    TONSILLECTOMY      UPPER GASTROINTESTINAL ENDOSCOPY N/A 9/29/2021    EGD BIOPSY performed by Marcelo Ordonez activity: Not on file   Other Topics Concern    Not on file   Social History Narrative    Not on file     Social Determinants of Health     Financial Resource Strain: Low Risk     Difficulty of Paying Living Expenses: Not hard at all   Food Insecurity: No Food Insecurity    Worried About Running Out of Food in the Last Year: Never true    [address] in the Last Year: Never true   Transportation Needs:     Lack of Transportation (Medical): Not on file    Lack of Transportation (Non-Medical): Not on file   Physical Activity:     Days of Exercise per Week: Not on file    Minutes of Exercise per Session: Not on file   Stress:     Feeling of Stress : Not on file   Social Connections:     Frequency of Communication with Friends and Family: Not on file    Frequency of Social Gatherings with Friends and Family: Not on file    Attends Oriental orthodox Services: Not on file    Active Member of [address] [place] [address] or Organizations: Not on file    Attends Club or Organization Meetings: Not on file    Marital Status: Not on file   Intimate Partner Violence:     Fear of Current or Ex-Partner: Not on file    Emotionally Abused: Not on file    Physically Abused: Not on file    Sexually Abused: Not on file   Housing Stability:     Unable to Pay for Housing in the Last Year: Not on file    Number of [place] in the Last Year: Not on file    Unstable Housing in the Last Year: Not on file       REVIEW OF SYSTEMS: A 12-point review of systemswas obtained and pertinent positives and negatives were enumerated above in the history of present illness. All other reviewed systems / symptoms were negative. Review of Systems   Constitutional: Positive for appetite change. Negative for fatigue and unexpected weight change. HENT: Positive for sore throat and trouble swallowing. Negative for congestion. Respiratory: Positive for cough, choking, chest tightness and shortness of breath. Negative for wheezing.     Cardiovascular: Positive for chest pain. Negative for palpitations and leg swelling. Gastrointestinal: Positive for abdominal distention, abdominal pain and constipation. Negative for anal bleeding, blood in stool, diarrhea, nausea, rectal pain and vomiting. Musculoskeletal: Positive for back pain and neck pain. Psychiatric/Behavioral: The patient is nervous/anxious. LABORATORY DATA: Reviewed  Lab Results   Component Value Date    WBC 5.6 10/08/2021    HGB 12.8 10/08/2021    HCT 41.1 10/08/2021    MCV 88.4 10/08/2021     10/08/2021     10/08/2021    K 4.0 10/08/2021     10/08/2021    CO2 23 10/08/2021    BUN 9 10/08/2021    CREATININE 0.72 10/08/2021    LABALBU 4.3 10/08/2021    BILITOT 0.21 (L) 10/08/2021    ALKPHOS 107 (H) 10/08/2021    AST 23 10/08/2021    ALT 12 10/08/2021    INR 1.1 03/20/2019         Lab Results   Component Value Date    RBC 4.65 10/08/2021    HGB 12.8 10/08/2021    MCV 88.4 10/08/2021    MCH 27.5 10/08/2021    MCHC 31.1 10/08/2021    RDW 14.7 (H) 10/08/2021    MPV 9.7 10/08/2021    BASOPCT 1 10/08/2021    LYMPHSABS 1.99 10/08/2021    MONOSABS 0.39 10/08/2021    NEUTROABS 3.14 10/08/2021    EOSABS 0.04 10/08/2021    BASOSABS 0.03 10/08/2021         DIAGNOSTIC TESTING:     US PELVIS COMPLETE    Result Date: 11/15/2021  EXAMINATION: PELVIC ULTRASOUND 11/15/2021 TECHNIQUE: Transabdominal pelvic duplex ultrasound using B-mode/gray scaled imaging, Doppler spectral analysis and color flow Doppler was obtained. Patient refused transvaginal imaging. COMPARISON: CT abdomen pelvis from 10/08/2021 HISTORY: ORDERING SYSTEM PROVIDED HISTORY: Fibroid TECHNOLOGIST PROVIDED HISTORY: This procedure can be scheduled via [employer]. Access your [employer] account by visiting [email or web address]. Fibroid uterus Acuity: Acute Type of Exam: Initial 61-year-old female with fibroid uterus FINDINGS: Measurements: Uterus: 10.8 x 4.4 x 4.5 cm. Endometrial stripe: 2 mm.  Right Ovary:1.8 x 0.9 x 1.7 cm Left

## 2021-12-01 ENCOUNTER — OFFICE VISIT (OUTPATIENT)
Dept: ORTHOPEDIC SURGERY | Age: 51
End: 2021-12-01
Payer: COMMERCIAL

## 2021-12-01 VITALS — HEIGHT: 59 IN | WEIGHT: 119 LBS | BODY MASS INDEX: 23.99 KG/M2

## 2021-12-01 DIAGNOSIS — S43.91XD SPRAIN OF RIGHT SHOULDER GIRDLE, SUBSEQUENT ENCOUNTER: ICD-10-CM

## 2021-12-01 DIAGNOSIS — G89.29 CHRONIC RIGHT SHOULDER PAIN: ICD-10-CM

## 2021-12-01 DIAGNOSIS — M25.511 CHRONIC RIGHT SHOULDER PAIN: ICD-10-CM

## 2021-12-01 DIAGNOSIS — M75.41 IMPINGEMENT SYNDROME OF RIGHT SHOULDER: ICD-10-CM

## 2021-12-01 DIAGNOSIS — S43.421D SPRAIN OF RIGHT ROTATOR CUFF CAPSULE, SUBSEQUENT ENCOUNTER: Primary | ICD-10-CM

## 2021-12-01 PROCEDURE — 99214 OFFICE O/P EST MOD 30 MIN: CPT | Performed by: ORTHOPAEDIC SURGERY

## 2021-12-01 PROCEDURE — 20610 DRAIN/INJ JOINT/BURSA W/O US: CPT | Performed by: ORTHOPAEDIC SURGERY

## 2021-12-01 RX ORDER — BUPIVACAINE HYDROCHLORIDE 2.5 MG/ML
2 INJECTION, SOLUTION INFILTRATION; PERINEURAL ONCE
Status: COMPLETED | OUTPATIENT
Start: 2021-12-01 | End: 2021-12-01

## 2021-12-01 RX ORDER — METHYLPREDNISOLONE ACETATE 80 MG/ML
80 INJECTION, SUSPENSION INTRA-ARTICULAR; INTRALESIONAL; INTRAMUSCULAR; SOFT TISSUE ONCE
Status: COMPLETED | OUTPATIENT
Start: 2021-12-01 | End: 2021-12-01

## 2021-12-01 RX ADMIN — BUPIVACAINE HYDROCHLORIDE 5 MG: 2.5 INJECTION, SOLUTION INFILTRATION; PERINEURAL at 14:07

## 2021-12-01 RX ADMIN — METHYLPREDNISOLONE ACETATE 80 MG: 80 INJECTION, SUSPENSION INTRA-ARTICULAR; INTRALESIONAL; INTRAMUSCULAR; SOFT TISSUE at 14:11

## 2021-12-01 ASSESSMENT — ENCOUNTER SYMPTOMS
NAUSEA: 0
COUGH: 0
CONSTIPATION: 0
DIARRHEA: 0

## 2021-12-01 NOTE — PROGRESS NOTES
[facility]TS [facility]  [address] [address] [place] [id number]  Dept: [phone]  Dept Fax: [phone]        Ambulatory Follow Up      Subjective:   Edi Moore is a 46y.o. year old female who presents to our office today for routine followup regarding her   1. Sprain of right rotator cuff capsule, subsequent encounter    2. Impingement syndrome of right shoulder    3. Sprain of right shoulder girdle, subsequent encounter    4. Chronic right shoulder pain    . Chief Complaint   Patient presents with    Follow-up     right shoulder       HPI- Edi Moore is a 44y.o. year old right hand dominant female that has had pain in the right shoulder for 13 years. As far as any trauma to the shoulder, the patient indicates that she injured the shoulder when she was working in 2008 but she reaggravated it in when she was lifting a car seat in February 2019. Patient was last seen on 3/25/2021 and received a right shoulder corticosteroid injection. Patient says the injection helped a lot about 95% pain relief for 8 months. She says her shoulder is now starting to have more pain. She doesn't want it to get worse and she made an appointment for a repeat right shoulder injection. Patient can get through most activities of the day but says she will get pain with moving her right shoulder above shoulder height. She denies numbness/tingling. Review of Systems   Constitutional: Negative for chills and fever. Respiratory: Negative for cough. Gastrointestinal: Negative for constipation, diarrhea and nausea. Musculoskeletal: Positive for arthralgias (right shoulder). Negative for gait problem, joint swelling and myalgias. Neurological: Negative for dizziness, weakness and numbness. I have reviewed the CC, HPI, ROS, PMH, FHX, Social History, and if not present in this note, I have reviewed in the patient's chart.    I agree with the documentation provided by other staff and have reviewed their documentation prior to providing my signature indicating agreement. Objective :   Ht 4' 11\" (1.499 m)   Wt 119 lb (54 kg)   LMP 10/21/2020   BMI 24.04 kg/m²  Body mass index is 24.04 kg/m². General: Garrett Duckworth is a 46 y.o. female who is alert and oriented and sitting comfortably in our office. Ortho Exam  MS:  Right shoulder F=160, LK=132. Evaluation of the Right shoulder reveals no significant shoulder girdle muscle atrophy, erythema, warmth, skin lesions, signs of infection. Tenderness to the anterolateral aspect of the shoulder is appreciated. No palpable deformity is noted. A positive Parry test is appreciated. Positive supraspinatus test is appreciated. A positive impingement test is noted. No gross shoulder instability is appreciated. A negative apprehension test is noted. Negative Crenshaw's test is appreciated. Rotator cuff muscle strength testing is intact and symmetric bilaterally without focal deficits. Sensation to C5, C6, C7, C8, T1 dermatomes appears to be intact and symmetric bilaterally. Patient has full range of motion of the cervical spine and elbow. Neuro: alert and oriented to person and place. Eyes: Extra-ocular muscles intact  Mouth: Oral mucosa moist. No perioral lesions  Pulm: Respirations unlabored and regular. Symmetric chest excursion without outward deformity is noted. Skin: warm, well perfused  Psych:   Patient has good fund of knowledge and displays understanging of exam, diagnosis, and plan. Radiology:     No x-rays were taken in office today. SHOULDER INJECTION PROCEDURE NOTE:  The patient was identified. The right shoulder was confirmed with the patient. After a sterile prep with Betadine the shoulder  was injected using a posterior approach to the subacromial space and glenohumeral joint with a mixture of 2 mL of 0.25% Marcaine and 80 mg of Depo-Medrol.   Patient tolerated the procedure well without post injection complications. I instructed the patient to call our office immediately if they have any swelling or increased pain at the injection site. Assessment:      1. Sprain of right rotator cuff capsule, subsequent encounter    2. Impingement syndrome of right shoulder    3. Sprain of right shoulder girdle, subsequent encounter    4. Chronic right shoulder pain       Plan:        Went over previous radiographs with patient. Discussed that her since she had relief for 8 months from her last injection patient would like to go forth with a left shoulder corticosteroid injection. Discussed with her that we will have to get this approved through [facility]. Patient says last time  gave her an injection and asked foe the approval after the visit. Discussed with patient that we are okay performing the injection today but [facility] could deny it. Patient said that was okay and she opted for right shoulder corticosteroid injection today. Patient can follow up as needed. Follow up:Return if symptoms worsen or fail to improve. Orders Placed This Encounter   Medications    methylPREDNISolone acetate (DEPO-MEDROL) injection 80 mg    bupivacaine (MARCAINE) 0.25 % injection 5 mg         Orders Placed This Encounter   Procedures    TX ARTHROCENTESIS ASPIR&/INJ MAJOR JT/BURSA W/O US     I, Agusto Donnelly RN am scribing for and in the presence of Dr. Laura Monae  12/1/2021 8:07 PM      I have reviewed and made changes accordingly to the work scribed by Agusto Donnelly RN. The documentation accurately reflects work and decisions made by me. I have also reviewed documentation completed by clinical staff.     Laura Monae DO, [facility]  12/1/2021 8:08 PM    This note is created with the assistance of a speech recognition program.  While intending to generate a document that actually reflects the content of the visit, the document can still have some errors including those of syntax and sound a like substitutions which may escape proof reading.  In such instances, actual meaning can be extrapolated by contextual diversion      Electronically signed by Rosita Sorensen DO, FAOAO on 12/1/2021 at 8:07 PM

## 2021-12-02 DIAGNOSIS — M12.812 ROTATOR CUFF TEAR ARTHROPATHY OF LEFT SHOULDER: Primary | ICD-10-CM

## 2021-12-02 DIAGNOSIS — M75.102 ROTATOR CUFF TEAR ARTHROPATHY OF LEFT SHOULDER: Primary | ICD-10-CM

## 2021-12-06 ENCOUNTER — HOSPITAL ENCOUNTER (OUTPATIENT)
Dept: PREADMISSION TESTING | Age: 51
Discharge: HOME | End: 2021-12-10

## 2021-12-06 NOTE — PROGRESS NOTES
Abel Levy stated she was going to cancel this procedure with Dr. Kyra Reilly on 12/15/21. She was instructed to call Dr. Chela Arenas office to cancel and she stated she understood. PAT telephone visit not completed.

## 2021-12-07 ENCOUNTER — TELEPHONE (OUTPATIENT)
Dept: GASTROENTEROLOGY | Age: 51
End: 2021-12-07

## 2021-12-07 NOTE — TELEPHONE ENCOUNTER
Rec'd a message from [facility] that pt notified the nurse during her PAT call that she wants to cancel her procedure. Called and spoke with Deanna Lazo whom states she wants to wait a few month to see how she may feel and then she will call back to r/s.

## 2021-12-11 ENCOUNTER — APPOINTMENT (OUTPATIENT)
Dept: MRI IMAGING | Age: 51
End: 2021-12-11
Payer: MEDICAID

## 2021-12-11 ENCOUNTER — HOSPITAL ENCOUNTER (OUTPATIENT)
Dept: MRI IMAGING | Age: 51
Discharge: HOME | End: 2021-12-13
Payer: MEDICAID

## 2021-12-11 ENCOUNTER — APPOINTMENT (OUTPATIENT)
Dept: LAB | Age: 51
End: 2021-12-11
Payer: MEDICAID

## 2021-12-11 DIAGNOSIS — I67.1 CEREBRAL ANEURYSM: ICD-10-CM

## 2021-12-11 PROCEDURE — 70544 MR ANGIOGRAPHY HEAD W/O DYE: CPT

## 2021-12-22 ENCOUNTER — OFFICE VISIT (OUTPATIENT)
Dept: ORTHOPEDIC SURGERY | Age: 51
End: 2021-12-22
Payer: MEDICAID

## 2021-12-22 VITALS
HEART RATE: 70 BPM | RESPIRATION RATE: 14 BRPM | SYSTOLIC BLOOD PRESSURE: 131 MMHG | HEIGHT: 59 IN | WEIGHT: 120 LBS | BODY MASS INDEX: 24.19 KG/M2 | DIASTOLIC BLOOD PRESSURE: 72 MMHG

## 2021-12-22 DIAGNOSIS — M19.012 OSTEOARTHRITIS OF LEFT SHOULDER, UNSPECIFIED OSTEOARTHRITIS TYPE: Primary | ICD-10-CM

## 2021-12-22 PROCEDURE — 20611 DRAIN/INJ JOINT/BURSA W/US: CPT | Performed by: PHYSICIAN ASSISTANT

## 2021-12-22 PROCEDURE — 99999 PR OFFICE/OUTPT VISIT,PROCEDURE ONLY: CPT | Performed by: PHYSICIAN ASSISTANT

## 2021-12-22 RX ORDER — METHYLPREDNISOLONE ACETATE 40 MG/ML
80 INJECTION, SUSPENSION INTRA-ARTICULAR; INTRALESIONAL; INTRAMUSCULAR; SOFT TISSUE ONCE
Status: COMPLETED | OUTPATIENT
Start: 2021-12-22 | End: 2021-12-22

## 2021-12-22 RX ORDER — BUPIVACAINE HYDROCHLORIDE 5 MG/ML
2 INJECTION, SOLUTION PERINEURAL ONCE
Status: COMPLETED | OUTPATIENT
Start: 2021-12-22 | End: 2021-12-22

## 2021-12-22 RX ADMIN — BUPIVACAINE HYDROCHLORIDE 10 MG: 5 INJECTION, SOLUTION PERINEURAL at 16:11

## 2021-12-22 RX ADMIN — METHYLPREDNISOLONE ACETATE 80 MG: 40 INJECTION, SUSPENSION INTRA-ARTICULAR; INTRALESIONAL; INTRAMUSCULAR; SOFT TISSUE at 16:10

## 2021-12-22 NOTE — PROGRESS NOTES
[address] AND SPORTS MEDICINE  [address] [place] [address]  Dept: [phone]  Dept Fax: [phone]          Left Shoulder Visit - Follow up    Subjective:     Chief Complaint   Patient presents with    Follow-up     L Shoulder Injection (LI 12/18/20)     HPI:     Garrett Duckworth presents today for Left shoulder pain. Patient is here today for cortisone injections into Left shoulder. She had her last subacromial cortisone injection was on 12/18/2020 with excellent relief. I have reviewed the CC, and if not present in this note, I have reviewed in the patient's chart. I agree with the documentation provided by other staff and have reviewed their documentation prior to providing my signature indicating agreement. Vitals:   /72   Pulse 70   Resp 14   Ht 4' 11\" (1.499 m)   Wt 120 lb (54.4 kg)   LMP 10/21/2020   BMI 24.24 kg/m²  Body mass index is 24.24 kg/m². Assessment:     1. Osteoarthritis of left shoulder, unspecified osteoarthritis type          Procedure:   Procedure: Yes    Subacromial Bursa Injection    Location: Left Shoulder  Procedure: I discussed in detail the risks, benefits and complications of an injection which included but are not limited to infection, skin reactions, hot swollen joint and anaphylaxis with the patient. The patient verbalized understanding and gave informed consent for the injection. The skin was prepped with betadine in a sterile fashion. Under these sterile conditions, a [employer] ultrasound unit with a variable frequency linear transducer was used for precise placement of a 22-gauge needle into the subacromial bursa. A clean technique was utilized using sterile gloves and after prepping the patient under the stated sterile conditions, the patient was placed in the Seated position on the exam table.  The posterior soft spot approximately 2 cm distal and 2 cm medial to the posterior acromial edge was identified and marked and a 4 cc solution containing 2 cc of 0.5% Bupivocainewith 2 cc containing 40 mg of Depomedrol was injected into the subacromial space with the 22-gauge needle. The needled was withdrawn and the injection site was cleansed. A Band-Aid was placed over the injection site. There was no resistance to the injection and the patient tolerated the procedure well without difficulty. Adverse reactions of the injection was discussed with the patient including signs of infection, increasing pain, redness, swelling, warmth, fever, chills and the patient was instructed to call immediately with any of these symptoms. Successful needle placement was achieved and final images were taken and saved in the patient's chart for the permanent record. The images are stored on SD card in the machine until downloaded to the patient's chart. Plan:   Patient should return to the clinic in 3-4 months or as needed to follow up with Smith Washington PA-C. The patient will call the office immediately with any problems. Orders Placed This Encounter   Medications    methylPREDNISolone acetate (DEPO-MEDROL) injection 80 mg    bupivacaine (MARCAINE) 0.5 % injection 10 mg       No orders of the defined types were placed in this encounter.           Electronically signed by Elgin London PA-C, on 12/22/2021 at 12:54 PM

## 2022-01-31 ENCOUNTER — OFFICE VISIT (OUTPATIENT)
Dept: FAMILY MEDICINE CLINIC | Age: 52
End: 2022-01-31
Payer: COMMERCIAL

## 2022-01-31 VITALS
WEIGHT: 124.4 LBS | HEART RATE: 69 BPM | BODY MASS INDEX: 25.08 KG/M2 | HEIGHT: 59 IN | DIASTOLIC BLOOD PRESSURE: 76 MMHG | SYSTOLIC BLOOD PRESSURE: 121 MMHG | TEMPERATURE: 97.9 F

## 2022-01-31 DIAGNOSIS — K21.9 GASTROESOPHAGEAL REFLUX DISEASE WITHOUT ESOPHAGITIS: Primary | ICD-10-CM

## 2022-01-31 DIAGNOSIS — Z13.220 SCREENING FOR HYPERLIPIDEMIA: ICD-10-CM

## 2022-01-31 DIAGNOSIS — E11.9 TYPE 2 DIABETES MELLITUS WITHOUT COMPLICATION, WITHOUT LONG-TERM CURRENT USE OF INSULIN (HCC): ICD-10-CM

## 2022-01-31 LAB — HBA1C MFR BLD: 6.9 %

## 2022-01-31 PROCEDURE — 83036 HEMOGLOBIN GLYCOSYLATED A1C: CPT | Performed by: STUDENT IN AN ORGANIZED HEALTH CARE EDUCATION/TRAINING PROGRAM

## 2022-01-31 PROCEDURE — 99213 OFFICE O/P EST LOW 20 MIN: CPT | Performed by: FAMILY MEDICINE

## 2022-01-31 PROCEDURE — 99213 OFFICE O/P EST LOW 20 MIN: CPT | Performed by: STUDENT IN AN ORGANIZED HEALTH CARE EDUCATION/TRAINING PROGRAM

## 2022-01-31 PROCEDURE — 99211 OFF/OP EST MAY X REQ PHY/QHP: CPT | Performed by: FAMILY MEDICINE

## 2022-01-31 ASSESSMENT — PATIENT HEALTH QUESTIONNAIRE - PHQ9
5. POOR APPETITE OR OVEREATING: 1
8. MOVING OR SPEAKING SO SLOWLY THAT OTHER PEOPLE COULD HAVE NOTICED. OR THE OPPOSITE, BEING SO FIGETY OR RESTLESS THAT YOU HAVE BEEN MOVING AROUND A LOT MORE THAN USUAL: 0
1. LITTLE INTEREST OR PLEASURE IN DOING THINGS: 1
4. FEELING TIRED OR HAVING LITTLE ENERGY: 1
6. FEELING BAD ABOUT YOURSELF - OR THAT YOU ARE A FAILURE OR HAVE LET YOURSELF OR YOUR FAMILY DOWN: 2
SUM OF ALL RESPONSES TO PHQ9 QUESTIONS 1 & 2: 4
SUM OF ALL RESPONSES TO PHQ QUESTIONS 1-9: 12
SUM OF ALL RESPONSES TO PHQ QUESTIONS 1-9: 12
10. IF YOU CHECKED OFF ANY PROBLEMS, HOW DIFFICULT HAVE THESE PROBLEMS MADE IT FOR YOU TO DO YOUR WORK, TAKE CARE OF THINGS AT HOME, OR GET ALONG WITH OTHER PEOPLE: 0
9. THOUGHTS THAT YOU WOULD BE BETTER OFF DEAD, OR OF HURTING YOURSELF: 0
7. TROUBLE CONCENTRATING ON THINGS, SUCH AS READING THE NEWSPAPER OR WATCHING TELEVISION: 1
SUM OF ALL RESPONSES TO PHQ QUESTIONS 1-9: 12
3. TROUBLE FALLING OR STAYING ASLEEP: 3
SUM OF ALL RESPONSES TO PHQ QUESTIONS 1-9: 12
2. FEELING DOWN, DEPRESSED OR HOPELESS: 3

## 2022-01-31 ASSESSMENT — ENCOUNTER SYMPTOMS
DIARRHEA: 0
VOMITING: 0
SHORTNESS OF BREATH: 0
NAUSEA: 0
ABDOMINAL PAIN: 0

## 2022-01-31 ASSESSMENT — COLUMBIA-SUICIDE SEVERITY RATING SCALE - C-SSRS
6. HAVE YOU EVER DONE ANYTHING, STARTED TO DO ANYTHING, OR PREPARED TO DO ANYTHING TO END YOUR LIFE?: NO
1. WITHIN THE PAST MONTH, HAVE YOU WISHED YOU WERE DEAD OR WISHED YOU COULD GO TO SLEEP AND NOT WAKE UP?: NO
2. HAVE YOU ACTUALLY HAD ANY THOUGHTS OF KILLING YOURSELF?: NO

## 2022-01-31 NOTE — PROGRESS NOTES
I have reviewed and discussed lopez elements of [address] [place] [address] with the resident including plan of care and follow up and agree with the care romelia plan. Vitals:    01/31/22 1340   BP: 121/76   Pulse: 69   Temp: 97.9 °F (36.6 °C)        Diagnosis Orders   1. Gastroesophageal reflux disease without esophagitis  KOMAL - Alex Horner MD, Gastroenterology, [facility]   2. Screening for hyperlipidemia  Lipid Panel   3.  Type 2 diabetes mellitus without complication, without long-term current use of insulin (HCC)  POCT glycosylated hemoglobin (Hb A1C)    Microalbumin, Ur

## 2022-01-31 NOTE — PATIENT INSTRUCTIONS
Thank you for letting us take care of you today. We hope all your questions were addressed. If a question was overlooked or something else comes to mind after you return home, please contact a member of your Care Team listed below. Your Care Team at [place] [address] is Team #5  Dwaine Elliott MD (Faculty)  Desire Hung MD (Resident)  Nakita Lawson MD (Resident)  Shara Bates MD (Resident)  Trice Cheney MD (Resident)  Oliva Ryan., TAE Spencer., YOBANY Worthy., Juan Her., [facility] office)  Cheo Joshi, [address] (Clinical Practice Manager)  Carlos Blas [facility] (Clinical Pharmacist)       Office phone number: [phone]    If you need to get in right away due to illness, please be advised we have \"Same Day\" appointments available Monday-Friday. Please call us at [phone] option #3 to schedule your \"Same Day\" appointment.

## 2022-01-31 NOTE — PROGRESS NOTES
Visit Information    Have you changed or started any medications since your last visit including any over-the-counter medicines, vitamins, or herbal medicines? no   Have you stopped taking any of your medications? Is so, why? -  no  Are you having any side effects from any of your medications? - no    Have you seen any other physician or provider since your last visit?  no   Have you had any other diagnostic tests since your last visit?  no   Have you been seen in the emergency room and/or had an admission in a hospital since we last saw you?  no   Have you had your routine dental cleaning in the past 6 months?  no     Do you have an active MyChart account? If no, what is the barrier?   Yes    Patient Care Team:  Karla Quintero MD as PCP - General (Family Medicine)    Medical History Review  Past Medical, Family, and Social History reviewed and does not contribute to the patient presenting condition    Health Maintenance   Topic Date Due    COVID-19 Vaccine (1) Never done    Pneumococcal 0-64 years Vaccine (1 of 2 - PPSV23) Never done    Diabetic foot exam  Never done    Depression Screen  Never done    Diabetic retinal exam  Never done    Shingles Vaccine (1 of 2) Never done    Cervical cancer screen  04/20/2020    Flu vaccine (1) Never done    Diabetic microalbuminuria test  12/22/2021    Lipid screen  12/22/2021    A1C test (Diabetic or Prediabetic)  01/15/2022    Breast cancer screen  03/04/2023    DTaP/Tdap/Td vaccine (2 - Td or Tdap) 03/17/2025    Colon cancer screen colonoscopy  03/08/2031    Hepatitis C screen  Completed    HIV screen  Completed    Hepatitis A vaccine  Aged Out    Hepatitis B vaccine  Aged Out    Hib vaccine  Aged Out    Meningococcal (ACWY) vaccine  Aged Out

## 2022-02-15 ENCOUNTER — HOSPITAL ENCOUNTER (OUTPATIENT)
Age: 52
Discharge: HOME | End: 2022-02-15
Payer: MEDICARE

## 2022-02-15 LAB
ABSOLUTE EOS #: 0.05 K/UL (ref 0–0.44)
ABSOLUTE IMMATURE GRANULOCYTE: <0.03 K/UL (ref 0–0.3)
ABSOLUTE LYMPH #: 2.47 K/UL (ref 1.1–3.7)
ABSOLUTE MONO #: 0.37 K/UL (ref 0.1–1.2)
ALBUMIN SERPL-MCNC: 4.5 G/DL (ref 3.5–5.2)
ALBUMIN/GLOBULIN RATIO: 1.9 (ref 1–2.5)
ALP BLD-CCNC: 120 U/L (ref 35–104)
ALT SERPL-CCNC: 21 U/L (ref 5–33)
ANION GAP SERPL CALCULATED.3IONS-SCNC: 14 MMOL/L (ref 9–17)
AST SERPL-CCNC: 28 U/L
BASOPHILS # BLD: 1 % (ref 0–2)
BASOPHILS ABSOLUTE: 0.04 K/UL (ref 0–0.2)
BILIRUB SERPL-MCNC: 0.41 MG/DL (ref 0.3–1.2)
BILIRUBIN DIRECT: <0.08 MG/DL
BILIRUBIN, INDIRECT: ABNORMAL MG/DL (ref 0–1)
BUN BLDV-MCNC: 11 MG/DL (ref 6–20)
CALCIUM SERPL-MCNC: 9.7 MG/DL (ref 8.6–10.4)
CHLORIDE BLD-SCNC: 101 MMOL/L (ref 98–107)
CO2: 24 MMOL/L (ref 20–31)
CREAT SERPL-MCNC: 0.75 MG/DL (ref 0.5–0.9)
DIFFERENTIAL TYPE: ABNORMAL
EOSINOPHILS RELATIVE PERCENT: 1 % (ref 1–4)
GFR AFRICAN AMERICAN: >60 ML/MIN
GFR NON-AFRICAN AMERICAN: >60 ML/MIN
GFR SERPL CREATININE-BSD FRML MDRD: ABNORMAL ML/MIN/{1.73_M2}
GFR SERPL CREATININE-BSD FRML MDRD: ABNORMAL ML/MIN/{1.73_M2}
GLUCOSE BLD-MCNC: 96 MG/DL (ref 70–99)
HCT VFR BLD CALC: 37.6 % (ref 36.3–47.1)
HEMOGLOBIN: 11.9 G/DL (ref 11.9–15.1)
IMMATURE GRANULOCYTES: 0 %
LYMPHOCYTES # BLD: 37 % (ref 24–43)
MCH RBC QN AUTO: 28.1 PG (ref 25.2–33.5)
MCHC RBC AUTO-ENTMCNC: 31.6 G/DL (ref 28.4–34.8)
MCV RBC AUTO: 88.9 FL (ref 82.6–102.9)
MONOCYTES # BLD: 6 % (ref 3–12)
NRBC AUTOMATED: 0 PER 100 WBC
PDW BLD-RTO: 14.6 % (ref 11.8–14.4)
PLATELET # BLD: 242 K/UL (ref 138–453)
PLATELET ESTIMATE: ABNORMAL
PMV BLD AUTO: 10.8 FL (ref 8.1–13.5)
POTASSIUM SERPL-SCNC: 4.9 MMOL/L (ref 3.7–5.3)
RBC # BLD: 4.23 M/UL (ref 3.95–5.11)
RBC # BLD: ABNORMAL 10*6/UL
SEG NEUTROPHILS: 55 % (ref 36–65)
SEGMENTED NEUTROPHILS ABSOLUTE COUNT: 3.67 K/UL (ref 1.5–8.1)
SODIUM BLD-SCNC: 139 MMOL/L (ref 135–144)
TOTAL PROTEIN: 6.9 G/DL (ref 6.4–8.3)
WBC # BLD: 6.6 K/UL (ref 3.5–11.3)
WBC # BLD: ABNORMAL 10*3/UL

## 2022-02-15 PROCEDURE — 82248 BILIRUBIN DIRECT: CPT

## 2022-02-15 PROCEDURE — 85025 COMPLETE CBC W/AUTO DIFF WBC: CPT

## 2022-02-15 PROCEDURE — 80053 COMPREHEN METABOLIC PANEL: CPT

## 2022-02-15 PROCEDURE — 36415 COLL VENOUS BLD VENIPUNCTURE: CPT

## 2022-02-18 ENCOUNTER — HOSPITAL ENCOUNTER (EMERGENCY)
Age: 52
Discharge: HOME | End: 2022-02-18
Attending: EMERGENCY MEDICINE
Payer: COMMERCIAL

## 2022-02-18 ENCOUNTER — APPOINTMENT (OUTPATIENT)
Dept: GENERAL RADIOLOGY | Age: 52
End: 2022-02-18
Payer: COMMERCIAL

## 2022-02-18 VITALS
WEIGHT: 120 LBS | RESPIRATION RATE: 20 BRPM | HEIGHT: 59 IN | BODY MASS INDEX: 24.19 KG/M2 | OXYGEN SATURATION: 96 % | SYSTOLIC BLOOD PRESSURE: 142 MMHG | DIASTOLIC BLOOD PRESSURE: 91 MMHG | HEART RATE: 67 BPM | TEMPERATURE: 98.4 F
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
## 2022-02-18 DIAGNOSIS — S39.012A STRAIN OF LUMBAR REGION, INITIAL ENCOUNTER: ICD-10-CM

## 2022-02-18 DIAGNOSIS — S16.1XXA STRAIN OF NECK MUSCLE, INITIAL ENCOUNTER: ICD-10-CM

## 2022-02-18 PROCEDURE — 99285 EMERGENCY DEPT VISIT HI MDM: CPT

## 2022-02-18 PROCEDURE — 72040 X-RAY EXAM NECK SPINE 2-3 VW: CPT

## 2022-02-18 PROCEDURE — 72100 X-RAY EXAM L-S SPINE 2/3 VWS: CPT

## 2022-02-18 PROCEDURE — 6360000002 HC RX W HCPCS: Performed by: PHYSICIAN ASSISTANT

## 2022-02-18 PROCEDURE — 96372 THER/PROPH/DIAG INJ SC/IM: CPT

## 2022-02-18 PROCEDURE — 6370000000 HC RX 637 (ALT 250 FOR IP): Performed by: PHYSICIAN ASSISTANT

## 2022-02-18 RX ORDER — METHOCARBAMOL 750 MG/1
750 TABLET, FILM COATED ORAL 4 TIMES DAILY
Qty: 40 TABLET | Refills: 0 | Status: SHIPPED | OUTPATIENT
Start: 2022-02-18 | End: 2022-02-28

## 2022-02-18 RX ORDER — ETODOLAC 500 MG/1
500 TABLET, FILM COATED ORAL 2 TIMES DAILY
Qty: 14 TABLET | Refills: 0 | Status: SHIPPED | OUTPATIENT
Start: 2022-02-18 | End: 2022-05-06

## 2022-02-18 RX ORDER — ONDANSETRON 4 MG/1
4 TABLET, ORALLY DISINTEGRATING ORAL ONCE
Status: COMPLETED | OUTPATIENT
Start: 2022-02-18 | End: 2022-02-18

## 2022-02-18 RX ORDER — MORPHINE SULFATE 4 MG/ML
4 INJECTION, SOLUTION INTRAMUSCULAR; INTRAVENOUS ONCE
Status: COMPLETED | OUTPATIENT
Start: 2022-02-18 | End: 2022-02-18

## 2022-02-18 RX ORDER — ORPHENADRINE CITRATE 30 MG/ML
60 INJECTION INTRAMUSCULAR; INTRAVENOUS ONCE
Status: COMPLETED | OUTPATIENT
Start: 2022-02-18 | End: 2022-02-18

## 2022-02-18 RX ADMIN — ONDANSETRON 4 MG: 4 TABLET, ORALLY DISINTEGRATING ORAL at 17:17

## 2022-02-18 RX ADMIN — ORPHENADRINE CITRATE 60 MG: 30 INJECTION INTRAMUSCULAR; INTRAVENOUS at 17:18

## 2022-02-18 RX ADMIN — MORPHINE SULFATE 4 MG: 4 INJECTION, SOLUTION INTRAMUSCULAR; INTRAVENOUS at 17:20

## 2022-02-18 ASSESSMENT — PAIN DESCRIPTION - LOCATION: LOCATION: BACK

## 2022-02-18 ASSESSMENT — PAIN - FUNCTIONAL ASSESSMENT: PAIN_FUNCTIONAL_ASSESSMENT: 0-10

## 2022-02-18 ASSESSMENT — PAIN DESCRIPTION - PAIN TYPE: TYPE: ACUTE PAIN

## 2022-02-18 ASSESSMENT — PAIN DESCRIPTION - ORIENTATION: ORIENTATION: UPPER;MID;LOWER

## 2022-02-18 ASSESSMENT — PAIN DESCRIPTION - FREQUENCY: FREQUENCY: CONTINUOUS

## 2022-02-18 ASSESSMENT — PAIN SCALES - GENERAL
PAINLEVEL_OUTOF10: 10
PAINLEVEL_OUTOF10: 10
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
## 2022-02-18 NOTE — ED NOTES
Discharge instructions given with prescriptions and voiced knowledge of self care and follow up. Out via w/c per RN. No s/s of acute distress noted.       Queenie Vega, RN  02/18/22 3060
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
## 2022-05-06 ENCOUNTER — OFFICE VISIT (OUTPATIENT)
Dept: FAMILY MEDICINE CLINIC | Age: 52
End: 2022-05-06
Payer: MEDICARE

## 2022-05-06 VITALS
BODY MASS INDEX: 24.88 KG/M2 | SYSTOLIC BLOOD PRESSURE: 139 MMHG | WEIGHT: 123.4 LBS | HEIGHT: 59 IN | HEART RATE: 80 BPM | DIASTOLIC BLOOD PRESSURE: 88 MMHG | TEMPERATURE: 97.7 F

## 2022-05-06 DIAGNOSIS — M54.42 CHRONIC BILATERAL LOW BACK PAIN WITH LEFT-SIDED SCIATICA: Primary | ICD-10-CM

## 2022-05-06 DIAGNOSIS — G89.29 CHRONIC BILATERAL LOW BACK PAIN WITH LEFT-SIDED SCIATICA: Primary | ICD-10-CM

## 2022-05-06 PROCEDURE — 3017F COLORECTAL CA SCREEN DOC REV: CPT

## 2022-05-06 PROCEDURE — 4004F PT TOBACCO SCREEN RCVD TLK: CPT

## 2022-05-06 PROCEDURE — 99213 OFFICE O/P EST LOW 20 MIN: CPT

## 2022-05-06 PROCEDURE — G8427 DOCREV CUR MEDS BY ELIG CLIN: HCPCS

## 2022-05-06 PROCEDURE — G8420 CALC BMI NORM PARAMETERS: HCPCS

## 2022-05-06 ASSESSMENT — ENCOUNTER SYMPTOMS
ABDOMINAL PAIN: 0
SHORTNESS OF BREATH: 0
VOMITING: 0
COLOR CHANGE: 0
PHOTOPHOBIA: 0
DIARRHEA: 0
CHOKING: 0
BACK PAIN: 1
NAUSEA: 0
COUGH: 0
CONSTIPATION: 0
WHEEZING: 0
ABDOMINAL DISTENTION: 0
SORE THROAT: 0
RHINORRHEA: 0
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
## 2022-05-06 NOTE — PROGRESS NOTES
Visit Information    Have you changed or started any medications since your last visit including any over-the-counter medicines, vitamins, or herbal medicines? no   Have you stopped taking any of your medications? Is so, why? -  no  Are you having any side effects from any of your medications? - no    Have you seen any other physician or provider since your last visit? Yes - Chiropractor  Have you had any other diagnostic tests since your last visit? yes - Labs, XR   Have you been seen in the emergency room and/or had an admission in a hospital since we last saw you?  yes - [facility]   Have you had your routine dental cleaning in the past 6 months?  no     Do you have an active MyChart account? If no, what is the barrier?   Yes    Patient Care Team:  Divya Valenzuela MD as PCP - General (Family Medicine)    Medical History Review  Past Medical, Family, and Social History reviewed and does not contribute to the patient presenting condition    Health Maintenance   Topic Date Due    COVID-19 Vaccine (1) Never done    Pneumococcal 0-64 years Vaccine (1 - PCV) Never done    Diabetic foot exam  Never done    Diabetic retinal exam  Never done    Hepatitis B vaccine (1 of 3 - Risk 3-dose series) Never done    Shingles vaccine (1 of 2) Never done    Cervical cancer screen  04/20/2020    Diabetic microalbuminuria test  12/22/2021    Lipids  12/22/2021    Flu vaccine (Season Ended) 09/01/2022    A1C test (Diabetic or Prediabetic)  01/31/2023    Depression Monitoring  01/31/2023    Breast cancer screen  03/04/2023    DTaP/Tdap/Td vaccine (2 - Td or Tdap) 03/17/2025    Colorectal Cancer Screen  03/08/2031    Hepatitis C screen  Completed    HIV screen  Completed    Hepatitis A vaccine  Aged Out    Hib vaccine  Aged Out    Meningococcal (ACWY) vaccine  Aged Out

## 2022-05-06 NOTE — PATIENT INSTRUCTIONS
Thank you for letting us take care of you today. We hope all your questions were addressed. If a question was overlooked or something else comes to mind after you return home, please contact a member of your Care Team listed below. Your Care Team at [place] [address] is Team #2  Yamilet Hernandez DO (Faculty)  David Rose (Faculty)  Marychuy Howard MD (Resident)  Desmond Caicedo MD (Resident)  Jose De Jesus Maldonado MD (Resident)  Benjaman Dakin, MD (Resident)  Dalton Mcgarry., [facility]  Britta Abraham.,  YOBANY Mcclure., [facility] office)  Lola Taylor, [address] (Clinical Practice Manager)  Ivana Avila, [facility] (Clinical Pharmacist)     Office phone number: [phone]    If you need to get in right away due to illness, please be advised we have \"Same Day\" appointments available Monday-Friday. Please call us at [phone] option #3 to schedule your \"Same Day\" appointment.
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
## 2022-05-13 ENCOUNTER — OFFICE VISIT (OUTPATIENT)
Dept: FAMILY MEDICINE CLINIC | Age: 52
End: 2022-05-13
Payer: MEDICARE

## 2022-05-13 VITALS
SYSTOLIC BLOOD PRESSURE: 130 MMHG | HEIGHT: 59 IN | HEART RATE: 86 BPM | TEMPERATURE: 97.1 F | WEIGHT: 124.4 LBS | BODY MASS INDEX: 25.08 KG/M2 | DIASTOLIC BLOOD PRESSURE: 86 MMHG

## 2022-05-13 DIAGNOSIS — G89.29 CHRONIC BILATERAL LOW BACK PAIN WITH LEFT-SIDED SCIATICA: ICD-10-CM

## 2022-05-13 DIAGNOSIS — E11.9 TYPE 2 DIABETES MELLITUS WITHOUT COMPLICATION, WITHOUT LONG-TERM CURRENT USE OF INSULIN (HCC): Primary | ICD-10-CM

## 2022-05-13 DIAGNOSIS — M54.42 CHRONIC BILATERAL LOW BACK PAIN WITH LEFT-SIDED SCIATICA: ICD-10-CM

## 2022-05-13 PROCEDURE — 99213 OFFICE O/P EST LOW 20 MIN: CPT | Performed by: STUDENT IN AN ORGANIZED HEALTH CARE EDUCATION/TRAINING PROGRAM

## 2022-05-13 PROCEDURE — 3044F HG A1C LEVEL LT 7.0%: CPT | Performed by: STUDENT IN AN ORGANIZED HEALTH CARE EDUCATION/TRAINING PROGRAM

## 2022-05-13 ASSESSMENT — ENCOUNTER SYMPTOMS
NAUSEA: 0
BACK PAIN: 1
ABDOMINAL PAIN: 0
SHORTNESS OF BREATH: 0
VOMITING: 0
DIARRHEA: 0
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
## 2022-05-13 NOTE — PATIENT INSTRUCTIONS
Thank you for letting us take care of you today. We hope all your questions were addressed. If a question was overlooked or something else comes to mind after you return home, please contact a member of your Care Team listed below. Your Care Team at [place] [address] is Team #5  Yenny Foreman MD (Faculty)  Gabbi Valencia MD (Resident)  Shane Rowley MD (Resident)  Nery Crowder MD (Resident)  Gwen Mckinley MD (Resident)  Ashley Becker., JAMIA Guthrie., IVY Gomez., Candice Sutton., [facility] [place] office)  Meera Chavarria, [address] [facility] [address] (Clinical Practice Manager)  Denny Aguilar, [facility] (Clinical Pharmacist)       Office phone number: [phone]    If you need to get in right away due to illness, please be advised we have \"Same Day\" appointments available Monday-Friday. Please call us at [phone] option #3 to schedule your \"Same Day\" appointment.
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
## 2022-05-13 NOTE — PROGRESS NOTES
Visit Information    Have you changed or started any medications since your last visit including any over-the-counter medicines, vitamins, or herbal medicines? no   Have you stopped taking any of your medications? Is so, why? -  no  Are you having any side effects from any of your medications? - no    Have you seen any other physician or provider since your last visit?  no   Have you had any other diagnostic tests since your last visit?  no   Have you been seen in the emergency room and/or had an admission in a hospital since we last saw you?  no   Have you had your routine dental cleaning in the past 6 months?  no     Do you have an active MyChart account? If no, what is the barrier?   Yes    Patient Care Team:  Tip Quintero MD as PCP - General (Family Medicine)    Medical History Review  Past Medical, Family, and Social History reviewed and does not contribute to the patient presenting condition    Health Maintenance   Topic Date Due    COVID-19 Vaccine (1) Never done    Pneumococcal 0-64 years Vaccine (1 - PCV) Never done    Diabetic foot exam  Never done    Diabetic retinal exam  Never done    Hepatitis B vaccine (1 of 3 - Risk 3-dose series) Never done    Shingles vaccine (1 of 2) Never done    Cervical cancer screen  04/20/2020    Diabetic microalbuminuria test  12/22/2021    Lipids  12/22/2021    Flu vaccine (Season Ended) 09/01/2022    A1C test (Diabetic or Prediabetic)  01/31/2023    Depression Monitoring  01/31/2023    Breast cancer screen  03/04/2023    DTaP/Tdap/Td vaccine (2 - Td or Tdap) 03/17/2025    Colorectal Cancer Screen  03/08/2031    Hepatitis C screen  Completed    HIV screen  Completed    Hepatitis A vaccine  Aged Out    Hib vaccine  Aged Out    Meningococcal (ACWY) vaccine  Aged Out

## 2022-05-13 NOTE — PROGRESS NOTES
Visit Information    Have you changed or started any medications since your last visit including any over-the-counter medicines, vitamins, or herbal medicines? no   Have you stopped taking any of your medications? Is so, why? -  no  Are you having any side effects from any of your medications? - no    Have you seen any other physician or provider since your last visit?  no   Have you had any other diagnostic tests since your last visit?  no   Have you been seen in the emergency room and/or had an admission in a hospital since we last saw you?  no   Have you had your routine dental cleaning in the past 6 months?  no     Do you have an active MyChart account? If no, what is the barrier?   Yes    Patient Care Team:  Dedrick Mendoza MD as PCP - General (Family Medicine)    Medical History Review  Past Medical, Family, and Social History reviewed and does not contribute to the patient presenting condition    Health Maintenance   Topic Date Due    COVID-19 Vaccine (1) Never done    Pneumococcal 0-64 years Vaccine (1 - PCV) Never done    Diabetic foot exam  Never done    Diabetic retinal exam  Never done    Hepatitis B vaccine (1 of 3 - Risk 3-dose series) Never done    Shingles vaccine (1 of 2) Never done    Cervical cancer screen  04/20/2020    Diabetic microalbuminuria test  12/22/2021    Lipids  12/22/2021    Flu vaccine (Season Ended) 09/01/2022    A1C test (Diabetic or Prediabetic)  01/31/2023    Depression Monitoring  01/31/2023    Breast cancer screen  03/04/2023    DTaP/Tdap/Td vaccine (2 - Td or Tdap) 03/17/2025    Colorectal Cancer Screen  03/08/2031    Hepatitis C screen  Completed    HIV screen  Completed    Hepatitis A vaccine  Aged Out    Hib vaccine  Aged Out    Meningococcal (ACWY) vaccine  Aged Out

## 2022-06-16 ENCOUNTER — OFFICE VISIT (OUTPATIENT)
Dept: ORTHOPEDIC SURGERY | Age: 52
End: 2022-06-16
Payer: MEDICARE

## 2022-06-16 VITALS — WEIGHT: 124 LBS | BODY MASS INDEX: 25 KG/M2 | HEIGHT: 59 IN

## 2022-06-16 DIAGNOSIS — M75.102 ROTATOR CUFF SYNDROME OF LEFT SHOULDER: Primary | ICD-10-CM

## 2022-06-16 PROCEDURE — 20610 DRAIN/INJ JOINT/BURSA W/O US: CPT | Performed by: PHYSICIAN ASSISTANT

## 2022-06-16 RX ORDER — LIDOCAINE HYDROCHLORIDE 10 MG/ML
2 INJECTION, SOLUTION INFILTRATION; PERINEURAL ONCE
Status: SHIPPED | OUTPATIENT
Start: 2022-06-16

## 2022-06-16 RX ORDER — METHYLPREDNISOLONE ACETATE 80 MG/ML
80 INJECTION, SUSPENSION INTRA-ARTICULAR; INTRALESIONAL; INTRAMUSCULAR; SOFT TISSUE ONCE
Status: SHIPPED | OUTPATIENT
Start: 2022-06-16

## 2022-06-16 ASSESSMENT — ENCOUNTER SYMPTOMS
CONSTIPATION: 0
RESPIRATORY NEGATIVE: 1
SHORTNESS OF BREATH: 0
COUGH: 0
NAUSEA: 0
ABDOMINAL DISTENTION: 0
VOMITING: 0
APNEA: 0
ABDOMINAL PAIN: 0
DIARRHEA: 0
COLOR CHANGE: 0
CHEST TIGHTNESS: 0

## 2022-06-16 NOTE — PROGRESS NOTES
[address]  [address] [address]  Dept: [phone]  Dept Fax: [phone]        Ambulatory Follow Up      Subjective:   Sandra Avery is a 46y.o. year old female who presents to our office today for routine followup regarding her   1. Rotator cuff syndrome of left shoulder    . Chief Complaint   Patient presents with    Follow-up     shoulder pain       HPI Sandra Avery  is a 46 y.o. Right hand dominant  female who presents today in follow for left shoulder pain. The patient was last seen on 12/22/2021 and underwent treatment in the form of left subacromial injection. The patient notes 80% improvement with the previous treatment until recently. Review of Systems   Constitutional: Positive for activity change. Negative for appetite change, fatigue and fever. Respiratory: Negative. Negative for apnea, cough, chest tightness and shortness of breath. Cardiovascular: Negative. Negative for chest pain, palpitations and leg swelling. Gastrointestinal: Negative for abdominal distention, abdominal pain, constipation, diarrhea, nausea and vomiting. Genitourinary: Negative for difficulty urinating, dysuria and hematuria. Musculoskeletal: Positive for arthralgias. Negative for gait problem, joint swelling and myalgias. Skin: Negative for color change and rash. Neurological: Negative for dizziness, weakness, numbness and headaches. Psychiatric/Behavioral: Positive for sleep disturbance. Objective :   Ht 4' 11\" (1.499 m)   Wt 124 lb (56.2 kg)   LMP 10/21/2020   BMI 25.04 kg/m²  Body mass index is 25.04 kg/m². General: Sandra Avery is a 46 y.o. female who is alert and oriented and sitting comfortably in our office. Orthopedics:    GENERAL: Alert and oriented X3 in no acute distress. SKIN: Intact without lesions or ulcerations.   NEURO: Musculoskeletal and axillary nerves intact to sensory and motor testing. VASC: Capillary refill is less than 3 seconds. left SHOULDER  GEN:  Alert and oriented X 3, in no acute distress. SKIN:  Intact without rashes, lesions, or ulcerations. Incisions are well healed. NEURO:  Musculoskeletal ans axillary nerves intact to sensory and motor testing. VASC:  Cap refill less than than 3 secs. Negative Adson's test, Negative Christina's test.  ROM: Forward elevation 160degrees, external rotation in neutral 60 degrees, external rotation in abduction 90 degrees, internal rotation to T9. MUSC:  No atrophy, 4/5 supraspinatus, 5/5 external rotators, negative subscrap lift off or belly press test.  IMP:  +Neer's sign, +Hawkin's sign, +painful arc, +pain with cross body abduction. PALP: no pain over anterolateral acromion, no pain over AC joint, no pain over traps/rhomboids. INST:   Negative Sheridan's test, negative Speeds    Radiology: Previous x-rays reviewed    Procedure:    Subacromial Bursa Injection    Location: Left Shoulder  Procedure: I discussed in detail the risks, benefits and complications of an injection which included but are not limited to infection, skin reactions, hot swollen joint and anaphylaxis with the patient. The patient verbalized understanding and gave informed consent for the injection. The skin was prepped with betadine in a sterile fashion. Under these sterile conditions, a [employer] ultrasound unit with a variable frequency linear transducer was used for precise placement of a 22-gauge needle into the subacromial bursa. A clean technique was utilized using sterile gloves and after prepping the patient under the stated sterile conditions, the patient was placed in the Seated position on the exam table.  The posterior soft spot approximately 2 cm distal and 2 cm medial to the posterior acromial edge was identified and marked and a 3 cc solution containing 2 cc of 1% Lidocainewith 1 cc containing 80 mg of Depomedrol was injected into the subacromial space with the 22-gauge needle. The needled was withdrawn and the injection site was cleansed. A Band-Aid was placed over the injection site. There was no resistance to the injection and the patient tolerated the procedure well without difficulty. Adverse reactions of the injection was discussed with the patient including signs of infection, increasing pain, redness, swelling, warmth, fever, chills and the patient was instructed to call immediately with any of these symptoms. Successful needle placement was achieved and final images were taken and saved in the patient's chart for the permanent record. The images are stored on SD card in the machine until downloaded to the patient's chart. Assessment:      1. Rotator cuff syndrome of left shoulder       Plan:      Reviewed the previous x-rays with the patient. Discussed that she had relief for many months after her last subacromial injection that I would continue with this treatment plan since it works really well for her. The left shoulder gets aggravated and the injections worked well for months at a time. We discussed conservative treatments including NSAIDs, injections, physical therapy and it and result surgery. The patient is not interested in having a surgery at this time. Today she opted for a cortisone injection into the subacromial space to help reduce inflammation and pain. The injection site should never get red, hot, or swollen and if it does the patient will contact our office right away. The patient may experience a increase in soreness the first 24-48 hours due to a cortisone flair and can take anti-inflammatories for a short period of time to reduce that soreness. The patient should not submerge the injection site in water for a minimum of 24 hours to avoid infection. This means no lakes, pools, ponds, or hot tubs for 24 hours. If the patient is diabetic the injection may increase their blood sugar for up to one week.  The patient can do this cortisone injection once every 3 months as needed. If the injections stop working and do not give the patient relief the patient should consider surgical interventions to produce long term relief. We have not gotten x-rays of her left shoulder since 2020 so we will definitely need left shoulder x-rays at her next visit. The patient can follow-up as needed. The patient will call if she has any questions or concerns. Follow up:No follow-ups on file. No orders of the defined types were placed in this encounter. No orders of the defined types were placed in this encounter. This note is created with the assistance of a speech recognition program.  While intending to generate a document that actually reflects the content of the visit, the document can still have some errors including those of syntax and sound a like substitutions which may escape proof reading. In such instances, actual meaning can be extrapolated by contextual diversion.      Electronically signed by Jony Hummel PA-C on 6/16/2022 at 4:37 PM

## 2022-06-17 ENCOUNTER — TELEPHONE (OUTPATIENT)
Dept: FAMILY MEDICINE CLINIC | Age: 52
End: 2022-06-17

## 2022-07-28 ENCOUNTER — OFFICE VISIT (OUTPATIENT)
Dept: FAMILY MEDICINE CLINIC | Age: 52
End: 2022-07-28
Payer: MEDICARE

## 2022-07-28 VITALS
OXYGEN SATURATION: 99 % | HEIGHT: 59 IN | WEIGHT: 118.2 LBS | DIASTOLIC BLOOD PRESSURE: 64 MMHG | BODY MASS INDEX: 23.83 KG/M2 | SYSTOLIC BLOOD PRESSURE: 98 MMHG | TEMPERATURE: 98.2 F | HEART RATE: 79 BPM

## 2022-07-28 DIAGNOSIS — R07.0 BURNING SENSATION OF THROAT: Primary | ICD-10-CM

## 2022-07-28 DIAGNOSIS — E11.9 TYPE 2 DIABETES MELLITUS WITHOUT COMPLICATION, WITHOUT LONG-TERM CURRENT USE OF INSULIN (HCC): ICD-10-CM

## 2022-07-28 DIAGNOSIS — R49.9 HOARSENESS OR CHANGING VOICE: ICD-10-CM

## 2022-07-28 LAB — HBA1C MFR BLD: 6.2 %

## 2022-07-28 PROCEDURE — 3044F HG A1C LEVEL LT 7.0%: CPT

## 2022-07-28 PROCEDURE — 4004F PT TOBACCO SCREEN RCVD TLK: CPT

## 2022-07-28 PROCEDURE — 2022F DILAT RTA XM EVC RTNOPTHY: CPT

## 2022-07-28 PROCEDURE — 3017F COLORECTAL CA SCREEN DOC REV: CPT

## 2022-07-28 PROCEDURE — 99213 OFFICE O/P EST LOW 20 MIN: CPT

## 2022-07-28 PROCEDURE — 83036 HEMOGLOBIN GLYCOSYLATED A1C: CPT

## 2022-07-28 PROCEDURE — 99211 OFF/OP EST MAY X REQ PHY/QHP: CPT | Performed by: FAMILY MEDICINE

## 2022-07-28 PROCEDURE — G8420 CALC BMI NORM PARAMETERS: HCPCS

## 2022-07-28 PROCEDURE — G8427 DOCREV CUR MEDS BY ELIG CLIN: HCPCS

## 2022-07-28 ASSESSMENT — ENCOUNTER SYMPTOMS
SHORTNESS OF BREATH: 0
VOICE CHANGE: 1
COUGH: 0
CHEST TIGHTNESS: 0

## 2022-07-28 NOTE — PATIENT INSTRUCTIONS
Thank you for letting us take care of you today. We hope all your questions were addressed. If a question was overlooked or something else comes to mind after you return home, please contact a member of your Care Team listed below. Your Care Team at [place] [address] is Team #4  Haseeb Winston MD (Faculty)  Nathaniel Simms MD (Resident)  Yan López MD (Resident)  Aleja Beltre MD (Resident)  Jeremiah Awad MD (Resident)  TAE Malone., IVY Portillo., Vanesa Chandra., [facility] office)  Erwin Becerril, [address] (Clinical Practice Manager)  Bina Souza [facility] (Clinical Pharmacist)       Office phone number: [phone]    If you need to get in right away due to illness, please be advised we have \"Same Day\" appointments available Monday-Friday. Please call us at [phone] option #3 to schedule your \"Same Day\" appointment.

## 2022-07-28 NOTE — PROGRESS NOTES
Attending Physician Statement  I have discussed the care off. First name Alphonsus Fothergill pertinent history and exam findings,  with the resident. I have seen and examined the patient and the key elements of all parts of the encounter have been performed by me. I agree with the assessment, plan and orders as documented by the resident.   (GC Modifier)    Burning in her throat with hoarseness- has gastritis-   ENT referral
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
## 2022-07-28 NOTE — PROGRESS NOTES
Visit Information    Have you changed or started any medications since your last visit including any over-the-counter medicines, vitamins, or herbal medicines? no   Have you stopped taking any of your medications? Is so, why? -  no  Are you having any side effects from any of your medications? - no    Have you seen any other physician or provider since your last visit?  no   Have you had any other diagnostic tests since your last visit?  no   Have you been seen in the emergency room and/or had an admission in a hospital since we last saw you?  no   Have you had your routine dental cleaning in the past 6 months?  no     Do you have an active MyChart account? If no, what is the barrier?   Yes    Patient Care Team:  Sharmin Mccullough MD as PCP - General (Family Medicine)    Medical History Review  Past Medical, Family, and Social History reviewed and does not contribute to the patient presenting condition    Health Maintenance   Topic Date Due    COVID-19 Vaccine (1) Never done    Pneumococcal 0-64 years Vaccine (1 - PCV) Never done    Diabetic foot exam  Never done    Diabetic retinal exam  Never done    Hepatitis B vaccine (1 of 3 - Risk 3-dose series) Never done    Shingles vaccine (1 of 2) Never done    Cervical cancer screen  04/20/2020    Diabetic microalbuminuria test  12/22/2021    Lipids  12/22/2021    Flu vaccine (1) 09/01/2022    A1C test (Diabetic or Prediabetic)  01/31/2023    Depression Monitoring  01/31/2023    Breast cancer screen  03/04/2023    DTaP/Tdap/Td vaccine (2 - Td or Tdap) 03/17/2025    Colorectal Cancer Screen  03/08/2031    Hepatitis C screen  Completed    HIV screen  Completed    Hepatitis A vaccine  Aged Out    Hib vaccine  Aged Out    Meningococcal (ACWY) vaccine  Aged Out

## 2022-08-08 ENCOUNTER — TELEPHONE (OUTPATIENT)
Dept: FAMILY MEDICINE CLINIC | Age: 52
End: 2022-08-08

## 2022-08-08 NOTE — TELEPHONE ENCOUNTER
Patient had contacted office the ent referral that was place does not accept pt insurance. Pt is asking for new referral to be placed for [facility] ent  [address] [place] [address].   [phone]  Fax number [phone]    Once completed would need to be faxed to number above.

## 2022-08-09 DIAGNOSIS — R49.9 HOARSENESS OR CHANGING VOICE: Primary | ICD-10-CM

## 2022-09-08 DIAGNOSIS — M19.012 OSTEOARTHRITIS OF LEFT SHOULDER, UNSPECIFIED OSTEOARTHRITIS TYPE: ICD-10-CM

## 2022-09-08 DIAGNOSIS — M75.102 ROTATOR CUFF SYNDROME OF LEFT SHOULDER: Primary | ICD-10-CM

## 2022-09-09 ENCOUNTER — OFFICE VISIT (OUTPATIENT)
Dept: ORTHOPEDIC SURGERY | Age: 52
End: 2022-09-09
Payer: MEDICARE

## 2022-09-09 VITALS — BODY MASS INDEX: 23.79 KG/M2 | WEIGHT: 118 LBS | HEIGHT: 59 IN

## 2022-09-09 DIAGNOSIS — M75.102 ROTATOR CUFF SYNDROME OF LEFT SHOULDER: Primary | ICD-10-CM

## 2022-09-09 PROCEDURE — 99214 OFFICE O/P EST MOD 30 MIN: CPT | Performed by: PHYSICIAN ASSISTANT

## 2022-09-09 PROCEDURE — G8427 DOCREV CUR MEDS BY ELIG CLIN: HCPCS | Performed by: PHYSICIAN ASSISTANT

## 2022-09-09 PROCEDURE — G8420 CALC BMI NORM PARAMETERS: HCPCS | Performed by: PHYSICIAN ASSISTANT

## 2022-09-09 PROCEDURE — 3017F COLORECTAL CA SCREEN DOC REV: CPT | Performed by: PHYSICIAN ASSISTANT

## 2022-09-09 PROCEDURE — 4004F PT TOBACCO SCREEN RCVD TLK: CPT | Performed by: PHYSICIAN ASSISTANT

## 2022-09-09 RX ORDER — METHYLPREDNISOLONE 4 MG/1
TABLET ORAL
Qty: 1 KIT | Refills: 0 | Status: SHIPPED | OUTPATIENT
Start: 2022-09-09 | End: 2022-09-15

## 2022-09-09 NOTE — PROGRESS NOTES
[address]  [address]  Dept: [phone]  Dept Fax: [phone]        Ambulatory Follow Up      Subjective:   Kat Wilson is a 45y.o. year old female who presents to our office today for routine followup regarding her   1. Rotator cuff syndrome of left shoulder        Chief Complaint   Patient presents with    Follow-up     Left shoulder       HPI Kat Wilson  is a 45 y.o. Right hand dominant  female who presents today for a recheck of her left shoulder. The patient was last seen on 6/16/2022 Khai Haque PA-C and underwent treatment in the form of Left shoulder subacromial, corticosteroid injection. The patient notes moderate improvement with the previous treatment until recently. She now notes and increase in her left shoulder pain with limitations in her range of motion and difficulty sleeping due to her discomfort. She denies prior surgery to the left shoulder. She denies current numbness or tingling in the left shoulder. Patient did have a prior MRI of the left shoulder in 2020 revealing mild tendinosis within the rotator cuff with shallow partial-thickness tearing. Review of Systems   Constitutional:  Negative for activity change and fever. HENT:  Negative for sneezing. Respiratory:  Negative for cough and shortness of breath. Cardiovascular:  Negative for chest pain. Gastrointestinal:  Negative for vomiting. Musculoskeletal:  Positive for arthralgias (left shoulder). Negative for joint swelling and myalgias. Skin:  Negative for color change. Neurological:  Negative for weakness and numbness. Objective :   Ht 4' 11\" (1.499 m)   Wt 118 lb (53.5 kg)   LMP 10/21/2020   BMI 23.83 kg/m²  Body mass index is 23.83 kg/m². General: Kat Wilson is a 45 y.o. female who is alert and oriented and sitting comfortably in our office.   Ortho Exam  MS:  Evaluation of the Left shoulder reveals no significant shoulder girdle muscle atrophy, erythema, warmth, skin lesions, signs of infection. Tenderness to the anterolateral aspect of the shoulder is appreciated. No palpable deformity is noted. Active flexion is 90 degrees. Active functional reach is  Left back pocket  . Active abduction is 85 degrees. A  PositiveHawkins test is appreciated. Positive supraspinatus test is appreciated. A positive impingement test is noted. No gross shoulder instability is appreciated. A negative apprehension test is noted. Negative Burke's test is appreciated. Rotator cuff muscle strength testing shows weakness with external rotationstrength testing. Sensation to C5, C6, C7, C8, T1 dermatomes appears to be intact and symmetric bilaterally. Patient has full range of motion of the cervical spine and elbow. Neuro: alert and oriented to person and place. Eyes: Extra-ocular muscles intact  Mouth: Oral mucosa moist. No perioral lesions  Pulm: Respirations unlabored and regular. Symmetric chest excursion without outward deformity is noted. Skin: warm, well perfused  Psych:   Patient has good fund of knowledge and displays understanging of exam, diagnosis, and plan. Radiology:     XR SHOULDER LEFT (MIN 2 VIEWS)    Result Date: 9/14/2022  History: Left shoulder pain. Comparison: 12/18/2020. Findings: AP, scapular Y, axial view x-rays of the left shoulder done in office today shows a 2 acromial morphology with moderate degenerative narrowing and periarticular osteophytosis at the acromioclavicular joint. Mild glenohumeral joint space narrowing is also appreciated. There is mild sclerotic changes at the greater tuberosity insertion of the rotator cuff. Glenohumeral joint is well-maintained without evidence of superior migration of the humeral head. No further evidence of fracture, subluxation, dislocation, radiopaque foreign body or radiopaque tumor is noted. Impression: Mild degenerative changes within the left shoulder as described above. Assessment:      1. Rotator cuff syndrome of left shoulder       Plan: Today in office we discussed etiology and natural history of chronic left shoulder pain due to rotator cuff arthropathy versus tendinitis. I personally reviewed the patient's x-rays from today revealing mild degenerative changes within the left shoulder primarily within the acromioclavicular joint. The treatment options may include activity modification, oral anti-inflammatories, bracing, injections, advanced imaging, physical therapy and/or surgical intervention. Due to the fact that the patient has increase in left shoulder pain with limitations in her range of motion and discomfort that is affecting her activities of daily living an updated MRI of the left shoulder was ordered to further evaluate for possible rotator cuff tear. Patient was also given a prescription for steroid Dosepak to help with her current discomfort. The patient will follow-up in our office after the MRI of the left shoulder to discuss her results and neck steps in her care. She may continue to take over-the-counter Tylenol and or ibuprofen as needed for her discomfort. The patients PDMP was reviewed and she was found to have a recent prescription for Percocet prescribed on 8/25/2022 with monthly refills dating back to October 2020. Follow up:Return for MRI results. Total Time: 30 min      Orders Placed This Encounter   Medications    methylPREDNISolone (MEDROL DOSEPACK) 4 MG tablet     Sig: Take by mouth.      Dispense:  1 kit     Refill:  0           Orders Placed This Encounter   Procedures    MRI SHOULDER LEFT WO CONTRAST     Standing Status:   Future     Standing Expiration Date:   9/9/2023     Order Specific Question:   Reason for exam:     Answer:   chronic pain that is increasing, failed conservative tx     Order Specific Question:   What

## 2022-09-15 ASSESSMENT — ENCOUNTER SYMPTOMS
SHORTNESS OF BREATH: 0
COUGH: 0
COLOR CHANGE: 0
VOMITING: 0

## 2022-09-23 ENCOUNTER — HOSPITAL ENCOUNTER (OUTPATIENT)
Dept: MRI IMAGING | Age: 52
Discharge: HOME | End: 2022-09-25
Payer: MEDICARE

## 2022-09-23 ENCOUNTER — OFFICE VISIT (OUTPATIENT)
Dept: GASTROENTEROLOGY | Age: 52
End: 2022-09-23
Payer: MEDICARE

## 2022-09-23 VITALS
SYSTOLIC BLOOD PRESSURE: 126 MMHG | WEIGHT: 125 LBS | DIASTOLIC BLOOD PRESSURE: 79 MMHG | HEART RATE: 79 BPM | BODY MASS INDEX: 25.25 KG/M2

## 2022-09-23 DIAGNOSIS — M75.102 ROTATOR CUFF SYNDROME OF LEFT SHOULDER: ICD-10-CM

## 2022-09-23 DIAGNOSIS — R13.19 ESOPHAGEAL DYSPHAGIA: Primary | ICD-10-CM

## 2022-09-23 PROCEDURE — 73221 MRI JOINT UPR EXTREM W/O DYE: CPT

## 2022-09-23 PROCEDURE — 3017F COLORECTAL CA SCREEN DOC REV: CPT | Performed by: INTERNAL MEDICINE

## 2022-09-23 PROCEDURE — 4004F PT TOBACCO SCREEN RCVD TLK: CPT | Performed by: INTERNAL MEDICINE

## 2022-09-23 PROCEDURE — G8427 DOCREV CUR MEDS BY ELIG CLIN: HCPCS | Performed by: INTERNAL MEDICINE

## 2022-09-23 PROCEDURE — 99214 OFFICE O/P EST MOD 30 MIN: CPT | Performed by: INTERNAL MEDICINE

## 2022-09-23 PROCEDURE — G8419 CALC BMI OUT NRM PARAM NOF/U: HCPCS | Performed by: INTERNAL MEDICINE

## 2022-09-23 ASSESSMENT — ENCOUNTER SYMPTOMS
SORE THROAT: 1
VOMITING: 1
ABDOMINAL DISTENTION: 1
RESPIRATORY NEGATIVE: 1
EYES NEGATIVE: 1
DIARRHEA: 1
ALLERGIC/IMMUNOLOGIC NEGATIVE: 1
TROUBLE SWALLOWING: 1

## 2022-09-23 NOTE — PROGRESS NOTES
COLONOSCOPY N/A 4/18/2019    COLORECTAL CANCER SCREENING, NOT HIGH RISK performed by Marika Mercer MD at [address] [facility] Right 2/20/2018    RIGHT SHOULDER ARTHROSCOPY ROTATOR CUFF LYSIS OF ADHESIONS performed by Korey Garcia DO at [address] [facility] ARTHROSCOPY Right 02/20/2018    extensive lysis of adhesions; Open suture granuloma removal     SHOULDER SURGERY Right     x 3    TONSILLECTOMY      UPPER GASTROINTESTINAL ENDOSCOPY N/A 9/29/2021    EGD BIOPSY performed by Marika Mercer MD at [address]:    Current Outpatient Medications:     ondansetron (ZOFRAN ODT) 4 MG disintegrating tablet, Take 1 tablet by mouth every 6 hours as needed for Nausea or Vomiting, Disp: 12 tablet, Rfl: 0    pantoprazole (PROTONIX) 40 MG tablet, Take 1 tablet by mouth 2 times daily (before meals), Disp: 30 tablet, Rfl: 5    sennosides-docusate sodium (SENOKOT-S) 8.6-50 MG tablet, Take 1 tablet by mouth daily, Disp: 30 tablet, Rfl: 3    famotidine (PEPCID) 20 MG tablet, Take 1 tablet by mouth 2 times daily, Disp: 20 tablet, Rfl: 0    FLUoxetine (PROZAC) 20 MG capsule, take 1 capsule by mouth once daily, Disp: , Rfl:     oxyCODONE-acetaminophen (PERCOCET) 7.5-325 MG per tablet, Take 1 tablet by mouth 4 times daily. , Disp: , Rfl:     traZODone (DESYREL) 100 MG tablet, Take 100 mg by mouth daily, Disp: , Rfl:     pregabalin (LYRICA) 100 MG capsule, Take 100 mg by mouth 2 times daily. , Disp: , Rfl:     ALLERGIES:   No Known Allergies    FAMILY HISTORY:       Problem Relation Age of Onset    Asthma Mother     Cancer Father         throat         SOCIAL HISTORY:   Social History     Socioeconomic History    Marital status:      Spouse name: Not on file    Number of children: Not on file    Years of education: Not on file    Highest education level: Not on file   Occupational History    Not on file   Tobacco Use    Smoking status: Every Day     Packs/day: 0.50     Years: 12.00     Pack years: 6.00     Types: Cigarettes    Smokeless tobacco: Never    Tobacco comments:     pt smokes about 1 cig daily   Vaping Use    Vaping Use: Never used   Substance and Sexual Activity    Alcohol use: No    Drug use: No    Sexual activity: Not on file   Other Topics Concern    Not on file   Social History Narrative    Not on file     Social Determinants of Health     Financial Resource Strain: Low Risk     Difficulty of Paying Living Expenses: Not hard at all   Food Insecurity: No Food Insecurity    Worried About Running Out of Food in the Last Year: Never true    Ran Out of Food in the Last Year: Never true   Transportation Needs: Not on file   Physical Activity: Not on file   Stress: Not on file   Social Connections: Not on file   Intimate Partner Violence: Not on file   Housing Stability: Not on file         REVIEW OF SYSTEMS:         Review of Systems   Constitutional:  Positive for appetite change. HENT:  Positive for sore throat and trouble swallowing (eats small bites). Eyes: Negative. Respiratory: Negative. Cardiovascular: Negative. Gastrointestinal:  Positive for abdominal distention, diarrhea and vomiting. Endocrine: Negative. Genitourinary: Negative. Musculoskeletal: Negative. Skin: Negative. Allergic/Immunologic: Negative. Neurological: Negative. Hematological: Negative. Psychiatric/Behavioral: Negative. PHYSICAL EXAMINATION    Vital signs reviewed per the nursing documentation. LMP 10/21/2020   There is no height or weight on file to calculate BMI.    Physical Exam      LABORATORY DATA: Reviewed  Lab Results   Component Value Date    WBC 6.6 02/15/2022    HGB 11.9 02/15/2022    HCT 37.6 02/15/2022    MCV 88.9 02/15/2022     02/15/2022     02/15/2022    K 4.9 02/15/2022     02/15/2022    CO2 24 02/15/2022    BUN 11 02/15/2022    CREATININE 0.75 02/15/2022    LABALBU 4.5 02/15/2022    BILITOT 0.41 02/15/2022    ALKPHOS 120 (H) 02/15/2022    AST 28 02/15/2022    ALT 21 02/15/2022    INR 1.1 03/20/2019         Lab Results   Component Value Date    RBC 4.23 02/15/2022    HGB 11.9 02/15/2022    MCV 88.9 02/15/2022    MCH 28.1 02/15/2022    MCHC 31.6 02/15/2022    RDW 14.6 (H) 02/15/2022    MPV 10.8 02/15/2022    BASOPCT 1 02/15/2022    LYMPHSABS 2.47 02/15/2022    MONOSABS 0.37 02/15/2022    NEUTROABS 3.67 02/15/2022    EOSABS 0.05 02/15/2022    BASOSABS 0.04 02/15/2022         DIAGNOSTIC TESTING:     XR SHOULDER LEFT (MIN 2 VIEWS)    Result Date: 9/14/2022  History: Left shoulder pain. Comparison: 12/18/2020. Findings: AP, scapular Y, axial view x-rays of the left shoulder done in office today shows a 2 acromial morphology with moderate degenerative narrowing and periarticular osteophytosis at the acromioclavicular joint. Mild glenohumeral joint space narrowing is also appreciated. There is mild sclerotic changes at the greater tuberosity insertion of the rotator cuff. Glenohumeral joint is well-maintained without evidence of superior migration of the humeral head. No further evidence of fracture, subluxation, dislocation, radiopaque foreign body or radiopaque tumor is noted. Impression: Mild degenerative changes within the left shoulder as described above. Assessment  No diagnosis found. Plan  At present patient looks stable. She has a persistent symptoms. May need EGD with Bravo pH study. And also esophagogram to further evaluate and manage symptoms. It is possible that she may have esophageal spasm normal peptic esophagitis etc.  Discussed with the patient, she understood and agreed. Explained regarding EGD, Bravo pH study. Thank you for allowing me to participate in the care of Ms. Mary Lou Spangler. For any further questions please do not hesitate to contact me. I have reviewed and agree with the ROS entered by the MA/LPN. Note is dictated utilizing voice recognition software.  Unfortunately this leads to occasional typographical errors.  Please contact our office if you have any questions        Tia Cowden, MD,FACP, [facility]  Board Certified in Gastroenterology and [address] [place] [address] Gastroenterology  Office #: [phone]

## 2022-09-26 ENCOUNTER — TELEPHONE (OUTPATIENT)
Dept: GASTROENTEROLOGY | Age: 52
End: 2022-09-26

## 2022-09-28 ENCOUNTER — OFFICE VISIT (OUTPATIENT)
Dept: ORTHOPEDIC SURGERY | Age: 52
End: 2022-09-28
Payer: MEDICARE

## 2022-09-28 VITALS — OXYGEN SATURATION: 99 % | HEIGHT: 59 IN | BODY MASS INDEX: 25.4 KG/M2 | WEIGHT: 126 LBS

## 2022-09-28 DIAGNOSIS — M75.102 ROTATOR CUFF SYNDROME OF LEFT SHOULDER: Primary | ICD-10-CM

## 2022-09-28 DIAGNOSIS — M75.122 COMPLETE TEAR OF LEFT ROTATOR CUFF, UNSPECIFIED WHETHER TRAUMATIC: ICD-10-CM

## 2022-09-28 PROCEDURE — G8419 CALC BMI OUT NRM PARAM NOF/U: HCPCS | Performed by: ORTHOPAEDIC SURGERY

## 2022-09-28 PROCEDURE — 3017F COLORECTAL CA SCREEN DOC REV: CPT | Performed by: ORTHOPAEDIC SURGERY

## 2022-09-28 PROCEDURE — 99213 OFFICE O/P EST LOW 20 MIN: CPT | Performed by: ORTHOPAEDIC SURGERY

## 2022-09-28 PROCEDURE — G8427 DOCREV CUR MEDS BY ELIG CLIN: HCPCS | Performed by: ORTHOPAEDIC SURGERY

## 2022-09-28 PROCEDURE — 4004F PT TOBACCO SCREEN RCVD TLK: CPT | Performed by: ORTHOPAEDIC SURGERY

## 2022-09-29 ASSESSMENT — ENCOUNTER SYMPTOMS
ABDOMINAL PAIN: 0
SHORTNESS OF BREATH: 0
EYE DISCHARGE: 0
ROS SKIN COMMENTS: NEGATIVE FOR RASH

## 2022-09-29 NOTE — PROGRESS NOTES
[address] [place] [address] AND SPORTS MEDICINE  [facility]  [address] [facility] [address]  Dept: [phone]  Dept Fax: [phone]        Ambulatory Follow Up      Subjective:   Neris Krueger is a 45y.o. year old female who presents to our office today for routine followup regarding her   1. Rotator cuff syndrome of left shoulder    2. Complete tear of left rotator cuff, unspecified whether traumatic    . Chief Complaint   Patient presents with    Shoulder Pain     Review L shoulder MRI       BROOK Bradford is a 24-year-old female who presents to the office today for recheck of her left shoulder. She notes persistent left shoulder pain for years worsening over the last 4 months. She had a corticosteroid injection in June 2022 which helped for approximately 30 days. She has had physical therapy with no significant improvement in her symptoms. She notes increased pain when sleeping at night. She also notes for previous surgeries for rotator cuff pathology right shoulder and feels like her shoulder on the right is doing well now. Review of Systems   Constitutional:  Positive for activity change. Negative for fever. HENT:  Negative for dental problem. Eyes:  Negative for discharge. Respiratory:  Negative for shortness of breath. Cardiovascular:  Negative for chest pain. Gastrointestinal:  Negative for abdominal pain. Genitourinary: Negative. Musculoskeletal:  Positive for arthralgias. Skin:         Negative for rash   Neurological:  Positive for weakness. Psychiatric/Behavioral:  Negative for confusion. I have reviewed the CC, HPI, ROS, PMH, FHX, Social History, and if not present in this note, I have reviewed in the patient's chart. I agree with the documentation provided by other staff and have reviewed their documentation prior to providing my signature indicating agreement.     Objective :   Ht 4' 11\" (1.499 m)   Wt 126 lb (57.2 kg)   LMP 10/21/2020   SpO2 99%   BMI 25.45 kg/m²  Body mass index is 25.45 kg/m². General: Hammad Stewart is a 45 y.o. female who is alert and oriented and sitting comfortably in our office. Ortho Exam  MS: No outward deformity of the left shoulder is appreciated. No significant shoulder girdle muscle atrophy is noted. Forward elevation is 90 degrees and AB duction is 130 degrees actively. Minimal weakness with external rotation testing of the left shoulder is appreciated. Positive impingement and supraspinatus test are appreciated. No instability of the left shoulder is noted. Motor, sensory, vascular examination left upper extremity is grossly intact without focal deficits. Neuro: alert and oriented to person and place. Eyes: Extra-ocular muscles intact  Mouth: Oral mucosa moist. No perioral lesions  Pulm: Respirations unlabored and regular. Symmetric chest excursion without outward deformity is noted. Skin: warm, well perfused  Psych:   Patient has good fund of knowledge and displays understanging of exam, diagnosis, and plan. Radiology:     XR SHOULDER LEFT (MIN 2 VIEWS)    Result Date: 9/14/2022  History: Left shoulder pain. Comparison: 12/18/2020. Findings: AP, scapular Y, axial view x-rays of the left shoulder done in office today shows a 2 acromial morphology with moderate degenerative narrowing and periarticular osteophytosis at the acromioclavicular joint. Mild glenohumeral joint space narrowing is also appreciated. There is mild sclerotic changes at the greater tuberosity insertion of the rotator cuff. Glenohumeral joint is well-maintained without evidence of superior migration of the humeral head. No further evidence of fracture, subluxation, dislocation, radiopaque foreign body or radiopaque tumor is noted. Impression: Mild degenerative changes within the left shoulder as described above.      MRI SHOULDER LEFT WO CONTRAST    Result Date: 9/26/2022  EXAMINATION: MRI OF THE LEFT SHOULDER WITHOUT CONTRAST   9/23/2022 7:38 am TECHNIQUE: Multiplanar multisequence MRI of the left shoulder was performed without the administration of intravenous contrast. COMPARISON: Left shoulder plain radiographs from 09/09/2022 HISTORY: ORDERING SYSTEM PROVIDED HISTORY: Rotator cuff syndrome of left shoulder TECHNOLOGIST PROVIDED HISTORY: chronic pain that is increasing, failed conservative tx What is the sedation requirement?->None Reason for Exam: chronic pain that is increasing, failed conservative tx, Rotator cuff syndrome of left shoulder 75-year-old female with chronic left shoulder pain; ? rotator cuff syndrome FINDINGS: ROTATOR CUFF: Subacromial subdeltoid bursa contiguous with the glenohumeral joint. Complete retracted full-thickness tear of supraspinatus with retraction of the torn fibers measuring up to 2.7 cm from the footplate. Full-thickness tearing measures 2.2 cm in greatest AP dimension on image 11, series 9. Less than 50% multifocal partial-thickness interstitial and articular surface tearing of infraspinatus between musculotendinous junction and footplate. Mild subscapularis tendinosis. Teres minor muscle/tendon appears grossly intact without evidence of tearing. No significant atrophy or fatty degeneration of the visualized rotator cuff musculature. BICEPS TENDON: A normal long head of the biceps tendon not identified in the rotator interval which can be seen with biceps tendon rupture or severe thinning. LABRUM: Mild diffuse labral degeneration. No paralabral cyst formation. GLENOHUMERAL JOINT: Small glenohumeral joint effusion. Inferior glenohumeral ligament appears intact. Mild glenohumeral chondromalacia. AC JOINT AND ACROMIOCLAVICULAR ARCH: Mild degenerative change of the left AC joint. Type 2 acromion. BONE MARROW: Reactive marrow edema at the superolateral and posterolateral humeral head.   Bone marrow signal intensity within the visualized osseous structures otherwise grossly unremarkable. No acute fracture or dislocation involving the osseous components of the shoulder. OUTLET SPACES: Suprascapular notch and quadrilateral space grossly unremarkable in appearance. No left axillary lymphadenopathy. 1. Complete retracted full-thickness tear of supraspinatus with retraction of the torn fibers from the footplate measuring up to 2.7 cm. Region of full-thickness tearing measures 2.2 cm in greatest AP dimension. 2. Less than 50% multifocal partial-thickness interstitial and articular surface tearing of infraspinatus between musculotendinous junction and footplate. 3. Mild subscapularis tendinosis. 4. Biceps tendon rupture versus severe thinning of the intra-articular long head of the biceps tendon. 5. Mild diffuse labral degeneration. Mild glenohumeral chondromalacia. Small glenohumeral joint effusion. 6. Mild degenerative change of the left AC joint. Assessment:      1. Rotator cuff syndrome of left shoulder    2. Complete tear of left rotator cuff, unspecified whether traumatic         Plan:      Patient has a rotator cuff tear on the left is by her recent MRI. We discussed operative versus nonoperative treatment for this. The patient would like to try nonoperative treatment if possible. We are going to have her in physical therapy for her left rotator cuff tear over the next 6 weeks as well as massage therapy at her request.  I plan to see her back in 7 to 8 weeks for further evaluation and we will alter treatment plan as necessary at that time. .rmd     Follow up:No follow-ups on file. No orders of the defined types were placed in this encounter.         Orders Placed This Encounter   Procedures    [facility] Physical Therapy [facility]     Referral Priority:   Routine     Referral Type:   Eval and Treat     Referral Reason:   Specialty Services Required     Requested Specialty:   Physical Therapist     Number of Visits Requested:   1 This note is created with the assistance of a speech recognition program.  While intending to generate a document that actually reflects the content of the visit, the document can still have some errors including those of syntax and sound a like substitutions which may escape proof reading.   In such instances, actual meaning can be extrapolated by contextual diversion      Electronically signed by Gage Hudson DO, Channie Reamer on 9/29/2022 at 8:02 AM

## 2022-10-03 ENCOUNTER — HOSPITAL ENCOUNTER (OUTPATIENT)
Dept: PHYSICAL THERAPY | Facility: CLINIC | Age: 52
Setting detail: THERAPIES SERIES
Discharge: HOME | End: 2022-10-03
Payer: MEDICARE

## 2022-10-03 NOTE — FLOWSHEET NOTE
[] [facility]  [address]    [phone]  F: [phone]   [x] [address]  [facility]   [address]  P: [phone]  F: [phone]  [] [address] &  Therapy  [address] [facility] [address]  P: [phone]  F: [phone] [] [address]  P: [phone]  F: [phone]  [] [address]  [facility]   [address]   [place]: [phone]  F: [phone]   [] [address]  [place]: [phone]   F: [phone]     Physical Therapy Cancel/No Show note    Date: 10/3/2022  Patient: Donavan Castillo  : 1970  MRN: [id number]    Cancels/No Shows to date:     For today's appointment patient:    [x]  Cancelled    [] Rescheduled appointment    [] No-show     Reason given by patient:    []  Patient ill    []  Conflicting appointment    [] No transportation      [] Conflict with work    [] No reason given    [] Weather related    [] COVID-19    [x] Other:      Comments:  Patient called, running late.   Reschedules for 10-4-2022      [] Next appointment was confirmed    Electronically signed by: Shahrzad Triana PT

## 2022-10-04 ENCOUNTER — HOSPITAL ENCOUNTER (OUTPATIENT)
Dept: PHYSICAL THERAPY | Facility: CLINIC | Age: 52
Setting detail: THERAPIES SERIES
Discharge: HOME | End: 2022-10-04
Payer: MEDICARE

## 2022-10-04 PROCEDURE — 97161 PT EVAL LOW COMPLEX 20 MIN: CPT

## 2022-10-04 PROCEDURE — 97110 THERAPEUTIC EXERCISES: CPT
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
## 2022-10-06 ENCOUNTER — TELEPHONE (OUTPATIENT)
Dept: FAMILY MEDICINE CLINIC | Age: 52
End: 2022-10-06

## 2022-10-06 NOTE — TELEPHONE ENCOUNTER
Writer called the patient no answer left message to call office to make her appointment for up coming surgery

## 2022-10-10 ENCOUNTER — HOSPITAL ENCOUNTER (OUTPATIENT)
Dept: PHYSICAL THERAPY | Facility: CLINIC | Age: 52
Setting detail: THERAPIES SERIES
Discharge: HOME | End: 2022-10-10
Payer: MEDICARE

## 2022-10-10 NOTE — FLOWSHEET NOTE
[] [facility]) - [facility] &  Therapy  [address]    [phone]  F: [phone]   [x] [address]  [facility] 36   [address]  P: [phone]  F: [phone]  [] [facility] &  Therapy  [address]  P: [phone]  F: [phone] [] [address]  P: [phone]  F: [phone]  [] [address] [facility]   [address]   [place]: [phone]  F: [phone]   [] [facility]  [address]  [place]: [phone]   F: [phone]     Physical Therapy Cancel/No Show note    Date: 10/10/2022  Patient: Deonte Brito  : 1970  MRN: [id number]    Cancels/No Shows to date:     For today's appointment patient:    [x]  Cancelled    [] Rescheduled appointment    [] No-show     Reason given by patient:    []  Patient ill    []  Conflicting appointment    [] No transportation      [] Conflict with work    [] No reason given    [] Weather related    [] COVID-19    [x] Other:   Pt is in too much pain and wants to proceed with sx.     Comments:        [] Next appointment was confirmed    Electronically signed by: Pratima Webster PT

## 2022-10-12 ENCOUNTER — TELEPHONE (OUTPATIENT)
Dept: ORTHOPEDIC SURGERY | Age: 52
End: 2022-10-12

## 2022-10-12 DIAGNOSIS — Z01.818 PRE-OP TESTING: Primary | ICD-10-CM

## 2022-10-18 ENCOUNTER — OFFICE VISIT (OUTPATIENT)
Dept: FAMILY MEDICINE CLINIC | Age: 52
End: 2022-10-18
Payer: MEDICARE

## 2022-10-18 VITALS
WEIGHT: 124 LBS | BODY MASS INDEX: 25.04 KG/M2 | HEART RATE: 59 BPM | DIASTOLIC BLOOD PRESSURE: 75 MMHG | SYSTOLIC BLOOD PRESSURE: 138 MMHG

## 2022-10-18 DIAGNOSIS — M75.112 NONTRAUMATIC INCOMPLETE TEAR OF LEFT ROTATOR CUFF: Primary | ICD-10-CM

## 2022-10-18 DIAGNOSIS — Z13.220 SCREENING FOR HYPERLIPIDEMIA: ICD-10-CM

## 2022-10-18 DIAGNOSIS — F32.0 CURRENT MILD EPISODE OF MAJOR DEPRESSIVE DISORDER WITHOUT PRIOR EPISODE (HCC): ICD-10-CM

## 2022-10-18 DIAGNOSIS — Z01.818 PRE-OPERATIVE CLEARANCE: ICD-10-CM

## 2022-10-18 DIAGNOSIS — Z86.79 HISTORY OF CEREBRAL ANEURYSM: ICD-10-CM

## 2022-10-18 PROCEDURE — G8484 FLU IMMUNIZE NO ADMIN: HCPCS | Performed by: STUDENT IN AN ORGANIZED HEALTH CARE EDUCATION/TRAINING PROGRAM

## 2022-10-18 PROCEDURE — 4004F PT TOBACCO SCREEN RCVD TLK: CPT | Performed by: STUDENT IN AN ORGANIZED HEALTH CARE EDUCATION/TRAINING PROGRAM

## 2022-10-18 PROCEDURE — G8427 DOCREV CUR MEDS BY ELIG CLIN: HCPCS | Performed by: STUDENT IN AN ORGANIZED HEALTH CARE EDUCATION/TRAINING PROGRAM

## 2022-10-18 PROCEDURE — G8419 CALC BMI OUT NRM PARAM NOF/U: HCPCS | Performed by: STUDENT IN AN ORGANIZED HEALTH CARE EDUCATION/TRAINING PROGRAM

## 2022-10-18 PROCEDURE — 3017F COLORECTAL CA SCREEN DOC REV: CPT | Performed by: STUDENT IN AN ORGANIZED HEALTH CARE EDUCATION/TRAINING PROGRAM

## 2022-10-18 PROCEDURE — 99213 OFFICE O/P EST LOW 20 MIN: CPT | Performed by: STUDENT IN AN ORGANIZED HEALTH CARE EDUCATION/TRAINING PROGRAM

## 2022-10-18 ASSESSMENT — ENCOUNTER SYMPTOMS: SHORTNESS OF BREATH: 0

## 2022-10-18 NOTE — PROGRESS NOTES
Subjective:    Christine Guerra is a 45 y.o. female with  has a past medical history of Anemia, Anxiety, Brain aneurysm, Chronic right shoulder pain, Depression, Gastrointestinal distress, Irregular heartbeat, Sciatica, and Vomiting. Family History   Problem Relation Age of Onset    Asthma Mother     Cancer Father         throat       Presented tothe office today for:  Chief Complaint   Patient presents with    Pre-op Exam     Patient states she is here for clearance for surgery on her shoulder                          HPI 45year old female with a past medical history of depression here for pre operative clearance. Getting left shoulder arthroscopic repair with Dr. Shobha Graham on 10/25/2022. RCRI - 0 points low risk for surgery    Depression  - Denies any suicidal or homicidal ideation  - Appetite is okay, some issues concentrating  - More stressed about her shoulder surgery  - She wants to see psychiatrist  - Takes Prozac 20 mg daily    Health Maintenance   - Lipid panel  - PAP smear at next visit  - Refusing flu and other vaccines    Review of Systems   Constitutional:  Negative for fever. Respiratory:  Negative for shortness of breath. Cardiovascular:  Negative for chest pain. Neurological:  Negative for weakness. All other systems reviewed and are negative. Objective:    /75   Pulse 59   Wt 124 lb (56.2 kg)   LMP 10/21/2020   BMI 25.04 kg/m²    BP Readings from Last 3 Encounters:   10/18/22 138/75   09/23/22 126/79   07/28/22 98/64     Physical Exam  Vitals reviewed. Constitutional:       General: She is awake. Cardiovascular:      Rate and Rhythm: Normal rate and regular rhythm. Heart sounds: Normal heart sounds. Pulmonary:      Effort: Pulmonary effort is normal.      Breath sounds: Normal breath sounds and air entry. Neurological:      Mental Status: She is alert. Psychiatric:         Behavior: Behavior is cooperative.      Lab Results   Component Value Date    WBC 6.6 02/15/2022    HGB 11.9 02/15/2022    HCT 37.6 02/15/2022     02/15/2022    CHOL 198 12/22/2020    TRIG 127 12/22/2020    HDL 73 12/22/2020    ALT 21 02/15/2022    AST 28 02/15/2022     02/15/2022    K 4.9 02/15/2022     02/15/2022    CREATININE 0.75 02/15/2022    BUN 11 02/15/2022    CO2 24 02/15/2022    TSH 2.35 02/19/2021    INR 1.1 03/20/2019    LABA1C 6.2 07/28/2022    LABMICR 4 12/22/2020     Lab Results   Component Value Date    CALCIUM 9.7 02/15/2022     Lab Results   Component Value Date    LDLCHOLESTEROL 100 12/22/2020       Assessment and Plan:    1. Pre-operative clearance  - RCRI - 0 points, METS is  >4  - Low risk for surgery  - Clear from primary care perspective for shoulder arthroscopic repair     2. Nontraumatic incomplete tear of left rotator cuff  - Following with Dr. Tauna Primrose for arthroscopic repair on 10/25/2022    3. Screening for hyperlipidemia  - The 10-year ASCVD risk score (Andrew DK, et al., 2019) is: 12.7%    Values used to calculate the score:      Age: 45 years      Sex: Female      Is Non- : Yes      Diabetic: Yes      Tobacco smoker: Yes      Systolic Blood Pressure: 497 mmHg      Is BP treated: No      HDL Cholesterol: 73 mg/dL      Total Cholesterol: 198 mg/dL  - Lipid Panel; Future  - Discussed this with patient, she would like to recheck her lipid panel and decide from there about starting a statin, did discuss risks and benefits with her    4. History of Cerebral Aneurysm  - Right MCA and left PCA aneurysm notsed on an MRA in 12/2021 at [facility]?  - Patient follows with neurology at [address] needs an appt  - She wants an ultrasound of her carotids and she is very persistent about getting this before her shoulder surgery  - Will order this for her    Requested Prescriptions      No prescriptions requested or ordered in this encounter       There are no discontinued medications.     Return in about 2 months (around 12/18/2022) for FU for physical and PAP smear with her PCP.

## 2022-10-18 NOTE — PROGRESS NOTES
Visit Information    Have you changed or started any medications since your last visit including any over-the-counter medicines, vitamins, or herbal medicines? no   Have you stopped taking any of your medications? Is so, why? -  no  Are you having any side effects from any of your medications? - no    Have you seen any other physician or provider since your last visit? yes -    Have you had any other diagnostic tests since your last visit? yes -    Have you been seen in the emergency room and/or had an admission in a hospital since we last saw you?  no   Have you had your routine dental cleaning in the past 6 months?  no     Do you have an active MyChart account? If no, what is the barrier?   Yes    Patient Care Team:  Elin Valdez MD as PCP - General (Family Medicine)  Ramesh Nation RN as Ambulatory Care Manager    Medical History Review  Past Medical, Family, and Social History reviewed and does not contribute to the patient presenting condition    Health Maintenance   Topic Date Due    COVID-19 Vaccine (1) Never done    Pneumococcal 0-64 years Vaccine (1 - PCV) Never done    Diabetic foot exam  Never done    Diabetic retinal exam  Never done    Hepatitis B vaccine (1 of 3 - Risk 3-dose series) Never done    Shingles vaccine (1 of 2) Never done    Cervical cancer screen  04/20/2020    Diabetic microalbuminuria test  12/22/2021    Lipids  12/22/2021    Flu vaccine (1) Never done    Depression Monitoring  01/31/2023    Breast cancer screen  03/04/2023    A1C test (Diabetic or Prediabetic)  07/28/2023    DTaP/Tdap/Td vaccine (2 - Td or Tdap) 03/17/2025    Colorectal Cancer Screen  03/08/2031    Hepatitis C screen  Completed    HIV screen  Completed    Hepatitis A vaccine  Aged Out    Hib vaccine  Aged Out    Meningococcal (ACWY) vaccine  Aged Out

## 2022-10-18 NOTE — PROGRESS NOTES
Attending Physician Statement  I have discussed the care of Park Mcclure, 45 y.o. female,including pertinent history and exam findings,  with the resident Dr. Ryan Potter MD.  History:  Chief Complaint   Patient presents with    Pre-op Exam     Patient states she is here for clearance for surgery on her shoulder     Neck Pain     Right side Neck pain x few months off and on     Depression     Patients states she wants to talk about seeing a phycologist      I have reviewed the key elements of the encounter with the resident. Examination was done by resident as documented in residents note. BP Readings from Last 3 Encounters:   10/18/22 138/75   09/23/22 126/79   07/28/22 98/64     /75   Pulse 59   Wt 124 lb (56.2 kg)   LMP 10/21/2020   BMI 25.04 kg/m²   Lab Results   Component Value Date    WBC 6.6 02/15/2022    HGB 11.9 02/15/2022    HCT 37.6 02/15/2022     02/15/2022    CHOL 198 12/22/2020    TRIG 127 12/22/2020    HDL 73 12/22/2020    ALT 21 02/15/2022    AST 28 02/15/2022     02/15/2022    K 4.9 02/15/2022     02/15/2022    CREATININE 0.75 02/15/2022    BUN 11 02/15/2022    CO2 24 02/15/2022    TSH 2.35 02/19/2021    INR 1.1 03/20/2019    LABA1C 6.2 07/28/2022    LABMICR 4 12/22/2020     Lab Results   Component Value Date    CALCIUM 9.7 02/15/2022     Lab Results   Component Value Date    LDLCHOLESTEROL 100 12/22/2020     I agree with the assessment, plan and diagnosis of    Diagnosis Orders   1. Nontraumatic incomplete tear of left rotator cuff        2. Pre-operative clearance        3. Screening for hyperlipidemia  Lipid Panel      4. History of cerebral aneurysm  VL DUP CAROTID BILATERAL      5. Current mild episode of major depressive disorder without prior episode ([address]  Reina Zuh MD, Psychiatry, [place]        I agree with  orders as documented by the resident.   Recommendations: Patient insistent on carotid imaging prior to surgery and will provide w/ history of aneurysm in the past and writer difficulty finding past records of vascular studies though it is very possible she has had previous contrast imaging of her cerebral vascularity such as ICA's. Patient reported METS of 4-10 and surgery is low risk for MACE at this time. Due to ASCVD PCE above 10% will recheck lipid panel as patient currently refuses statin therapy and would like lipid panel rechecked. Return in about 2 months (around 12/18/2022) for FU for physical and PAP smear with her PCP.    (Charlette Foreman ) Dr. Ryland Moura MD

## 2022-10-19 ENCOUNTER — HOSPITAL ENCOUNTER (OUTPATIENT)
Dept: PREADMISSION TESTING | Age: 52
Discharge: HOME | End: 2022-10-23
Payer: MEDICARE

## 2022-10-19 ENCOUNTER — HOSPITAL ENCOUNTER (OUTPATIENT)
Age: 52
Discharge: HOME | End: 2022-10-19
Payer: MEDICARE

## 2022-10-19 VITALS
RESPIRATION RATE: 16 BRPM | BODY MASS INDEX: 25.1 KG/M2 | SYSTOLIC BLOOD PRESSURE: 137 MMHG | DIASTOLIC BLOOD PRESSURE: 69 MMHG | HEIGHT: 59 IN | OXYGEN SATURATION: 100 % | WEIGHT: 124.5 LBS | TEMPERATURE: 98 F | HEART RATE: 63 BPM

## 2022-10-19 DIAGNOSIS — Z13.220 SCREENING FOR HYPERLIPIDEMIA: ICD-10-CM

## 2022-10-19 DIAGNOSIS — Z98.890 STATUS POST SURGERY: ICD-10-CM

## 2022-10-19 DIAGNOSIS — M75.122 COMPLETE TEAR OF LEFT ROTATOR CUFF, UNSPECIFIED WHETHER TRAUMATIC: Primary | ICD-10-CM

## 2022-10-19 DIAGNOSIS — Z01.818 PRE-OP TESTING: ICD-10-CM

## 2022-10-19 LAB
ALBUMIN SERPL-MCNC: 4.2 G/DL (ref 3.5–5.2)
ALP BLD-CCNC: 119 U/L (ref 35–104)
ALT SERPL-CCNC: 12 U/L (ref 5–33)
ANION GAP SERPL CALCULATED.3IONS-SCNC: 9 MMOL/L (ref 9–17)
AST SERPL-CCNC: 21 U/L
BILIRUB SERPL-MCNC: <0.1 MG/DL (ref 0.3–1.2)
BILIRUBIN URINE: NEGATIVE
BUN BLDV-MCNC: 15 MG/DL (ref 6–20)
BUN/CREAT BLD: 17 (ref 9–20)
CALCIUM SERPL-MCNC: 8.9 MG/DL (ref 8.6–10.4)
CHLORIDE BLD-SCNC: 106 MMOL/L (ref 98–107)
CHOLESTEROL/HDL RATIO: 3.5
CHOLESTEROL: 155 MG/DL
CO2: 28 MMOL/L (ref 20–31)
COLOR: YELLOW
COMMENT UA: NORMAL
CREAT SERPL-MCNC: 0.89 MG/DL (ref 0.5–0.9)
EKG ATRIAL RATE: 63 BPM
EKG P AXIS: 61 DEGREES
EKG P-R INTERVAL: 170 MS
EKG Q-T INTERVAL: 406 MS
EKG QRS DURATION: 74 MS
EKG QTC CALCULATION (BAZETT): 415 MS
EKG R AXIS: -7 DEGREES
EKG T AXIS: 10 DEGREES
EKG VENTRICULAR RATE: 63 BPM
GFR SERPL CREATININE-BSD FRML MDRD: >60 ML/MIN/1.73M2
GLUCOSE BLD-MCNC: 103 MG/DL (ref 70–99)
GLUCOSE URINE: NEGATIVE
HCT VFR BLD CALC: 36.8 % (ref 36.3–47.1)
HDLC SERPL-MCNC: 44 MG/DL
HEMOGLOBIN: 11.4 G/DL (ref 11.9–15.1)
KETONES, URINE: NEGATIVE
LDL CHOLESTEROL: 89 MG/DL (ref 0–130)
LEUKOCYTE ESTERASE, URINE: NEGATIVE
MCH RBC QN AUTO: 27.4 PG (ref 25.2–33.5)
MCHC RBC AUTO-ENTMCNC: 31 G/DL (ref 28.4–34.8)
MCV RBC AUTO: 88.5 FL (ref 82.6–102.9)
NITRITE, URINE: NEGATIVE
NRBC AUTOMATED: 0 PER 100 WBC
PDW BLD-RTO: 13.8 % (ref 11.8–14.4)
PH UA: 6.5 (ref 5–8)
PLATELET # BLD: 237 K/UL (ref 138–453)
PMV BLD AUTO: 9.4 FL (ref 8.1–13.5)
POTASSIUM SERPL-SCNC: 3.8 MMOL/L (ref 3.7–5.3)
PROTEIN UA: NEGATIVE
RBC # BLD: 4.16 M/UL (ref 3.95–5.11)
SODIUM BLD-SCNC: 143 MMOL/L (ref 135–144)
SPECIFIC GRAVITY UA: 1.02 (ref 1–1.03)
TOTAL PROTEIN: 6.9 G/DL (ref 6.4–8.3)
TRIGL SERPL-MCNC: 110 MG/DL
TURBIDITY: CLEAR
URINE HGB: NEGATIVE
UROBILINOGEN, URINE: NORMAL
WBC # BLD: 7.7 K/UL (ref 3.5–11.3)

## 2022-10-19 PROCEDURE — 93005 ELECTROCARDIOGRAM TRACING: CPT

## 2022-10-19 PROCEDURE — 85027 COMPLETE CBC AUTOMATED: CPT

## 2022-10-19 PROCEDURE — 36415 COLL VENOUS BLD VENIPUNCTURE: CPT

## 2022-10-19 PROCEDURE — 81003 URINALYSIS AUTO W/O SCOPE: CPT

## 2022-10-19 PROCEDURE — 80061 LIPID PANEL: CPT

## 2022-10-19 PROCEDURE — 80053 COMPREHEN METABOLIC PANEL: CPT

## 2022-10-19 ASSESSMENT — PAIN DESCRIPTION - FREQUENCY: FREQUENCY: CONTINUOUS

## 2022-10-19 ASSESSMENT — PAIN DESCRIPTION - ORIENTATION: ORIENTATION: LEFT

## 2022-10-19 ASSESSMENT — PAIN DESCRIPTION - DESCRIPTORS: DESCRIPTORS: SHARP;SHOOTING;THROBBING

## 2022-10-19 ASSESSMENT — PAIN - FUNCTIONAL ASSESSMENT: PAIN_FUNCTIONAL_ASSESSMENT: PREVENTS OR INTERFERES SOME ACTIVE ACTIVITIES AND ADLS

## 2022-10-19 ASSESSMENT — PAIN DESCRIPTION - LOCATION: LOCATION: SHOULDER

## 2022-10-19 ASSESSMENT — PAIN DESCRIPTION - ONSET: ONSET: ON-GOING

## 2022-10-19 ASSESSMENT — PAIN DESCRIPTION - PAIN TYPE: TYPE: CHRONIC PAIN

## 2022-10-19 ASSESSMENT — PAIN SCALES - GENERAL: PAINLEVEL_OUTOF10: 9

## 2022-10-19 NOTE — PRE-PROCEDURE INSTRUCTIONS
ARRIVE  [place] Tuesday, October 25, 2022. Dr. [facility] office will notify with time to arrive. B    Once you enter the hospital lobby, take the elevators to the second floor. Check-In is at the surgery registration desk      Continue to take your home medications as you normally do up to and including the night before surgery with the exception of any blood thinning medications. Please stop any blood thinning medications as directed by your surgeon or prescribing physician. Failure to stop certain medications may interfere with your scheduled surgery. These may include:  Aspirin, Warfarin (Coumadin), Clopidogrel (Plavix), Ibuprofen (Motrin, Advil), Naproxen (Aleve), Meloxicam (Mobic), Celecoxib (Celebrex), Eliquis, Pradaxa, Xarelto, Effient, Fish Oil, Herbal supplements. Vitamins      Please take the following medication(s) the day of surgery with a small sip of water:  Protonix      PREPARING FOR YOUR SURGERY:     Before surgery, you can play an important role in your own health. Because skin is not sterile, we need to be sure that your skin is as free of germs as possible before surgery by carefully washing before surgery. Preparing or prepping skin before surgery can reduce the risk of a surgical site infection.   Do not shave the area of your body where your surgery will be performed unless you received specific permission from your physician. You will need to shower at home the night before surgery and the morning of surgery with a special soap called chlorhexidine gluconate (CHG*). *Not to be used by people allergic to Chlorhexidine Gluconate (CHG). Following these instructions will help you be sure that your skin is clean before surgery. Instructions on cleaning your skin before surgery: The night before your surgery: You will need to shower with warm water (not hot) and the CHG soap. Use a clean wash cloth and a clean towel.   Have clean clothes available to us, please bring a small bag of necessary personal items. Please wear loose, comfortable clothing. If you are potentially going to have a cast or brace bring clothing that will fit over them. In case of illness - If you have cold or flu like symptoms (high fever, runny nose, sore throat, cough, etc.) rash, nausea, vomiting, loose stools, and/or recent contact with someone who has a contagious disease (chicken pox, measles, etc.) Please call your doctor before coming to the hospital.         Day of Surgery/Procedure:    As a patient at [facility] - INPATIENT you can expect quality medical and nursing care that is centered on your individual needs. Our goal is to make your surgical experience as comfortable as possible    . Transportation After Your Surgery/Procedure: You will need a friend or family member to drive you home after your procedure. Your  must be 25years of age or older and able to sign off on your discharge instructions. A taxi cab or any other form of public transportation is not acceptable. Your friend or family member must stay at the hospital throughout your procedure. Someone must remain with you for the first 24 hours after your surgery if you receive anesthesia or medication. If you do not have someone to stay with you, your procedure may be cancelled.       If you have any other questions regarding your procedure or the day of surgery, please call [phone]      _________________________  ____________________________  Signature (Patient)              Signature (Provider)               Date

## 2022-10-20 ENCOUNTER — ANESTHESIA EVENT (OUTPATIENT)
Dept: OPERATING ROOM | Age: 52
End: 2022-10-20
Payer: MEDICARE

## 2022-10-21 ENCOUNTER — OFFICE VISIT (OUTPATIENT)
Dept: ORTHOPEDIC SURGERY | Age: 52
End: 2022-10-21
Payer: MEDICARE

## 2022-10-21 ENCOUNTER — HOSPITAL ENCOUNTER (OUTPATIENT)
Dept: VASCULAR LAB | Age: 52
Discharge: HOME | End: 2022-10-21
Payer: MEDICARE

## 2022-10-21 VITALS — BODY MASS INDEX: 25 KG/M2 | WEIGHT: 124 LBS | HEIGHT: 59 IN

## 2022-10-21 DIAGNOSIS — M75.122 COMPLETE TEAR OF LEFT ROTATOR CUFF, UNSPECIFIED WHETHER TRAUMATIC: Primary | ICD-10-CM

## 2022-10-21 DIAGNOSIS — Z86.79 HISTORY OF CEREBRAL ANEURYSM: ICD-10-CM

## 2022-10-21 PROCEDURE — 99214 OFFICE O/P EST MOD 30 MIN: CPT | Performed by: PHYSICIAN ASSISTANT

## 2022-10-21 PROCEDURE — G8484 FLU IMMUNIZE NO ADMIN: HCPCS | Performed by: PHYSICIAN ASSISTANT

## 2022-10-21 PROCEDURE — 93880 EXTRACRANIAL BILAT STUDY: CPT

## 2022-10-21 PROCEDURE — G8427 DOCREV CUR MEDS BY ELIG CLIN: HCPCS | Performed by: PHYSICIAN ASSISTANT

## 2022-10-21 PROCEDURE — 3017F COLORECTAL CA SCREEN DOC REV: CPT | Performed by: PHYSICIAN ASSISTANT

## 2022-10-21 PROCEDURE — G8419 CALC BMI OUT NRM PARAM NOF/U: HCPCS | Performed by: PHYSICIAN ASSISTANT

## 2022-10-21 PROCEDURE — 4004F PT TOBACCO SCREEN RCVD TLK: CPT | Performed by: PHYSICIAN ASSISTANT

## 2022-10-21 NOTE — PROGRESS NOTES
[address]  [address]  Dept: [phone]  Dept Fax: [phone]        Ambulatory Follow Up      Subjective:   Deion Tolliver is a 45y.o. year old female who presents to our office today for routine followup regarding her   1. Complete tear of left rotator cuff, unspecified whether traumatic    . Chief Complaint   Patient presents with    Follow-up     Left shoulder         HPI Deion Tolliver  is a 45 y.o. female who presents today with questions in regards to her upcoming surgery. Patient is having a left shoulder arthroscopy with rotator cuff repair and possible open subpectoral biceps tenodesis to be completed by Dr. Safia Anton on 10/25/2022. Patient came in with questions today. She was unaware of the possible open subpectoral biceps tenodesis and she would noted that she would like to go over her MRI again today in office. She notes continued pain within the left shoulder with limitations in her range of motion. Patient is currently taking Percocet 7.5-325 mg, 1 tablet up to 4 times daily as prescribed by her primary care physician along with Lyrica 100 mg, 1 tablet twice daily as prescribed by her primary care physician. She notes that both of these medications do not seem to be significantly improving her discomfort. She notes difficulty in sleeping due to left shoulder discomfort. Review of Systems   Constitutional:  Negative for activity change and fever. HENT:  Negative for sneezing. Respiratory:  Negative for cough and shortness of breath. Cardiovascular:  Negative for chest pain. Gastrointestinal:  Negative for vomiting. Musculoskeletal:  Positive for arthralgias (left shoulder). Negative for joint swelling and myalgias. Skin:  Negative for color change. Neurological:  Negative for weakness and numbness.    Psychiatric/Behavioral:  Positive for sleep disturbance (d/t left shoulder pain). Objective :   Ht 4' 11\" (1.499 m)   Wt 124 lb (56.2 kg)   LMP 10/21/2020   BMI 25.04 kg/m²  Body mass index is 25.04 kg/m². General: Lila Russ is a 45 y.o. female who is alert and oriented and sitting comfortably in our office. Ortho Exam  MS:  Evaluation of the Left shoulder reveals no significant shoulder girdle muscle atrophy, erythema, warmth, skin lesions, signs of infection. Tenderness to the anterolateral aspect of the shoulder is appreciated. No palpable deformity is noted. Active flexion is 90 degrees. Active functional reach is  LS junction  . Active abduction is 90 degrees. A  PositiveHawkins test is appreciated. Positive supraspinatus test is appreciated. A positive impingement test is noted. No gross shoulder instability is appreciated. A negative apprehension test is noted. Negative Scioto's test is appreciated. Rotator cuff muscle strength testing shows weakness with external rotationstrength testing. Sensation to C5, C6, C7, C8, T1 dermatomes appears to be intact and symmetric bilaterally. Patient has full range of motion of the cervical spine and left elbow. Neuro: alert and oriented to person and place. Eyes: Extra-ocular muscles intact  Mouth: Oral mucosa moist. No perioral lesions  Pulm: Respirations unlabored and regular. Symmetric chest excursion without outward deformity is noted. Skin: warm, well perfused  Psych:   Patient has good fund of knowledge and displays understanging of exam, diagnosis, and plan. Radiology: No new imaging obtained today in office. MRI LEFT SHOULDER - 9/23/2022  Impression   1. Complete retracted full-thickness tear of supraspinatus with retraction of   the torn fibers from the footplate measuring up to 2.7 cm. Region of   full-thickness tearing measures 2.2 cm in greatest AP dimension.    2. Less than 50% multifocal partial-thickness interstitial and articular   surface tearing of infraspinatus between musculotendinous junction and   footplate. 3. Mild subscapularis tendinosis. 4. Biceps tendon rupture versus severe thinning of the intra-articular long   head of the biceps tendon. 5. Mild diffuse labral degeneration. Mild glenohumeral chondromalacia. Small glenohumeral joint effusion. 6. Mild degenerative change of the left AC joint. Assessment:      1. Complete tear of left rotator cuff, unspecified whether traumatic       Plan:      Patient is here today for questions in regards to her left shoulder surgery that is scheduled for 10/25/2022. The patient is having a left shoulder arthroscopy with rotator cuff repair and possible open subpectoral biceps tenodesis to be completed by Dr. Sofi Sandoval on 10/25/2022. All questions were answered in regards to the patient's upcoming left shoulder rotator cuff repair with possible open subpectoral biceps tenodesis. The patient was very overwhelmed with the fact that she is going to be having surgery and states that she is still continuing to be in a lot of pain. She has Percocet 7.5-325 mg prescribed 1 tablet 4 times per day from her PCP along with Lyrica and notes that her medication is not helping. I discussed with her that the surgery will be uncomfortable and she will not be able to lift her left shoulder for at least 6 to 12 weeks after surgery but outpatient physical therapy and postoperative pain medication should continue to help her discomfort. Patient was amenable to the above plan and stated that she felt less anxious after the appointment. Patient will follow up 2 weeks postoperatively for her previously scheduled postoperative appointment. She was instructed to call our office with any questions or concerns prior to her next appointment. She noted her understanding. Follow up:Return for 2 weeks post-op.     Total Time: 30 min      No orders of the defined types were placed in this encounter. No orders of the defined types were placed in this encounter. This note is created with the assistance of a speech recognition program.  While intending to generate a document that actually reflects the content of the visit, the document can still have some errors including those of syntax and sound a like substitutions which may escape proof reading. In such instances, actual meaning can be extrapolated by contextual diversion.      Electronically signed by Srikanth Gauthier PA-C on 10/24/2022 at 9:15 AM

## 2022-10-24 ASSESSMENT — ENCOUNTER SYMPTOMS
COUGH: 0
COLOR CHANGE: 0
SHORTNESS OF BREATH: 0
VOMITING: 0

## 2022-10-25 ENCOUNTER — ANESTHESIA (OUTPATIENT)
Dept: OPERATING ROOM | Age: 52
End: 2022-10-25
Payer: MEDICARE

## 2022-10-25 ENCOUNTER — HOSPITAL ENCOUNTER (OUTPATIENT)
Age: 52
Setting detail: OUTPATIENT SURGERY
Discharge: HOME | End: 2022-10-25
Attending: ORTHOPAEDIC SURGERY | Admitting: ORTHOPAEDIC SURGERY
Payer: MEDICARE

## 2022-10-25 VITALS
HEIGHT: 59 IN | RESPIRATION RATE: 15 BRPM | SYSTOLIC BLOOD PRESSURE: 164 MMHG | BODY MASS INDEX: 25 KG/M2 | HEART RATE: 62 BPM | WEIGHT: 124 LBS | TEMPERATURE: 96.8 F | DIASTOLIC BLOOD PRESSURE: 90 MMHG | OXYGEN SATURATION: 100 %

## 2022-10-25 DIAGNOSIS — G89.18 POST-OP PAIN: Primary | ICD-10-CM

## 2022-10-25 PROCEDURE — L3650 SO 8 ABD RESTRAINT PRE OTS: HCPCS | Performed by: ORTHOPAEDIC SURGERY

## 2022-10-25 PROCEDURE — 2500000003 HC RX 250 WO HCPCS: Performed by: STUDENT IN AN ORGANIZED HEALTH CARE EDUCATION/TRAINING PROGRAM

## 2022-10-25 PROCEDURE — C1713 ANCHOR/SCREW BN/BN,TIS/BN: HCPCS | Performed by: ORTHOPAEDIC SURGERY

## 2022-10-25 PROCEDURE — 3600000013 HC SURGERY LEVEL 3 ADDTL 15MIN: Performed by: ORTHOPAEDIC SURGERY

## 2022-10-25 PROCEDURE — 3700000001 HC ADD 15 MINUTES (ANESTHESIA): Performed by: ORTHOPAEDIC SURGERY

## 2022-10-25 PROCEDURE — C9290 INJ, BUPIVACAINE LIPOSOME: HCPCS | Performed by: STUDENT IN AN ORGANIZED HEALTH CARE EDUCATION/TRAINING PROGRAM

## 2022-10-25 PROCEDURE — 7100000001 HC PACU RECOVERY - ADDTL 15 MIN: Performed by: ORTHOPAEDIC SURGERY

## 2022-10-25 PROCEDURE — 2500000003 HC RX 250 WO HCPCS: Performed by: NURSE ANESTHETIST, CERTIFIED REGISTERED

## 2022-10-25 PROCEDURE — 3700000000 HC ANESTHESIA ATTENDED CARE: Performed by: ORTHOPAEDIC SURGERY

## 2022-10-25 PROCEDURE — 2580000003 HC RX 258: Performed by: ORTHOPAEDIC SURGERY

## 2022-10-25 PROCEDURE — 64415 NJX AA&/STRD BRCH PLXS IMG: CPT | Performed by: STUDENT IN AN ORGANIZED HEALTH CARE EDUCATION/TRAINING PROGRAM

## 2022-10-25 PROCEDURE — 2720000010 HC SURG SUPPLY STERILE: Performed by: ORTHOPAEDIC SURGERY

## 2022-10-25 PROCEDURE — 6360000002 HC RX W HCPCS: Performed by: NURSE ANESTHETIST, CERTIFIED REGISTERED

## 2022-10-25 PROCEDURE — 29827 SHO ARTHRS SRG RT8TR CUF RPR: CPT | Performed by: ORTHOPAEDIC SURGERY

## 2022-10-25 PROCEDURE — 2580000003 HC RX 258: Performed by: ANESTHESIOLOGY

## 2022-10-25 PROCEDURE — 7100000011 HC PHASE II RECOVERY - ADDTL 15 MIN: Performed by: ORTHOPAEDIC SURGERY

## 2022-10-25 PROCEDURE — 7100000010 HC PHASE II RECOVERY - FIRST 15 MIN: Performed by: ORTHOPAEDIC SURGERY

## 2022-10-25 PROCEDURE — 7100000000 HC PACU RECOVERY - FIRST 15 MIN: Performed by: ORTHOPAEDIC SURGERY

## 2022-10-25 PROCEDURE — 3600000003 HC SURGERY LEVEL 3 BASE: Performed by: ORTHOPAEDIC SURGERY

## 2022-10-25 PROCEDURE — 76942 ECHO GUIDE FOR BIOPSY: CPT | Performed by: STUDENT IN AN ORGANIZED HEALTH CARE EDUCATION/TRAINING PROGRAM

## 2022-10-25 PROCEDURE — 6360000002 HC RX W HCPCS: Performed by: STUDENT IN AN ORGANIZED HEALTH CARE EDUCATION/TRAINING PROGRAM

## 2022-10-25 PROCEDURE — 6370000000 HC RX 637 (ALT 250 FOR IP): Performed by: STUDENT IN AN ORGANIZED HEALTH CARE EDUCATION/TRAINING PROGRAM

## 2022-10-25 PROCEDURE — 2709999900 HC NON-CHARGEABLE SUPPLY: Performed by: ORTHOPAEDIC SURGERY

## 2022-10-25 PROCEDURE — 6360000002 HC RX W HCPCS: Performed by: ORTHOPAEDIC SURGERY

## 2022-10-25 DEVICE — ANCHOR SUT L19.1MM DIA5.5MM BIOCOMPOSITE FULL THRD KNOTLESS: Type: IMPLANTABLE DEVICE | Site: SHOULDER | Status: FUNCTIONAL

## 2022-10-25 RX ORDER — HYDROMORPHONE HYDROCHLORIDE 1 MG/ML
0.5 INJECTION, SOLUTION INTRAMUSCULAR; INTRAVENOUS; SUBCUTANEOUS EVERY 5 MIN PRN
Status: DISCONTINUED | OUTPATIENT
Start: 2022-10-25 | End: 2022-10-25 | Stop reason: HOSPADM

## 2022-10-25 RX ORDER — KETOROLAC TROMETHAMINE 10 MG/1
10 TABLET, FILM COATED ORAL EVERY 6 HOURS PRN
Qty: 15 TABLET | Refills: 0 | Status: SHIPPED | OUTPATIENT
Start: 2022-10-25

## 2022-10-25 RX ORDER — LIDOCAINE HYDROCHLORIDE 10 MG/ML
1 INJECTION, SOLUTION EPIDURAL; INFILTRATION; INTRACAUDAL; PERINEURAL
Status: DISCONTINUED | OUTPATIENT
Start: 2022-10-26 | End: 2022-10-25 | Stop reason: HOSPADM

## 2022-10-25 RX ORDER — FENTANYL CITRATE 50 UG/ML
25 INJECTION, SOLUTION INTRAMUSCULAR; INTRAVENOUS EVERY 5 MIN PRN
Status: DISCONTINUED | OUTPATIENT
Start: 2022-10-25 | End: 2022-10-25 | Stop reason: HOSPADM

## 2022-10-25 RX ORDER — FENTANYL CITRATE 50 UG/ML
INJECTION, SOLUTION INTRAMUSCULAR; INTRAVENOUS PRN
Status: DISCONTINUED | OUTPATIENT
Start: 2022-10-25 | End: 2022-10-25 | Stop reason: SDUPTHER

## 2022-10-25 RX ORDER — ONDANSETRON 2 MG/ML
INJECTION INTRAMUSCULAR; INTRAVENOUS PRN
Status: DISCONTINUED | OUTPATIENT
Start: 2022-10-25 | End: 2022-10-25 | Stop reason: SDUPTHER

## 2022-10-25 RX ORDER — SODIUM CHLORIDE 0.9 % (FLUSH) 0.9 %
5-40 SYRINGE (ML) INJECTION PRN
Status: DISCONTINUED | OUTPATIENT
Start: 2022-10-25 | End: 2022-10-25 | Stop reason: HOSPADM

## 2022-10-25 RX ORDER — PREGABALIN 100 MG/1
100 CAPSULE ORAL ONCE
Status: COMPLETED | OUTPATIENT
Start: 2022-10-25 | End: 2022-10-25

## 2022-10-25 RX ORDER — MAGNESIUM HYDROXIDE 1200 MG/15ML
LIQUID ORAL CONTINUOUS PRN
Status: COMPLETED | OUTPATIENT
Start: 2022-10-25 | End: 2022-10-25

## 2022-10-25 RX ORDER — OXYCODONE HYDROCHLORIDE AND ACETAMINOPHEN 5; 325 MG/1; MG/1
1 TABLET ORAL EVERY 6 HOURS PRN
Qty: 28 TABLET | Refills: 0 | Status: SHIPPED | OUTPATIENT
Start: 2022-10-25 | End: 2022-11-01

## 2022-10-25 RX ORDER — SODIUM CHLORIDE 9 MG/ML
INJECTION, SOLUTION INTRAVENOUS PRN
Status: DISCONTINUED | OUTPATIENT
Start: 2022-10-25 | End: 2022-10-25 | Stop reason: HOSPADM

## 2022-10-25 RX ORDER — ONDANSETRON 4 MG/1
4 TABLET, ORALLY DISINTEGRATING ORAL EVERY 8 HOURS PRN
Qty: 15 TABLET | Refills: 0 | Status: SHIPPED | OUTPATIENT
Start: 2022-10-25 | End: 2022-10-30

## 2022-10-25 RX ORDER — LABETALOL HYDROCHLORIDE 5 MG/ML
10 INJECTION, SOLUTION INTRAVENOUS
Status: DISCONTINUED | OUTPATIENT
Start: 2022-10-25 | End: 2022-10-25 | Stop reason: HOSPADM

## 2022-10-25 RX ORDER — SODIUM CHLORIDE, SODIUM LACTATE, POTASSIUM CHLORIDE, CALCIUM CHLORIDE 600; 310; 30; 20 MG/100ML; MG/100ML; MG/100ML; MG/100ML
INJECTION, SOLUTION INTRAVENOUS CONTINUOUS
Status: DISCONTINUED | OUTPATIENT
Start: 2022-10-26 | End: 2022-10-25 | Stop reason: HOSPADM

## 2022-10-25 RX ORDER — SODIUM CHLORIDE 0.9 % (FLUSH) 0.9 %
5-40 SYRINGE (ML) INJECTION EVERY 12 HOURS SCHEDULED
Status: DISCONTINUED | OUTPATIENT
Start: 2022-10-25 | End: 2022-10-25 | Stop reason: HOSPADM

## 2022-10-25 RX ORDER — PROCHLORPERAZINE EDISYLATE 5 MG/ML
5 INJECTION INTRAMUSCULAR; INTRAVENOUS
Status: DISCONTINUED | OUTPATIENT
Start: 2022-10-25 | End: 2022-10-25 | Stop reason: HOSPADM

## 2022-10-25 RX ORDER — MIDAZOLAM HYDROCHLORIDE 1 MG/ML
2 INJECTION INTRAMUSCULAR; INTRAVENOUS ONCE
Status: COMPLETED | OUTPATIENT
Start: 2022-10-25 | End: 2022-10-25

## 2022-10-25 RX ORDER — BUPIVACAINE HYDROCHLORIDE 5 MG/ML
INJECTION, SOLUTION EPIDURAL; INTRACAUDAL
Status: COMPLETED | OUTPATIENT
Start: 2022-10-25 | End: 2022-10-25

## 2022-10-25 RX ORDER — OXYCODONE HYDROCHLORIDE 5 MG/1
5 TABLET ORAL
Status: DISCONTINUED | OUTPATIENT
Start: 2022-10-25 | End: 2022-10-25 | Stop reason: HOSPADM

## 2022-10-25 RX ORDER — LIDOCAINE HYDROCHLORIDE 20 MG/ML
INJECTION, SOLUTION EPIDURAL; INFILTRATION; INTRACAUDAL; PERINEURAL PRN
Status: DISCONTINUED | OUTPATIENT
Start: 2022-10-25 | End: 2022-10-25 | Stop reason: SDUPTHER

## 2022-10-25 RX ORDER — PHENYLEPHRINE HCL IN 0.9% NACL 1 MG/10 ML
VIAL (ML) INTRAVENOUS PRN
Status: DISCONTINUED | OUTPATIENT
Start: 2022-10-25 | End: 2022-10-25 | Stop reason: SDUPTHER

## 2022-10-25 RX ORDER — PROPOFOL 10 MG/ML
INJECTION, EMULSION INTRAVENOUS PRN
Status: DISCONTINUED | OUTPATIENT
Start: 2022-10-25 | End: 2022-10-25 | Stop reason: SDUPTHER

## 2022-10-25 RX ORDER — DEXAMETHASONE SODIUM PHOSPHATE 10 MG/ML
INJECTION, SOLUTION INTRAMUSCULAR; INTRAVENOUS PRN
Status: DISCONTINUED | OUTPATIENT
Start: 2022-10-25 | End: 2022-10-25 | Stop reason: SDUPTHER

## 2022-10-25 RX ORDER — ONDANSETRON 2 MG/ML
4 INJECTION INTRAMUSCULAR; INTRAVENOUS
Status: DISCONTINUED | OUTPATIENT
Start: 2022-10-25 | End: 2022-10-25 | Stop reason: HOSPADM

## 2022-10-25 RX ORDER — ROCURONIUM BROMIDE 10 MG/ML
INJECTION, SOLUTION INTRAVENOUS PRN
Status: DISCONTINUED | OUTPATIENT
Start: 2022-10-25 | End: 2022-10-25 | Stop reason: SDUPTHER

## 2022-10-25 RX ADMIN — LIDOCAINE HYDROCHLORIDE 40 MG: 20 INJECTION, SOLUTION EPIDURAL; INFILTRATION; INTRACAUDAL; PERINEURAL at 13:54

## 2022-10-25 RX ADMIN — Medication 100 MCG: at 14:48

## 2022-10-25 RX ADMIN — BUPIVACAINE HYDROCHLORIDE 10 ML: 5 INJECTION, SOLUTION EPIDURAL; INTRACAUDAL; PERINEURAL at 12:32

## 2022-10-25 RX ADMIN — SUGAMMADEX 150 MG: 100 INJECTION, SOLUTION INTRAVENOUS at 15:15

## 2022-10-25 RX ADMIN — Medication 25 MCG: at 15:22

## 2022-10-25 RX ADMIN — Medication 100 MCG: at 14:15

## 2022-10-25 RX ADMIN — CEFAZOLIN 2000 MG: 10 INJECTION, POWDER, FOR SOLUTION INTRAVENOUS at 14:14

## 2022-10-25 RX ADMIN — PROPOFOL 150 MG: 10 INJECTION, EMULSION INTRAVENOUS at 13:54

## 2022-10-25 RX ADMIN — BUPIVACAINE 10 ML: 13.3 INJECTION, SUSPENSION, LIPOSOMAL INFILTRATION at 12:32

## 2022-10-25 RX ADMIN — Medication 100 MCG: at 14:21

## 2022-10-25 RX ADMIN — DEXAMETHASONE SODIUM PHOSPHATE 10 MG: 10 INJECTION, SOLUTION INTRAMUSCULAR; INTRAVENOUS at 14:07

## 2022-10-25 RX ADMIN — MIDAZOLAM HYDROCHLORIDE 2 MG: 1 INJECTION, SOLUTION INTRAMUSCULAR; INTRAVENOUS at 12:19

## 2022-10-25 RX ADMIN — BUPIVACAINE HYDROCHLORIDE 5 ML: 5 INJECTION, SOLUTION EPIDURAL; INTRACAUDAL at 12:34

## 2022-10-25 RX ADMIN — PREGABALIN 100 MG: 100 CAPSULE ORAL at 12:35

## 2022-10-25 RX ADMIN — ROCURONIUM BROMIDE 40 MG: 10 INJECTION, SOLUTION INTRAVENOUS at 13:54

## 2022-10-25 RX ADMIN — Medication 100 MCG: at 14:57

## 2022-10-25 RX ADMIN — Medication 100 MCG: at 14:10

## 2022-10-25 RX ADMIN — Medication 100 MCG: at 14:27

## 2022-10-25 RX ADMIN — SODIUM CHLORIDE, POTASSIUM CHLORIDE, SODIUM LACTATE AND CALCIUM CHLORIDE: 600; 310; 30; 20 INJECTION, SOLUTION INTRAVENOUS at 10:53

## 2022-10-25 RX ADMIN — Medication 25 MCG: at 15:18

## 2022-10-25 RX ADMIN — Medication 50 MCG: at 13:54

## 2022-10-25 RX ADMIN — Medication 100 MCG: at 14:36

## 2022-10-25 RX ADMIN — ONDANSETRON 4 MG: 2 INJECTION INTRAMUSCULAR; INTRAVENOUS at 15:13

## 2022-10-25 ASSESSMENT — PAIN - FUNCTIONAL ASSESSMENT
PAIN_FUNCTIONAL_ASSESSMENT: PREVENTS OR INTERFERES SOME ACTIVE ACTIVITIES AND ADLS
PAIN_FUNCTIONAL_ASSESSMENT: 0-10

## 2022-10-25 ASSESSMENT — PAIN DESCRIPTION - DESCRIPTORS: DESCRIPTORS: THROBBING

## 2022-10-25 ASSESSMENT — PAIN SCALES - GENERAL
PAINLEVEL_OUTOF10: 0

## 2022-10-25 NOTE — ANESTHESIA PROCEDURE NOTES
Peripheral Block    Patient location during procedure: pre-op  Reason for block: post-op pain management and at surgeon's request  Start time: 10/25/2022 12:32 PM  End time: 10/25/2022 12:34 PM  Staffing  Performed: anesthesiologist   Anesthesiologist: Sammie Schofield MD  Preanesthetic Checklist  Completed: patient identified, IV checked, site marked, risks and benefits discussed, surgical/procedural consents, equipment checked, pre-op evaluation, timeout performed, anesthesia consent given, oxygen available and monitors applied/VS acknowledged  Peripheral Block   Patient position: sitting  Prep: ChloraPrep  Provider prep: mask and sterile gloves  Patient monitoring: continuous pulse ox, frequent blood pressure checks, IV access, oxygen and responsive to questions  Block type: Intercostal (Intercostal Brachial Nerve (ICBN))  single  Laterality: left  Injection technique: single-shot  Guidance: ultrasound guided    Needle   Needle type: short-bevel   Needle gauge: 25 gauge. Assessment   Injection assessment: negative aspiration for heme, no paresthesia on injection, local visualized surrounding nerve on ultrasound and no intravascular symptoms  Paresthesia pain: none  Slow fractionated injection: yes  Hemodynamics: stable  Real-time US image taken/store: yes  Outcomes: uncomplicated and patient tolerated procedure well    Additional Notes  Dx: Acute post-operative pain in the axilla (medial upper extremity) in the distribution not covered by the brachial plexus.    Medications Administered  bupivacaine (PF) 0.5 % - Perineural   5 mL - 10/25/2022 12:34:00 PM
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
## 2022-10-25 NOTE — H&P
Interval H&P Note    Pt Name: Virginia Coburn  MRN: [id number]  YOB: 1970  Date of evaluation: 10/25/2022      [x] I have reviewed in [facility] the orthopedic surgery progress note by Fátima Harp PA-C dated 10/21/2022 for an Interval History and Physical note. [x] I have examined  Virginia Coburn  There are no changes to the patient who is scheduled for LEFT SHOULDER ARTHROSCOPY ROTATOR CUFF REPAIR   POSSIBLE OPEN SUBPEC BICEPS TENODESIS- [address] Estephanie Zamudio by Tenisha Morley DO for Rotator cuff syndrome of left shoulder [M75.102] Nontraumatic complete tear of rotator cuff, left [M75.122]. Patient ate fruit at 2:00AM this morning. Patient has a history of brain aneurysm with coiling and monitoring of the second aneurysm. Patient received neurology clearance for planned surgery (scanned into epic). The patient denies new health changes, fever, chills, wheezing, cough, increased SOB, chest pain, open sores or wounds. Denies hx diabetes and taking any blood thinning medications in the last 10 days. Vital signs: /76   Pulse 66   Temp 97.1 °F (36.2 °C) (Temporal)   Resp 16   Ht 4' 11\" (1.499 m)   Wt 124 lb (56.2 kg)   LMP 10/21/2020   SpO2 99%   BMI 25.04 kg/m²     Allergies:  Patient has no known allergies. Medications:    Prior to Admission medications    Medication Sig Start Date End Date Taking?  Authorizing Provider   ondansetron (ZOFRAN ODT) 4 MG disintegrating tablet Take 1 tablet by mouth every 6 hours as needed for Nausea or Vomiting  Patient not taking: Reported on 10/19/2022 11/22/21   Ratna Johnson MD   pantoprazole (PROTONIX) 40 MG tablet Take 1 tablet by mouth 2 times daily (before meals) 11/22/21   Ratna Johnson MD   sennosides-docusate sodium (SENOKOT-S) 8.6-50 MG tablet Take 1 tablet by mouth daily  Patient not taking: Reported on 10/19/2022 10/19/21   Denise Valadez MD   famotidine (PEPCID) 20 MG tablet Take 1 tablet by mouth 2 times daily  Patient not taking: Reported on 10/19/2022 9/11/21   Ace Lemus MD   FLUoxetine (PROZAC) 20 MG capsule Take 20 mg by mouth daily 2/19/21   Historical Provider, MD   oxyCODONE-acetaminophen (PERCOCET) 7.5-325 MG per tablet Take 1 tablet by mouth 4 times daily. 9/15/20   Historical Provider, MD   traZODone (DESYREL) 100 MG tablet Take 100 mg by mouth nightly 9/16/20   Historical Provider, MD   pregabalin (LYRICA) 100 MG capsule Take 100 mg by mouth 2 times daily. Historical Provider, MD   celecoxib (CELEBREX) 200 MG capsule take 1 capsule by mouth once daily prn 4/4/17 2/18/22  Historical Provider, MD         This is a 45 y.o. female who is pleasant, cooperative, alert and oriented x3, in no acute distress. Heart: Heart sounds are normal.  HR 66 regular rate and rhythm without murmur, gallop or rub. Lungs: Normal respiratory effort with equal expansion, good air exchange, unlabored and clear to auscultation without wheezes or rales bilaterally   Abdomen: soft, nontender, nondistended with bowel sounds active. Labs:  Recent Labs     10/19/22  1040   HGB 11.4*   HCT 36.8   WBC 7.7   MCV 88.5         K 3.8      CO2 28   BUN 15   CREATININE 0.89   GLUCOSE 103*   AST 21   ALT 12   LABALBU 4.2       No results for input(s): COVID19 in the last 720 hours.     YEE Brice - CNP  Electronically signed 10/25/2022 at 10:58 AM       Fátima Goyal PA-C   Physician Assistant   Specialty:  Physician Assistant   Progress Notes      Signed   Encounter Date:  10/21/2022          Related encounter: Office Visit from 10/21/2022 in [address] [address]  Dept: [phone]  Dept Fax: [phone]         Ambulatory Follow Up        Subjective:   Fatuma Soriano is a 45y.o. year old female who presents to our office today for routine followup regarding her   1. Complete tear of left rotator cuff, unspecified whether traumatic    . Chief Complaint   Patient presents with    Follow-up       Left shoulder            HPI Enriqueta Sr  is a 45 y.o. female who presents today with questions in regards to her upcoming surgery. Patient is having a left shoulder arthroscopy with rotator cuff repair and possible open subpectoral biceps tenodesis to be completed by Dr. Annie Cormier on 10/25/2022. Patient came in with questions today. She was unaware of the possible open subpectoral biceps tenodesis and she would noted that she would like to go over her MRI again today in office. She notes continued pain within the left shoulder with limitations in her range of motion. Patient is currently taking Percocet 7.5-325 mg, 1 tablet up to 4 times daily as prescribed by her primary care physician along with Lyrica 100 mg, 1 tablet twice daily as prescribed by her primary care physician. She notes that both of these medications do not seem to be significantly improving her discomfort. She notes difficulty in sleeping due to left shoulder discomfort. Review of Systems   Constitutional:  Negative for activity change and fever. HENT:  Negative for sneezing. Respiratory:  Negative for cough and shortness of breath. Cardiovascular:  Negative for chest pain. Gastrointestinal:  Negative for vomiting. Musculoskeletal:  Positive for arthralgias (left shoulder). Negative for joint swelling and myalgias. Skin:  Negative for color change. Neurological:  Negative for weakness and numbness. Psychiatric/Behavioral:  Positive for sleep disturbance (d/t left shoulder pain). Objective :   Ht 4' 11\" (1.499 m)   Wt 124 lb (56.2 kg)   LMP 10/21/2020   BMI 25.04 kg/m²  Body mass index is 25.04 kg/m². General: Enriqueta Sr is a 45 y.o. female who is alert and oriented and sitting comfortably in our office.   Ortho Exam  MS:  Evaluation of the Left shoulder reveals no significant shoulder girdle muscle atrophy, erythema, warmth, skin lesions, signs of infection. Tenderness to the anterolateral aspect of the shoulder is appreciated. No palpable deformity is noted. Active flexion is 90 degrees. Active functional reach is  LS junction  . Active abduction is 90 degrees. A  PositiveHawkins test is appreciated. Positive supraspinatus test is appreciated. A positive impingement test is noted. No gross shoulder instability is appreciated. A negative apprehension test is noted. Negative Grafton's test is appreciated. Rotator cuff muscle strength testing shows weakness with external rotationstrength testing. Sensation to C5, C6, C7, C8, T1 dermatomes appears to be intact and symmetric bilaterally. Patient has full range of motion of the cervical spine and left elbow. Neuro: alert and oriented to person and place. Eyes: Extra-ocular muscles intact  Mouth: Oral mucosa moist. No perioral lesions  Pulm: Respirations unlabored and regular. Symmetric chest excursion without outward deformity is noted. Skin: warm, well perfused  Psych:   Patient has good fund of knowledge and displays understanging of exam, diagnosis, and plan. Radiology: No new imaging obtained today in office. MRI LEFT SHOULDER - 9/23/2022  Impression   1. Complete retracted full-thickness tear of supraspinatus with retraction of   the torn fibers from the footplate measuring up to 2.7 cm. Region of   full-thickness tearing measures 2.2 cm in greatest AP dimension. 2. Less than 50% multifocal partial-thickness interstitial and articular   surface tearing of infraspinatus between musculotendinous junction and   footplate. 3. Mild subscapularis tendinosis. 4. Biceps tendon rupture versus severe thinning of the intra-articular long   head of the biceps tendon. 5. Mild diffuse labral degeneration. Mild glenohumeral chondromalacia.    Small glenohumeral joint effusion. 6. Mild degenerative change of the left AC joint. Assessment:   1. Complete tear of left rotator cuff, unspecified whether traumatic    Plan:   Patient is here today for questions in regards to her left shoulder surgery that is scheduled for 10/25/2022. The patient is having a left shoulder arthroscopy with rotator cuff repair and possible open subpectoral biceps tenodesis to be completed by Dr. Myriam Marquis on 10/25/2022. All questions were answered in regards to the patient's upcoming left shoulder rotator cuff repair with possible open subpectoral biceps tenodesis. The patient was very overwhelmed with the fact that she is going to be having surgery and states that she is still continuing to be in a lot of pain. She has Percocet 7.5-325 mg prescribed 1 tablet 4 times per day from her PCP along with Lyrica and notes that her medication is not helping. I discussed with her that the surgery will be uncomfortable and she will not be able to lift her left shoulder for at least 6 to 12 weeks after surgery but outpatient physical therapy and postoperative pain medication should continue to help her discomfort. Patient was amenable to the above plan and stated that she felt less anxious after the appointment. Patient will follow up 2 weeks postoperatively for her previously scheduled postoperative appointment. She was instructed to call our office with any questions or concerns prior to her next appointment. She noted her understanding. Follow up:Return for 2 weeks post-op. Total Time: 30 min        Encounter Medications    No orders of the defined types were placed in this encounter. No orders of the defined types were placed in this encounter.         This note is created with the assistance of a speech recognition program.  While intending to generate a document that actually reflects the content of the visit, the document can still have some errors including those of syntax and sound a like substitutions which may escape proof reading. In such instances, actual meaning can be extrapolated by contextual diversion.       Electronically signed by Adriano Desai PA-C on 10/24/2022 at 9:15 AM

## 2022-10-25 NOTE — ANESTHESIA POSTPROCEDURE EVALUATION
Department of Anesthesiology  Postprocedure Note    Patient: Last Abbott  MRN: [id number]  YOB: 1970  Date of evaluation: 10/25/2022      Procedure Summary     Date: 10/25/22 Room / Location: [facility] / [place] [address]    Anesthesia Start: 1347 Anesthesia Stop: 1537    Procedure: LEFT SHOULDER ARTHROSCOPY PARTIAL SYNOVECTOMY ROTATOR CUFF REPAIR (Left: Shoulder) Diagnosis:       Rotator cuff syndrome of left shoulder      Nontraumatic complete tear of rotator cuff, left      (Rotator cuff syndrome of left shoulder [M75.102])      (Nontraumatic complete tear of rotator cuff, left [M75.122])    Surgeons: Wade Simpson DO Responsible Provider: Fern Abdi MD    Anesthesia Type: general ASA Status: 2          Anesthesia Type: No value filed.     Lana Phase I: Lana Score: 9    Lana Phase II: Lana Score: 10      Anesthesia Post Evaluation    Patient location during evaluation: PACU  Patient participation: complete - patient participated  Level of consciousness: awake  Airway patency: patent  Nausea & Vomiting: no nausea and no vomiting  Complications: no  Cardiovascular status: hemodynamically stable  Respiratory status: acceptable  Hydration status: stable  Multimodal analgesia pain management approach

## 2022-10-25 NOTE — OP NOTE
Operative Note      Patient: Deonte Brito  YOB: 1970  MRN: [id number]    Date of Procedure: 10/25/2022    Pre-Op Diagnosis: Rotator cuff syndrome of left shoulder [M75.102]  Nontraumatic complete tear of rotator cuff, left [M75.122], reactive synovitis left shoulder    Post-Op Diagnosis: Same       Procedure(s):  LEFT SHOULDER ARTHROSCOPY PARTIAL SYNOVECTOMY ROTATOR CUFF REPAIR    Surgeon(s):  Garo Sky DO    Assistant:   Resident: Adriano Musa DO    Anesthesia: General    Estimated Blood Loss (mL): less than 50     Complications: None    Specimens:   * No specimens in log *    Implants:  Implant Name Type Inv. Item Serial No.  Lot No. LRB No. Used Action   ANCHOR SUT L19.1MM DIA5. 5MM BIOCOMPOSITE FULL THRD KNOTLESS - [id number]  ANCHOR SUT L19.1MM DIA5. 5MM BIOCOMPOSITE FULL THRD KNOTLESS  [employer] [id number] Left 1 Implanted   ANCHOR SUT L19.1MM DIA5. 5MM BIOCOMPOSITE FULL THRD KNOTLESS - [id number]  ANCHOR SUT L19.1MM DIA5. 5MM BIOCOMPOSITE FULL THRD KNOTLESS  [employer] [id number] Left 1 Implanted         Drains: * No LDAs found *    Findings: Per operative note    Detailed Description of Procedure:   Bharti Robert is a 57-year-old female who presented to [facility] surgical department for left shoulder arthroscopy and rotator cuff repair. The patient has had progressively worsening left shoulder pain which is failed to improve despite conservative therapy to include activity modification. She has been followed in clinic for radiographic evaluation of a rotator cuff tear and as a result has indicated she would like surgical intervention at this time. Patient was identified preoperatively where consent was obtained, signed, placed on the chart. The procedure was described. Her questions were answered.   All details of the procedure, as well as risks, benefits and alternatives, including the option of non operative versus operative treatment were discussed. The patient understands that risks of the surgery include but are not limited to: bleeding, malunion/nonunion, loss of fixation, loss of reduction, hardware failure, angular or rotational deformity, length discrepancy, limp, transfusion, skin blistering or breakdown, progressive post traumatic degenerative joint disease, possible need for further surgery, bone grafting, infection, nerve injury, paralysis, numbness, blood vessel injury, excessive scaring, wound complication or breakdown, failure of symptoms to improve or actual deterioration in condition, significant acute and/or chronic pain, possible need for amputation, permanent loss of motion, and permanent loss of function. As well as the general complications of anesthesia, which include but are not limited to: myocardial infarction and/or heart attack, stroke, multi organ system failure or even possible death, prolonged hospital stay, blood clots, pulmonary embolism, abnormal reaction to medication, visual and neurological disturbances, constipation, ischemic bowel, bowel obstruction, bowel perforation, ileus and mental status changes. No guarantees were made. Patient was administered an interscalene regional block to the left upper extremity in the preoperative holding area by the anesthesia team.  She was also administered IV antibiotics perioperatively and then taken to the operative suite where she was placed supine upon the operative table. General anesthesia was affected and the patient was placed in a modified beachchair position on a spider table with all bony prominences well-padded. The left upper extremity was evaluated under anesthesia was found to be ligamentously stable at the shoulder. The left upper extremity was then prepped and draped in a sterile fashion. After an appropriate surgical timeout a standard posterior portal was created just inferior medial to the posterior lateral aspect of the acromion.   Sharp stab incision was made in the skin. Obturator and cannula were advanced into the intra-articular portion of the shoulder being careful not to injure surrounding neurovascular and tendinous structures. Inflow and outflow are connected to the arthroscopic cannula and serial arthroscopy of the shoulder was carried out under sterile saline pump pressure of 40 to 60 mmHg. Glenohumeral joint was inspected. Mild glenohumeral chondromalacia was appreciated. Mild fraying of the subscapularis was noted. The middle glenohumeral ligament was intact. The inferior glenohumeral ligaments were intact. No significant pathology in the axillary pouch was appreciated. The biceps was noted to be absent and was noted to be previously torn on the MRI. A moderate or moderate reactive synovitis was noted throughout the superior and superior anterior and superior posterior portions of the glenohumeral joint and there is synovitis was resected using a motorized shaver and an ablator. The rotator cuff was inspected. The infraspinatus and teres were noted to be intact but the supraspinatus was noted to be completely torn at its anatomic footprint. The shoulder was then thoroughly irrigated and suctioned. The anterior and posterior portals with an anterior portal had been created previously just lateral to the coracoid process was then repositioned in the subacromial space. A subacromial bursectomy was affected using the anterior portal and a newly created a lateral portal which was placed after spinal needle localization, stab incision in the skin and placement of an 8.25 mm cannula. Once the bursa was resected the rotator cuff was grasped with a grasper and was mobilized back to its footprint easily. The footprint was then scraped down to good bleeding bone with an open ring curette.     Multiple horizontal mattress sutures were placed in the rotator cuff tear and 2 -5.5 mm swivel lock anchors were deployed after predrilling at the anatomic footprint restoring the rotator cuff tear back to his near anatomic position as could be obtained. The shoulder was put through range of motion the rotator cuff repair was noted to be stable. The shoulder was then thoroughly irrigated and suctioned. All arthroscopic equipment was removed from the shoulder under direct visualization. Portals were closed using nylon suture and a sterile dressing was applied. Patient was placed in an [address] [place] [address] by Leta. Anesthesia was reversed. Patient was taken to postop recovery room in stable condition. There were no major postoperative complications and the procedure was tolerated well.         Electronically signed by Negro Drake DO on 10/25/2022 at 3:18 PM

## 2022-10-25 NOTE — DISCHARGE INSTRUCTIONS
Orthopedic Instructions:  -Weight bearing status: No lifting, pulling, or pushing with the left upper extremity. No active external rotation of shoulder. Ok for passive range of motion in the scapular plain   -Keep dressing clean and dry.  -Starting 3 days after surgery, Ok for daily dressing changes until wound is dry. Then leave open to air. if wound is no longer leaking, Ok to shower but no soaks or baths.  -Physical Therapy for strengthening and gait training. Occupational therapy for activities of daily living.  -Ice (20 minutes on and off 1 hour) and elevate (above heart) as needed for swelling/pain  -Drink plenty of fluids.  -Call the office or come to Emergency Room if signs of infection appear (hot, swollen, red, draining pus, fever)  -Take medications as prescribed.  -Wean off narcotics (percocet/norco) as soon as possible. Do not take tylenol if still taking narcotics. -No alcoholic beverages or driving/operating machinery while on narcotics  -Follow up with  in his office in 10-14 days after surgery/discharge. Call [phone]   to schedule.

## 2022-10-25 NOTE — BRIEF OP NOTE
Brief Postoperative Note      Patient: Joe López  YOB: 1970  MRN: [id number]    Date of Procedure: 10/25/2022    Pre-Op Diagnosis:   ROTATOR CUFF TEAR LEFT SHOULDER  EXTENSIVE SYNOVITIS LEFT SHOULDER    Post-Op Diagnosis:   ROTATOR CUFF TEAR LEFT SHOULDER  EXTENSIVE SYNOVITIS LEFT SHOULDER       Procedure(s):  LEFT SHOULDER ARTHROSCOPY PARTIAL SYNOVECTOMY ROTATOR CUFF REPAIR    Surgeon(s):  Faisal Salas DO    Assistant:  Resident: Janet Jaffe DO    Anesthesia: General    Estimated Blood Loss (mL): 30AS    Complications: None    Specimens: None    Implants:  Implant Name Type Inv. Item Serial No.  Lot No. LRB No. Used Action   ANCHOR SUT L19.1MM DIA5. 5MM BIOCOMPOSITE FULL THRD KNOTLESS - [id number]  ANCHOR SUT L19.1MM DIA5. 5MM BIOCOMPOSITE FULL THRD KNOTLESS  [employer] [id number] Left 1 Implanted   ANCHOR SUT L19.1MM DIA5. 5MM BIOCOMPOSITE FULL THRD KNOTLESS - [id number]  ANCHOR SUT L19.1MM DIA5. 5MM BIOCOMPOSITE FULL THRD KNOTLESS  [employer] [id number] Left 1 Implanted         Drains: None    Findings: See op note for details.      Electronically signed by Janet Jaffe DO on 10/25/2022 at 3:36 PM

## 2022-10-25 NOTE — ANESTHESIA PROCEDURE NOTES
Peripheral Block    Patient location during procedure: procedure area  Reason for block: post-op pain management and at surgeon's request  Start time: 10/25/2022 12:27 PM  End time: 10/25/2022 12:32 PM  Staffing  Performed: anesthesiologist   Anesthesiologist: Jonnie Coon MD  Preanesthetic Checklist  Completed: patient identified, IV checked, site marked, risks and benefits discussed, surgical/procedural consents, equipment checked, pre-op evaluation, timeout performed, anesthesia consent given, oxygen available and monitors applied/VS acknowledged  Peripheral Block   Patient position: sitting  Prep: ChloraPrep  Provider prep: mask and sterile gloves  Patient monitoring: cardiac monitor, continuous pulse ox, frequent blood pressure checks, IV access and responsive to questions  Block type: Brachial plexus  Interscalene  Laterality: left  Injection technique: single-shot  Guidance: ultrasound guided  Local infiltration: lidocaine  Infiltration strength: 1 %  Local infiltration: lidocaine  Dose: 3 mL    Needle   Needle type: insulated echogenic nerve stimulator needle   Needle gauge: 22 G  Needle localization: ultrasound guidance  Needle length: 5 cm  Assessment   Injection assessment: negative aspiration for heme, no paresthesia on injection, local visualized surrounding nerve on ultrasound and no intravascular symptoms  Paresthesia pain: none  Slow fractionated injection: yes  Hemodynamics: stable  Real-time US image taken/store: yes  Outcomes: uncomplicated and patient tolerated procedure well    Additional Notes  Dx: Acute post-operative pain of the left shoulder      Medications Administered  bupivacaine (PF) 0.5 % - Perineural   10 mL - 10/25/2022 12:32:00 PM  bupivacaine liposome 1.3 % - Perineural   10 mL - 10/25/2022 12:32:00 PM

## 2022-10-27 ENCOUNTER — TELEPHONE (OUTPATIENT)
Dept: GASTROENTEROLOGY | Age: 52
End: 2022-10-27

## 2022-10-28 ENCOUNTER — HOSPITAL ENCOUNTER (OUTPATIENT)
Dept: PHYSICAL THERAPY | Facility: CLINIC | Age: 52
Setting detail: THERAPIES SERIES
Discharge: HOME | End: 2022-10-28
Payer: MEDICARE

## 2022-10-28 PROCEDURE — 97110 THERAPEUTIC EXERCISES: CPT

## 2022-10-28 PROCEDURE — 97161 PT EVAL LOW COMPLEX 20 MIN: CPT
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
## 2022-11-02 DIAGNOSIS — M19.012 OSTEOARTHRITIS OF LEFT SHOULDER, UNSPECIFIED OSTEOARTHRITIS TYPE: Primary | ICD-10-CM

## 2022-11-03 ENCOUNTER — HOSPITAL ENCOUNTER (OUTPATIENT)
Dept: PHYSICAL THERAPY | Facility: CLINIC | Age: 52
Setting detail: THERAPIES SERIES
Discharge: HOME | End: 2022-11-03
Payer: MEDICARE

## 2022-11-03 PROCEDURE — 97110 THERAPEUTIC EXERCISES: CPT

## 2022-11-03 NOTE — FLOWSHEET NOTE
[] [facility]) [facility] C[facility] &  Therapy  [address]  [phone]  F: [phone] [] [address]  [address]   [address]  P: [phone]  F: [phone] [] [address]  [address]  P: [phone]  F: [phone] [] [address]  P: [phone]  F: [phone] [] [address]  [facility]   [address]   [place]: [phone]  F: [phone]      Physical Therapy Daily Treatment Note    Date:  11/3/2022  Patient Name:  Lila Russ    :  1970  MRN: [id number]  Physician: Susan Rubio DO           Insurance: PARAMOUNT ADVANTAGE (30 visits, auth after 30)  Medical Diagnosis:   Z98.890 (ICD-10-CM) - Status post surgery   M75.122 (ICD-10-CM) - Complete tear of left rotator cuff, unspecified whether traumatic   Rehab Codes: M62.81, M62.9, Z73.6, R29.3,  M25.60, Z74.0  Onset Date: 10/25/22 - DOS                         Next Dr.'s appt.: 22  Visit# / total visits: 2/16    Cancels/No Shows: 0/0    Subjective:    Pain:  [x] Yes  [] No Location: L shoulder  Pain Rating: (0-10 scale) 5/10  Pain altered Tx:  [x] No  [] Yes  Action:  Comments: Pt reports her pain stays about the same throughout the day. States pain really only increases with laying down. Objective:  Modalities: no lifting of objects, no shoulder ext, no excessive stretching or sudden movements, no supporting body weight with hands. Protocol in hard chart.   AAROM AT 5 WEEKS  Precautions:   Exercises:  Exercise Reps/ Time Weight/ Level Comments   PROM to L shoulder 25'  Head of table elevated          Standing:      Pendulums 2x10  Flexion/ext; side to side; CW/CCW   Flexion/Extension  2x10           Seated:      Scapular retractions 2x10 Isometrics 5\"x10ea  Flexion, ABD, ER/IR                                       Other:     Specific Instructions for next treatment: ROM, manual PRN , isometrics (flexion, ABD, ER, IR and elbow flex), scapular exercises      Treatment Charges: Mins Units   []  Modalities     [x]  Ther Exercise 35 2   []  Manual Therapy     []  Ther Activities     []  Aquatics     []  Vasocompression     []  Other     Total Treatment time 35 2       Assessment: [x] Progressing toward goals. Pt had a fair tolerance to treatment today. Started treatment with PROM to L shoulder. Pt was most comfortable in an upright position during PROM. Pt had most increase in pain with abduction. Added in seated shoulder isometrics. Pt was challenged with this and discussed that she controls the amount of force and to not resist as much if it hurts. Pt declined ice at end of treatment and reported pain was about the same. [] No change. [] Other:  [x] Patient would continue to benefit from skilled physical therapy services in order to: decrease pain and increase ROM, strength, and function in L shoulder. Problems:    [x] ? Pain: 3-10/10 pain  [x] ? ROM: decreased PROM in flex/abd/ER  [x] ? Strength: NT due to no AROM at this time  [x] ? Function: 99% impairment  [x] Other:  poor posture        Goals  MET NOT MET ON-  GOING  Details   Date Addressed:            STG: To be met in 8 treatments            1. ? Pain: Patient will self report worst pain no greater than 3/10 in order to better tolerate ADLs/work activities with minimal dysfunction []  []  []      2. ? ROM: Patient will improve PROM in flexion to 125 degrees, ER at 90 degrees of ABD to at least 45 degrees and IR at 90 degrees of ABD to at least 45 degrees in order to demonstrate ability to move/reach in all planes unrestricted at PLOF []  []  []      3. Independent with Home Exercise Programs []  []  []      4.  Demonstrate knowledge of fall risk prevention  [x]  []  []        [] []  []                  Date Addressed:            LTG: To be met in 16 treatments           1. Improve score on assessment tool UEFS from 99% impairment to less than 65% impairment  []  []  []      2. Strength: Pt will demonstrate improved R UE strength to 3/5  in order to demonstrate improved stability/strength necessary for unrestricted ADLs/work activities []  []  []      3. ? ROM: Patient will improve PROM to full ROM in all directions  in order to demonstrate ability to move/reach in all planes unrestricted at PLOF []  []  []      4. ? ROM: Patient will improve AROM to at least 120 degrees of flex/ABD, ER/IR to at least 60 degrees in all directions  in order to demonstrate ability to move/reach in all planes unrestricted at PLOF []  []  []                                      Pt. Education:  [x] Yes  [] No  [x] Reviewed Prior HEP/Ed  Method of Education: [x] Verbal  [x] Demo  [x] Written  Seated Scapular Retraction - 1 x daily - 7 x weekly - 2 sets - 10 reps  Seated Isometric Shoulder Flexion - 1 x daily - 7 x weekly - 10 reps - 5 hold  Seated Isometric Shoulder External Rotation with Towel - 1 x daily - 7 x weekly - 10 reps - 5 hold  Seated Isometric Shoulder External Rotation - 1 x daily - 7 x weekly - 10 reps - 5 hold  Seated Isometric Shoulder Abduction - 1 x daily - 7 x weekly - 10 reps - 5 hold    Comprehension of Education:  [x] Verbalizes understanding. [x] Demonstrates understanding. [] Needs review. [x] Demonstrates/verbalizes HEP/Ed previously given. Access Code: [email or web address]  URL: [email or web address]  Date: 10/28/2022  Prepared by: Calli Scheuermann     Exercises  Circular Shoulder Pendulum with Table Support - 3 x daily - 7 x weekly - 2 sets - 10 reps  Horizontal Shoulder Pendulum with Table Support - 3 x daily - 7 x weekly - 2 sets - 10 reps  Flexion-Extension Shoulder Pendulum with Table Support - 3 x daily - 7 x weekly - 2 sets - 10 reps  Standing Elbow Flexion Extension AROM - 3 x daily - 7 x weekly - 2 sets - 10 reps  Gripping Sponge Supinated - 3 x daily - 7 x weekly - 2 sets - 10 reps  Gripping Sponge Pronated - 3 x daily - 7 x weekly - 2 sets - 10 reps     Plan: [x] Continue current frequency toward long and short term goals.     [x] Specific Instructions for subsequent treatments: continue to progress as able       Time In:10:10 am           Time Out: 10:50am     Electronically signed by:  Odilia Kennedy PTA

## 2022-11-07 ENCOUNTER — OFFICE VISIT (OUTPATIENT)
Dept: ORTHOPEDIC SURGERY | Age: 52
End: 2022-11-07

## 2022-11-07 VITALS — WEIGHT: 124 LBS | BODY MASS INDEX: 25 KG/M2 | HEIGHT: 59 IN

## 2022-11-07 DIAGNOSIS — Z98.890 S/P ARTHROSCOPY OF LEFT SHOULDER: ICD-10-CM

## 2022-11-07 DIAGNOSIS — M75.122 COMPLETE TEAR OF LEFT ROTATOR CUFF, UNSPECIFIED WHETHER TRAUMATIC: Primary | ICD-10-CM

## 2022-11-07 DIAGNOSIS — Z98.890 S/P LEFT ROTATOR CUFF REPAIR: ICD-10-CM

## 2022-11-07 PROCEDURE — 99024 POSTOP FOLLOW-UP VISIT: CPT | Performed by: PHYSICIAN ASSISTANT

## 2022-11-07 RX ORDER — KETOROLAC TROMETHAMINE 10 MG/1
10 TABLET, FILM COATED ORAL EVERY 6 HOURS PRN
Qty: 28 TABLET | Refills: 0 | Status: SHIPPED | OUTPATIENT
Start: 2022-11-07 | End: 2022-11-14

## 2022-11-07 NOTE — PROGRESS NOTES
[facility]  [address]  Dept: [phone]  Dept Fax: [phone]        Postoperative follow-up note    Subjective:   Tanvir  is a 45y.o. year old female who presents to our office today for postoperative followup regarding her   1. Complete tear of left rotator cuff, unspecified whether traumatic    2. S/P arthroscopy of left shoulder    3. S/P left rotator cuff repair        Chief Complaint   Patient presents with    Post-Op Check     DOS 10/25/22 L shoulder Rotator Cuff Repair       Date of Surgery: 10/25/2022    Tanvir   is a 45y.o. year old female who presents to our office today for postoperative follow up after having undergone a Left shoulder arthroscopy with rotator cuff repair and partial synovectomy on 10/25/2022. The patient denies fevers, chills, nausea, vomiting, diarrhea. The patient has started physical therapy. Overall the patient notes improvement in her left shoulder pain. She notes she has been taking Oral Toradol 10mg, every 6 hours as needed for her pain. She denies numbness or tingling in the left upper extremity. Review of Systems   Constitutional:  Negative for activity change and fever. HENT:  Negative for sneezing. Respiratory:  Negative for cough and shortness of breath. Cardiovascular:  Negative for chest pain. Gastrointestinal:  Negative for vomiting. Musculoskeletal:  Positive for arthralgias (Left shoulder). Negative for joint swelling and myalgias. Skin:  Negative for color change. Neurological:  Negative for weakness and numbness. Psychiatric/Behavioral:  Negative for sleep disturbance. Objective :   General: Tanvir  is a 45 y.o. female who is alert and oriented and sitting comfortably in our office. Ortho Exam  MS:   Patient arrived in a sling on the left upper extremity.   After removal of sling evaluation of the left shoulder reveals intact sutures status post left shoulder arthroscopy with rotator cuff repair. Surgical portal incisions are healing well without erythema, ecchymosis, skin warmth or signs of infection noted. Tenderness noted to the anterolateral aspect of the left shoulder is appreciated. The patient is able to flex and extend the left elbow without difficulty. She has full range of motion of the left wrist and hand without discomfort noted. Sensation is intact to light touch to the left upper extremity without focal deficits present. The skin is noted to be warm with brisk capillary refill noted distally on the fingers. Radial pulse 2+ on the left. Neuro: alert. oriented  Eyes: Extra-ocular muscles intact  Mouth: Oral mucosa moist. No perioral lesions  Pulm: Respirations unlabored and regular. Skin: warm, well perfused  Psych:   Patient has good fund of knowledge and displays understanging of exam, diagnosis, and plan. Radiology:   XR SHOULDER LEFT (MIN 2 VIEWS)    Result Date: 11/7/2022  History: Left shoulder status post rotator cuff repair. Findings: AP, Scapular Y and grashey view x-rays of the left shoulder obtained today in office shows evidence of recent rotator cuff repair with sclerotic changes noted at the greater tuberosity of the proximal humerus. Moderate degenerative changes noted at the acromioclavicular joint. Scoliosis evident within the thoracic spine. No acute fracture, subluxation, dislocation, radiopaque foreign body or radiopaque tumor is appreciated. Impression: Left shoulder status post rotator cuff repair as described above. Procedure:   After informed consent was obtained verbally, sutures were removed from the surgical site after sterile Betadine prep. Patient tolerated suture removal well without post procedure complications. No significant wound healing complications were appreciated at the time of suture removal.       Assessment:      1.  Complete tear of left rotator cuff, unspecified whether traumatic    2. S/P arthroscopy of left shoulder    3. S/P left rotator cuff repair         Plan:        The patient is currently 2 weeks postoperative after undergoing a left shoulder arthroscopy with rotator cuff repair and with Dr. Claudeen Longs, DO on 10/25/2022. I personally reviewed the x-ray images from today reveals post-operative changes within the left shoulder with no acute osseous abnormality noted within the left shoulder. The sutures were removed from the left shoulder today in office. She tolerated the procedure without complication. At this time I would like the patient to continue outpatient physical therapy working on PASSIVE range of motion of the left shoulder. She may continue her range of motion progression as recommended by Physical Therapy as per Dr Stevenson Yang repair protocol. She is to continue working on active range of motion of the left elbow, wrist and hand and was instructed not to lift heavier than 3lbs with her left upper extremity. The patient was instructed not to submerge her left upper extremity in any bodies of water until the surgical incisions are completely healed in approximately 4 weeks. The patient can shower but was instructed to keep the surgical incisions clean and dry otherwise. The patient may continue taking prescribed Toradol 10mg every 6 hours as needed for pain x 7 days. The patient will follow-up in 4 weeks or sooner if needed. The patient was instructed to call the office with any questions or concerns. The patient voiced their understanding. Follow up: Return in about 4 weeks (around 12/5/2022) for re-evaluation, post-operative follow up.     Orders Placed This Encounter   Medications    ketorolac (TORADOL) 10 MG tablet     Sig: Take 1 tablet by mouth every 6 hours as needed for Pain     Dispense:  28 tablet     Refill:  0       No orders of the defined types were placed in this encounter. This note is created with the assistance of a speech recognition program.  While intending to generate a document that actually reflects the content of the visit, the document can still have some errors including those of syntax and sound a like substitutions which may escape proof reading. In such instances, actual meaning can be extrapolated by contextual diversion.      Electronically signed by Alyssia Lyles PA-C on 11/8/2022 at 4:21 PM

## 2022-11-08 ENCOUNTER — HOSPITAL ENCOUNTER (OUTPATIENT)
Dept: PHYSICAL THERAPY | Facility: CLINIC | Age: 52
Setting detail: THERAPIES SERIES
Discharge: HOME | End: 2022-11-08
Payer: MEDICARE

## 2022-11-08 PROCEDURE — 97110 THERAPEUTIC EXERCISES: CPT

## 2022-11-08 ASSESSMENT — ENCOUNTER SYMPTOMS
COUGH: 0
VOMITING: 0
COLOR CHANGE: 0
SHORTNESS OF BREATH: 0

## 2022-11-08 NOTE — FLOWSHEET NOTE
[x] [address] [facility]  Outpatient Rehabilitation &  Therapy  [facility] [address]   [address]  P: [phone]  F: [phone]     Physical Therapy Daily Treatment Note    Date:  2022  Patient Name:  Jose Carlos Schwarz    :  1970  MRN: [id number]  Physician: Megan Grove DO           Insurance: [employer] [place] [employer] (30 visits, auth after 30)  Medical Diagnosis:   Z98.890 (ICD-10-CM) - Status post surgery   M75.122 (ICD-10-CM) - Complete tear of left rotator cuff, unspecified whether traumatic   Rehab Codes: M62.81, M62.9, Z73.6, R29.3,  M25.60, Z74.0  Onset Date: 10/25/22 - DOS                         Next 's appt.: 22  Visit# / total visits: 3/16    Cancels/No Shows: 0/0    Subjective:    Pain:  [x] Yes  [] No Location: L shoulder  Pain Rating: (0-10 scale) 6-7/10  Pain altered Tx:  [x] No  [] Yes  Action:  Comments: Patient arrives today stating that she has not been sleeping because of the pain. States that she used her TENS unit on her shoulder last night and had increased pain afterwards but is not sure if it is due to the TENS unit. States her pain is tolerable but it is around the same everyday. Objective:  Modalities: no lifting of objects, no shoulder ext, no excessive stretching or sudden movements, no supporting body weight with hands. Protocol in hard chart.   AAROM AT 5 WEEKS  Precautions:   Exercises:  Exercise Reps/ Time Weight/ Level Comments   PROM to L shoulder 25'  Head of table elevated          Standing:      Pendulums 2x10  Flexion/ext; side to side; CW/CCW   Flexion/Extension  2x10           Seated:      Scapular retractions 2x10     Isometrics 5\"x10ea  Flexion, ABD, ER/IR   Elbow flexion ISO 15x5\"  New                                  Other:     Specific Instructions for next treatment: ROM, manual PRN , isometrics (flexion, ABD, ER, IR and elbow flex), scapular exercises      Treatment Charges: Mins Units   []  Modalities     [x]  Ther Exercise 58 4   []  Manual Therapy     []  Ther Activities     []  Aquatics     []  Vasocompression     []  Other     Total Treatment time 58 4       Assessment: [x] Progressing toward goals. Phase 2 (Day 11-week 4). Initiated session with pendulum exercises to reduce shoulder stiffness prior to PROM and isometric exercises. Added elbow flexion isometrics in sitting with education regarding only 50-70% of MVC throughout isometric exercises. Completed isometrics in standing with wall rather than with other UE and patient able to tolerate well. Patient requires moderate verbal instruction for proper upright posture, reducing L UE muscle guarding, and proper technique. Completed PROM with most limitations observed with passive ER. Plan to record ROM measurements at next visit for consecutive visits to observe progress. Patient reports feeling good at end of session and declines VASO. [] No change. [] Other:  [x] Patient would continue to benefit from skilled physical therapy services in order to: decrease pain and increase ROM, strength, and function in L shoulder. Problems:    [x] ? Pain: 3-10/10 pain  [x] ? ROM: decreased PROM in flex/abd/ER  [x] ? Strength: NT due to no AROM at this time  [x] ? Function: 99% impairment  [x] Other:  poor posture        Goals  MET NOT MET ON-  GOING  Details   Date Addressed:            STG: To be met in 8 treatments            1. ? Pain: Patient will self report worst pain no greater than 3/10 in order to better tolerate ADLs/work activities with minimal dysfunction []  []  []      2. ? ROM: Patient will improve PROM in flexion to 125 degrees, ER at 90 degrees of ABD to at least 45 degrees and IR at 90 degrees of ABD to at least 45 degrees in order to demonstrate ability to move/reach in all planes unrestricted at PLOF []  []  []      3. Independent with Home Exercise Programs []  []  []      4.  Demonstrate knowledge of fall risk prevention  [x]  []  []        []  []  [] Date Addressed:            LTG: To be met in 16 treatments           1. Improve score on assessment tool UEFS from 99% impairment to less than 65% impairment  []  []  []      2. Strength: Pt will demonstrate improved R UE strength to 3/5  in order to demonstrate improved stability/strength necessary for unrestricted ADLs/work activities []  []  []      3. ? ROM: Patient will improve PROM to full ROM in all directions  in order to demonstrate ability to move/reach in all planes unrestricted at PLOF []  []  []      4. ? ROM: Patient will improve AROM to at least 120 degrees of flex/ABD, ER/IR to at least 60 degrees in all directions  in order to demonstrate ability to move/reach in all planes unrestricted at PLOF []  []  []                                      Pt. Education:  [x] Yes  [] No  [x] Reviewed Prior HEP/Ed  Method of Education: [x] Verbal  [x] Demo  [x] Written  Seated Scapular Retraction - 1 x daily - 7 x weekly - 2 sets - 10 reps  Seated Isometric Shoulder Flexion - 1 x daily - 7 x weekly - 10 reps - 5 hold  Seated Isometric Shoulder External Rotation with Towel - 1 x daily - 7 x weekly - 10 reps - 5 hold  Seated Isometric Shoulder External Rotation - 1 x daily - 7 x weekly - 10 reps - 5 hold  Seated Isometric Shoulder Abduction - 1 x daily - 7 x weekly - 10 reps - 5 hold    Comprehension of Education:  [x] Verbalizes understanding. [x] Demonstrates understanding. [] Needs review. [x] Demonstrates/verbalizes HEP/Ed previously given. Access Code: [email or web address]  URL: [email or web address]  Date: 10/28/2022  Prepared by: Calli Scheuermann     Exercises  Circular Shoulder Pendulum with Table Support - 3 x daily - 7 x weekly - 2 sets - 10 reps  Horizontal Shoulder Pendulum with Table Support - 3 x daily - 7 x weekly - 2 sets - 10 reps  Flexion-Extension Shoulder Pendulum with Table Support - 3 x daily - 7 x weekly - 2 sets - 10 reps  Standing Elbow Flexion Extension AROM - 3 x daily - 7 x weekly - 2 sets - 10 reps  Gripping Sponge Supinated - 3 x daily - 7 x weekly - 2 sets - 10 reps  Gripping Sponge Pronated - 3 x daily - 7 x weekly - 2 sets - 10 reps     Plan: [x] Continue current frequency toward long and short term goals.     [x] Specific Instructions for subsequent treatments: continue to progress as able , MEASURE ROM      Time In:   1003        Time Out: 1101 AM    Electronically signed by:  Jule Kehr, PT

## 2022-11-10 ENCOUNTER — HOSPITAL ENCOUNTER (OUTPATIENT)
Dept: PHYSICAL THERAPY | Facility: CLINIC | Age: 52
Setting detail: THERAPIES SERIES
Discharge: HOME | End: 2022-11-10
Payer: MEDICARE

## 2022-11-10 PROCEDURE — 97110 THERAPEUTIC EXERCISES: CPT

## 2022-11-10 PROCEDURE — 97016 VASOPNEUMATIC DEVICE THERAPY: CPT

## 2022-11-10 NOTE — FLOWSHEET NOTE
[x] [address] [facility]  Outpatient Rehabilitation &  Therapy  [facility] [address]   [address]  P: [phone]  F: [phone]     Physical Therapy Daily Treatment Note    Date:  11/10/2022  Patient Name:  Enriqueta Sr    :  1970  MRN: [id number]  Physician: Belen Pagan DO           Insurance: [employer] (30 visits, auth after 30)  Medical Diagnosis:   Z98.890 (ICD-10-CM) - Status post surgery   M75.122 (ICD-10-CM) - Complete tear of left rotator cuff, unspecified whether traumatic   Rehab Codes: M62.81, M62.9, Z73.6, R29.3,  M25.60, Z74.0  Onset Date: 10/25/22 - DOS                         Next 's appt.: 22  Visit# / total visits:     Cancels/No Shows: 0/0    Subjective:    Pain:  [x] Yes  [] No Location: L shoulder  Pain Rating: (0-10 scale) 8/10  Pain altered Tx:  [x] No  [] Yes  Action:  Comments: Patient arrives stating she is doing worse. Very frustrated. Things had calmed down but now they are high again. Pt drove herself here and felt unsafe doing so. Objective:  Modalities: Vasocompression 15 min @ 42° and min pressure in sitting  Precautions: no lifting of objects, no shoulder ext, no excessive stretching or sudden movements, no supporting body weight with hands. Protocol in hard chart.   AAROM AT 5 WEEKS   Exercises:MEASURE ROM  Exercise Reps/ Time Weight/ Level Comments   PROM to L shoulder 35 min  Head of table elevated          Standing:      Pendulums 2x10  Flexion/ext; side to side; CW/CCW   Flexion/Extension  2x10           Seated:      Scapular retractions 2x10     Isometrics 5\"x10ea  Flexion, ABD, ER/IR   Elbow flexion ISO 15x5\"  New                                  Other:     Specific Instructions for next treatment: ROM, manual PRN , isometrics (flexion, ABD, ER, IR and elbow flex), scapular exercises      Treatment Charges: Mins Units   []  Modalities     [x]  Ther Exercise 40 3   []  Manual Therapy     []  Ther Activities     [] Aquatics     [x]  Vasocompression 15 1    []  Other     Total Treatment time 55 4       Assessment: [x] Progressing toward goals. Pt arrives with increased pain and frustrated with this, thus did not increase exercises as just PROM was bothersome. Did encourage patient to complete ice this date. Went through surgical procedure with patient and educated patient on the nature of tendons as she was upset her tendon repair was not completed and no one explained to her why.       [] No change. [] Other:  [x] Patient would continue to benefit from skilled physical therapy services in order to: decrease pain and increase ROM, strength, and function in L shoulder. Problems:    [x] ? Pain: 3-10/10 pain  [x] ? ROM: decreased PROM in flex/abd/ER  [x] ? Strength: NT due to no AROM at this time  [x] ? Function: 99% impairment  [x] Other:  poor posture        Goals  MET NOT MET ON-  GOING  Details   Date Addressed:            STG: To be met in 8 treatments            1. ? Pain: Patient will self report worst pain no greater than 3/10 in order to better tolerate ADLs/work activities with minimal dysfunction []  []  []      2. ? ROM: Patient will improve PROM in flexion to 125 degrees, ER at 90 degrees of ABD to at least 45 degrees and IR at 90 degrees of ABD to at least 45 degrees in order to demonstrate ability to move/reach in all planes unrestricted at PLOF []  []  []      3. Independent with Home Exercise Programs []  []  []      4. Demonstrate knowledge of fall risk prevention  [x]  []  []        []  []  []                  Date Addressed:            LTG: To be met in 16 treatments           1. Improve score on assessment tool UEFS from 99% impairment to less than 65% impairment  []  []  []      2.  Strength: Pt will demonstrate improved R UE strength to 3/5  in order to demonstrate improved stability/strength necessary for unrestricted ADLs/work activities []  []  []      3. ? ROM: Patient will improve PROM to full ROM in all directions  in order to demonstrate ability to move/reach in all planes unrestricted at PLOF []  []  []      4. ? ROM: Patient will improve AROM to at least 120 degrees of flex/ABD, ER/IR to at least 60 degrees in all directions  in order to demonstrate ability to move/reach in all planes unrestricted at PLOF []  []  []                                      Pt. Education:  [x] Yes  [] No  [x] Reviewed Prior HEP/Ed  Method of Education: [x] Verbal  [x] Demo  [x] Written  Seated Scapular Retraction - 1 x daily - 7 x weekly - 2 sets - 10 reps  Seated Isometric Shoulder Flexion - 1 x daily - 7 x weekly - 10 reps - 5 hold  Seated Isometric Shoulder External Rotation with Towel - 1 x daily - 7 x weekly - 10 reps - 5 hold  Seated Isometric Shoulder External Rotation - 1 x daily - 7 x weekly - 10 reps - 5 hold  Seated Isometric Shoulder Abduction - 1 x daily - 7 x weekly - 10 reps - 5 hold    Comprehension of Education:  [x] Verbalizes understanding. [x] Demonstrates understanding. [] Needs review. [x] Demonstrates/verbalizes HEP/Ed previously given. Access Code: [email or web address]  URL: [email or web address]  Date: 10/28/2022  Prepared by: Calli Scheuermann     Exercises  Circular Shoulder Pendulum with Table Support - 3 x daily - 7 x weekly - 2 sets - 10 reps  Horizontal Shoulder Pendulum with Table Support - 3 x daily - 7 x weekly - 2 sets - 10 reps  Flexion-Extension Shoulder Pendulum with Table Support - 3 x daily - 7 x weekly - 2 sets - 10 reps  Standing Elbow Flexion Extension AROM - 3 x daily - 7 x weekly - 2 sets - 10 reps  Gripping Sponge Supinated - 3 x daily - 7 x weekly - 2 sets - 10 reps  Gripping Sponge Pronated - 3 x daily - 7 x weekly - 2 sets - 10 reps     Plan: [x] Continue current frequency toward long and short term goals.     [x] Specific Instructions for subsequent treatments: continue to progress as able , MEASURE ROM      Time In:   1005        Time Out: 1100 AM    Electronically signed by:  Nida Vora, PTA

## 2022-11-15 ENCOUNTER — HOSPITAL ENCOUNTER (OUTPATIENT)
Dept: PHYSICAL THERAPY | Facility: CLINIC | Age: 52
Setting detail: THERAPIES SERIES
Discharge: HOME | End: 2022-11-15
Payer: MEDICARE

## 2022-11-15 NOTE — FLOWSHEET NOTE
[] [facility]) [facility] &  Therapy  [address]    [phone]  F: [phone]   [x] [address] [employer]  [facility] [address]   [address]  P: [phone]  F: [phone]  [] [address]  [address]  P: [phone]  F: [phone] [] [address]  P: [phone]  F: [phone]  [] [address]  [facility]   [address]   [place]: [phone]  F: [phone]   [] [facility]  [address] [facility] [address]  [place]: [phone]   F: [phone]     Physical Therapy Cancel/No Show note    Date: 11/15/2022  Patient: Madeline Guzman  : 1970  MRN: [id number]    Cancels/No Shows to date:     For today's appointment patient:    [x]  Cancelled    [] Rescheduled appointment    [] No-show     Reason given by patient:    [x]  Patient ill    []  Conflicting appointment    [] No transportation      [] Conflict with work    [] No reason given    [] Weather related    [] COVID-19    [] Other:      Comments:      [x] Next appointment was confirmed    Electronically signed by: Grace Short, PT

## 2022-11-17 ENCOUNTER — HOSPITAL ENCOUNTER (OUTPATIENT)
Dept: PHYSICAL THERAPY | Facility: CLINIC | Age: 52
Setting detail: THERAPIES SERIES
Discharge: HOME | End: 2022-11-17
Payer: MEDICARE

## 2022-11-17 PROCEDURE — 97016 VASOPNEUMATIC DEVICE THERAPY: CPT

## 2022-11-17 PROCEDURE — 97110 THERAPEUTIC EXERCISES: CPT

## 2022-11-17 NOTE — FLOWSHEET NOTE
[x] [address] [facility]  Outpatient Rehabilitation &  Therapy  [facility] [address]   [address]  P: [phone]  F: [phone]     Physical Therapy Daily Treatment Note    Date:  2022  Patient Name:  Aida Younger    :  1970  MRN: [id number]  Physician: Precious Goode DO           Insurance: [employer] (30 visits, auth after 30)  Medical Diagnosis:   Z98.890 (ICD-10-CM) - Status post surgery   M75.122 (ICD-10-CM) - Complete tear of left rotator cuff, unspecified whether traumatic   Rehab Codes: M62.81, M62.9, Z73.6, R29.3,  M25.60, Z74.0  Onset Date: 10/25/22 - DOS                         Next 's appt.: 22  Visit# / total visits:     Cancels/No Shows: 1/0    Subjective:    Pain:  [x] Yes  [] No Location: L shoulder  Pain Rating: (0-10 scale) 8/10  Pain altered Tx:  [x] No  [] Yes  Action:  Comments: Pt states she is feeling better. She felt like she was getting sick and cancelled her prior PT appointment on Tuesday. She is taking Torodol or Percocet for pain control. She mentions her incisions are healing without concern. She wears her sling at all times, except night when she is sleeping in bed propped on pillows. She is to follow up with the surgeon next week but she plans to call to confirm as she was in the office with the NP or PA earlier this month. Pt has been driving. Objective:  Modalities: Vasocompression 15 min @ 42° and min pressure in semirecumbent position on the plinth. Precautions: no lifting of objects, no shoulder ext, no excessive stretching or sudden movements, no supporting body weight with hands. Protocol in hard chart.   AAROM AT 5 WEEKS   Exercises:MEASURE ROM  Exercise Reps/ Time Weight/ Level Comments   PROM to L shoulder 35 min  Head of table elevated          Standing:      Pendulums 2x10  Flexion/ext; side to side; CW/CCW   Flexion/Extension  2x10           Seated:      Scapular retractions 2x10     Isometrics 5\"x10ea  Flexion, ABD, ER/IR   Elbow flexion ISO 15x5\"  New 11/8                                 Other:     Specific Instructions for next treatment: ROM, manual PRN , isometrics (flexion, ABD, ER, IR and elbow flex), scapular exercises      Treatment Charges: Mins Units   []  Modalities     [x]  Ther Exercise 40 3   []  Manual Therapy     []  Ther Activities     []  Aquatics     [x]  Vasocompression 15 1    []  Other     Total Treatment time 55 4       Assessment: [x] Progressing toward goals. Pt presents to PT wearing her sling with abductor pillow. She was able to independently remove the sling. PTA/PROM with following measurements: 100° flexion, 75° scaption (painful end feel), 15° ER & 68° IR (measured at 50° abd). Continued with isometrics and other exercises as noted above. Concluded treatment with use of vaso to help relieve painful symptoms and inflammation,  She requested a sheet be draped over her.    [] No change. [] Other:  [x] Patient would continue to benefit from skilled physical therapy services in order to: decrease pain and increase ROM, strength, and function in L shoulder. Problems:    [x] ? Pain: 3-10/10 pain  [x] ? ROM: decreased PROM in flex/abd/ER  [x] ? Strength: NT due to no AROM at this time  [x] ? Function: 99% impairment  [x] Other:  poor posture        Goals  MET NOT MET ON-  GOING  Details   Date Addressed:            STG: To be met in 8 treatments            1. ? Pain: Patient will self report worst pain no greater than 3/10 in order to better tolerate ADLs/work activities with minimal dysfunction []  []  []      2. ? ROM: Patient will improve PROM in flexion to 125 degrees, ER at 90 degrees of ABD to at least 45 degrees and IR at 90 degrees of ABD to at least 45 degrees in order to demonstrate ability to move/reach in all planes unrestricted at PLOF []  []  []      3. Independent with Home Exercise Programs []  []  []      4.  Demonstrate knowledge of fall risk prevention  [x]  []  [] []  []  []                  Date Addressed:            LTG: To be met in 16 treatments           1. Improve score on assessment tool UEFS from 99% impairment to less than 65% impairment  []  []  []      2. Strength: Pt will demonstrate improved R UE strength to 3/5  in order to demonstrate improved stability/strength necessary for unrestricted ADLs/work activities []  []  []      3. ? ROM: Patient will improve PROM to full ROM in all directions  in order to demonstrate ability to move/reach in all planes unrestricted at PLOF []  []  []      4. ? ROM: Patient will improve AROM to at least 120 degrees of flex/ABD, ER/IR to at least 60 degrees in all directions  in order to demonstrate ability to move/reach in all planes unrestricted at PLOF []  []  []                                      Pt. Education:  [x] Yes  [] No  [x] Reviewed Prior HEP/Ed  Method of Education: [x] Verbal  [x] Demo  [x] Written  Seated Scapular Retraction - 1 x daily - 7 x weekly - 2 sets - 10 reps  Seated Isometric Shoulder Flexion - 1 x daily - 7 x weekly - 10 reps - 5 hold  Seated Isometric Shoulder External Rotation with Towel - 1 x daily - 7 x weekly - 10 reps - 5 hold  Seated Isometric Shoulder External Rotation - 1 x daily - 7 x weekly - 10 reps - 5 hold  Seated Isometric Shoulder Abduction - 1 x daily - 7 x weekly - 10 reps - 5 hold    Comprehension of Education:  [x] Verbalizes understanding. [x] Demonstrates understanding. [] Needs review. [x] Demonstrates/verbalizes HEP/Ed previously given. Access Code: [email or web address]  URL: [email or web address]  Date: 10/28/2022  Prepared by: Calli Scheuermann     Exercises  Circular Shoulder Pendulum with Table Support - 3 x daily - 7 x weekly - 2 sets - 10 reps  Horizontal Shoulder Pendulum with Table Support - 3 x daily - 7 x weekly - 2 sets - 10 reps  Flexion-Extension Shoulder Pendulum with Table Support - 3 x daily - 7 x weekly - 2 sets - 10 reps  Standing Elbow Flexion Extension AROM - 3 x daily - 7 x weekly - 2 sets - 10 reps  Gripping Sponge Supinated - 3 x daily - 7 x weekly - 2 sets - 10 reps  Gripping Sponge Pronated - 3 x daily - 7 x weekly - 2 sets - 10 reps     Plan: [x] Continue current frequency toward long and short term goals.     [x] Specific Instructions for subsequent treatments: continue to progress as able , MEASURE ROM      Time In: 10:05 am       Time Out: 11:00 AM    Electronically signed by:  Michelet Resendez PTA

## 2022-11-21 ENCOUNTER — OFFICE VISIT (OUTPATIENT)
Dept: ORTHOPEDIC SURGERY | Age: 52
End: 2022-11-21

## 2022-11-21 VITALS — WEIGHT: 126 LBS | BODY MASS INDEX: 25.4 KG/M2 | HEIGHT: 59 IN

## 2022-11-21 DIAGNOSIS — M75.122 COMPLETE TEAR OF LEFT ROTATOR CUFF, UNSPECIFIED WHETHER TRAUMATIC: Primary | ICD-10-CM

## 2022-11-21 PROCEDURE — 99024 POSTOP FOLLOW-UP VISIT: CPT | Performed by: ORTHOPAEDIC SURGERY

## 2022-11-22 ENCOUNTER — HOSPITAL ENCOUNTER (OUTPATIENT)
Dept: PHYSICAL THERAPY | Facility: CLINIC | Age: 52
Setting detail: THERAPIES SERIES
Discharge: HOME | End: 2022-11-22
Payer: MEDICARE

## 2022-11-22 ASSESSMENT — ENCOUNTER SYMPTOMS
SHORTNESS OF BREATH: 0
EYE DISCHARGE: 0
ABDOMINAL PAIN: 0
ROS SKIN COMMENTS: NEGATIVE FOR RASH

## 2022-11-22 NOTE — FLOWSHEET NOTE
[] [facility] &  Therapy  [address]    [phone]  F: [phone]   [x] [address]  [facility] [address]   [address]  P: [phone]  F: [phone]  [] [address]  [address]  P: [phone]  F: [phone] [] [address]  P: [phone]  F: [phone]  [] [address] [facility]   [address]   [place]: [phone]  F: [phone]   [] [place] [address] [facility] [address]  [place]: [phone]   F: [phone]     Physical Therapy Cancel/No Show note    Date: 2022  Patient: Татьяна Torres  : 1970  MRN: [id number]    Cancels/No Shows to date:     For today's appointment patient:    [x]  Cancelled    [] Rescheduled appointment    [] No-show     Reason given by patient:    [x]  Patient ill    []  Conflicting appointment    [] No transportation      [] Conflict with work    [x] No reason given    [] Weather related    [] COVID-19    [] Other:      Comments:      [x] Next appointment was confirmed and scheduled    Electronically signed by: Niko Ho PT

## 2022-11-23 NOTE — PROGRESS NOTES
[address] [place] [address] AND SPORTS MEDICINE  [facility] [address]  Dept: [phone]  Dept Fax: [phone]        Postoperative follow-up note    Subjective:   Fatuma Soriano is a 45y.o. year old female who presents to our office today for postoperative followup regarding her   1. Complete tear of left rotator cuff, unspecified whether traumatic    . Chief Complaint   Patient presents with    Shoulder Pain     2nd PO L shoulder     Katerin Irby 68-year-old female presents the office today for recheck of her left shoulder status post left shoulder arthroscopy and rotator cuff repair and partial synovectomy on 10/25/2022. She is concerned about radiating pain down the posterior aspect of her arm which really has not changed since before surgery. She concerned about her biceps tendon. It is noted that her biceps was torn on previous MRI back in 2020 and was not visualized at the time of arthroscopic surgery on 10/25/2022. She has been working with physical therapy and feels like she is making slow progress. Review of Systems   Constitutional:  Positive for activity change. Negative for fever. HENT:  Negative for dental problem. Eyes:  Negative for discharge. Respiratory:  Negative for shortness of breath. Cardiovascular:  Negative for chest pain. Gastrointestinal:  Negative for abdominal pain. Genitourinary: Negative. Musculoskeletal:  Positive for arthralgias. Skin:         Negative for rash   Neurological:  Positive for weakness. Psychiatric/Behavioral:  Negative for confusion. I have reviewed the CC, HPI, ROS, PMH, FHX, Social History, and if not present in this note, I have reviewed in the patient's chart. I agree with the documentation provided by other staff and have reviewed their documentation prior to providing my signature indicating agreement.     Objective :   General: Elisa Leach is a 45 y.o. female who is alert and oriented and sitting comfortably in our office. Ortho Exam  MS:   Portal sites of shoulder healing well without signs of infection. Motor, sensory, vascular examination of the left upper extremity is grossly intact without focal deficits with  Neuro: alert. oriented  Eyes: Extra-ocular muscles intact  Mouth: Oral mucosa moist. No perioral lesions  Pulm: Respirations unlabored and regular. Skin: warm, well perfused  Psych:   Patient has good fund of knowledge and displays understanging of exam, diagnosis, and plan. Radiology:   XR SHOULDER LEFT (MIN 2 VIEWS)    Result Date: 11/9/2022  History: Left shoulder status post rotator cuff repair. Findings: AP, Scapular Y and grashey view x-rays of the left shoulder obtained today in office shows evidence of recent rotator cuff repair with sclerotic changes noted at the greater tuberosity of the proximal humerus. Moderate degenerative changes noted at the acromioclavicular joint. Scoliosis evident within the thoracic spine. No acute fracture, subluxation, dislocation, radiopaque foreign body or radiopaque tumor is appreciated. Impression: Left shoulder status post rotator cuff repair as described above. Assessment:      1. Complete tear of left rotator cuff, unspecified whether traumatic         Plan:      Patient is doing well. She is going to slowly work with physical therapy. As far as her biceps goes her really was not much to do with that as it has been torn chronically. Patient will continue physical therapy I plan to see her back in about 6 weeks or sooner as necessary. Follow up: No follow-ups on file. No orders of the defined types were placed in this encounter. No orders of the defined types were placed in this encounter.       This note is created with the assistance of a speech recognition program.  While intending to generate a document that actually reflects the content of the visit, the document can still have some errors including those of syntax and sound a like substitutions which may escape proof reading.   In such instances, actual meaning can be extrapolated by contextual diversion      Electronically signed by Negro Drake DO, Chiquita Portal on 11/22/2022 at 9:41 PM

## 2022-11-30 ENCOUNTER — HOSPITAL ENCOUNTER (OUTPATIENT)
Dept: PHYSICAL THERAPY | Facility: CLINIC | Age: 52
Setting detail: THERAPIES SERIES
Discharge: HOME | End: 2022-11-30
Payer: MEDICARE

## 2022-11-30 PROCEDURE — 97016 VASOPNEUMATIC DEVICE THERAPY: CPT

## 2022-11-30 PROCEDURE — 97110 THERAPEUTIC EXERCISES: CPT

## 2022-11-30 NOTE — FLOWSHEET NOTE
[x] [address] [facility]  Outpatient Rehabilitation &  Therapy  [facility] [address]   [address]  P: [phone]  F: [phone]     Physical Therapy Daily Treatment Note    Date:  2022  Patient Name:  Jose Carlos Schwarz    :  1970  MRN: [id number]  Physician: Megan Grove DO           [facility] ([facility], auth after 30)  Medical Diagnosis:   Z98.890 (ICD-10-CM) - Status post surgery   M75.122 (ICD-10-CM) - Complete tear of left rotator cuff, unspecified whether traumatic   Rehab Codes: M62.81, M62.9, Z73.6, R29.3,  M25.60, Z74.0  Onset Date: 10/25/22 - DOS                         Next 's appt.: 22  Visit# / total visits:     Cancels/No Shows: 1/0    Subjective:    Pain:  [x] Yes  [] No Location: L shoulder  Pain Rating: (0-10 scale) 8-9/10  Pain altered Tx:  [x] No  [] Yes  Action:  Comments: Pt reports she had a horrible night last night. Increased pain in L shoulder caused her difficulty sleeping. Not sure what caused increased pain but she almost cancelled therapy as she didn't feel she would be able to do much of anything. She arrives wearing sling and abduction pillow. Pt took a percocet approx 30' prior to therapy and reports she drove herself to treatment    Objective:  Modalities: Vasocompression 10 min @ 42° and min pressure in semirecumbent position on the plinth. Precautions: no lifting of objects, no shoulder ext, no excessive stretching or sudden movements, no supporting body weight with hands. Protocol in hard chart.   AAROM AT 5 WEEKS   Exercises:MEASURE ROM  Exercise Reps/ Time Weight/ Level Comments   PROM to L shoulder 25 min  Head of table elevated          Standing:      Pendulums 2x10  Flexion/ext; side to side; CW/CCW   Flexion/Extension  2x10           Seated:      Scapular retractions 2x10     Isometrics 5\"x10ea  Flexion, ABD, ER/IR   Elbow flexion ISO 15x5\"  New          AAROM:   Cane flexion 10 cane Assistance from R hand and clinician, all below added 11/30   Cane press ups 10 cane    Horiz abd/add 10 cane          Other: added cane ex charted above with assistance from clinician for initial lift and general support throughout ROM    Specific Instructions for next treatment: ROM, manual PRN , isometrics (flexion, ABD, ER, IR and elbow flex), scapular exercises      Treatment Charges: Mins Units   []  Modalities     [x]  Ther Exercise 35 2   []  Manual Therapy     []  Ther Activities     []  Aquatics     [x]  Vasocompression 10 1    []  Other     Total Treatment time 45 3       Assessment: [x] Progressing toward goals. Pt presents to PT wearing her sling with abductor pillow. She arrives complaining of increased pain for unknown reason and states she doesn't think she'll be able to do much. Pt is now entering week 6 post op so we initiated cane ex with assistance from clinician this date per physician protocol. Pt tolerated without complaints of increased pain and notes that it feels better to move it towards the end of session. Did not remeasure this date due to clinician overlook. Ended treatment with vasocompression in semi recumbant x 10' with tempt at 43.      [] No change. [] Other:  [x] Patient would continue to benefit from skilled physical therapy services in order to: decrease pain and increase ROM, strength, and function in L shoulder. Problems:    [x] ? Pain: 3-10/10 pain  [x] ? ROM: decreased PROM in flex/abd/ER  [x] ? Strength: NT due to no AROM at this time  [x] ?  Function: 99% impairment  [x] Other:  poor posture        Goals  MET NOT MET ON-  GOING  Details   Date Addressed:            STG: To be met in 8 treatments            1. ? Pain: Patient will self report worst pain no greater than 3/10 in order to better tolerate ADLs/work activities with minimal dysfunction []  []  []      2. ? ROM: Patient will improve PROM in flexion to 125 degrees, ER at 90 degrees of ABD to at least 45 degrees and IR at 90 degrees of ABD to at least 45 degrees in order to demonstrate ability to move/reach in all planes unrestricted at PLOF []  []  []      3. Independent with Home Exercise Programs []  []  []      4. Demonstrate knowledge of fall risk prevention  [x]  []  []        []  []  []                  Date Addressed:            LTG: To be met in 16 treatments           1. Improve score on assessment tool UEFS from 99% impairment to less than 65% impairment  []  []  []      2. Strength: Pt will demonstrate improved R UE strength to 3/5  in order to demonstrate improved stability/strength necessary for unrestricted ADLs/work activities []  []  []      3. ? ROM: Patient will improve PROM to full ROM in all directions  in order to demonstrate ability to move/reach in all planes unrestricted at PLOF []  []  []      4. ? ROM: Patient will improve AROM to at least 120 degrees of flex/ABD, ER/IR to at least 60 degrees in all directions  in order to demonstrate ability to move/reach in all planes unrestricted at PLOF []  []  []                                      Pt. Education:  [x] Yes  [] No  [x] Reviewed Prior HEP/Ed  Method of Education: [x] Verbal  [x] Demo  [x] Written: for cane ex initiated today  Seated Scapular Retraction - 1 x daily - 7 x weekly - 2 sets - 10 reps  Seated Isometric Shoulder Flexion - 1 x daily - 7 x weekly - 10 reps - 5 hold  Seated Isometric Shoulder External Rotation with Towel - 1 x daily - 7 x weekly - 10 reps - 5 hold  Seated Isometric Shoulder External Rotation - 1 x daily - 7 x weekly - 10 reps - 5 hold  Seated Isometric Shoulder Abduction - 1 x daily - 7 x weekly - 10 reps - 5 hold    Comprehension of Education:  [x] Verbalizes understanding. [x] Demonstrates understanding. [] Needs review. [x] Demonstrates/verbalizes HEP/Ed previously given. Access Code: [email or web address]  URL: [email or web address]  Date: 10/28/2022  Prepared by: [facility] Scheuermann     Exercises  Circular Shoulder Pendulum with Table Support - 3 x daily - 7 x weekly - 2 sets - 10 reps  Horizontal Shoulder Pendulum with Table Support - 3 x daily - 7 x weekly - 2 sets - 10 reps  Flexion-Extension Shoulder Pendulum with Table Support - 3 x daily - 7 x weekly - 2 sets - 10 reps  Standing Elbow Flexion Extension AROM - 3 x daily - 7 x weekly - 2 sets - 10 reps  Gripping Sponge Supinated - 3 x daily - 7 x weekly - 2 sets - 10 reps  Gripping Sponge Pronated - 3 x daily - 7 x weekly - 2 sets - 10 reps    Access Code: [email or web address]  URL: [employer]/  Date: 11/30/2022  Prepared by: Malena Simpson    Exercises  Supine Shoulder Press with Dowel - 1 x daily - 7 x weekly - 2 sets - 10 reps  Supine Shoulder Press AAROM in Abduction with Dowel - 1 x daily - 7 x weekly - 2 sets - 10 reps  Supine Shoulder Abduction AAROM with Dowel - 1 x daily - 7 x weekly - 2 sets - 10 reps  Supine Shoulder Flexion AAROM with Dowel - 1 x daily - 7 x weekly - 2 sets - 10 reps       Plan: [x] Continue current frequency toward long and short term goals.     [x] Specific Instructions for subsequent treatments: continue to progress as able , MEASURE ROM      Time In: 12:06 pm       Time Out: 12:58PM    Electronically signed by:  Kehinde Delgadillo PTA

## 2022-12-06 ENCOUNTER — HOSPITAL ENCOUNTER (OUTPATIENT)
Dept: PHYSICAL THERAPY | Facility: CLINIC | Age: 52
Setting detail: THERAPIES SERIES
Discharge: HOME | End: 2022-12-06
Payer: MEDICARE

## 2022-12-06 PROCEDURE — 97110 THERAPEUTIC EXERCISES: CPT

## 2022-12-06 NOTE — FLOWSHEET NOTE
[x] [address] [facility]  Outpatient Rehabilitation &  Therapy  [facility] [address]   [address]  P: [phone]  F: [phone]     Physical Therapy Daily Treatment Note    Date:  2022  Patient Name:  Aida Younger    :  1970  MRN: [id number]  Physician: Precious Goode DO           Insurance: [employer] (30 visits, auth after 30)  Medical Diagnosis:   Z98.890 (ICD-10-CM) - Status post surgery   M75.122 (ICD-10-CM) - Complete tear of left rotator cuff, unspecified whether traumatic   Rehab Codes: M62.81, M62.9, Z73.6, R29.3,  M25.60, Z74.0  Onset Date: 10/25/22 - DOS                         Next Dr.'s appt.: 22  Visit# / total visits:     Cancels/No Shows: 1/0    Subjective:    Pain:  [x] Yes  [] No Location: L shoulder  Pain Rating: (0-10 scale) no rating per pt  Pain altered Tx:  [] No  [x] Yes  Action: PROM, isometrics and table slides only. Held AAROM exercises. HP    Comments: Pt enters with c/o frustration over persistent elevated pain level. Pt does not rate pain d/t irritation and states that she can't tell what her pain is as it is always there. Objective:  Modalities: Vasocompression 10 min @ 42° and min pressure in semirecumbent position on the plinth. - held  d/t pt reports of this not providing relief of pain  HP- to L shoulder in sitting after exercise x 10 min  Precautions: no lifting of objects, no shoulder ext, no excessive stretching or sudden movements, no supporting body weight with hands. Protocol in hard chart.   AAROM AT 5 WEEKS   Exercises:MEASURE ROM  Exercise Reps/ Time Weight/ Level Comments   PROM to L shoulder 27 min  Head of table elevated          Standing:      Pendulums 2x10  Flexion/ext; side to side; CW/CCW   Flexion/Extension  2x10           Seated:      Scapular retractions 10x 5\"    Isometrics 5\"x10ea  Flexion, ABD, ER/IR   Elbow flexion ISO 15x5\"  New    Table slides 10x  Added          AAROM:   Cane flexion 10 cane Assistance from R hand and clinician, all below added 11/30   Cane press ups 10 cane    Horiz abd/add 10 cane          Other:     Specific Instructions for next treatment: ROM, manual PRN , isometrics (flexion, ABD, ER, IR and elbow flex), scapular exercises      Treatment Charges: Mins Units   [x]  Modalities- HP 10 0   [x]  Ther Exercise 45 3   []  Manual Therapy     []  Ther Activities     []  Aquatics     []  Vasocompression     []  Other     Total Treatment time 42 3       Assessment: [x] Progressing toward goals. [] No change. [x] Other: Started session with PROM. Pt with intermittent guarding and increased pain past 90 degrees flexion and abduction with empty end feel. Held cane exercises this date d/t higher pain levels. Resumed isometrics and added table slides. Ended with HP as pt reports vasocompression to not be helping. Upon completion pt did voice preference to the HP. [x] Patient would continue to benefit from skilled physical therapy services in order to: decrease pain and increase ROM, strength, and function in L shoulder. Problems:    [x] ? Pain: 3-10/10 pain  [x] ? ROM: decreased PROM in flex/abd/ER  [x] ? Strength: NT due to no AROM at this time  [x] ? Function: 99% impairment  [x] Other:  poor posture        Goals  MET NOT MET ON-  GOING  Details   Date Addressed:            STG: To be met in 8 treatments            1. ? Pain: Patient will self report worst pain no greater than 3/10 in order to better tolerate ADLs/work activities with minimal dysfunction []  []  []      2. ? ROM: Patient will improve PROM in flexion to 125 degrees, ER at 90 degrees of ABD to at least 45 degrees and IR at 90 degrees of ABD to at least 45 degrees in order to demonstrate ability to move/reach in all planes unrestricted at PLOF []  []  []      3. Independent with Home Exercise Programs []  []  []      4.  Demonstrate knowledge of fall risk prevention  [x]  []  []        []  []  []                  Date Addressed:            LTG: To be met in 16 treatments           1. Improve score on assessment tool UEFS from 99% impairment to less than 65% impairment  []  []  []      2. Strength: Pt will demonstrate improved R UE strength to 3/5  in order to demonstrate improved stability/strength necessary for unrestricted ADLs/work activities []  []  []      3. ? ROM: Patient will improve PROM to full ROM in all directions  in order to demonstrate ability to move/reach in all planes unrestricted at PLOF []  []  []      4. ? ROM: Patient will improve AROM to at least 120 degrees of flex/ABD, ER/IR to at least 60 degrees in all directions  in order to demonstrate ability to move/reach in all planes unrestricted at PLOF []  []  []                                      Pt. Education:  [x] Yes  [] No  [x] Reviewed Prior HEP/Ed  Method of Education: [x] Verbal  [x] Demo  [x] Written: for cane ex initiated today  Seated Scapular Retraction - 1 x daily - 7 x weekly - 2 sets - 10 reps  Seated Isometric Shoulder Flexion - 1 x daily - 7 x weekly - 10 reps - 5 hold  Seated Isometric Shoulder External Rotation with Towel - 1 x daily - 7 x weekly - 10 reps - 5 hold  Seated Isometric Shoulder External Rotation - 1 x daily - 7 x weekly - 10 reps - 5 hold  Seated Isometric Shoulder Abduction - 1 x daily - 7 x weekly - 10 reps - 5 hold    Comprehension of Education:  [x] Verbalizes understanding. [x] Demonstrates understanding. [] Needs review. [x] Demonstrates/verbalizes HEP/Ed previously given. Access Code: [email or web address]  URL: [email or web address]  Date: 10/28/2022  Prepared by: Calli Scheuermann     Exercises  Circular Shoulder Pendulum with Table Support - 3 x daily - 7 x weekly - 2 sets - 10 reps  Horizontal Shoulder Pendulum with Table Support - 3 x daily - 7 x weekly - 2 sets - 10 reps  Flexion-Extension Shoulder Pendulum with Table Support - 3 x daily - 7 x weekly - 2 sets - 10 reps  Standing Elbow Flexion Extension AROM - 3 x daily - 7 x weekly - 2 sets - 10 reps  Gripping Sponge Supinated - 3 x daily - 7 x weekly - 2 sets - 10 reps  Gripping Sponge Pronated - 3 x daily - 7 x weekly - 2 sets - 10 reps    Access Code: [email or web address]  URL: [employer]/  Date: 11/30/2022  Prepared by: Olegario Pitch    Exercises  Supine Shoulder Press with Dowel - 1 x daily - 7 x weekly - 2 sets - 10 reps  Supine Shoulder Press AAROM in Abduction with Dowel - 1 x daily - 7 x weekly - 2 sets - 10 reps  Supine Shoulder Abduction AAROM with Dowel - 1 x daily - 7 x weekly - 2 sets - 10 reps  Supine Shoulder Flexion AAROM with Dowel - 1 x daily - 7 x weekly - 2 sets - 10 reps       Plan: [x] Continue current frequency toward long and short term goals.     [x] Specific Instructions for subsequent treatments: continue to progress as able , MEASURE ROM      Time In: 12:03pm       Time Out: 12:55pm    Electronically signed by:  Anu Underwood PTA

## 2022-12-08 ENCOUNTER — HOSPITAL ENCOUNTER (OUTPATIENT)
Dept: PHYSICAL THERAPY | Facility: CLINIC | Age: 52
Setting detail: THERAPIES SERIES
Discharge: HOME | End: 2022-12-08
Payer: MEDICARE

## 2022-12-08 PROCEDURE — 97110 THERAPEUTIC EXERCISES: CPT

## 2022-12-08 NOTE — FLOWSHEET NOTE
[x] [address] [facility]  Outpatient Rehabilitation &  Therapy  [facility] [address]   [address]  P: [phone]  F: [phone]     Physical Therapy Daily Treatment Note    Date:  2022  Patient Name:  Eden Patel    :  1970  MRN: [id number]  Physician: Reyna Encinas DO           Insurance: [place] [id number] (30 visits, auth after 30)  Medical Diagnosis:   Z98.890 (ICD-10-CM) - Status post surgery   M75.122 (ICD-10-CM) - Complete tear of left rotator cuff, unspecified whether traumatic   Rehab Codes: M62.81, M62.9, Z73.6, R29.3,  M25.60, Z74.0  Onset Date: 10/25/22 - DOS                         Next 's appt.: 22  Visit# / total visits:     Cancels/No Shows: 1/0    Subjective:    Pain:  [x] Yes  [] No Location: L shoulder  Pain Rating: (0-10 scale) 1/10  Pain altered Tx:  [] No  [x] Yes  Action: PROM, isometrics and table slides only. Held AAROM exercises. HP    Comments: Pt reports it seems to be a good day so far in that she has minimal pain at time of arrival.     Objective:  Modalities: Vasocompression 10 min @ 42° and min pressure in semirecumbent position on the plinth. - held / d/t pt reports of this not providing relief of pain  HP- to L shoulder in sitting after exercise x 10 min-not this date, needed to leave for a family emergency  Precautions: no lifting of objects, no shoulder ext, no excessive stretching or sudden movements, no supporting body weight with hands. Protocol in hard chart.   AAROM AT 5 WEEKS   Exercises:MEASURE ROM  Exercise Reps/ Time Weight/ Level Comments   PROM to L shoulder 13 min  Head of table elevated          Standing:      Pendulums 2x10  Pt self initiated in between reps of cane ex   Flexion/Extension  2x10           Seated:      Scapular retractions 10x 5\"    Isometrics 5\"x10ea  Flexion, ABD, ER/IR   Elbow flexion ISO 15x5\"  New    Table slides 10x  Added          AAROM:   Cane flexion 10 cane Shortened arc-From 90 degrees to approx 120   Cane press ups 10 cane    Horiz abd/add 10 cane    Shldr protraction 15 AROM    Other: See assessment section for measurements taken today, 12/8    Specific Instructions for next treatment: ROM, manual PRN , isometrics (flexion, ABD, ER, IR and elbow flex), scapular exercises      Treatment Charges: Mins Units   []  Modalities- HP     [x]  Ther Exercise 25 2   []  Manual Therapy     []  Ther Activities     []  Aquatics     []  Vasocompression     []  Other     Total Treatment time 25 2       Assessment: [x] Progressing toward goals: Pt enters with sling on and offering few complaints this date. Initiated treatment with L shldr PROM-Supine PROM measurements this date: flexion: 121, abd-103, ER-56, IR-62. Pt received a phone call shortly after starting treatment with news of a family emergency and was not able to stay for full treatment. Was able to complete shldr cane ex with intermittent breaks of doing pendulum for pain relief experienced with eccentric lowering from flexion position. No modalities at end of session due to pt needing to leave    [] No change. [] Other:   [x] Patient would continue to benefit from skilled physical therapy services in order to: decrease pain and increase ROM, strength, and function in L shoulder. Problems:    [x] ? Pain: 3-10/10 pain  [x] ? ROM: decreased PROM in flex/abd/ER  [x] ? Strength: NT due to no AROM at this time  [x] ?  Function: 99% impairment  [x] Other:  poor posture        Goals  MET NOT MET ON-  GOING  Details   Date Addressed:            STG: To be met in 8 treatments            1. ? Pain: Patient will self report worst pain no greater than 3/10 in order to better tolerate ADLs/work activities with minimal dysfunction []  []  []      2. ? ROM: Patient will improve PROM in flexion to 125 degrees, ER at 90 degrees of ABD to at least 45 degrees and IR at 90 degrees of ABD to at least 45 degrees in order to demonstrate ability to move/reach in all planes unrestricted at PLOF []  []  []      3. Independent with Home Exercise Programs []  []  []      4. Demonstrate knowledge of fall risk prevention  [x]  []  []        []  []  []                  Date Addressed:            LTG: To be met in 16 treatments           1. Improve score on assessment tool UEFS from 99% impairment to less than 65% impairment  []  []  []      2. Strength: Pt will demonstrate improved R UE strength to 3/5  in order to demonstrate improved stability/strength necessary for unrestricted ADLs/work activities []  []  []      3. ? ROM: Patient will improve PROM to full ROM in all directions  in order to demonstrate ability to move/reach in all planes unrestricted at PLOF []  []  []      4. ? ROM: Patient will improve AROM to at least 120 degrees of flex/ABD, ER/IR to at least 60 degrees in all directions  in order to demonstrate ability to move/reach in all planes unrestricted at PLOF []  []  []                                      Pt. Education:  [x] Yes  [] No  [x] Reviewed Prior HEP/Ed  Method of Education: [x] Verbal  [x] Demo  [x] Written: for cane ex initiated today  Seated Scapular Retraction - 1 x daily - 7 x weekly - 2 sets - 10 reps  Seated Isometric Shoulder Flexion - 1 x daily - 7 x weekly - 10 reps - 5 hold  Seated Isometric Shoulder External Rotation with Towel - 1 x daily - 7 x weekly - 10 reps - 5 hold  Seated Isometric Shoulder External Rotation - 1 x daily - 7 x weekly - 10 reps - 5 hold  Seated Isometric Shoulder Abduction - 1 x daily - 7 x weekly - 10 reps - 5 hold    Comprehension of Education:  [x] Verbalizes understanding. [x] Demonstrates understanding. [] Needs review. [x] Demonstrates/verbalizes HEP/Ed previously given. Access Code: [email or web address]  URL: [email or web address]  Date: 10/28/2022  Prepared by: Calli Scheuermann     Exercises  Circular Shoulder Pendulum with Table Support - 3 x daily - 7 x weekly - 2 sets - 10 reps  Horizontal Shoulder Pendulum with Table Support - 3 x daily - 7 x weekly - 2 sets - 10 reps  Flexion-Extension Shoulder Pendulum with Table Support - 3 x daily - 7 x weekly - 2 sets - 10 reps  Standing Elbow Flexion Extension AROM - 3 x daily - 7 x weekly - 2 sets - 10 reps  Gripping Sponge Supinated - 3 x daily - 7 x weekly - 2 sets - 10 reps  Gripping Sponge Pronated - 3 x daily - 7 x weekly - 2 sets - 10 reps    Access Code: [email or web address]  URL: [email or web address]  Date: 11/30/2022  Prepared by: Antony Pool    Exercises  Supine Shoulder Press with Dowel - 1 x daily - 7 x weekly - 2 sets - 10 reps  Supine Shoulder Press AAROM in Abduction with Dowel - 1 x daily - 7 x weekly - 2 sets - 10 reps  Supine Shoulder Abduction AAROM with Dowel - 1 x daily - 7 x weekly - 2 sets - 10 reps  Supine Shoulder Flexion AAROM with Dowel - 1 x daily - 7 x weekly - 2 sets - 10 reps       Plan: [x] Continue current frequency toward long and short term goals.     [x] Specific Instructions for subsequent treatments: continue to progress as able , MEASURE ROM      Time In: 10:56am       Time Out: 11:25am    Electronically signed by:  Oma Molina PTA

## 2022-12-08 NOTE — FLOWSHEET NOTE
[] [facility]) [facility] &  Therapy  [address]    [phone]  F: [phone]   [x] [address]  [facility]   [address]  P: [phone]  F: [phone]  [] [address] &  Therapy  [address]  P: [phone]  F: [phone]   [] [address]  P: [phone]  F: [phone]  [] [place] [address] [facility] [address]  [place]: [phone]   F: [phone] [] [facility]  Outpatient Rehabilitation &  Therapy  [facility] [address]   [place]: [phone]  F: [phone]      THERAPY RESPONSIBILITY OF CARE TRANSFER FORM       PATIENT NAME: Janusz Gray  MRN: [id number]   : 1970      TRANSFERRING FACILITY:    [] [facility]   [] [facility]   [x]  [facility]   [] [facility]   [] [facility]   [] [facility]   [] [facility]    [] Other:       ACCEPTING FACILITY   [] [facility]   [] [facility]   [x]  [facility]   [] [place] [facility]   [] [facility]   [] [facility]   [] [facility]    [] Other:          REASON FOR TRANSFER: Primary PT is moving. Transferring to returning therapist. To take effect on 22. TRANSFER OF CARE:    I am transferring the care of the above patient to: Renée Travis, PT  Calli Scheuermann, PT  2022      ACCEPTANCE OF CARE:     I am accepting the care of the above patient.  Renée Travis, PT

## 2022-12-13 ENCOUNTER — HOSPITAL ENCOUNTER (OUTPATIENT)
Dept: PHYSICAL THERAPY | Facility: CLINIC | Age: 52
Setting detail: THERAPIES SERIES
Discharge: HOME | End: 2022-12-13
Payer: MEDICARE

## 2022-12-13 PROCEDURE — 97110 THERAPEUTIC EXERCISES: CPT

## 2022-12-15 ENCOUNTER — HOSPITAL ENCOUNTER (OUTPATIENT)
Dept: PHYSICAL THERAPY | Facility: CLINIC | Age: 52
Setting detail: THERAPIES SERIES
End: 2022-12-15
Payer: MEDICARE

## 2022-12-27 ENCOUNTER — HOSPITAL ENCOUNTER (OUTPATIENT)
Dept: PHYSICAL THERAPY | Facility: CLINIC | Age: 52
Setting detail: THERAPIES SERIES
Discharge: HOME | End: 2022-12-27
Payer: MEDICARE

## 2022-12-27 PROCEDURE — 97110 THERAPEUTIC EXERCISES: CPT

## 2022-12-27 NOTE — FLOWSHEET NOTE
[x] [address] [facility]  Outpatient Rehabilitation &  Therapy  [facility] [address]   [address]  P: [phone]  F: [phone]     Physical Therapy Daily Treatment Note    Date:  2022  Patient Name:  Bib Torrez    :  1970  MRN: [id number]  Physician: Kirill Quinones DO           Insurance: [employer] (30 visits, auth after 27)  Medical Diagnosis:   Z98.890 (ICD-10-CM) - Status post surgery   M75.122 (ICD-10-CM) - Complete tear of left rotator cuff, unspecified whether traumatic   Rehab Codes: M62.81, M62.9, Z73.6, R29.3,  M25.60, Z74.0  Onset Date: 10/25/22 - DOS                         Next 's appt.: 22  Visit# / total visits:     Cancels/No Shows: 1/0    Subjective:    Pain:  [x] Yes  [] No Location: L shoulder  Pain Rating: (0-10 scale) 3/10  Pain altered Tx:  [x] No  [] Yes  Action:   Comments: Patient 9 weeks post -op as of todays date. Patient arrives with minimal complaint of pain today. Objective:  Modalities: Vasocompression 10 min @ 42° and min pressure in semirecumbent position on the plinth. - held 12/6 d/t pt reports of this not providing relief of pain  HP- to L shoulder in sitting after exercise x 10 min-not this date    Precautions: no lifting of objects, no shoulder ext, no excessive stretching or sudden movements, no supporting body weight with hands. Protocol in hard chart.   AAROM AT 5 WEEKS   Exercises:MEASURE ROM  Exercise Reps/ Time Weight/ Level Comments   PROM to L shoulder 5 min     Pulleys 2'  Added - flexion         Standing:      Pendulums 2x10  Pt self initiated in between reps of cane ex   Flexion/Extension  2x10           Seated:      Scapular retractions 10x 5\"    Isometrics 5\"x10ea  Flexion, ABD, ER/IR   Elbow flexion ISO 15x5\"  New    Table slides 10x  Added          AAROM:   Cane flexion 10x2 cane Shortened arc-From 90 degrees to approx 120   Cane press ups 10x2 cane    Horiz abd/add 2x10 cane    [employer] protraction 15 AROM    Rhythmic stabs  3x15\"ea  New 12/27  90 degrees flexion  25 abd, 0 ER         Prone      Rows 2x10  New 12/13   HAB 2x10  New 12/13   Extension to neutral  2x10  New 12/13         Standing      ER/IR Iso walkouts 15xea orange    Wall slides 15x ea     Elbow flexion/ext 10xea orange New 12/27         Other: See assessment section for measurements taken today, 12/8    Specific Instructions for next treatment: full ROM, SL ER, AROM abduction, scaption,       Treatment Charges: Mins Units   []  Modalities- HP     [x]  Ther Exercise 43 3   []  Manual Therapy     []  Ther Activities     []  Aquatics     []  Vasocompression     []  Other     Total Treatment time 43 3            Assessed on 12/13/22 12/27     Left PROM Left AROM  PROM   Shld Abd 110  107 113   Shld Flex 125  140 125   Shld IR 90 degrees at 90 abd   --   Shld ER at 0  60   56 at 25 abduction    Shoulder ER at 90  45           Assessment: [x] Progressing toward goals: Initiated session with pulleys followed by PROM. Pt continues to present with guarding during PROM with pain limiting ROM all planes. Attempted scaption with pulleys however pt reports increased pain and requested to hold, thus only performed flexion. Continued with previous exercises encouraging pt to complete within full ROM as possible. Added rhythmic stabilizations and resisted elbow flexion and extension this date. Unable to complete ER/IR walk outs per pt request to end the session due to elevated pain levels up to 7/10. Pt declines use of vaso or MHP post exercise and agrees to use MHP at home to reduce soreness. [] No change. [] Other:   [x] Patient would continue to benefit from skilled physical therapy services in order to: decrease pain and increase ROM, strength, and function in L shoulder. Problems:    [x] ? Pain: 3-10/10 pain  [x] ? ROM: decreased PROM in flex/abd/ER  [x] ? Strength: NT due to no AROM at this time  [x] ?  Function: 99% impairment  [x] Other:  poor posture        Goals  MET NOT MET ON-  GOING  Details   Date Addressed: 12/13/22           STG: To be met in 8 treatments            1. ? Pain: Patient will self report worst pain no greater than 3/10 in order to better tolerate ADLs/work activities with minimal dysfunction []  []  [x]      2. ? ROM: Patient will improve PROM in flexion to 125 degrees, ER at 90 degrees of ABD to at least 45 degrees and IR at 90 degrees of ABD to at least 45 degrees in order to demonstrate ability to move/reach in all planes unrestricted at PLOF [x]  []  []            Left PROM Left AROM    Shld Abd 110  107   Shld Flex 125  140   Shld IR 90 degrees at 90 abd     Shld ER at 0  60     Shoulder ER at 90  45          3. Independent with Home Exercise Programs [x]  []  []      4. Demonstrate knowledge of fall risk prevention  [x]  []  []        []  []  []                  Date Addressed:            LTG: To be met in 16 treatments           1. Improve score on assessment tool UEFS from 99% impairment to less than 65% impairment  []  []  []      2.  Strength: Pt will demonstrate improved R UE strength to 3/5  in order to demonstrate improved stability/strength necessary for unrestricted ADLs/work activities []  []  []      3. ? ROM: Patient will improve PROM to full ROM in all directions  in order to demonstrate ability to move/reach in all planes unrestricted at PLOF []  []  []      4. ? ROM: Patient will improve AROM to at least 120 degrees of flex/ABD, ER/IR to at least 60 degrees in all directions  in order to demonstrate ability to move/reach in all planes unrestricted at PLOF []  []  []                                      Pt. Education:  [x] Yes  [] No  [x] Reviewed Prior HEP/Ed  Method of Education: [x] Verbal  [x] Demo  [x] Written: for cane ex initiated today  Seated Scapular Retraction - 1 x daily - 7 x weekly - 2 sets - 10 reps  Seated Isometric Shoulder Flexion - 1 x daily - 7 x weekly - 10 reps - 5 hold  Seated Isometric Shoulder External Rotation with Towel - 1 x daily - 7 x weekly - 10 reps - 5 hold  Seated Isometric Shoulder External Rotation - 1 x daily - 7 x weekly - 10 reps - 5 hold  Seated Isometric Shoulder Abduction - 1 x daily - 7 x weekly - 10 reps - 5 hold    Comprehension of Education:  [x] Verbalizes understanding. [x] Demonstrates understanding. [] Needs review. [x] Demonstrates/verbalizes HEP/Ed previously given. Access Code: [email or web address]  URL: [email or web address]  Date: 10/28/2022  Prepared by: Calli Scheuermann     Exercises  Circular Shoulder Pendulum with Table Support - 3 x daily - 7 x weekly - 2 sets - 10 reps  Horizontal Shoulder Pendulum with Table Support - 3 x daily - 7 x weekly - 2 sets - 10 reps  Flexion-Extension Shoulder Pendulum with Table Support - 3 x daily - 7 x weekly - 2 sets - 10 reps  Standing Elbow Flexion Extension AROM - 3 x daily - 7 x weekly - 2 sets - 10 reps  Gripping Sponge Supinated - 3 x daily - 7 x weekly - 2 sets - 10 reps  Gripping Sponge Pronated - 3 x daily - 7 x weekly - 2 sets - 10 reps    Access Code: [email or web address]  URL: [email or web address]/  Date: 11/30/2022  Prepared by: Olegario Thorpe    Exercises  Supine Shoulder Press with Dowel - 1 x daily - 7 x weekly - 2 sets - 10 reps  Supine Shoulder Press AAROM in Abduction with Dowel - 1 x daily - 7 x weekly - 2 sets - 10 reps  Supine Shoulder Abduction AAROM with Dowel - 1 x daily - 7 x weekly - 2 sets - 10 reps  Supine Shoulder Flexion AAROM with Dowel - 1 x daily - 7 x weekly - 2 sets - 10 reps       Plan: [x] Continue current frequency toward long and short term goals.     [x] Specific Instructions for subsequent treatments: continue to progress as able , MEASURE ROM      Time In: 4:30 pm      Time Out: 5:13 pm     Electronically signed by:  Erwin Kramer PT

## 2023-01-09 ENCOUNTER — HOSPITAL ENCOUNTER (OUTPATIENT)
Dept: PHYSICAL THERAPY | Facility: CLINIC | Age: 53
Setting detail: THERAPIES SERIES
Discharge: HOME | End: 2023-01-09
Payer: MEDICARE

## 2023-01-09 PROCEDURE — 97110 THERAPEUTIC EXERCISES: CPT

## 2023-01-09 NOTE — FLOWSHEET NOTE
[x] [facility] PLAN  Outpatient Rehabilitation &  Therapy  [facility] [address]   [address]  P: [phone]  F: [phone]     Physical Therapy Daily Treatment Note    Date:  2023  Patient Name:  Demetrio Lozoya    :  1970  MRN: [id number]  Physician: Mando Perrin DO           Insurance: [employer] (30 visits, auth after 30)  Medical Diagnosis:   Z98.890 (ICD-10-CM) - Status post surgery   M75.122 (ICD-10-CM) - Complete tear of left rotator cuff, unspecified whether traumatic   Rehab Codes: M62.81, M62.9, Z73.6, R29.3,  M25.60, Z74.0  Onset Date: 10/25/22 - DOS                         Next 's appt. :   Visit# / total visits: 10/16    Cancels/No Shows:     Subjective:    Pain:  [x] Yes  [] No Location: L shoulder  Pain Rating: (0-10 scale) 7/10  Pain altered Tx:  [x] No  [] Yes  Action:   Comments: Pt arrives with increased pain this date. Pt thinks she is doing too much at home. Uses heat at home for pain management. Notes she focuses on wall slides at home and hasn't done cane exercises yet. Took tylenol really early this morning. Objective:  Modalities: Vasocompression 10 min @ 42° and min pressure in semirecumbent position on the plinth. - held / d/t pt reports of this not providing relief of pain  HP- to L shoulder in sitting after exercise x 10 min-not this date    Precautions: no lifting of objects, no shoulder ext, no excessive stretching or sudden movements, no supporting body weight with hands. Protocol in hard chart.   AAROM AT 5 WEEKS   Exercises: MEASURE ROM  Exercise Reps/ Time Weight/ Level Comments   PROM to L shoulder 5 min  Slow, controlled motion    Pulleys 2' HEP Added - flexion         Standing:      Pendulums 2x10  Pt self initiated in between reps of cane ex   Flexion/Extension  2x10           Seated:      Scapular retractions 10x 5\"    Isometrics 5\"x10ea  Flexion, ABD, ER/IR   Elbow flexion ISO 15x5\"  New    Table slides 10x Added 12/6         AAROM:   Cane flexion 10x2 cane Shortened arc-From 90 degrees to approx 120   Cane press ups 10x2 cane    Horiz abd/add 2x10 cane    Shldr protraction 15 AROM    Rhythmic stabs  3x15\"ea  New 12/27  90 degrees flexion  25 abd, 0 ER         SIDE LYING       ER 2x10  New 1/9         Prone      Rows 2x10  New 12/13   HAB 2x10  New 12/13   Extension to neutral  2x10  New 12/13         Standing      ER/IR Iso walkouts 15xea orange    Wall slides 10x ea  Reduced reps 1/8- limited into abd   Elbow flexion/ext 10xea orange New 12/27         Other: manual STM to lateral aspect of LUE    Specific Instructions for next treatment: full ROM, SL ER, AROM abduction, scaption,       Treatment Charges: Mins Units   []  Modalities- HP     [x]  Ther Exercise 47 3   [x]  Manual Therapy 3 --   []  Ther Activities     []  Aquatics     []  Vasocompression     []  Other     Total Treatment time 50 3            Assessed on 12/13/22 12/27 1/9/23     Left PROM Left AROM  PROM AAROM   Shld Abd 110  107 113 90   Shld Flex 125  140 125 146   Shld IR 90 degrees at 90 abd   --    Shld ER at 0  60   56 at 25 abduction     Shoulder ER at 90  45            Assessment: [x] Progressing toward goals: Initiated session with PROM to L shoulder being most limited into abduction this date. Pt able to relax musculature a bit better this date. VC's for slow and controlled motion to avoid shoulder discomfort. Applied light STM to lateral aspect of LUE to help reduce sx's. Addition of sidelying ER to progress strength without incident. Complaints of pain throughout session with arm movement. Encouraged cane exercises at home and modify to table slides as needed to reduce pain levels. Pt notes she has pulley's and home so instructed to complete to tolerance. [] No change.      [] Other:   [x] Patient would continue to benefit from skilled physical therapy services in order to: decrease pain and increase ROM, strength, and function in L shoulder. Problems:    [x] ? Pain: 3-10/10 pain  [x] ? ROM: decreased PROM in flex/abd/ER  [x] ? Strength: NT due to no AROM at this time  [x] ? Function: 99% impairment  [x] Other:  poor posture        Goals  MET NOT MET ON-  GOING  Details   Date Addressed: 12/13/22           STG: To be met in 8 treatments            1. ? Pain: Patient will self report worst pain no greater than 3/10 in order to better tolerate ADLs/work activities with minimal dysfunction []  []  [x]      2. ? ROM: Patient will improve PROM in flexion to 125 degrees, ER at 90 degrees of ABD to at least 45 degrees and IR at 90 degrees of ABD to at least 45 degrees in order to demonstrate ability to move/reach in all planes unrestricted at PLOF [x]  []  []            Left PROM Left AROM    Shld Abd 110  107   Shld Flex 125  140   Shld IR 90 degrees at 90 abd     Shld ER at 0  60     Shoulder ER at 90  45          3. Independent with Home Exercise Programs [x]  []  []      4. Demonstrate knowledge of fall risk prevention  [x]  []  []        []  []  []                  Date Addressed:            LTG: To be met in 16 treatments           1. Improve score on assessment tool UEFS from 99% impairment to less than 65% impairment  []  []  []      2.  Strength: Pt will demonstrate improved R UE strength to 3/5  in order to demonstrate improved stability/strength necessary for unrestricted ADLs/work activities []  []  []      3. ? ROM: Patient will improve PROM to full ROM in all directions  in order to demonstrate ability to move/reach in all planes unrestricted at PLOF []  []  []      4. ? ROM: Patient will improve AROM to at least 120 degrees of flex/ABD, ER/IR to at least 60 degrees in all directions  in order to demonstrate ability to move/reach in all planes unrestricted at PLOF []  []  []                                      Pt. Education:  [x] Yes  [] No  [x] Reviewed Prior HEP/Ed  Method of Education: [x] Verbal HEP, pulley's cane exercises  [x] Marco  [] Written:   Seated Scapular Retraction - 1 x daily - 7 x weekly - 2 sets - 10 reps  Seated Isometric Shoulder Flexion - 1 x daily - 7 x weekly - 10 reps - 5 hold  Seated Isometric Shoulder External Rotation with Towel - 1 x daily - 7 x weekly - 10 reps - 5 hold  Seated Isometric Shoulder External Rotation - 1 x daily - 7 x weekly - 10 reps - 5 hold  Seated Isometric Shoulder Abduction - 1 x daily - 7 x weekly - 10 reps - 5 hold    Comprehension of Education:  [x] Verbalizes understanding. [x] Demonstrates understanding. [x] Needs review. FOR COMPLIANCE   [x] Demonstrates/verbalizes HEP/Ed previously given. Access Code: [email or web address]  URL: [email or web address]  Date: 10/28/2022  Prepared by: Calli Scheuermann     Exercises  Circular Shoulder Pendulum with Table Support - 3 x daily - 7 x weekly - 2 sets - 10 reps  Horizontal Shoulder Pendulum with Table Support - 3 x daily - 7 x weekly - 2 sets - 10 reps  Flexion-Extension Shoulder Pendulum with Table Support - 3 x daily - 7 x weekly - 2 sets - 10 reps  Standing Elbow Flexion Extension AROM - 3 x daily - 7 x weekly - 2 sets - 10 reps  Gripping Sponge Supinated - 3 x daily - 7 x weekly - 2 sets - 10 reps  Gripping Sponge Pronated - 3 x daily - 7 x weekly - 2 sets - 10 reps    Access Code: [email or web address]  URL: [email or web address]  Date: 11/30/2022  Prepared by: Bairon Khoury    Exercises  Supine Shoulder Press with Dowel - 1 x daily - 7 x weekly - 2 sets - 10 reps  Supine Shoulder Press AAROM in Abduction with Dowel - 1 x daily - 7 x weekly - 2 sets - 10 reps  Supine Shoulder Abduction AAROM with Dowel - 1 x daily - 7 x weekly - 2 sets - 10 reps  Supine Shoulder Flexion AAROM with Dowel - 1 x daily - 7 x weekly - 2 sets - 10 reps       Plan: [x] Continue current frequency toward long and short term goals.     [x] Specific Instructions for subsequent treatments: continue to progress as able , MEASURE ROM      Time In: 11:05 am      Time Out: 11:55 am    Electronically signed by:  Marcos Gudino PTA

## 2023-01-12 ENCOUNTER — HOSPITAL ENCOUNTER (OUTPATIENT)
Dept: PHYSICAL THERAPY | Facility: CLINIC | Age: 53
Setting detail: THERAPIES SERIES
Discharge: HOME | End: 2023-01-12
Payer: MEDICARE

## 2023-01-12 PROCEDURE — 97110 THERAPEUTIC EXERCISES: CPT

## 2023-01-12 NOTE — FLOWSHEET NOTE
[x] [address] [facility]  Outpatient Rehabilitation &  Therapy  [facility] [address]   [address]  P: [phone]  F: [phone]     Physical Therapy Daily Treatment Note    Date:  2023  Patient Name:  Deion Tolliver    :  1970  MRN: [id number]  Physician: Agusto Goode DO           Insurance: [facility] (30 visits, auth after 30)  Medical Diagnosis:   Z98.890 (ICD-10-CM) - Status post surgery   M75.122 (ICD-10-CM) - Complete tear of left rotator cuff, unspecified whether traumatic   Rehab Codes: M62.81, M62.9, Z73.6, R29.3,  M25.60, Z74.0  Onset Date: 10/25/22 - DOS                         Next 's appt. :   Visit# / total visits:     Cancels/No Shows:     Subjective:    Pain:  [x] Yes  [] No Location: L shoulder  Pain Rating: (0-10 scale) 7/10  Pain altered Tx:  [x] No  [] Yes  Action:   Comments: Pt arrives stating that she is having a good day and doing better this date. Objective:  Modalities: Vasocompression 10 min @ 42° and min pressure in semirecumbent position on the plinth. - held  d/t pt reports of this not providing relief of pain  HP- to L shoulder in sitting after exercise x 10 min-not this date    Precautions: no lifting of objects, no shoulder ext, no excessive stretching or sudden movements, no supporting body weight with hands. Protocol in hard chart.   AAROM AT 5 WEEKS   Exercises: MEASURE ROM  Exercise Reps/ Time Weight/ Level Comments   PROM to L shoulder 5 min  Slow, controlled motion    Pulleys 2' HEP Added - flexion         Standing:      Pendulums 2x10  Pt self initiated in between reps of cane ex   Flexion/Extension  2x10           Seated:      Scapular retractions 10x 5\"    Isometrics 5\"x10ea  Flexion, ABD, ER/IR   Elbow flexion ISO 15x5\"  New    Table slides 10x  Added          AAROM:   Cane flexion 10x2 cane Shortened arc-From 90 degrees to approx 160    Cane press ups 10x2 cane    Horiz abd/add 2x10 cane    [employer] protraction 15 AROM    Rhythmic stabs  3x15\"ea  New 12/27  90 degrees flexion  25 abd, 0 ER         SIDE LYING       ER 2x10 1# New 1/9   Horizontal abd 2x10  Added 1/12   Abduction  2x10  Added 1/12         Prone      Rows 2x10  New 12/13   HAB 2x10  New 12/13   Extension to neutral  2x10  New 12/13   Scaption 2x10  Added 1/12         Standing      ER/IR Iso walkouts 10xea orange    Wall slides 10x ea  Reduced reps 1/8- limited into abd   Elbow flexion/ext 10xea orange New 12/27         Other:     Specific Instructions for next treatment: full ROM, SL ER, AROM abduction, scaption       Treatment Charges: Mins Units   []  Modalities- HP     [x]  Ther Exercise 48 3   []  Manual Therapy     []  Ther Activities     []  Aquatics     []  Vasocompression     []  Other     Total Treatment time 48 3            Assessed on 12/13/22 12/27 1/9/23     Left PROM Left AROM  PROM AAROM   Shld Abd 110  107 113 90   Shld Flex 125  140 125 146   Shld IR 90 degrees at 90 abd   --    Shld ER at 0  60   56 at 25 abduction     Shoulder ER at 90  45            Assessment: [x] Progressing toward goals: Initiated treatment with pulleys to help warm up patient through ROM. Patient did well with all charted exercises and progressions of prone and sidelying exercises. Encouraged patient to do exercises daily and work within pain free range. [] No change. [] Other:   [x] Patient would continue to benefit from skilled physical therapy services in order to: decrease pain and increase ROM, strength, and function in L shoulder. Problems:    [x] ? Pain: 3-10/10 pain  [x] ? ROM: decreased PROM in flex/abd/ER  [x] ? Strength: NT due to no AROM at this time  [x] ?  Function: 99% impairment  [x] Other:  poor posture        Goals  MET NOT MET ON-  GOING  Details   Date Addressed: 12/13/22           STG: To be met in 8 treatments            1. ? Pain: Patient will self report worst pain no greater than 3/10 in order to better tolerate ADLs/work activities with minimal dysfunction []  []  [x]      2. ? ROM: Patient will improve PROM in flexion to 125 degrees, ER at 90 degrees of ABD to at least 45 degrees and IR at 90 degrees of ABD to at least 45 degrees in order to demonstrate ability to move/reach in all planes unrestricted at PLOF [x]  []  []            Left PROM Left AROM    Shld Abd 110  107   Shld Flex 125  140   Shld IR 90 degrees at 90 abd     Shld ER at 0  60     Shoulder ER at 90  45          3. Independent with Home Exercise Programs [x]  []  []      4. Demonstrate knowledge of fall risk prevention  [x]  []  []        []  []  []                  Date Addressed:            LTG: To be met in 16 treatments           1. Improve score on assessment tool UEFS from 99% impairment to less than 65% impairment  []  []  []      2.  Strength: Pt will demonstrate improved R UE strength to 3/5  in order to demonstrate improved stability/strength necessary for unrestricted ADLs/work activities []  []  []      3. ? ROM: Patient will improve PROM to full ROM in all directions  in order to demonstrate ability to move/reach in all planes unrestricted at PLOF []  []  []      4. ? ROM: Patient will improve AROM to at least 120 degrees of flex/ABD, ER/IR to at least 60 degrees in all directions  in order to demonstrate ability to move/reach in all planes unrestricted at PLOF []  []  []                                      Pt. Education:  [x] Yes  [] No  [] Reviewed Prior HEP/Ed  Method of Education: [x] Verbal HEP, pulley's cane exercises  [x] Demo  [] Written:   Seated Scapular Retraction - 1 x daily - 7 x weekly - 2 sets - 10 reps  Seated Isometric Shoulder Flexion - 1 x daily - 7 x weekly - 10 reps - 5 hold  Seated Isometric Shoulder External Rotation with Towel - 1 x daily - 7 x weekly - 10 reps - 5 hold  Seated Isometric Shoulder External Rotation - 1 x daily - 7 x weekly - 10 reps - 5 hold  Seated Isometric Shoulder Abduction - 1 x daily - 7 x weekly - 10 reps - 5 hold    Comprehension of Education:  [x] Verbalizes understanding. [x] Demonstrates understanding. [x] Needs review. FOR COMPLIANCE   [x] Demonstrates/verbalizes HEP/Ed previously given. Access Code: [email or web address]  URL: [email or web address]  Date: 10/28/2022  Prepared by: Calli Scheuermann     Exercises  Circular Shoulder Pendulum with Table Support - 3 x daily - 7 x weekly - 2 sets - 10 reps  Horizontal Shoulder Pendulum with Table Support - 3 x daily - 7 x weekly - 2 sets - 10 reps  Flexion-Extension Shoulder Pendulum with Table Support - 3 x daily - 7 x weekly - 2 sets - 10 reps  Standing Elbow Flexion Extension AROM - 3 x daily - 7 x weekly - 2 sets - 10 reps  Gripping Sponge Supinated - 3 x daily - 7 x weekly - 2 sets - 10 reps  Gripping Sponge Pronated - 3 x daily - 7 x weekly - 2 sets - 10 reps    Access Code: [email or web address]  URL: [email or web address]/  Date: 11/30/2022  Prepared by: Laurence Swartz    Exercises  Supine Shoulder Press with Dowel - 1 x daily - 7 x weekly - 2 sets - 10 reps  Supine Shoulder Press AAROM in Abduction with Dowel - 1 x daily - 7 x weekly - 2 sets - 10 reps  Supine Shoulder Abduction AAROM with Dowel - 1 x daily - 7 x weekly - 2 sets - 10 reps  Supine Shoulder Flexion AAROM with Dowel - 1 x daily - 7 x weekly - 2 sets - 10 reps       Plan: [x] Continue current frequency toward long and short term goals.     [x] Specific Instructions for subsequent treatments: continue to progress as able , MEASURE ROM      Time In: 11:03 am      Time Out: 11:53 am    Electronically signed by:  Cheo Daley PTA

## 2023-01-16 ENCOUNTER — HOSPITAL ENCOUNTER (OUTPATIENT)
Dept: PHYSICAL THERAPY | Facility: CLINIC | Age: 53
Setting detail: THERAPIES SERIES
Discharge: HOME | End: 2023-01-16
Payer: MEDICARE

## 2023-01-16 PROCEDURE — 97110 THERAPEUTIC EXERCISES: CPT

## 2023-01-16 NOTE — FLOWSHEET NOTE
[x] [address] [facility]  Outpatient Rehabilitation &  Therapy  [facility] [address]   [address]  P: [phone]  F: [phone]     Physical Therapy Daily Treatment Note    Date:  2023  Patient Name:  Akua Piper    :  1970  MRN: [id number]  Physician: Barbra Espinoza DO           Insurance: [facility] (30 visits, auth after 30)  Medical Diagnosis:   Z98.890 (ICD-10-CM) - Status post surgery   M75.122 (ICD-10-CM) - Complete tear of left rotator cuff, unspecified whether traumatic   Rehab Codes: M62.81, M62.9, Z73.6, R29.3,  M25.60, Z74.0  Onset Date: 10/25/22 - DOS                         Next 's appt. :   Visit# / total visits:     Cancels/No Shows:     Subjective:    Pain:  [x] Yes  [] No Location: L shoulder  Pain Rating: (0-10 scale) minimal/10  Pain altered Tx:  [x] No  [] Yes  Action:     Comments: Pt reports she is doing good today, she did have a rough night due to pain in the shoulder. Objective:  Modalities: Vasocompression 10 min @ 42° and min pressure in semirecumbent position on the plinth. - held  d/t pt reports of this not providing relief of pain  HP- to L shoulder in sitting after exercise x 10 min-not this date    Precautions: no lifting of objects, no shoulder ext, no excessive stretching or sudden movements, no supporting body weight with hands. Protocol in hard chart.   AAROM AT 5 WEEKS   Exercises: MEASURE ROM  Exercise Reps/ Time Weight/ Level Comments   PROM to L shoulder 5 min  Slow, controlled motion    Pulleys 2' HEP Added - flexion         Standing:      Pendulums 2x10  Pt self initiated in between reps of cane ex   Flexion/Extension  2x10           Seated:      Scapular retractions 10x 5\"    Isometrics 5\"x10ea  Flexion, ABD, ER/IR   Elbow flexion ISO 15x5\"  New    Table slides 10x  Added          AAROM:   Cane flexion 10x2 cane Shortened arc-From 90 degrees to approx 160    Cane press ups 10x2 cane    Horiz abd/add 2x10 cane    Shldr protraction 2x10 AROM    Rhythmic stabs  3x15\"ea  New 12/27  90 degrees flexion  25 abd, 0 ER         SIDE LYING       ER 2x10 1# New 1/9   Horizontal abd 2x10  Added 1/12   Abduction  2x10  Added 1/12         Prone      Rows 2x10 1# New 12/13   HAB 2x10 1# New 12/13   Extension to neutral  2x10 1#  New 12/13   Scaption 2x10  Added 1/12         Standing      ER/IR Iso walkouts 10xea Orange[employer]lime    Wall slides 10x ea  Reduced reps 1/8- limited into abd   Elbow flexion/ext 10xea orange New 12/27         Other:     Specific Instructions for next treatment: full ROM, SL ER, AROM abduction, scaption       Treatment Charges: Mins Units   []  Modalities- HP     [x]  Ther Exercise 50 3   []  Manual Therapy     []  Ther Activities     []  Aquatics     []  Vasocompression     []  Other     Total Treatment time 50 3            Assessed on 12/13/22 12/27 1/9/23 1/16     Left PROM Left AROM  PROM AAROM P/AA   Shld Abd 110  107 113 90 143P,      Shld Flex 125  140 125 146 155P,    Shld IR 90 degrees at 90 abd   --     Shld ER at 0  60   56 at 25 abduction      Shoulder ER at 90  45             Assessment: [x] Progressing toward goals: continued with charted exercises today with good tolerance. Able to increase strengthening today with adding weight to some prone exercises and increased band strength with IR. Much improved ROM today as charted. Pt notes feeling good at the end of the treatment.     [] No change.     [] Other:   [x] Patient would continue to benefit from skilled physical therapy services in order to: decrease pain and increase ROM, strength, and function in L shoulder.     Problems:    [x] ? Pain: 3-10/10 pain  [x] ? ROM: decreased PROM in flex/abd/ER  [x] ? Strength: NT due to no AROM at this time  [x] ? Function: 99% impairment  [x] Other:  poor posture        Goals  MET NOT MET ON-  GOING  Details   Date Addressed: 12/13/22           STG: To be met in 8 treatments            1. ?  Pain: Patient will self report worst pain no greater than 3/10 in order to better tolerate ADLs/work activities with minimal dysfunction []  []  [x]      2. ? ROM: Patient will improve PROM in flexion to 125 degrees, ER at 90 degrees of ABD to at least 45 degrees and IR at 90 degrees of ABD to at least 45 degrees in order to demonstrate ability to move/reach in all planes unrestricted at PLOF [x]  []  []            Left PROM Left AROM    Shld Abd 110  107   Shld Flex 125  140   Shld IR 90 degrees at 90 abd     Shld ER at 0  60     Shoulder ER at 90  45          3. Independent with Home Exercise Programs [x]  []  []      4. Demonstrate knowledge of fall risk prevention  [x]  []  []        []  []  []                  Date Addressed:            LTG: To be met in 16 treatments           1. Improve score on assessment tool UEFS from 99% impairment to less than 65% impairment  []  []  []      2.  Strength: Pt will demonstrate improved R UE strength to 3/5  in order to demonstrate improved stability/strength necessary for unrestricted ADLs/work activities []  []  []      3. ? ROM: Patient will improve PROM to full ROM in all directions  in order to demonstrate ability to move/reach in all planes unrestricted at PLOF []  []  []      4. ? ROM: Patient will improve AROM to at least 120 degrees of flex/ABD, ER/IR to at least 60 degrees in all directions  in order to demonstrate ability to move/reach in all planes unrestricted at PLOF []  []  []                                      Pt. Education:  [x] Yes  [] No  [] Reviewed Prior HEP/Ed  Method of Education: [x] Verbal form  [] Demo  [] Written:   Seated Scapular Retraction - 1 x daily - 7 x weekly - 2 sets - 10 reps  Seated Isometric Shoulder Flexion - 1 x daily - 7 x weekly - 10 reps - 5 hold  Seated Isometric Shoulder External Rotation with Towel - 1 x daily - 7 x weekly - 10 reps - 5 hold  Seated Isometric Shoulder External Rotation - 1 x daily - 7 x weekly - 10 reps - 5 hold  Seated Isometric Shoulder Abduction - 1 x daily - 7 x weekly - 10 reps - 5 hold    Comprehension of Education:  [x] Verbalizes understanding. [x] Demonstrates understanding. [x] Needs review. FOR COMPLIANCE   [x] Demonstrates/verbalizes HEP/Ed previously given. Access Code: [email or web address]  URL: [email or web address]  Date: 10/28/2022  Prepared by: Calli Scheuermann     Exercises  Circular Shoulder Pendulum with Table Support - 3 x daily - 7 x weekly - 2 sets - 10 reps  Horizontal Shoulder Pendulum with Table Support - 3 x daily - 7 x weekly - 2 sets - 10 reps  Flexion-Extension Shoulder Pendulum with Table Support - 3 x daily - 7 x weekly - 2 sets - 10 reps  Standing Elbow Flexion Extension AROM - 3 x daily - 7 x weekly - 2 sets - 10 reps  Gripping Sponge Supinated - 3 x daily - 7 x weekly - 2 sets - 10 reps  Gripping Sponge Pronated - 3 x daily - 7 x weekly - 2 sets - 10 reps    Access Code: [email or web address]  URL: [email or web address]  Date: 11/30/2022  Prepared by: Gracia Casper    Exercises  Supine Shoulder Press with Dowel - 1 x daily - 7 x weekly - 2 sets - 10 reps  Supine Shoulder Press AAROM in Abduction with Dowel - 1 x daily - 7 x weekly - 2 sets - 10 reps  Supine Shoulder Abduction AAROM with Dowel - 1 x daily - 7 x weekly - 2 sets - 10 reps  Supine Shoulder Flexion AAROM with Dowel - 1 x daily - 7 x weekly - 2 sets - 10 reps       Plan: [x] Continue current frequency toward long and short term goals.     [x] Specific Instructions for subsequent treatments: continue to progress as able , MEASURE ROM      Time In: 2:55 pm      Time Out: 3:53 pm    Electronically signed by:  Estell Soulier, PTA

## 2023-01-19 ENCOUNTER — HOSPITAL ENCOUNTER (OUTPATIENT)
Dept: PHYSICAL THERAPY | Facility: CLINIC | Age: 53
Setting detail: THERAPIES SERIES
Discharge: HOME | End: 2023-01-19
Payer: MEDICARE
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
## 2023-01-23 ENCOUNTER — HOSPITAL ENCOUNTER (OUTPATIENT)
Dept: PHYSICAL THERAPY | Facility: CLINIC | Age: 53
Setting detail: THERAPIES SERIES
Discharge: HOME | End: 2023-01-23
Payer: MEDICARE

## 2023-01-23 PROCEDURE — 97110 THERAPEUTIC EXERCISES: CPT

## 2023-01-23 NOTE — FLOWSHEET NOTE
[x] [address] [facility]  Outpatient Rehabilitation &  Therapy  [facility] [address]   [address]  P: [phone]  F: [phone]     Physical Therapy Daily Treatment Note    Date:  2023  Patient Name:  Rosibel Ching    :  1970  MRN: [id number]  Physician: Dusty Menchaca DO           Insurance: Belinda Carrel (30 visits, auth after 30)  Medical Diagnosis:   Z98.890 (ICD-10-CM) - Status post surgery   M75.122 (ICD-10-CM) - Complete tear of left rotator cuff, unspecified whether traumatic   Rehab Codes: M62.81, M62.9, Z73.6, R29.3,  M25.60, Z74.0  Onset Date: 10/25/22 - DOS                         Next 's appt. : tbd  Visit# / total visits:     Cancels/No Shows: 2/    Subjective:    Pain:  [x] Yes  [] No Location: L shoulder  Pain Rating: (0-10 scale) minimal/10  Pain altered Tx:  [x] No  [] Yes  Action:     Comments: Pt arrives with minimal L shoulder pain this date but did have difficulty sleeping due to increase in pain. Pt denies increased pain or soreness after previous session. Objective:  Modalities: Vasocompression 10 min @ 42° and min pressure in semirecumbent position on the plinth. - held  d/t pt reports of this not providing relief of pain  HP- to L shoulder in sitting after exercise x 10 min-not this date    Precautions: no lifting of objects, no shoulder ext, no excessive stretching or sudden movements, no supporting body weight with hands. Protocol in hard chart.   AAROM AT 5 WEEKS   Exercises: MEASURE ROM  Exercise Reps/ Time Weight/ Level Comments   PROM to L shoulder 5 min  Slow, controlled motion    Pulleys 2'/2'  Added - flexion, abduction          Standing:      Pendulums 2x10  Pt self initiated in between reps of cane ex   Flexion/Extension  2x10           Seated:      Scapular retractions 10x 5\"    Isometrics 5\"x10ea  Flexion, ABD, ER/IR   Elbow flexion ISO 15x5\"  New    Table slides 10x  Added          AAROM:   Cane flexion 10x2 cane Shortened arc-From 90 degrees to approx 160    Cane press ups 10x2 cane    Horiz abd/add 2x10 cane    Shldr protraction 2x10 AROM    Rhythmic stabs  3x15\"ea  New 12/27  90 degrees flexion  25 abd, 0 ER         SIDE LYING       ER 2x10 1# New 1/9   Horizontal abd 2x10 1# Added 1/12, inc 1/23   Abduction  2x10 1# Added 1/12, inc 1/23         Prone      Rows 2x10 1# New 12/13   HAB 2x10 1# New 12/13   Extension to neutral  2x10 1#  New 12/13   Scaption 2x10  Added 1/12         Standing      ER/IR Iso walkouts 10xea Orange/lime    Wall slides 10x ea  Reduced reps 1/8- limited into abd   Elbow flexion/ext 10xea orange New 12/27   Tband rows/ext  10xea orange New 1/23   Other:     Specific Instructions for next treatment: full ROM, SL ER, AROM abduction, scaption       Treatment Charges: Mins Units   []  Modalities- HP     [x]  Ther Exercise 47 3   []  Manual Therapy     []  Ther Activities     []  Aquatics     []  Vasocompression     []  Other     Total Treatment time 47 3            Assessed on 12/13/22 12/27 1/9/23 1/16     Left PROM Left AROM  PROM AAROM P/AA   Shld Abd 110  107 113 90 143P,      Shld Flex 125  140 125 146 155P,    Shld IR 90 degrees at 90 abd   --     Shld ER at 0  60   56 at 25 abduction      Shoulder ER at 90  45             Assessment: [] Progressing toward goals:     [] No change. [x] Other: initiated session with pulleys, adding abduction this date with good tolerance. Continued with charted exercises, progressing with theraband extension and rows and added weight to SL exercises with good tolerance. Pt presents challenged by current exercise regimine so no further progressions were made this date. Continued to encourage pt to perform all exercises with maximal range of motion. Pt reports fatigue post session but denies any increase in pain.     [x] Patient would continue to benefit from skilled physical therapy services in order to: decrease pain and increase ROM, strength, and function in L shoulder. Problems:    [x] ? Pain: 3-10/10 pain  [x] ? ROM: decreased PROM in flex/abd/ER  [x] ? Strength: NT due to no AROM at this time  [x] ? Function: 99% impairment  [x] Other:  poor posture        Goals  MET NOT MET ON-  GOING  Details   Date Addressed: 12/13/22           STG: To be met in 8 treatments            1. ? Pain: Patient will self report worst pain no greater than 3/10 in order to better tolerate ADLs/work activities with minimal dysfunction []  []  [x]      2. ? ROM: Patient will improve PROM in flexion to 125 degrees, ER at 90 degrees of ABD to at least 45 degrees and IR at 90 degrees of ABD to at least 45 degrees in order to demonstrate ability to move/reach in all planes unrestricted at PLOF [x]  []  []            Left PROM Left AROM    Shld Abd 110  107   Shld Flex 125  140   Shld IR 90 degrees at 90 abd     Shld ER at 0  60     Shoulder ER at 90  45          3. Independent with Home Exercise Programs [x]  []  []      4. Demonstrate knowledge of fall risk prevention  [x]  []  []        []  []  []                  Date Addressed:            LTG: To be met in 16 treatments           1. Improve score on assessment tool UEFS from 99% impairment to less than 65% impairment  []  []  []      2.  Strength: Pt will demonstrate improved R UE strength to 3/5  in order to demonstrate improved stability/strength necessary for unrestricted ADLs/work activities []  []  []      3. ? ROM: Patient will improve PROM to full ROM in all directions  in order to demonstrate ability to move/reach in all planes unrestricted at PLOF []  []  []      4. ? ROM: Patient will improve AROM to at least 120 degrees of flex/ABD, ER/IR to at least 60 degrees in all directions  in order to demonstrate ability to move/reach in all planes unrestricted at PLOF []  []  []                                      Pt. Education:  [x] Yes  [] No  [] Reviewed Prior HEP/Ed  Method of Education: [x] Verbal form  [] Demo  [] Written:   Seated Scapular Retraction - 1 x daily - 7 x weekly - 2 sets - 10 reps  Seated Isometric Shoulder Flexion - 1 x daily - 7 x weekly - 10 reps - 5 hold  Seated Isometric Shoulder External Rotation with Towel - 1 x daily - 7 x weekly - 10 reps - 5 hold  Seated Isometric Shoulder External Rotation - 1 x daily - 7 x weekly - 10 reps - 5 hold  Seated Isometric Shoulder Abduction - 1 x daily - 7 x weekly - 10 reps - 5 hold    Comprehension of Education:  [] Verbalizes understanding. [] Demonstrates understanding. [] Needs review. FOR COMPLIANCE   [x] Demonstrates/verbalizes HEP/Ed previously given. Access Code: [email or web address]  URL: [email or web address]  Date: 10/28/2022  Prepared by: Calli Scheuermann     Exercises  Circular Shoulder Pendulum with Table Support - 3 x daily - 7 x weekly - 2 sets - 10 reps  Horizontal Shoulder Pendulum with Table Support - 3 x daily - 7 x weekly - 2 sets - 10 reps  Flexion-Extension Shoulder Pendulum with Table Support - 3 x daily - 7 x weekly - 2 sets - 10 reps  Standing Elbow Flexion Extension AROM - 3 x daily - 7 x weekly - 2 sets - 10 reps  Gripping Sponge Supinated - 3 x daily - 7 x weekly - 2 sets - 10 reps  Gripping Sponge Pronated - 3 x daily - 7 x weekly - 2 sets - 10 reps    Access Code: [email or web address]  URL: [email or web address] com/  Date: 11/30/2022  Prepared by: Aisha Douglass    Exercises  Supine Shoulder Press with Dowel - 1 x daily - 7 x weekly - 2 sets - 10 reps  Supine Shoulder Press AAROM in Abduction with Dowel - 1 x daily - 7 x weekly - 2 sets - 10 reps  Supine Shoulder Abduction AAROM with Dowel - 1 x daily - 7 x weekly - 2 sets - 10 reps  Supine Shoulder Flexion AAROM with Dowel - 1 x daily - 7 x weekly - 2 sets - 10 reps       Plan: [x] Continue current frequency toward long and short term goals.     [x] Specific Instructions for subsequent treatments: continue to progress as able , MEASURE ROM      Time In: 1:05 pm        Time Out: 1:52 pm     Electronically signed by:  Meenu Reynoso, PT

## 2023-01-26 ENCOUNTER — HOSPITAL ENCOUNTER (OUTPATIENT)
Dept: PHYSICAL THERAPY | Facility: CLINIC | Age: 53
Setting detail: THERAPIES SERIES
Discharge: HOME | End: 2023-01-26
Payer: MEDICARE

## 2023-01-26 PROCEDURE — 97110 THERAPEUTIC EXERCISES: CPT

## 2023-01-26 NOTE — FLOWSHEET NOTE
[x] [address] [facility]  Outpatient Rehabilitation &  Therapy  [facility] [address]   [address]  P: [phone]  F: [phone]     Physical Therapy Daily Treatment Note    Date:  2023  Patient Name:  Charity Hull    :  1970  MRN: [id number]  Physician: April Ferrari DO           Insurance: [place] [id number] (30 visits, auth after 30)  Medical Diagnosis:   Z98.890 (ICD-10-CM) - Status post surgery   M75.122 (ICD-10-CM) - Complete tear of left rotator cuff, unspecified whether traumatic   Rehab Codes: M62.81, M62.9, Z73.6, R29.3,  M25.60, Z74.0  Onset Date: 10/25/22 - DOS                         Next 's appt. : tbd  Visit# / total visits: 15/16    Cancels/No Shows:     Subjective:    Pain:  [x] Yes  [] No Location: L shoulder  Pain Rating: (0-10 scale) 410  Pain altered Tx:  [x] No  [] Yes  Action:     Comments: Pt arrives with minimal L shoulder pain noting it is 'under control'. Reports she has been slacking the last week and a half with her HEP. Used pulley's once this week. Objective:  Modalities: Vasocompression 10 min @ 42° and min pressure in semirecumbent position on the plinth. - held / d/t pt reports of this not providing relief of pain  HP- to L shoulder in sitting after exercise x 10 min-not this date    Precautions: no lifting of objects, no shoulder ext, no excessive stretching or sudden movements, no supporting body weight with hands. Protocol in hard chart.   AAROM AT 5 WEEKS   Exercises: MEASURE ROM  Exercise Reps/ Time Weight/ Level Comments   PROM to L shoulder 5 min  Slow, controlled motion    Pulleys /'  Added - flexion, abduction          Standing:      Pendulums 2x10  Pt self initiated in between reps of cane ex   Flexion/Extension  2x10           Seated:      Scapular retractions 10x 5\"    Isometrics 5\"x10ea  Flexion, ABD, ER/IR   Elbow flexion ISO 15x5\"  New    Table slides 10x  Added          AAROM:   Cane flexion 10x2 cane Shortened arc-From 90 degrees to approx 160    Cane press ups 10x2 cane    Horiz abd/add 2x10 cane    Shldr protraction 2x10 AROM    Rhythmic stabs  3x15\"ea  New 12/27  90 degrees flexion  25 abd, 0 ER         SIDE LYING       ER 2x10 1# New 1/9   Horizontal abd 2x10 1# Added 1/12, inc 1/23   Abduction  2x10 1# Added 1/12, inc 1/23         Prone      Rows 2x10 1# New 12/13   HAB 2x10 1# New 12/13   Extension to neutral  2x10 1#  New 12/13   Scaption 2x10 1# Added 1/12; wt added 1/26   Flexion  attempted  Limited motion, some pain so held         Standing      ER/IR Iso walkouts 10xea lime Incr res for ER 1/26   Wall slides 10x ea  Reduced reps 1/8- limited into abd   Elbow flexion/ext 10xea Lime  New 12/27; progressed 1/26   Tband rows/ext  10xea Lime  New 1/23; progressed 1/26   Other:     Specific Instructions for next treatment: full ROM, SL ER, AROM abduction, scaption       Treatment Charges: Mins Units   []  Modalities- HP     [x]  Ther Exercise 50 3   []  Manual Therapy     []  Ther Activities     []  Aquatics     []  Vasocompression     []  Other     Total Treatment time 50 3            Assessed on 12/13/22 12/27 1/9/23 1/16 1/26     Left PROM Left AROM  PROM AAROM P/AA AAROM   Shld Abd 110  107 113 90 143P,       Shld Flex 125  140 125 146 155P,     Shld IR 90 degrees at 90 abd   --      Shld ER at 0  60   56 at 25 abduction       Shoulder ER at 90  45              Assessment: [] Progressing toward goals:     [] No change. [x] Other: Initiated session with PROM followed by cane exercises to improve motion. Most limited into abduction this date. Notes having 'strange pain' in L UT/neck. Added 1# weight to prone scaption with good tolerance. Progressed resistance for ER walk outs, elbow flex/ext, rows, and extension. Pt tolerated session well and able to achieve increased AAROM this date charted above.  Emphasized importance of daily stretching and strengthening to progress towards goals, pt ensures understanding. [x] Patient would continue to benefit from skilled physical therapy services in order to: decrease pain and increase ROM, strength, and function in L shoulder. Problems:    [x] ? Pain: 3-10/10 pain  [x] ? ROM: decreased PROM in flex/abd/ER  [x] ? Strength: NT due to no AROM at this time  [x] ? Function: 99% impairment  [x] Other:  poor posture        Goals  MET NOT MET ON-  GOING  Details   Date Addressed: 12/13/22           STG: To be met in 8 treatments            1. ? Pain: Patient will self report worst pain no greater than 3/10 in order to better tolerate ADLs/work activities with minimal dysfunction []  []  [x]      2. ? ROM: Patient will improve PROM in flexion to 125 degrees, ER at 90 degrees of ABD to at least 45 degrees and IR at 90 degrees of ABD to at least 45 degrees in order to demonstrate ability to move/reach in all planes unrestricted at PLOF [x]  []  []            Left PROM Left AROM    Shld Abd 110  107   Shld Flex 125  140   Shld IR 90 degrees at 90 abd     Shld ER at 0  60     Shoulder ER at 90  45          3. Independent with Home Exercise Programs [x]  []  []      4. Demonstrate knowledge of fall risk prevention  [x]  []  []        []  []  []                  Date Addressed:            LTG: To be met in 16 treatments           1. Improve score on assessment tool UEFS from 99% impairment to less than 65% impairment  []  []  []      2.  Strength: Pt will demonstrate improved R UE strength to 3/5  in order to demonstrate improved stability/strength necessary for unrestricted ADLs/work activities []  []  []      3. ? ROM: Patient will improve PROM to full ROM in all directions  in order to demonstrate ability to move/reach in all planes unrestricted at PLOF []  []  []      4. ? ROM: Patient will improve AROM to at least 120 degrees of flex/ABD, ER/IR to at least 60 degrees in all directions  in order to demonstrate ability to move/reach in all planes unrestricted at [facility] []  []  []                                      Pt. Education:  [x] Yes  [] No  [] Reviewed Prior HEP/Ed  Method of Education: [x] Verbal form, daily HEP compliance, ice  [] Demo  [] Written:   Seated Scapular Retraction - 1 x daily - 7 x weekly - 2 sets - 10 reps  Seated Isometric Shoulder Flexion - 1 x daily - 7 x weekly - 10 reps - 5 hold  Seated Isometric Shoulder External Rotation with Towel - 1 x daily - 7 x weekly - 10 reps - 5 hold  Seated Isometric Shoulder External Rotation - 1 x daily - 7 x weekly - 10 reps - 5 hold  Seated Isometric Shoulder Abduction - 1 x daily - 7 x weekly - 10 reps - 5 hold    Comprehension of Education:  [] Verbalizes understanding. [] Demonstrates understanding. [] Needs review. FOR COMPLIANCE   [x] Demonstrates/verbalizes HEP/Ed previously given. Access Code: [email or web address]  URL: [email or web address]  Date: 10/28/2022  Prepared by: Calli Scheuermann     Exercises  Circular Shoulder Pendulum with Table Support - 3 x daily - 7 x weekly - 2 sets - 10 reps  Horizontal Shoulder Pendulum with Table Support - 3 x daily - 7 x weekly - 2 sets - 10 reps  Flexion-Extension Shoulder Pendulum with Table Support - 3 x daily - 7 x weekly - 2 sets - 10 reps  Standing Elbow Flexion Extension AROM - 3 x daily - 7 x weekly - 2 sets - 10 reps  Gripping Sponge Supinated - 3 x daily - 7 x weekly - 2 sets - 10 reps  Gripping Sponge Pronated - 3 x daily - 7 x weekly - 2 sets - 10 reps    Access Code: [email or web address]  URL: [email or web address]  Date: 11/30/2022  Prepared by: Tello Wheeler    Exercises  Supine Shoulder Press with Dowel - 1 x daily - 7 x weekly - 2 sets - 10 reps  Supine Shoulder Press AAROM in Abduction with Dowel - 1 x daily - 7 x weekly - 2 sets - 10 reps  Supine Shoulder Abduction AAROM with Dowel - 1 x daily - 7 x weekly - 2 sets - 10 reps  Supine Shoulder Flexion AAROM with Dowel - 1 x daily - 7 x weekly - 2 sets - 10 reps       Plan: [x] Continue current frequency toward long and short term goals.     [x] Specific Instructions for subsequent treatments: continue to progress as able , MEASURE ROM      Time In:  11:04 am        Time Out: 11:54 am    Electronically signed by:  Shira Lopez PTA

## 2023-01-30 ENCOUNTER — TELEPHONE (OUTPATIENT)
Dept: ADMINISTRATIVE | Age: 53
End: 2023-01-30

## 2023-01-30 NOTE — TELEPHONE ENCOUNTER
Tomorrow is her last therapy session and she is concerned that she is not where she needs to be and was considering cancelling it until she sees a provider. Her appt with Fátima was cancelled for today and is rescheduled for 2/2/23. Please call pt with recommendations/sooner appts. Pt states she will gladly take an opening today or tomorrow if one becomes available.

## 2023-01-30 NOTE — TELEPHONE ENCOUNTER
Offered patient multiple office times for tomorrow. She has other appointments that day so she is unable to make it. She will keep already scheduled appointment.

## 2023-01-30 NOTE — TELEPHONE ENCOUNTER
Post-Op Check       DOS 10/25/22 L shoulder Rotator Cuff Repair   Please see patient concern and advise. Thank You!

## 2023-01-31 ENCOUNTER — HOSPITAL ENCOUNTER (OUTPATIENT)
Dept: PHYSICAL THERAPY | Facility: CLINIC | Age: 53
Setting detail: THERAPIES SERIES
Discharge: HOME | End: 2023-01-31
Payer: MEDICARE

## 2023-01-31 PROCEDURE — 97110 THERAPEUTIC EXERCISES: CPT

## 2023-01-31 NOTE — PROGRESS NOTES
[] [facility]) - [facility] &  Therapy  [address]  [phone]  F: [phone] [x] [address] [employer]\[facility]\"" [address]   [address]  P: [phone]  F: [phone] [] [address] &  Therapy  [address]  P: [phone]  F: [phone] [] [address]  P: [phone]  F: [phone] [] [address]  [facility]   [address]   [place]: [phone]  F: [phone]      Physical Therapy Progress Note    Date: 2023      Patient: Sukhjinder Montejo  : 1970  MRN: [id number]    Physician: Mayra Brooks,            Insurance: [employer] (30 visits, auth after 27)  Medical Diagnosis:   Z98.890 (ICD-10-CM) - Status post surgery   M75.122 (ICD-10-CM) - Complete tear of left rotator cuff, unspecified whether traumatic   Rehab Codes: M62.81, M62.9, Z73.6, R29.3,  M25.60, Z74.0  Onset Date: 10/25/22 - DOS                         Next Dr.'s appt. : 23  Visit# / total visits:                     Cancels/No Shows:   Date range of services: 10/28/22 to 23      Subjective:    Pain:  [x] Yes  [] No   Location: L shoulder   Pain Ratin/10 (0-10 scale)   Pain altered Tx:  [x] No  [] Yes  Action:     Comments: Pt arrives with minimal shoulder pain this date. Appointment got cancelled and had to reschedule for Thursday. Pt feels she has more strengthening to do with her L shoulder and continues to experience increased pain, clicking with active movements as well as difficulty sleeping due to high shoulder pain levels up to 10/10 at night.         Objective:  Test Measurements:  Assessed by primary PT 23 AROM PROM Strength        Left     Shld Abd 110* 135* 4-/5*   Shld Flex 140* 140* 4-/5*   Shld IR PSIS 70 at 25 3+/5*   Shld ER   60 at 25 abd  4-/5*   Shld HAB                             Elbow Flex   WNL     Elbow Ext   0        PROM limited by pain  * indicates pain at end range     Function:   UEFS: 28/80 or 65% impaired       Assessment:  [x] Progressing toward goals: Initiated session with reassessment of progress towards goals. Pt does demonstrate improvements in passive and active range of motion, strength, and function from initial evaluation however continues to be limited globally by ongoing increase in shoulder pain up to 10/10, global shoulder and scapular weakness which is limiting independence with progressive ADLs. Pt would benefit from continued PT services at 2x/week for 8 additional visits in order to continue to progress strengthening, improve shoulder function, and reduce shoulder pain. [] No change. [x] Other: Continued with charted exercises progressing resistance of wand exercises with good tolerance and added prone shoulder flexion. Fair tolerance throughout session however pt does continue to report increase in pain at end range throughout. Encouraged to remain within pain free range as able. Pt requests to finish treatment session early due to shoulder fatigue so some standing exercises were held. Plan to resume and progress at upcoming sessions. [x] Patient would continue to benefit from skilled physical therapy services in order to: decrease pain and increase ROM, strength, and function in L shoulder.       Goals  MET NOT MET ON-  GOING  Details   Date Addressed: 1/31/23           STG: To be met in 8 treatments            1. ? Pain: Patient will self report worst pain no greater than 3/10 in order to better tolerate ADLs/work activities with minimal dysfunction []  []  [x]  Ongoing- up to 10/10 at night     2. ? ROM: Patient will improve PROM in flexion to 125 degrees, ER at 90 degrees of ABD to at least 45 degrees and IR at 90 degrees of ABD to at least 45 degrees in order to demonstrate ability to move/reach in all planes unrestricted at PLOF [x]  []  []       3. Independent with Home Exercise Programs [x]  []  []      4. Demonstrate knowledge of fall risk prevention  [x]  []  []        []  []  []                  Date Addressed: 1/31/23           LTG: To be met in 16 treatments           1. Improve score on assessment tool UEFS from 99% impairment to less than 65% impairment to 45% impairment or less 1/31  []  []  [x]  UEFS: 28/80 or 65% functionally impaired     2.  Strength: Pt will demonstrate improved R UE strength to 3/5  in order to demonstrate improved stability/strength necessary for unrestricted ADLs/work activities [x]  []  []      3. ? ROM: Patient will improve PROM to full ROM in all directions  in order to demonstrate ability to move/reach in all planes unrestricted at PLOF []  []  [x]   limited abd/ext/ER   4. ? ROM: Patient will improve AROM to at least 120 degrees of flex/ABD, ER/IR to at least 60 degrees in all directions  in order to demonstrate ability to move/reach in all planes unrestricted at PLOF []  []  [x]       New 1/31: increased shoulder strength to at least 4/5 globally to improve tolerance to ADLs            New 1/31: increase shoulder AROM to at least 160 degrees elevation to improve independence with OH reaching and lifting tasks                   Treatment Plan:  [x] Therapeutic Exercise   34069  [] Iontophoresis: 4 mg/mL Dexamethasone Sodium Phosphate  mAmin  83410   [x] Therapeutic Activity  12328 [x] Vasopneumatic cold with compression  48378    [] Gait Training   88846 [] Ultrasound   75490   [] Neuromuscular Re-education  79174 [] Electrical Stimulation Unattended  91289   [x] Manual Therapy  59524 [] Electrical Stimulation Attended  15050   [x] Instruction in HEP  [] Lumbar/Cervical Traction  [id number]   [] Aquatic Therapy   95356 [x] Cold/hotpack    [] Massage   38465      [] Dry Needling, 1 or 2 muscles  18843 [] Biofeedback, first 15 minutes   27157  [] Biofeedback, additional 15 minutes   06363 [] Dry Needling, 3 or more muscles  05359       Patient Status:     [] Continue per initial plan of care. [x] Additional visits necessary. [] Other:     Requested Frequency/Duration: 2 times per week for 8 ADDITIONAL treatments. (24 VISITS TOTAL)        Electronically signed by Ashwin Covarrubias PT on 1/31/2023 at 12:12 PM      If you have any questions or concerns, please don't hesitate to call. Thank you for your referral.    Physician Signature:________________________________Date:__________________  By signing above or cosigning this note, I have reviewed this plan of care and certify a need for medically necessary rehabilitation services.      *PLEASE SIGN ABOVE AND FAX BACK ALL PAGES*

## 2023-01-31 NOTE — FLOWSHEET NOTE
[x] [address] [facility]  Outpatient Rehabilitation &  Therapy  [facility] [address]   [address]  P: [phone]  F: [phone]     Physical Therapy Daily Treatment Note    Date:  2023  Patient Name:  Baldo Parra    :  1970  MRN: [id number]  Physician: Marylee Alf, DO           Insurance: [facility] (30 visits, auth after 30)  Medical Diagnosis:   Z98.890 (ICD-10-CM) - Status post surgery   M75.122 (ICD-10-CM) - Complete tear of left rotator cuff, unspecified whether traumatic   Rehab Codes: M62.81, M62.9, Z73.6, R29.3,  M25.60, Z74.0  Onset Date: 10/25/22 - DOS                         Next 's appt. : 23  Visit# / total visits:     Cancels/No Shows:     Subjective:    Pain:  [x] Yes  [] No Location: L shoulder  Pain Ratin/10 (0-10 scale)   Pain altered Tx:  [x] No  [] Yes  Action:     Comments: Pt arrives with minimal shoulder pain this date. Appointment got cancelled and had to reschedule for Thursday. Pt feels she has more strengthening to do with her L shoulder and continues to experience increased pain, clicking with active movements as well as difficulty sleeping due to high shoulder pain levels up to 10/10 at night. Objective:  Modalities: Vasocompression 10 min @ 42° and min pressure in semirecumbent position on the plinth. - held  d/t pt reports of this not providing relief of pain  HP- to L shoulder in sitting after exercise x 10 min-not this date    Precautions: Protocol in hard chart  Exercises: MEASURE ROM  Exercise Reps/ Time Weight/ Level Comments   PROM to L shoulder 5 min  Slow, controlled motion    Pulleys /'  Added - flexion, abduction          Standing:      Pendulums 2x10  Pt self initiated in between reps of cane ex   Flexion/Extension  2x10           Seated:      Scapular retractions 10x 5\"    Isometrics 5\"x10ea  Flexion, ABD, ER/IR   Elbow flexion ISO 15x5\"  New    Table slides 10x  Added          AAROM: Cane flexion 10x2 2# Shortened arc-From 90 degrees to approx 160 ; inc resistance 1/31   Cane press ups 10x2 2# Inc resistance 1/31   Horiz abd/add 2x10 2# Inc resistance 1/31   Shldr protraction 2x10 2# Inc resistance 1/31   Rhythmic stabs  3x15\"ea  New 12/27  90 degrees flexion  25 abd, 0 ER         SIDE LYING       ER 2x10 1# New 1/9   Horizontal abd 2x10 1# Added 1/12, inc 1/23   Abduction  2x10 1# Added 1/12, inc 1/23         Prone      Rows 2x10 1# New 12/13   HAB 2x10 1# New 12/13   Extension to neutral  2x10 1#  New 12/13   Scaption 2x10 1# Added 1/12; wt added 1/26   Flexion  2x10 1# Added 1/31         Standing      ER/IR Iso walkouts 10xea lime Incr res for ER 1/26   Wall slides 10x ea  Reduced reps 1/8- limited into abd   Elbow flexion/ext 10xea Lime  New 12/27; progressed 1/26   Tband rows/ext  10xea Lime  New 1/23; progressed 1/26   Other:       Treatment Charges: Mins Units   []  Modalities- HP     [x]  Ther Exercise 46 3   []  Manual Therapy     []  Ther Activities     []  Aquatics     []  Vasocompression     []  Other     Total Treatment time 46 3     *reassessment billed under therapeutic exercise            Assessed on 12/13/22 12/27 1/9/23 1/16 1/26     Left PROM Left AROM  PROM AAROM P/AA AAROM   Shld Abd 110  107 113 90 143P,       Shld Flex 125  140 125 146 155P,     Shld IR 90 degrees at 90 abd   --      Shld ER at 0  60   56 at 25 abduction       Shoulder ER at 90  45            Assessed by primary PT 1/31/23 AROM PROM Strength       Left    Shld Abd 110* 135* 4-/5*   Shld Flex 140* 140* 4-/5*   Shld IR PSIS 70 at 25  3+/5*   Shld ER  60 at 25 abd  4-/5*   Shld HAB                       Elbow Flex  WNL    Elbow Ext  0       PROM limited by pain  * indicates pain at end range     Assessment: [x] Progressing toward goals: Initiated session with reassessment of progress towards goals.  Pt does demonstrate improvements in passive and active range of motion, strength, and function from initial evaluation however continues to be limited globally by ongoing increase in shoulder pain up to 10/10, global shoulder and scapular weakness which is limiting independence with progressive ADLs. Pt would benefit from continued PT services at 2x/week for 8 additional visits in order to continue to progress strengthening, improve shoulder function, and reduce shoulder pain. [] No change. [x] Other: Continued with charted exercises progressing resistance of wand exercises with good tolerance and added prone shoulder flexion. Fair tolerance throughout session however pt does continue to report increase in pain at end range throughout. Encouraged to remain within pain free range as able. Pt requests to finish treatment session early due to shoulder fatigue so some standing exercises were held. Plan to resume and progress at upcoming sessions. [x] Patient would continue to benefit from skilled physical therapy services in order to: decrease pain and increase ROM, strength, and function in L shoulder. Problems:    [x] ? Pain: 3-10/10 pain  [x] ? ROM: decreased PROM in flex/abd/ER  [x] ? Strength: NT due to no AROM at this time  [x] ? Function: 99% impairment  [x] Other:  poor posture        Goals  MET NOT MET ON-  GOING  Details   Date Addressed: 1/31/23           STG: To be met in 8 treatments            1. ? Pain: Patient will self report worst pain no greater than 3/10 in order to better tolerate ADLs/work activities with minimal dysfunction []  []  [x]  Ongoing- up to 10/10 at night     2. ? ROM: Patient will improve PROM in flexion to 125 degrees, ER at 90 degrees of ABD to at least 45 degrees and IR at 90 degrees of ABD to at least 45 degrees in order to demonstrate ability to move/reach in all planes unrestricted at PLOF [x]  []  []       3. Independent with Home Exercise Programs [x]  []  []      4.  Demonstrate knowledge of fall risk prevention  [x]  []  []        []  []  [] Date Addressed: 1/31/23           LTG: To be met in 16 treatments           1. Improve score on assessment tool UEFS from 99% impairment to less than 65% impairment to 45% impairment or less 1/31  []  []  [x]  UEFS: 28/80 or 65% functionally impaired     2. Strength: Pt will demonstrate improved R UE strength to 3/5  in order to demonstrate improved stability/strength necessary for unrestricted ADLs/work activities [x]  []  []      3. ? ROM: Patient will improve PROM to full ROM in all directions  in order to demonstrate ability to move/reach in all planes unrestricted at PLOF []  []  [x]   limited abd/ext/ER   4. ? ROM: Patient will improve AROM to at least 120 degrees of flex/ABD, ER/IR to at least 60 degrees in all directions  in order to demonstrate ability to move/reach in all planes unrestricted at PLOF []  []  [x]       New 1/31: increased shoulder strength to at least 4/5 globally to improve tolerance to ADLs            New 1/31: increase shoulder AROM to at least 160 degrees elevation to improve independence with OH reaching and lifting tasks                    Pt. Education:  [x] Yes  [] No  [] Reviewed Prior HEP/Ed  Method of Education: [x] Verbal form, daily HEP compliance, ice  [] Demo  [] Written:   Seated Scapular Retraction - 1 x daily - 7 x weekly - 2 sets - 10 reps  Seated Isometric Shoulder Flexion - 1 x daily - 7 x weekly - 10 reps - 5 hold  Seated Isometric Shoulder External Rotation with Towel - 1 x daily - 7 x weekly - 10 reps - 5 hold  Seated Isometric Shoulder External Rotation - 1 x daily - 7 x weekly - 10 reps - 5 hold  Seated Isometric Shoulder Abduction - 1 x daily - 7 x weekly - 10 reps - 5 hold    Comprehension of Education:  [] Verbalizes understanding. [] Demonstrates understanding. [] Needs review. FOR COMPLIANCE   [x] Demonstrates/verbalizes HEP/Ed previously given. Access Code: [email or web address]  URL: [email or web address]  Date: 10/28/2022  Prepared by: Maya Barnett Scheuermann     Exercises  Circular Shoulder Pendulum with Table Support - 3 x daily - 7 x weekly - 2 sets - 10 reps  Horizontal Shoulder Pendulum with Table Support - 3 x daily - 7 x weekly - 2 sets - 10 reps  Flexion-Extension Shoulder Pendulum with Table Support - 3 x daily - 7 x weekly - 2 sets - 10 reps  Standing Elbow Flexion Extension AROM - 3 x daily - 7 x weekly - 2 sets - 10 reps  Gripping Sponge Supinated - 3 x daily - 7 x weekly - 2 sets - 10 reps  Gripping Sponge Pronated - 3 x daily - 7 x weekly - 2 sets - 10 reps    Access Code: [email or web address]  URL: [email or web address]  Date: 11/30/2022  Prepared by: Tiesha Lockwood    Exercises  Supine Shoulder Press with Dowel - 1 x daily - 7 x weekly - 2 sets - 10 reps  Supine Shoulder Press AAROM in Abduction with Dowel - 1 x daily - 7 x weekly - 2 sets - 10 reps  Supine Shoulder Abduction AAROM with Dowel - 1 x daily - 7 x weekly - 2 sets - 10 reps  Supine Shoulder Flexion AAROM with Dowel - 1 x daily - 7 x weekly - 2 sets - 10 reps       Plan: [x] Continue current frequency toward long and short term goals.     [x] Specific Instructions for subsequent treatments:progress shoulder and scapular strengthening, update HEP       Time In:  11:08 am        Time Out: 11:54 am     Electronically signed by:  Joe Torres PT

## 2023-02-02 ENCOUNTER — OFFICE VISIT (OUTPATIENT)
Dept: ORTHOPEDIC SURGERY | Age: 53
End: 2023-02-02

## 2023-02-02 VITALS — WEIGHT: 126 LBS | BODY MASS INDEX: 25.45 KG/M2

## 2023-02-02 DIAGNOSIS — Z98.890 S/P LEFT ROTATOR CUFF REPAIR: ICD-10-CM

## 2023-02-02 DIAGNOSIS — M75.122 COMPLETE TEAR OF LEFT ROTATOR CUFF, UNSPECIFIED WHETHER TRAUMATIC: Primary | ICD-10-CM

## 2023-02-02 DIAGNOSIS — M19.012 OSTEOARTHRITIS OF LEFT SHOULDER, UNSPECIFIED OSTEOARTHRITIS TYPE: ICD-10-CM

## 2023-02-02 DIAGNOSIS — Z98.890 S/P ARTHROSCOPY OF LEFT SHOULDER: ICD-10-CM

## 2023-02-02 PROCEDURE — 99214 OFFICE O/P EST MOD 30 MIN: CPT | Performed by: PHYSICIAN ASSISTANT

## 2023-02-02 ASSESSMENT — ENCOUNTER SYMPTOMS
VOMITING: 0
SHORTNESS OF BREATH: 0
COUGH: 0
COLOR CHANGE: 0

## 2023-02-02 NOTE — PROGRESS NOTES
renae  [facility] SPECIALISTS  [facility] [facility] [address]  Dept: [phone]  Dept Fax: [phone]        Postoperative follow-up note    Subjective:   Roman Reynolds is a 45y.o. year old female who presents to our office today for postoperative followup regarding her   1. Complete tear of left rotator cuff, unspecified whether traumatic    2. S/P arthroscopy of left shoulder    3. S/P left rotator cuff repair    4. Osteoarthritis of left shoulder, unspecified osteoarthritis type        Chief Complaint   [facility]    [facility] [place] [facility]  rotator cuff repair   moderate pain , progressing  in  physical therapy        Date of Surgery: 10/25/2022  3 months post-op    Roman Reynolds  is a 45y.o. year old female who presents to our office today for postoperative follow up after having undergone a left shoulder arthroscopy with rotator cuff repair and partial synovectomy completed on 10/25/2022 by Dr. Beau Casarez DO. Patient was last evaluated on 11/21/2022 by Dr. Jewel Gaston and was instructed to continue formal outpatient physical therapy. Patient notes very minimal discomfort within her left shoulder at this time and is progressing in therapy but still notes that she has limitations in range of motion and strength. Patient notes that she is not currently working therefore she is not concerned about returning to work at this time. The patient denies fevers, chills, nausea, vomiting, diarrhea. She denies numbness and or tingling in the left upper extremity at this time. Review of Systems   Constitutional:  Negative for activity change and fever. HENT:  Negative for sneezing. Respiratory:  Negative for cough and shortness of breath. Cardiovascular:  Negative for chest pain. Gastrointestinal:  Negative for vomiting. Musculoskeletal:  Positive for arthralgias (Left shoulder).  Negative for joint swelling and myalgias. Skin:  Negative for color change. Neurological:  Negative for weakness and numbness. Psychiatric/Behavioral:  Negative for sleep disturbance. Objective :   General: Priya Suarez is a 45 y.o. female who is alert and oriented and sitting comfortably in our office. Ortho Exam  MS:   Evaluation of the left shoulder reveals well-healed surgical portal incisions. There is no erythema, ecchymosis, new skin lesions or signs of infection noted. Forward flexion left shoulder: 95. Horizontal AB duction left shoulder: 120. Mild decrease strength with neutral external rotation testing when compared to the contralateral side. Full passive range of motion without discomfort noted. Full elbow range of motion without discomfort noted. Motor, sensory, vascular examination to the left upper extremity is grossly intact without focal deficits. Neuro: alert. oriented  Eyes: Extra-ocular muscles intact  Mouth: Oral mucosa moist. No perioral lesions  Pulm: Respirations unlabored and regular. Skin: warm, well perfused  Psych:   Patient has good fund of knowledge and displays understanging of exam, diagnosis, and plan. Radiology: No new imaging obtained today in office. Assessment:      1. Complete tear of left rotator cuff, unspecified whether traumatic    2. S/P arthroscopy of left shoulder    3. S/P left rotator cuff repair    4. Osteoarthritis of left shoulder, unspecified osteoarthritis type         Plan:      Olivia Perkins is a 45year old female and is currently 3 months postoperative after undergoing a left shoulder arthroscopy with rotator cuff repair and partial synovectomy that was completed on 10/25/2022 by Dr. Dot Melendez DO. At this time she is to continue formal outpatient physical therapy working on gentle strengthening, range of motion restoration and improvement in her functional daily activities.   She was instructed not to lift anything heavier than 25 pounds with the left arm indefinitely. We discussed that rotator cuff repair is can fail if she lifts anything heavier than 20 to 25 pounds especially when the object is away from her body. She noted her understanding. She may continue Tylenol and/or ibuprofen for discomfort if needed. She is to follow-up in 3 months for reevaluation. The  patient was instructed to call our office with any questions or concerns prior to the next appointment. The patient voiced their understanding. Follow up: Return in about 3 months (around 5/2/2023) for re-evaluation, post-operative follow up. No orders of the defined types were placed in this encounter. No orders of the defined types were placed in this encounter. This note is created with the assistance of a speech recognition program.  While intending to generate a document that actually reflects the content of the visit, the document can still have some errors including those of syntax and sound a like substitutions which may escape proof reading. In such instances, actual meaning can be extrapolated by contextual diversion.      Electronically signed by Myrna Dennis PA-C on 2/2/2023 at 12:20 PM

## 2023-02-06 ENCOUNTER — HOSPITAL ENCOUNTER (OUTPATIENT)
Dept: PHYSICAL THERAPY | Facility: CLINIC | Age: 53
Setting detail: THERAPIES SERIES
Discharge: HOME | End: 2023-02-06

## 2023-02-06 PROCEDURE — 97110 THERAPEUTIC EXERCISES: CPT

## 2023-02-06 NOTE — FLOWSHEET NOTE
[x] [address] [facility]  Outpatient Rehabilitation &  Therapy  [facility] 36   [address]  P: [phone]  F: [phone]     Physical Therapy Daily Treatment Note    Date:  2023  Patient Name:  Dylan Elder    :  1970  MRN: [id number]  Physician: Nadja Conti DO           Insurance: [employer] (30 visits, auth after 30)  Medical Diagnosis:   Z98.890 (ICD-10-CM) - Status post surgery   M75.122 (ICD-10-CM) - Complete tear of left rotator cuff, unspecified whether traumatic   Rehab Codes: M62.81, M62.9, Z73.6, R29.3,  M25.60, Z74.0  Onset Date: 10/25/22 - DOS                         Vangie Leon's appt.: 23  Visit# / total visits:  (8 additional visits approved on )    Cancels/No Shows:     Subjective:    Pain:  [x] Yes  [] No Location: L shoulder  Pain Rating: 3/10 (0-10 scale)   Pain altered Tx:  [x] No  [] Yes  Action:     Comments: Pt arrives with some pain in L shoulder rated at 3/10 this date. Pt does note that she has an upset stomach this date from food she ate last night and is generally feeling unwell. Objective:  Modalities: Vasocompression 10 min @ 42° and min pressure in semirecumbent position on the plinth. - held  d/t pt reports of this not providing relief of pain  HP- to L shoulder in sitting after exercise x 10 min-not this date    Precautions: no lifting over 20-25#, no greater than 10# OH, full protocol in soft chart   Exercises:   Exercise Reps/ Time Weight/ Level Comments   PROM to L shoulder 5 min  Slow, controlled motion    Pulleys 2'/2'  Added - flexion, abduction          Seated:      Scapular retractions 10x 5\"    Isometrics 5\"x10ea  Flexion, ABD, ER/IR   Elbow flexion ISO 15x5\"  New    Table slides 10x  Added          AAROM:   Cane flexion 10x2 2# Shortened arc-From 90 degrees to approx 160 ; inc resistance    Cane press ups 10x2 2# Inc resistance    Horiz abd/add 2x10 2# Inc resistance  Shldr protraction 2x10 2# Inc resistance 1/31   Rhythmic stabs  3x15\"ea  New 12/27  90 degrees flexion  25 abd, 0 ER         SIDE LYING       ER 2x10 1# New 1/9   Horizontal abd 2x10 1# Added 1/12, inc 1/23   Abduction  2x10 1# Added 1/12, inc 1/23         Prone      Rows 2x10 1# New 12/13   HAB 2x10 1# New 12/13   Extension to neutral  2x10 1#  New 12/13   Scaption 2x10 1# Added 1/12; wt added 1/26   Flexion  2x10 1# Added 1/31         Standing      ER/IR Iso walkouts 10xea lime Incr res for ER 1/26   Wall slides 10x2 ea  Reduced reps 1/8- limited into abd, inc reps 2/6   Elbow flexion/ext 10x2ea Lime  New 12/27; progressed 2/6   Tband rows/ext  10x2ea Lime  New 1/23; progressed 1/26   Wall push ups  10x2  New 2/6   Other:       Treatment Charges: Mins Units   []  Modalities- HP     [x]  Ther Exercise 29 2   []  Manual Therapy     []  Ther Activities     []  Aquatics     []  Vasocompression     []  Other     Total Treatment time 29 2     4 minutes unbilled for bathroom break            Assessed on 12/13/22 12/27 1/9/23 1/16 1/26     Left PROM Left AROM  PROM AAROM P/AA AAROM   Shld Abd 110  107 113 90 143P,       Shld Flex 125  140 125 146 155P,     Shld IR 90 degrees at 90 abd   --      Shld ER at 0  60   56 at 25 abduction       Shoulder ER at 90  45            Assessed by primary PT 1/31/23 AROM PROM Strength       Left    Shld Abd 110* 135* 4-/5*   Shld Flex 140* 140* 4-/5*   Shld IR PSIS 70 at 25  3+/5*   Shld ER  60 at 25 abd  4-/5*   Shld HAB                       Elbow Flex  WNL    Elbow Ext  0       PROM limited by pain  * indicates pain at end range     Assessment: [] Progressing toward goals:     [] No change. [x] Other: Pt arrives 27 minutes late to appointment this date but able to accommodate for a shortened treatment.  Continued with charted exercises increasing repetitons of wall slides and theraband exercises and added wall push ups to further improve strength and mobility this date. Fair tolerance throughout treatment session, pt does continue to report fatigue and soreness through shoulder throughout session. Held prone and sidelying exercises this date due to time constraints but plan to resume at next session. [x] Patient would continue to benefit from skilled physical therapy services in order to: decrease pain and increase ROM, strength, and function in L shoulder. Problems:    [x] ? Pain: 3-10/10 pain  [x] ? ROM: decreased PROM in flex/abd/ER  [x] ? Strength: NT due to no AROM at this time  [x] ? Function: 99% impairment  [x] Other:  poor posture        Goals  MET NOT MET ON-  GOING  Details   Date Addressed: 1/31/23           STG: To be met in 8 treatments            1. ? Pain: Patient will self report worst pain no greater than 3/10 in order to better tolerate ADLs/work activities with minimal dysfunction []  []  [x]  Ongoing- up to 10/10 at night     2. ? ROM: Patient will improve PROM in flexion to 125 degrees, ER at 90 degrees of ABD to at least 45 degrees and IR at 90 degrees of ABD to at least 45 degrees in order to demonstrate ability to move/reach in all planes unrestricted at [facility] [x]  []  []       3. Independent with Home Exercise Programs [x]  []  []      4. Demonstrate knowledge of fall risk prevention  [x]  []  []        []  []  []                  Date Addressed: 1/31/23           LTG: To be met in 16 treatments           1. Improve score on assessment tool UEFS from 99% impairment to less than 65% impairment to 45% impairment or less 1/31  []  []  [x]  UEFS: 28/80 or 65% functionally impaired     2.  Strength: Pt will demonstrate improved R UE strength to 3/5  in order to demonstrate improved stability/strength necessary for unrestricted ADLs/work activities [x]  []  []      3. ? ROM: Patient will improve PROM to full ROM in all directions  in order to demonstrate ability to move/reach in all planes unrestricted at [facility] []  []  [x]   limited abd/ext/ER   4. ? ROM: Patient will improve AROM to at least 120 degrees of flex/ABD, ER/IR to at least 60 degrees in all directions  in order to demonstrate ability to move/reach in all planes unrestricted at PLOF []  []  [x]       New 1/31: increased shoulder strength to at least 4/5 globally to improve tolerance to ADLs            New 1/31: increase shoulder AROM to at least 160 degrees elevation to improve independence with OH reaching and lifting tasks                    Pt. Education:  [x] Yes  [] No  [] Reviewed Prior HEP/Ed  Method of Education: [x] Verbal form, daily HEP compliance, ice  [] Demo  [] Written:   Seated Scapular Retraction - 1 x daily - 7 x weekly - 2 sets - 10 reps  Seated Isometric Shoulder Flexion - 1 x daily - 7 x weekly - 10 reps - 5 hold  Seated Isometric Shoulder External Rotation with Towel - 1 x daily - 7 x weekly - 10 reps - 5 hold  Seated Isometric Shoulder External Rotation - 1 x daily - 7 x weekly - 10 reps - 5 hold  Seated Isometric Shoulder Abduction - 1 x daily - 7 x weekly - 10 reps - 5 hold    Comprehension of Education:  [] Verbalizes understanding. [] Demonstrates understanding. [] Needs review. FOR COMPLIANCE   [x] Demonstrates/verbalizes HEP/Ed previously given. Access Code: [email or web address]  URL: [email or web address]  Date: 10/28/2022  Prepared by: Calli Scheuermann     Exercises  Circular Shoulder Pendulum with Table Support - 3 x daily - 7 x weekly - 2 sets - 10 reps  Horizontal Shoulder Pendulum with Table Support - 3 x daily - 7 x weekly - 2 sets - 10 reps  Flexion-Extension Shoulder Pendulum with Table Support - 3 x daily - 7 x weekly - 2 sets - 10 reps  Standing Elbow Flexion Extension AROM - 3 x daily - 7 x weekly - 2 sets - 10 reps  Gripping Sponge Supinated - 3 x daily - 7 x weekly - 2 sets - 10 reps  Gripping Sponge Pronated - 3 x daily - 7 x weekly - 2 sets - 10 reps    Access Code: [email or web address]  URL: [email or web address]/  Date: 11/30/2022  Prepared by: [address]    Exercises  Supine Shoulder Press with Dowel - 1 x daily - 7 x weekly - 2 sets - 10 reps  Supine Shoulder Press AAROM in Abduction with Dowel - 1 x daily - 7 x weekly - 2 sets - 10 reps  Supine Shoulder Abduction AAROM with Dowel - 1 x daily - 7 x weekly - 2 sets - 10 reps  Supine Shoulder Flexion AAROM with Dowel - 1 x daily - 7 x weekly - 2 sets - 10 reps       Plan: [x] Continue current frequency toward long and short term goals.     [x] Specific Instructions for subsequent treatments:progress shoulder and scapular strengthening, update HEP       Time In:  11:27 am      Time Out: 12:00 pm     Electronically signed by:  Tamika Fung PT

## 2023-02-09 ENCOUNTER — HOSPITAL ENCOUNTER (OUTPATIENT)
Dept: PHYSICAL THERAPY | Facility: CLINIC | Age: 53
Setting detail: THERAPIES SERIES
Discharge: HOME | End: 2023-02-09

## 2023-02-09 NOTE — FLOWSHEET NOTE
[] [facility]) [facility] &  Therapy  [address]    [phone]  F: [phone]   [x] [address] [employer]  [facility] [address]   [address]  P: [phone]  F: [phone]  [] [address]  [address]  P: [phone]  F: [phone] [] [address]  P: [phone]  F: [phone]  [] [address]  [facility]   [address]   [place]: [phone]  F: [phone]   [] [place] [address] [facility] [address]  [place]: [phone]   F: [phone]     Physical Therapy Cancel/No Show note    Date: 2023  Patient: Gómez Richardson  : 1970  MRN: [id number]    Cancels/No Shows to date: 3/2    For today's appointment patient:    [x]  Cancelled    [] Rescheduled appointment    [] No-show     Reason given by patient:    []  Patient ill    []  Conflicting appointment    [] No transportation      [] Conflict with work    [] No reason given    [] Weather related    [] [id number]    [x] Other:      Comments: Patient overslept      [x] Next appointment was confirmed    Electronically signed by: Errol Dominguez PT

## 2023-02-13 ENCOUNTER — HOSPITAL ENCOUNTER (OUTPATIENT)
Dept: PHYSICAL THERAPY | Facility: CLINIC | Age: 53
Setting detail: THERAPIES SERIES
Discharge: HOME | End: 2023-02-13
Payer: MEDICAID

## 2023-02-13 PROCEDURE — 97110 THERAPEUTIC EXERCISES: CPT

## 2023-02-13 NOTE — FLOWSHEET NOTE
[x] [address] [facility]  Outpatient Rehabilitation &  Therapy  [facility] [address]   [address]  P: [phone]  F: [phone]     Physical Therapy Daily Treatment Note    Date:  2023  Patient Name:  Berenice Mccray    :  1970  MRN: [id number]  Physician: Lynette Eli DO           Insurance: Macey Active DSP (30 visits, auth after 30)  Medical Diagnosis:   Z98.890 (ICD-10-CM) - Status post surgery   M75.122 (ICD-10-CM) - Complete tear of left rotator cuff, unspecified whether traumatic   Rehab Codes: M62.81, M62.9, Z73.6, R29.3,  M25.60, Z74.0  Onset Date: 10/25/22 - DOS                         Next 's appt.: 23  Visit# / total visits:  (8 additional visits approved on )    Cancels/No Shows: 3/2    Subjective:    Pain:  [x] Yes  [] No Location: L shoulder  Pain Ratin/10 (0-10 scale)   Pain altered Tx:  [x] No  [] Yes  Action:     Comments: Pt reports a slight increase in pain but states she is \"okay\" overall. Objective:  Modalities: Vasocompression 10 min @ 42° and min pressure in semirecumbent position on the plinth. - held  d/t pt reports of this not providing relief of pain  HP- to L shoulder in sitting after exercise x 10 min-not this date    Precautions: no lifting over 20-25#, no greater than 10# OH, full protocol in soft chart   Exercises:   Exercise Reps/ Time Weight/ Level Comments   PROM to L shoulder 5 min  Slow, controlled motion    Pulleys 2'/2'  Added - flexion, abduction          Seated:      Scapular retractions 10x 5\"    Isometrics 5\"x10ea  Flexion, ABD, ER/IR   Elbow flexion ISO 15x5\"  New    Table slides 10x  Added          AAROM:   Cane flexion 10x2 2# Shortened arc-From 90 degrees to approx 160 ; inc resistance    Cane press ups 10x2 2# Inc resistance    Horiz abd/add 2x10 2# Inc resistance    Shldr protraction 2x10 2# Inc resistance    Rhythmic stabs  3x15\"ea  New   90 degrees flexion  25 abd, 0 ER         SIDE LYING       ER 2x10 1# New 1/9   Horizontal abd 2x10 1# Added 1/12, inc 1/23   Abduction  2x10 1# Added 1/12, inc 1/23         Prone      Rows 2x10 1# New 12/13   HAB 2x10 1# New 12/13   Extension to neutral  2x10 1#  New 12/13   Scaption 2x10 1# Added 1/12; wt added 1/26   Flexion  2x10 1# Added 1/31         Standing      Horizontal abd 15x lime Added 2/13   ER/IR Iso walkouts 10xea lime Incr res for ER 1/26   Wall slides 10x2 ea  Reduced reps 1/8- limited into abd, inc reps 2/6   Elbow flexion/ext 10x2ea Lime  New 12/27; progressed 2/6   Tband rows/ext  10x2ea Lime  New 1/23; progressed 1/26   Wall push ups  10x2  New 2/6   Other:       Treatment Charges: Mins Units   [x]  Modalities- HP 10 0   [x]  Ther Exercise 45 3   []  Manual Therapy     []  Ther Activities     []  Aquatics     []  Vasocompression     []  Other     Total Treatment time 45 3              Assessed on 12/13/22 12/27 1/9/23 1/16 1/26     Left PROM Left AROM  PROM AAROM P/AA AAROM   Shld Abd 110  107 113 90 143P,       Shld Flex 125  140 125 146 155P,     Shld IR 90 degrees at 90 abd   --      Shld ER at 0  60   56 at 25 abduction       Shoulder ER at 90  45            Assessed by primary PT 1/31/23 AROM PROM Strength       Left    Shld Abd 110* 135* 4-/5*   Shld Flex 140* 140* 4-/5*   Shld IR PSIS 70 at 25  3+/5*   Shld ER  60 at 25 abd  4-/5*   Shld HAB                       Elbow Flex  WNL    Elbow Ext  0       PROM limited by pain  * indicates pain at end range     Assessment: [x] Progressing toward goals: Resumed prone and sidelying exercises this date. Pt did well with all exercises as indicated above. Added horizontal abd this date with tband. Pt did well with new exercises and had no complaints of pain post treatment. [] No change.      [] Other:    [x] Patient would continue to benefit from skilled physical therapy services in order to: decrease pain and increase ROM, strength, and function in L shoulder. Problems:    [x] ? Pain: 3-10/10 pain  [x] ? ROM: decreased PROM in flex/abd/ER  [x] ? Strength: NT due to no AROM at this time  [x] ? Function: 99% impairment  [x] Other:  poor posture        Goals  MET NOT MET ON-  GOING  Details   Date Addressed: 1/31/23           STG: To be met in 8 treatments            1. ? Pain: Patient will self report worst pain no greater than 3/10 in order to better tolerate ADLs/work activities with minimal dysfunction []  []  [x]  Ongoing- up to 10/10 at night     2. ? ROM: Patient will improve PROM in flexion to 125 degrees, ER at 90 degrees of ABD to at least 45 degrees and IR at 90 degrees of ABD to at least 45 degrees in order to demonstrate ability to move/reach in all planes unrestricted at PLOF [x]  []  []       3. Independent with Home Exercise Programs [x]  []  []      4. Demonstrate knowledge of fall risk prevention  [x]  []  []        []  []  []                  Date Addressed: 1/31/23           LTG: To be met in 16 treatments           1. Improve score on assessment tool UEFS from 99% impairment to less than 65% impairment to 45% impairment or less 1/31  []  []  [x]  UEFS: 28/80 or 65% functionally impaired     2.  Strength: Pt will demonstrate improved R UE strength to 3/5  in order to demonstrate improved stability/strength necessary for unrestricted ADLs/work activities [x]  []  []      3. ? ROM: Patient will improve PROM to full ROM in all directions  in order to demonstrate ability to move/reach in all planes unrestricted at PLOF []  []  [x]   limited abd/ext/ER   4. ? ROM: Patient will improve AROM to at least 120 degrees of flex/ABD, ER/IR to at least 60 degrees in all directions  in order to demonstrate ability to move/reach in all planes unrestricted at PLOF []  []  [x]       New 1/31: increased shoulder strength to at least 4/5 globally to improve tolerance to ADLs            New 1/31: increase shoulder AROM to at least 160 degrees elevation to improve independence with OH reaching and lifting tasks                    Pt. Education:  [] Yes  [x] No  [] Reviewed Prior HEP/Ed  Method of Education: [] Verbal form, daily HEP compliance, ice  [] Demo  [] Written:   Seated Scapular Retraction - 1 x daily - 7 x weekly - 2 sets - 10 reps  Seated Isometric Shoulder Flexion - 1 x daily - 7 x weekly - 10 reps - 5 hold  Seated Isometric Shoulder External Rotation with Towel - 1 x daily - 7 x weekly - 10 reps - 5 hold  Seated Isometric Shoulder External Rotation - 1 x daily - 7 x weekly - 10 reps - 5 hold  Seated Isometric Shoulder Abduction - 1 x daily - 7 x weekly - 10 reps - 5 hold    Comprehension of Education:  [] Verbalizes understanding. [] Demonstrates understanding. [] Needs review. FOR COMPLIANCE   [] Demonstrates/verbalizes HEP/Ed previously given. Access Code: [email or web address]  URL: [email or web address]  Date: 10/28/2022  Prepared by: Calli Scheuermann     Exercises  Circular Shoulder Pendulum with Table Support - 3 x daily - 7 x weekly - 2 sets - 10 reps  Horizontal Shoulder Pendulum with Table Support - 3 x daily - 7 x weekly - 2 sets - 10 reps  Flexion-Extension Shoulder Pendulum with Table Support - 3 x daily - 7 x weekly - 2 sets - 10 reps  Standing Elbow Flexion Extension AROM - 3 x daily - 7 x weekly - 2 sets - 10 reps  Gripping Sponge Supinated - 3 x daily - 7 x weekly - 2 sets - 10 reps  Gripping Sponge Pronated - 3 x daily - 7 x weekly - 2 sets - 10 reps    Access Code: [email or web address]  URL: [email or web address]  Date: 11/30/2022  Prepared by: Elverna Aase    Exercises  Supine Shoulder Press with Dowel - 1 x daily - 7 x weekly - 2 sets - 10 reps  Supine Shoulder Press AAROM in Abduction with Dowel - 1 x daily - 7 x weekly - 2 sets - 10 reps  Supine Shoulder Abduction AAROM with Dowel - 1 x daily - 7 x weekly - 2 sets - 10 reps  Supine Shoulder Flexion AAROM with Dowel - 1 x daily - 7 x weekly - 2 sets - 10 reps       Plan: [x] Continue current frequency toward long and short term goals.     [x] Specific Instructions for subsequent treatments:progress shoulder and scapular strengthening, update HEP       Time In:  12:00  pm      Time Out: 1:00 pm     Electronically signed by:  Brown Guy PTA

## 2023-02-16 ENCOUNTER — HOSPITAL ENCOUNTER (OUTPATIENT)
Dept: PHYSICAL THERAPY | Facility: CLINIC | Age: 53
Setting detail: THERAPIES SERIES
Discharge: HOME | End: 2023-02-16
Payer: MEDICAID

## 2023-02-16 PROCEDURE — 97110 THERAPEUTIC EXERCISES: CPT

## 2023-02-16 NOTE — FLOWSHEET NOTE
[x] [address] [facility]  Outpatient Rehabilitation &  Therapy  [facility] [address]   [address]  P: [phone]  F: [phone]     Physical Therapy Daily Treatment Note    Date:  2023  Patient Name:  Tin Bosch    :  1970  MRN: [id number]  Physician: Mana Alcala DO           Insurance: Bernadette Favor (30 visits, auth after 30)  Medical Diagnosis:   Z98.890 (ICD-10-CM) - Status post surgery   M75.122 (ICD-10-CM) - Complete tear of left rotator cuff, unspecified whether traumatic   Rehab Codes: M62.81, M62.9, Z73.6, R29.3,  M25.60, Z74.0  Onset Date: 10/25/22 - DOS                         Vangie Leon's appt.: 23  Visit# / total visits:  (8 additional visits approved on )    Cancels/No Shows: 3/    Subjective:    Pain:  [x] Yes  [] No Location: L shoulder  Pain Rating: 3/10 (0-10 scale)   Pain altered Tx:  [x] No  [] Yes  Action:     Comments: Will take prescription pain medication as needed, not daily. Objective:  Modalities: Vasocompression 10 min @ 42° and min pressure in semirecumbent position on the plinth. - held / d/t pt reports of this not providing relief of pain  HP- to L shoulder in sitting after exercise x 10 min    Precautions: no lifting over 20-25#, no greater than 10# OH, full protocol in soft chart   Exercises:   Exercise Reps/ Time Weight/ Level Comments   PROM to L shoulder 5 min  Slow, controlled motion    Pulleys '/2'  Added - flexion, abduction          Seated:      Scapular retractions 10x 5\"    Isometrics 5\"x10ea  Flexion, ABD, ER/IR   Elbow flexion ISO 15x5\"  New    Table slides 10x  Added          AAROM:   Cane flexion 10x2 2# Shortened arc-From 90 degrees to approx 160 ; inc resistance    Cane press ups 10x2 2# Inc resistance    Horiz abd/add 2x10 2# Inc resistance    Shldr protraction 2x10 2# Inc resistance    Rhythmic stabs  3x15\"ea  New   90 degrees flexion  25 abd, 0 ER         SIDE LYING ER x10 1# New 1/9; decreased reps 2/16   Horizontal abd x10 1# Added 1/12, inc 1/23;  decreased reps 2/16   Abduction  x10 1# Added 1/12, inc 1/23;  decreased reps 2/16         Prone      Rows 2x10 2# New 12/13; increased wt 2/16   HAB 2x10 2# New 12/13;  increased wt 2/16   Extension to neutral  2x10 2#  New 12/13;  increased wt 2/16   Scaption 2x10 2# Added 1/12; wt added 1/26;  increased wt 2/16   Flexion  2x10 2# Added 1/31;  increased wt 2/16         Standing      Horizontal abd 15x lime Added 2/13   ER/IR Iso walkouts 10xea lime Incr res for ER 1/26   ER 10x2 lime    IR 10x2 lime    Wall slides 10x2 ea  Reduced reps 1/8- limited into abd, inc reps 2/6   Elbow flexion/ext 10x2ea Lime  New 12/27; progressed 2/6   Tband rows/ext  10x2ea Lime  New 1/23; progressed 1/26   Wall push ups  10x2  New 2/6   Other:       Treatment Charges: Mins Units   [x]  Modalities- HP 10 0   [x]  Ther Exercise 45 3   []  Manual Therapy     []  Ther Activities     []  Aquatics     []  Vasocompression     []  Other     Total Treatment time 45 3              Assessed on 12/13/22 12/27 1/9/23 1/16 1/26     Left PROM Left AROM  PROM AAROM P/AA AAROM   Shld Abd 110  107 113 90 143P,       Shld Flex 125  140 125 146 155P,     Shld IR 90 degrees at 90 abd   --      Shld ER at 0  60   56 at 25 abduction       Shoulder ER at 90  45            Assessed by primary PT 1/31/23 AROM PROM Strength       Left    Shld Abd 110* 135* 4-/5*   Shld Flex 140* 140* 4-/5*   Shld IR PSIS 70 at 25  3+/5*   Shld ER  60 at 25 abd  4-/5*   Shld HAB                       Elbow Flex  WNL    Elbow Ext  0       PROM limited by pain  * indicates pain at end range     Assessment: [x] Progressing toward goals: Pt began treatment on the pulleys. She progressed exercises as noted and demonstrates good understanding of routine and technique. Added resisted ER and IR in standing against lime t-band resistance.   Mentions most discomfort with prone horizontal abduction. Increased weight with prone activities, and mentions shoulder was too tired to use 2 lb for sidelying activities as well. She also reports \"that's all I can do\" after first set of sidelying activities. Concluded treatment with application of moist heat x 10 minutes to left shoulder. [] No change. [] Other:    [x] Patient would continue to benefit from skilled physical therapy services in order to: decrease pain and increase ROM, strength, and function in L shoulder. Problems:    [x] ? Pain: 3-10/10 pain  [x] ? ROM: decreased PROM in flex/abd/ER  [x] ? Strength: NT due to no AROM at this time  [x] ? Function: 99% impairment  [x] Other:  poor posture        Goals  MET NOT MET ON-  GOING  Details   Date Addressed: 1/31/23           STG: To be met in 8 treatments            1. ? Pain: Patient will self report worst pain no greater than 3/10 in order to better tolerate ADLs/work activities with minimal dysfunction []  []  [x]  Ongoing- up to 10/10 at night     2. ? ROM: Patient will improve PROM in flexion to 125 degrees, ER at 90 degrees of ABD to at least 45 degrees and IR at 90 degrees of ABD to at least 45 degrees in order to demonstrate ability to move/reach in all planes unrestricted at PLOF [x]  []  []       3. Independent with Home Exercise Programs [x]  []  []      4. Demonstrate knowledge of fall risk prevention  [x]  []  []        []  []  []                  Date Addressed: 1/31/23           LTG: To be met in 16 treatments           1. Improve score on assessment tool UEFS from 99% impairment to less than 65% impairment to 45% impairment or less 1/31  []  []  [x]  UEFS: 28/80 or 65% functionally impaired     2.  Strength: Pt will demonstrate improved R UE strength to 3/5  in order to demonstrate improved stability/strength necessary for unrestricted ADLs/work activities [x]  []  []      3. ? ROM: Patient will improve PROM to full ROM in all directions  in order to demonstrate ability to move/reach in all planes unrestricted at [facility] []  []  [x]   limited abd/ext/ER   4. ? ROM: Patient will improve AROM to at least 120 degrees of flex/ABD, ER/IR to at least 60 degrees in all directions  in order to demonstrate ability to move/reach in all planes unrestricted at [facility] []  []  [x]       New 1/31: increased shoulder strength to at least 4/5 globally to improve tolerance to ADLs            New 1/31: increase shoulder AROM to at least 160 degrees elevation to improve independence with OH reaching and lifting tasks                    Pt. Education:  [] Yes  [x] No  [] Reviewed Prior HEP/Ed  Method of Education: [] Verbal form, daily HEP compliance, ice  [] Demo  [] Written:   Seated Scapular Retraction - 1 x daily - 7 x weekly - 2 sets - 10 reps  Seated Isometric Shoulder Flexion - 1 x daily - 7 x weekly - 10 reps - 5 hold  Seated Isometric Shoulder External Rotation with Towel - 1 x daily - 7 x weekly - 10 reps - 5 hold  Seated Isometric Shoulder External Rotation - 1 x daily - 7 x weekly - 10 reps - 5 hold  Seated Isometric Shoulder Abduction - 1 x daily - 7 x weekly - 10 reps - 5 hold    Comprehension of Education:  [x] Verbalizes understanding. [x] Demonstrates understanding. [] Needs review. FOR COMPLIANCE   [] Demonstrates/verbalizes HEP/Ed previously given. Access Code: [email or web address]  URL: [email or web address]  Date: 10/28/2022  Prepared by: Calli Scheuermann     Exercises  Circular Shoulder Pendulum with Table Support - 3 x daily - 7 x weekly - 2 sets - 10 reps  Horizontal Shoulder Pendulum with Table Support - 3 x daily - 7 x weekly - 2 sets - 10 reps  Flexion-Extension Shoulder Pendulum with Table Support - 3 x daily - 7 x weekly - 2 sets - 10 reps  Standing Elbow Flexion Extension AROM - 3 x daily - 7 x weekly - 2 sets - 10 reps  Gripping Sponge Supinated - 3 x daily - 7 x weekly - 2 sets - 10 reps  Gripping Sponge Pronated - 3 x daily - 7 x weekly - 2 sets - 10 reps    Access Code: [email or web address]  URL: [email or web address]  Date: 11/30/2022  Prepared by: Shelbie Cox    Exercises  Supine Shoulder Press with Dowel - 1 x daily - 7 x weekly - 2 sets - 10 reps  Supine Shoulder Press AAROM in Abduction with Dowel - 1 x daily - 7 x weekly - 2 sets - 10 reps  Supine Shoulder Abduction AAROM with Dowel - 1 x daily - 7 x weekly - 2 sets - 10 reps  Supine Shoulder Flexion AAROM with Dowel - 1 x daily - 7 x weekly - 2 sets - 10 reps       Plan: [x] Continue current frequency toward long and short term goals.    [x] Specific Instructions for subsequent treatments:progress shoulder and scapular strengthening, update HEP       Time In:  11:00 am      Time Out: 11:55 am     Electronically signed by:  CAM KO PTA

## 2023-02-22 ENCOUNTER — HOSPITAL ENCOUNTER (OUTPATIENT)
Dept: PHYSICAL THERAPY | Facility: CLINIC | Age: 53
Setting detail: THERAPIES SERIES
Discharge: HOME | End: 2023-02-22
Payer: MEDICAID

## 2023-02-22 NOTE — FLOWSHEET NOTE
[] [address] [facility] &  Therapy  [address]    [phone]  F: [phone]   [x] [address] [facility] [address]   [address]  P: [phone]  F: [phone]  [] [address]  [address] [facility]  P: [phone]  F: [phone] [] [address]  P: [phone]  F: [phone]  [] [address] [facility]   [address]   [place]: [phone]  F: [phone]   [] [address]  [place]: [phone]   F: [phone]     Physical Therapy Cancel/No Show note    Date: 2023  Patient: Roman Reynolds  : 1970  MRN: [id number]    Cancels/No Shows to date:     For today's appointment patient:    [x]  Cancelled    [] Rescheduled appointment    [] No-show     Reason given by patient:    [x]  Patient ill    []  Conflicting appointment    [] No transportation      [] Conflict with work    [] No reason given    [] Weather related    [] COVID-19    [] Other:      Comments:        [x] Next appointment was confirmed    Electronically signed by: Gerald Asif, PT

## 2023-03-01 ENCOUNTER — HOSPITAL ENCOUNTER (OUTPATIENT)
Dept: PHYSICAL THERAPY | Facility: CLINIC | Age: 53
Setting detail: THERAPIES SERIES
Discharge: HOME | End: 2023-03-01

## 2023-03-01 NOTE — FLOWSHEET NOTE
[] [facility]) [facility] &  Therapy  [address]    [phone]  F: [phone]   [x] [address]  [facility] [address]   [address]  P: [phone]  F: [phone]  [] [address]  [address]  P: [phone]  F: [phone] [] [address]  P: [phone]  F: [phone]  [] [address]  [facility]   [address]   [place]: [phone]  F: [phone]   [] [address]  [place]: [phone]   F: [phone]     Physical Therapy Cancel/No Show note    Date: 3/1/2023  Patient: Arcenio Armas  : 1970  MRN: [id number]    Cancels/No Shows to date: 10/2    For today's appointment patient:    [x]  Cancelled    [] Rescheduled appointment    [] No-show     Reason given by patient:    [x]  Patient ill    []  Conflicting appointment    [] No transportation      [] Conflict with work    [] No reason given    [] Weather related    [] COVID-19    [] Other:      Comments:        [x] Next appointment was confirmed- rescheduled for 3/8 at 1:00 pm    Electronically signed by: Mitul Johnson, PT

## 2023-03-08 ENCOUNTER — HOSPITAL ENCOUNTER (OUTPATIENT)
Dept: PHYSICAL THERAPY | Facility: CLINIC | Age: 53
Setting detail: THERAPIES SERIES
Discharge: HOME | End: 2023-03-08
Payer: MEDICAID

## 2023-03-08 PROCEDURE — 97110 THERAPEUTIC EXERCISES: CPT

## 2023-03-08 NOTE — FLOWSHEET NOTE
[x] [address] [facility]  Outpatient Rehabilitation &  Therapy  [facility] [address]   [address]  P: [phone]  F: [phone]     Physical Therapy Daily Treatment Note    Date:  3/8/2023  Patient Name:  Lacy Olsen    :  1970  MRN: [id number]  Physician: Natalie Rocha DO           Insurance: Delia Ma (30 visits, auth after 30)  Medical Diagnosis:   Z98.890 (ICD-10-CM) - Status post surgery   M75.122 (ICD-10-CM) - Complete tear of left rotator cuff, unspecified whether traumatic   Rehab Codes: M62.81, M62.9, Z73.6, R29.3,  M25.60, Z74.0  Onset Date: 10/25/22 - DOS                         Vangie Leon's appt.: 23  Visit# / total visits:  (8 additional visits approved on )    Cancels/No Shows:     Subjective:    Pain:  [x] Yes  [] No Location: L shoulder  Pain Rating: 3/10 (0-10 scale)   Pain altered Tx:  [x] No  [] Yes  Action:     Comments: Pt arrives after being sick and hasn't been to PT since . Pt reports that she fell and went to the hospital and received a medication she was allergic to. Pt does note pain through R shoulder as well as L, low back, and bilateral knee. Pt reports L shoulder has been feeling better except for trouble sleeping. Objective:  Modalities: Vasocompression 10 min @ 42° and min pressure in semirecumbent position on the plinth. - held  d/t pt reports of this not providing relief of pain  HP- to L shoulder in sitting after exercise x 10 min    Precautions: no lifting over 20-25#, no greater than 10# OH, full protocol in soft chart   Exercises:   Exercise Reps/ Time Weight/ Level Comments   PROM to L shoulder 5 min  Slow, controlled motion    Pulleys '/2'  Added - flexion, abduction          Seated:      Scapular retractions 10x 5\"    Isometrics 5\"x10ea  Flexion, ABD, ER/IR   Elbow flexion ISO 15x5\"  New    Table slides 10x  Added          AAROM:   Cane flexion 10x2 2# Shortened arc-From 90 degrees to approx 160 ; inc resistance 1/31   Cane press ups 10x2 2# Inc resistance 1/31   Horiz abd/add 2x10 2# Inc resistance 1/31   Shldr protraction 2x10 2# Inc resistance 1/31   Rhythmic stabs  3x15\"ea  New 12/27  90 degrees flexion  25 abd, 0 ER         SIDE LYING    Held 3/8 d/t R shoulder pain   ER x10 1# New 1/9; decreased reps 2/16   Horizontal abd x10 1# Added 1/12, inc 1/23;  decreased reps 2/16   Abduction  x10 1# Added 1/12, inc 1/23;  decreased reps 2/16         Prone      Rows 2x10 2# New 12/13; increased wt 2/16   HAB 2x10 2# New 12/13;  increased wt 2/16   Extension to neutral  2x10 2#  New 12/13;  increased wt 2/16   Scaption 2x10 2# Added 1/12; wt added 1/26;  increased wt 2/16   Flexion  2x10 2# Added 1/31;  increased wt 2/16         Standing      Horizontal abd 15x lime Added 2/13   ER/IR Iso walkouts 10xea lime Incr res for ER 1/26   ER 10x2 lime    IR 10x2 lime    Wall slides 10x2 ea  Reduced reps 1/8- limited into abd, inc reps 2/6   Elbow flexion/ext 10x2ea Lime  New 12/27; progressed 2/6   Tband rows/ext  10x2ea Lime  New 1/23; progressed 1/26   Wall push ups  10x2  New 2/6   Ball on wall  10xea playball New 3/8   Wall Clocks 10xea  New 3/8   Other:       Treatment Charges: Mins Units   []  Modalities- HP     [x]  Ther Exercise 48 3   []  Manual Therapy     []  Ther Activities     []  Aquatics     []  Vasocompression     []  Other     Total Treatment time 48 3              Assessed on 12/13/22 12/27 1/9/23 1/16 1/26     Left PROM Left AROM  PROM AAROM P/AA AAROM   Shld Abd 110  107 113 90 143P,       Shld Flex 125  140 125 146 155P,     Shld IR 90 degrees at 90 abd   --      Shld ER at 0  60   56 at 25 abduction       Shoulder ER at 90  45            Assessed by primary PT 1/31/23 AROM PROM Strength       Left    Shld Abd 110* 135* 4-/5*   Shld Flex 140* 140* 4-/5*   Shld IR PSIS 70 at 25  3+/5*   Shld ER  60 at 25 abd  4-/5*   Shld HAB                       Elbow Flex  WNL    Elbow Ext  0       PROM limited by pain  * indicates pain at end range     Assessment: [x] Progressing toward goals: Pt began treatment on the pulleys followed by mat table exercises. Pt request to hold sidelying exercises due to increased R shoulder pain from her fall in February. Pt is able to perform remaining exercises asked of her and progressed strengthening with wall clocks and ball on wall with good tolerance. Pt demonstrates good recall of previous exercises with ability to complete with minimal cueing for technique. Pt denies L shoulder pain but does report some fatigue and soreness post exercise. Primary complaints through R shoulder this date. Pt declines MHP at end of session. [] No change. [] Other:    [x] Patient would continue to benefit from skilled physical therapy services in order to: decrease pain and increase ROM, strength, and function in L shoulder. Problems:    [x] ? Pain: 3-10/10 pain  [x] ? ROM: decreased PROM in flex/abd/ER  [x] ? Strength: NT due to no AROM at this time  [x] ? Function: 99% impairment  [x] Other:  poor posture        Goals  MET NOT MET ON-  GOING  Details   Date Addressed: 1/31/23           STG: To be met in 8 treatments            1. ? Pain: Patient will self report worst pain no greater than 3/10 in order to better tolerate ADLs/work activities with minimal dysfunction []  []  [x]  Ongoing- up to 10/10 at night     2. ? ROM: Patient will improve PROM in flexion to 125 degrees, ER at 90 degrees of ABD to at least 45 degrees and IR at 90 degrees of ABD to at least 45 degrees in order to demonstrate ability to move/reach in all planes unrestricted at PLOF [x]  []  []       3. Independent with Home Exercise Programs [x]  []  []      4. Demonstrate knowledge of fall risk prevention  [x]  []  []        []  []  []                  Date Addressed: 1/31/23           LTG: To be met in 16 treatments           1.  Improve score on assessment tool UEFS from 99% impairment to less than 65% impairment to 45% impairment or less 1/31  []  []  [x]  UEFS: 28/80 or 65% functionally impaired     2. Strength: Pt will demonstrate improved R UE strength to 3/5  in order to demonstrate improved stability/strength necessary for unrestricted ADLs/work activities [x]  []  []      3. ? ROM: Patient will improve PROM to full ROM in all directions  in order to demonstrate ability to move/reach in all planes unrestricted at PLOF []  []  [x]   limited abd/ext/ER   4. ? ROM: Patient will improve AROM to at least 120 degrees of flex/ABD, ER/IR to at least 60 degrees in all directions  in order to demonstrate ability to move/reach in all planes unrestricted at PLOF []  []  [x]       New 1/31: increased shoulder strength to at least 4/5 globally to improve tolerance to ADLs            New 1/31: increase shoulder AROM to at least 160 degrees elevation to improve independence with OH reaching and lifting tasks                    Pt. Education:  [] Yes  [x] No  [] Reviewed Prior HEP/Ed  Method of Education: [] Verbal form, daily HEP compliance, ice  [] Demo  [] Written:   Seated Scapular Retraction - 1 x daily - 7 x weekly - 2 sets - 10 reps  Seated Isometric Shoulder Flexion - 1 x daily - 7 x weekly - 10 reps - 5 hold  Seated Isometric Shoulder External Rotation with Towel - 1 x daily - 7 x weekly - 10 reps - 5 hold  Seated Isometric Shoulder External Rotation - 1 x daily - 7 x weekly - 10 reps - 5 hold  Seated Isometric Shoulder Abduction - 1 x daily - 7 x weekly - 10 reps - 5 hold    Comprehension of Education:  [x] Verbalizes understanding. [x] Demonstrates understanding. [] Needs review. FOR COMPLIANCE   [] Demonstrates/verbalizes HEP/Ed previously given. Access Code: [email or web address]  URL: [email or web address]  Date: 10/28/2022  Prepared by: Calli Scheuermann     Exercises  Circular Shoulder Pendulum with Table Support - 3 x daily - 7 x weekly - 2 sets - 10 reps  Horizontal Shoulder Pendulum with Table Support - 3 x daily - 7 x weekly - 2 sets - 10 reps  Flexion-Extension Shoulder Pendulum with Table Support - 3 x daily - 7 x weekly - 2 sets - 10 reps  Standing Elbow Flexion Extension AROM - 3 x daily - 7 x weekly - 2 sets - 10 reps  Gripping Sponge Supinated - 3 x daily - 7 x weekly - 2 sets - 10 reps  Gripping Sponge Pronated - 3 x daily - 7 x weekly - 2 sets - 10 reps    Access Code: [email or web address]  URL: [email or web address]  Date: 11/30/2022  Prepared by: Andrew Abraham    Exercises  Supine Shoulder Press with Dowel - 1 x daily - 7 x weekly - 2 sets - 10 reps  Supine Shoulder Press AAROM in Abduction with Dowel - 1 x daily - 7 x weekly - 2 sets - 10 reps  Supine Shoulder Abduction AAROM with Dowel - 1 x daily - 7 x weekly - 2 sets - 10 reps  Supine Shoulder Flexion AAROM with Dowel - 1 x daily - 7 x weekly - 2 sets - 10 reps       Plan: [x] Continue current frequency toward long and short term goals.   Scheduled every other week for remaining visits leading up to ortho follow up per pt request.   [x] Specific Instructions for subsequent treatments:progress shoulder and scapular strengthening, update HEP       Time In:  1:00 pm        Time Out: 1:48 pm     Electronically signed by:  Darnell Lopez PT

## 2023-03-15 ENCOUNTER — TELEPHONE (OUTPATIENT)
Dept: ORTHOPEDIC SURGERY | Age: 53
End: 2023-03-15

## 2023-03-15 ENCOUNTER — HOSPITAL ENCOUNTER (OUTPATIENT)
Dept: PHYSICAL THERAPY | Facility: CLINIC | Age: 53
Setting detail: THERAPIES SERIES
Discharge: HOME | End: 2023-03-15
Payer: MEDICAID

## 2023-03-15 PROCEDURE — 97110 THERAPEUTIC EXERCISES: CPT

## 2023-03-15 NOTE — FLOWSHEET NOTE
[x] [address] [facility]  Outpatient Rehabilitation &  Therapy  [facility] [address]   [address]  P: [phone]  F: [phone]     Physical Therapy Daily Treatment Note    Date:  3/15/2023  Patient Name:  Abimbola Zimmerman    :  1970  MRN: [id number]  Physician: Jayy Forde DO           Insurance: [place] [employer] (30 visits, auth after 30)  Medical Diagnosis:   Z98.890 (ICD-10-CM) - Status post surgery   M75.122 (ICD-10-CM) - Complete tear of left rotator cuff, unspecified whether traumatic   Rehab Codes: M62.81, M62.9, Z73.6, R29.3,  M25.60, Z74.0  Onset Date: 10/25/22 - DOS                         Next 's appt.: 23  Visit# / total visits:  (8 additional visits approved on )    Cancels/No Shows:     Subjective:    Pain:  [x] Yes  [] No Location: L shoulder  Pain Rating: 3/10 (0-10 scale)   Pain altered Tx:  [x] No  [] Yes  Action:     Comments: Pt arrives noting that her L shoulder feels okay. Pt denies any increase in soreness following previous session. Continues to report increase in R shoulder pain after her fall in February. Objective:  Modalities: Vasocompression 10 min @ 42° and min pressure in semirecumbent position on the plinth. - held 12/6 d/t pt reports of this not providing relief of pain  HP- to L shoulder in sitting after exercise x 10 min    Precautions: no lifting over 20-25#, no greater than 10# OH, full protocol in soft chart   Exercises:   Exercise Reps/ Time Weight/ Level Comments   PROM to L shoulder 5 min  Slow, controlled motion    Pulleys 2'/2'  Added - flexion, abduction          Seated:      Scapular retractions 10x 5\"    Isometrics 5\"x10ea  Flexion, ABD, ER/IR   Elbow flexion ISO 15x5\"  New    Table slides 10x  Added          AAROM:   Cane flexion 10x2 2# Shortened arc-From 90 degrees to approx 160 ; inc resistance    Cane press ups 10x2 2# Inc resistance    Horiz abd/add 2x10 2# Inc resistance    Shldr protraction 2x10 2# Inc resistance 1/31   Rhythmic stabs  3x15\"ea  New 12/27  90 degrees flexion  25 abd, 0 ER         SIDE LYING    Held 3/8 d/t R shoulder pain   ER x10 1# New 1/9; decreased reps 2/16   Horizontal abd x10 1# Added 1/12, inc 1/23;  decreased reps 2/16   Abduction  x10 1# Added 1/12, inc 1/23;  decreased reps 2/16         Prone      Rows 2x10 2# New 12/13; increased wt 2/16   HAB 2x10 2# New 12/13;  increased wt 2/16   Extension to neutral  2x10 2#  New 12/13;  increased wt 2/16   Scaption 2x10 2# Added 1/12; wt added 1/26;  increased wt 2/16   Flexion  2x10 2# Added 1/31;  increased wt 2/16         Standing      Horizontal abd 20x lime Added 2/13, inc reps 3/15   ER/IR Iso walkouts 10xea lime Incr res for ER 1/26   ER 10x2 lime    IR 10x2 lime    Wall slides 10x2 ea  Reduced reps 1/8- limited into abd, inc reps 2/6   Elbow flexion/ext 10x2ea Lime  New 12/27; progressed 2/6   Tband rows/ext  10x2ea Lime  New 1/23; progressed 1/26   Wall push ups  10x2  New 2/6   Ball on wall  10xea playball New 3/8   Wall Clocks 10xea  New 3/8   Other:       Treatment Charges: Mins Units   []  Modalities- HP     [x]  Ther Exercise 47 3   []  Manual Therapy     []  Ther Activities     []  Aquatics     []  Vasocompression     []  Other     Total Treatment time 47 3     10 minutes unbilled d/t pt on phone call with doctors office            Assessed on 12/13/22 12/27 1/9/23 1/16 1/26     Left PROM Left AROM  PROM AAROM P/AA AAROM   Shld Abd 110  107 113 90 143P,       Shld Flex 125  140 125 146 155P,     Shld IR 90 degrees at 90 abd   --      Shld ER at 0  60   56 at 25 abduction       Shoulder ER at 90  45            Assessed by primary PT 1/31/23 AROM PROM Strength       Left    Shld Abd 110* 135* 4-/5*   Shld Flex 140* 140* 4-/5*   Shld IR PSIS 70 at 25  3+/5*   Shld ER  60 at 25 abd  4-/5*   Shld HAB                       Elbow Flex  WNL    Elbow Ext  0       PROM limited by pain  * indicates pain at end range     Assessment: [x] Progressing toward goals:     [] No change. [x] Other: Pt began treatment on the pulleys followed by standing interventions. Pt completed theraband exercises with LUE only due to increased R shoulder pain. Continued with previous exercises without progression due to pt reported fatigue in LUE this date. Held some exercises due to R shoulder pain such as supine and sidelying interventions but may resume at next session if able per pt tolerance. [x] Patient would continue to benefit from skilled physical therapy services in order to: decrease pain and increase ROM, strength, and function in L shoulder. Problems:    [x] ? Pain: 3-10/10 pain  [x] ? ROM: decreased PROM in flex/abd/ER  [x] ? Strength: NT due to no AROM at this time  [x] ? Function: 99% impairment  [x] Other:  poor posture        Goals  MET NOT MET ON-  GOING  Details   Date Addressed: 1/31/23           STG: To be met in 8 treatments            1. ? Pain: Patient will self report worst pain no greater than 3/10 in order to better tolerate ADLs/work activities with minimal dysfunction []  []  [x]  Ongoing- up to 10/10 at night     2. ? ROM: Patient will improve PROM in flexion to 125 degrees, ER at 90 degrees of ABD to at least 45 degrees and IR at 90 degrees of ABD to at least 45 degrees in order to demonstrate ability to move/reach in all planes unrestricted at PLOF [x]  []  []       3. Independent with Home Exercise Programs [x]  []  []      4. Demonstrate knowledge of fall risk prevention  [x]  []  []        []  []  []                  Date Addressed: 1/31/23           LTG: To be met in 16 treatments           1. Improve score on assessment tool UEFS from 99% impairment to less than 65% impairment to 45% impairment or less 1/31  []  []  [x]  UEFS: 28/80 or 65% functionally impaired     2.  Strength: Pt will demonstrate improved R UE strength to 3/5  in order to demonstrate improved stability/strength necessary for unrestricted ADLs/work activities [x]  []  []      3. ? ROM: Patient will improve PROM to full ROM in all directions  in order to demonstrate ability to move/reach in all planes unrestricted at PLOF []  []  [x]   limited abd/ext/ER   4. ? ROM: Patient will improve AROM to at least 120 degrees of flex/ABD, ER/IR to at least 60 degrees in all directions  in order to demonstrate ability to move/reach in all planes unrestricted at PLOF []  []  [x]       New 1/31: increased shoulder strength to at least 4/5 globally to improve tolerance to ADLs            New 1/31: increase shoulder AROM to at least 160 degrees elevation to improve independence with OH reaching and lifting tasks                    Pt. Education:  [] Yes  [x] No  [] Reviewed Prior HEP/Ed  Method of Education: [] Verbal form, daily HEP compliance, ice  [] Demo  [] Written:   Seated Scapular Retraction - 1 x daily - 7 x weekly - 2 sets - 10 reps  Seated Isometric Shoulder Flexion - 1 x daily - 7 x weekly - 10 reps - 5 hold  Seated Isometric Shoulder External Rotation with Towel - 1 x daily - 7 x weekly - 10 reps - 5 hold  Seated Isometric Shoulder External Rotation - 1 x daily - 7 x weekly - 10 reps - 5 hold  Seated Isometric Shoulder Abduction - 1 x daily - 7 x weekly - 10 reps - 5 hold    Comprehension of Education:  [x] Verbalizes understanding. [x] Demonstrates understanding. [] Needs review. FOR COMPLIANCE   [] Demonstrates/verbalizes HEP/Ed previously given. Access Code: [email or web address]  URL: [email or web address]  Date: 10/28/2022  Prepared by: Calli Scheuermann     Exercises  Circular Shoulder Pendulum with Table Support - 3 x daily - 7 x weekly - 2 sets - 10 reps  Horizontal Shoulder Pendulum with Table Support - 3 x daily - 7 x weekly - 2 sets - 10 reps  Flexion-Extension Shoulder Pendulum with Table Support - 3 x daily - 7 x weekly - 2 sets - 10 reps  Standing Elbow Flexion Extension AROM - 3 x daily - 7 x weekly - 2 sets - 10 reps  Gripping Sponge Supinated - 3 x daily - 7 x weekly - 2 sets - 10 reps  Gripping Sponge Pronated - 3 x daily - 7 x weekly - 2 sets - 10 reps    Access Code: [email or web address]  URL: [email or web address]  Date: 11/30/2022  Prepared by: Alessio Denis    Exercises  Supine Shoulder Press with Dowel - 1 x daily - 7 x weekly - 2 sets - 10 reps  Supine Shoulder Press AAROM in Abduction with Dowel - 1 x daily - 7 x weekly - 2 sets - 10 reps  Supine Shoulder Abduction AAROM with Dowel - 1 x daily - 7 x weekly - 2 sets - 10 reps  Supine Shoulder Flexion AAROM with Dowel - 1 x daily - 7 x weekly - 2 sets - 10 reps       Plan: [x] Continue current frequency toward long and short term goals.   Scheduled every other week for remaining visits leading up to ortho follow up per pt request.   [x] Specific Instructions for subsequent treatments:progress shoulder and scapular strengthening, update HEP       Time In:  3:00 pm        Time Out: 3:57 pm      Electronically signed by:  Ave Garcia, PT

## 2023-03-15 NOTE — TELEPHONE ENCOUNTER
Pt is pt of DR [facility] and Fátima - pt currently taking PT for LEFT shoulder - was seen 2/2/23 with F[facility]ima @ [facility] - pt is req extension for PT - FYI  pt w/be seeing Fátima on 3/17 @ [facility] for new issue with RIGHT shoulder - she was also informed to mention her req , but I felt an encounter was appropriate

## 2023-03-15 NOTE — TELEPHONE ENCOUNTER
Are you OK with ordering more PT, or would you like to wait until you see the patient on 3/17? Please advise. Thank you.

## 2023-03-16 DIAGNOSIS — Z98.890 S/P ARTHROSCOPY OF LEFT SHOULDER: ICD-10-CM

## 2023-03-16 DIAGNOSIS — M75.122 COMPLETE TEAR OF LEFT ROTATOR CUFF, UNSPECIFIED WHETHER TRAUMATIC: ICD-10-CM

## 2023-03-16 DIAGNOSIS — Z98.890 S/P LEFT ROTATOR CUFF REPAIR: Primary | ICD-10-CM

## 2023-03-17 ENCOUNTER — OFFICE VISIT (OUTPATIENT)
Dept: ORTHOPEDIC SURGERY | Age: 53
End: 2023-03-17
Payer: MEDICAID

## 2023-03-17 VITALS — BODY MASS INDEX: 25.45 KG/M2 | WEIGHT: 126 LBS

## 2023-03-17 DIAGNOSIS — Z98.890 S/P LEFT ROTATOR CUFF REPAIR: ICD-10-CM

## 2023-03-17 DIAGNOSIS — Z98.890 HISTORY OF REPAIR OF RIGHT ROTATOR CUFF: Primary | ICD-10-CM
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
## 2023-03-17 PROCEDURE — 99214 OFFICE O/P EST MOD 30 MIN: CPT | Performed by: PHYSICIAN ASSISTANT

## 2023-03-17 NOTE — PROGRESS NOTES
applied at 0 and 30° of flexion. A negative anterior drawer and Lachman's test is appreciated. There is increased pain with a medial Eliazar's test but no palpable click. There is no calf tenderness. There is a negative hip log-roll and Stinchfield test.  Motor, sensory, vascular examination to the Right lower extremity is intact without focal deficits. Neuro: alert and oriented to person and place. Eyes: Extra-ocular muscles intact  Mouth: Oral mucosa moist. No perioral lesions  Pulm: Respirations unlabored and regular. Symmetric chest excursion without outward deformity is noted. Skin: warm, well perfused  Psych:   Patient has good fund of knowledge and displays understanging of exam, diagnosis, and plan. Radiology:     XR RIGHT SHOULDER - 2/17/2023    [facility] (Care Everywhere)    Images in PACS    Narrative    XR SHOULDER RT MIN 2 VWS     HISTORY: Fall on Saturday, right shoulder pain. COMPARISON: 7/18/2016 left shoulder radiographs. TECHNIQUE: 3 views obtained. FINDINGS:     There is slightly superior angulation of the distal clavicle with widening acromioclavicular interval.  Clavicular accessory ossicle noted. Visualized lung and ribs are unremarkable. IMPRESSION:     *  Widened acromioclavicular interval, somewhat favor prior distal clavicular excision. Any underlying AC. ligamentous injury difficult to exclude in the appropriate context, however. Correlate with surgical history. Approved by Resident Yolanda Burns MD  on 2/17/2023 8:29 PM     Annemarie LIND MD have personally reviewed the image(s) and agree with and/or edited the report     Workstation:[id number]     XR LEFT SHOULDER - 2/17/2023    [facility] (Care Everywhere)    Images in PACS    Narrative    XR SHOULDER LT MIN 2 VWS     INDICATION:   Pain after fall     COMPARISON:   None     FINDINGS:   No acute fracture or dislocation.   Degenerative changes of the glenohumeral

## 2023-03-20 ASSESSMENT — ENCOUNTER SYMPTOMS
COLOR CHANGE: 0
COUGH: 0
VOMITING: 0
SHORTNESS OF BREATH: 0

## 2023-03-21 ENCOUNTER — OFFICE VISIT (OUTPATIENT)
Dept: FAMILY MEDICINE CLINIC | Age: 53
End: 2023-03-21

## 2023-03-21 VITALS
TEMPERATURE: 97.2 F | BODY MASS INDEX: 25.04 KG/M2 | WEIGHT: 124 LBS | DIASTOLIC BLOOD PRESSURE: 76 MMHG | SYSTOLIC BLOOD PRESSURE: 124 MMHG | HEART RATE: 83 BPM

## 2023-03-21 DIAGNOSIS — M25.561 CHRONIC PAIN OF BOTH KNEES: ICD-10-CM

## 2023-03-21 DIAGNOSIS — M25.562 CHRONIC PAIN OF BOTH KNEES: ICD-10-CM

## 2023-03-21 DIAGNOSIS — K21.9 GASTROESOPHAGEAL REFLUX DISEASE, UNSPECIFIED WHETHER ESOPHAGITIS PRESENT: ICD-10-CM

## 2023-03-21 DIAGNOSIS — G89.29 CHRONIC PAIN OF RIGHT KNEE: Primary | ICD-10-CM

## 2023-03-21 DIAGNOSIS — G89.29 CHRONIC PAIN OF BOTH KNEES: ICD-10-CM

## 2023-03-21 DIAGNOSIS — M25.561 CHRONIC PAIN OF RIGHT KNEE: Primary | ICD-10-CM

## 2023-03-21 RX ORDER — PANTOPRAZOLE SODIUM 40 MG/1
40 TABLET, DELAYED RELEASE ORAL 2 TIMES DAILY
Qty: 30 TABLET | Refills: 1 | Status: SHIPPED | OUTPATIENT
Start: 2023-03-21

## 2023-03-21 RX ORDER — DULOXETIN HYDROCHLORIDE 30 MG/1
30 CAPSULE, DELAYED RELEASE ORAL DAILY
Qty: 90 CAPSULE | Refills: 1 | Status: SHIPPED | OUTPATIENT
Start: 2023-03-21

## 2023-03-21 RX ORDER — SUCRALFATE 1 G/1
1 TABLET ORAL 4 TIMES DAILY
Qty: 120 TABLET | Refills: 1 | Status: SHIPPED | OUTPATIENT
Start: 2023-03-21

## 2023-03-21 NOTE — PATIENT INSTRUCTIONS
Thank you for letting us take care of you today. We hope all your questions were addressed. If a question was overlooked or something else comes to mind after you return home, please contact a member of your Care Team listed below. Your Care Team at [place] [address] is Team #5  Alan Delarosa MD (Faculty)  Bobby Carrillo MD (Resident)  Faisal Scott MD (Resident)  Racheal Bright MD (Resident)  IVY Jones Lydia ,[facility] ([address] [facility] office)  [facility] office)  Erick Wilburn, [address] (Clinical Practice Manager)  SHAHLA Peralta [facility] (Clinical Pharmacist)       Office phone number: [phone]    If you need to get in right away due to illness, please be advised we have \"Same Day\" appointments available Monday-Friday. Please call us at [phone] option #3 to schedule your \"Same Day\" appointment.

## 2023-03-21 NOTE — PROGRESS NOTES
DIABETES and HYPERTENSION visit    BP Readings from Last 3 Encounters:   10/25/22 (!) 164/90   10/19/22 137/69   10/18/22 138/75        Hemoglobin A1C (%)   Date Value   07/28/2022 6.2   01/31/2022 6.9   01/15/2021 6.8     Microalb/Crt. Ratio (mcg/mg creat)   Date Value   12/22/2020 4     LDL Cholesterol (mg/dL)   Date Value   10/19/2022 89     HDL (mg/dL)   Date Value   10/19/2022 44     BUN (mg/dL)   Date Value   10/19/2022 15     Creatinine (mg/dL)   Date Value   10/19/2022 0.89     Glucose (mg/dL)   Date Value   10/19/2022 103 (H)            Have you changed or started any medications since your last visit including any over-the-counter medicines, vitamins, or herbal medicines? no   Have you stopped taking any of your medications? Is so, why? -  no  Are you having any side effects from any of your medications? - no    Have you seen any other physician or provider since your last visit?  no   Have you had any other diagnostic tests since your last visit? X rays  Have you been seen in the emergency room and/or had an admission in a hospital since we last saw you? Yes [facility]  Have you had your routine dental cleaning in the past 6 months?  no     Have you had your annual diabetic retinal (eye) exam? No   (ensure copy of exam is in the chart)    Do you have an active MyChart account? If no, what is the barrier?   No:     Patient Care Team:  Nathan Hester MD as PCP - General (Family Medicine)    Medical History Review  Past Medical, Family, and Social History reviewed and does not contribute to the patient presenting condition    Health Maintenance   Topic Date Due    COVID-19 Vaccine (1) Never done    Pneumococcal 0-64 years Vaccine (1 - PCV) Never done    Diabetic foot exam  Never done    Diabetic retinal exam  Never done    Hepatitis B vaccine (1 of 3 - Risk 3-dose series) Never done    Shingles vaccine (1 of 2) Never done    Cervical cancer screen  04/20/2020    Diabetic Alb to Cr ratio
30 MG extended release capsule    M25.562 Elastic Bandages & Supports (KNEE BRACE) MISC    G89.29       3. 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 Will discontinue Prozac and start patient on duloxetine for management of chronic knee pain and depression. Will escalate the therapy in 4 weeks. History of GERD, is using on and off Protonix. Advised the patient to not take any NSAID including ibuprofen, Aleve, naproxen. We will switch Protonix to twice daily and Carafate 4 times daily. We will reassess in 4 weeks if patient symptoms are not getting better she might need repeat endoscopy/H. pylori test/pH study. Return in about 4 weeks (around 2023).        Requested Prescriptions     Signed Prescriptions Disp Refills    DULoxetine (CYMBALTA) 30 MG extended release capsule 90 capsule 1     Sig: Take 1 capsule by mouth daily    Elastic Bandages & Supports (KNEE BRACE) [facility] 1 each 1     Si box by Does not apply route daily    sucralfate (CARAFATE) 1 GM tablet 120 tablet 1     Sig: Take 1 tablet by mouth 4 times daily    pantoprazole (PROTONIX) 40 MG tablet 30 tablet 1     Sig: Take 1 tablet by mouth in the morning and at bedtime Take 30 mins before meal       Medications Discontinued During This Encounter   Medication Reason    FLUoxetine (PROZAC) 20 MG capsule DISCONTINUED BY ANOTHER CLINICIAN    ketorolac (TORADOL) 10 MG tablet Therapy completed    sennosides-docusate sodium (SENOKOT-S) 8.6-50 MG tablet LIST CLEANUP    famotidine (PEPCID) 20 MG tablet LIST CLEANUP    ketorolac (TORADOL) 10 MG tablet Therapy completed    pantoprazole (PROTONIX) 40 MG tablet REORDER       Meseret received counseling on the following healthy

## 2023-03-29 ENCOUNTER — HOSPITAL ENCOUNTER (OUTPATIENT)
Dept: PHYSICAL THERAPY | Facility: CLINIC | Age: 53
Setting detail: THERAPIES SERIES
Discharge: HOME | End: 2023-03-29
Payer: MEDICAID

## 2023-03-29 PROCEDURE — 97016 VASOPNEUMATIC DEVICE THERAPY: CPT

## 2023-03-29 PROCEDURE — 97161 PT EVAL LOW COMPLEX 20 MIN: CPT

## 2023-03-29 NOTE — CONSULTS
presentation (progression) [x] Stable [] Evolving  [] Unstable   Decision Making [x] Low [] Moderate [] High    [x] Low Complexity [] Moderate Complexity [] High Complexity       Treatment Charges: Mins Units   [x] Evaluation       [x]  Low       []  Moderate       []  High 30 1   []  Modalities     []  Ther Exercise     []  Manual Therapy     []  Ther Activities     []  Aquatics     [x]  Vasocompression 10 1   []  Other       TOTAL TREATMENT TIME: 40 minutes     Time in:2:20 pm      Time Out: 3:00 pm     Electronically signed by: Lindsay Joshua PT        Physician Signature:________________________________Date:__________________  By signing above or cosigning this note, I have reviewed this plan of care and certify a need for medically necessary rehabilitation services.      *PLEASE SIGN ABOVE AND FAX BACK ALL PAGES*

## 2023-04-05 ENCOUNTER — HOSPITAL ENCOUNTER (OUTPATIENT)
Dept: PHYSICAL THERAPY | Facility: CLINIC | Age: 53
Setting detail: THERAPIES SERIES
Discharge: HOME | End: 2023-04-05
Payer: MEDICAID

## 2023-04-05 PROCEDURE — 97110 THERAPEUTIC EXERCISES: CPT

## 2023-04-05 PROCEDURE — 97016 VASOPNEUMATIC DEVICE THERAPY: CPT

## 2023-04-05 NOTE — FLOWSHEET NOTE
cleaning   Pt to demonstrate ability to ambulate at least 300' with improved gait mechanics and without an associated increase in R knee pain to ease community access. Pt to demonstrate ability to ascend/descend a full flight of stairs with reciprocal stepping mechanics and without pain to improve access to basement for laundry. Pt to demonstrate ability to maintain R SLS for at least 10\" with improved stability and without an increase in knee pain. Patient goals: reduce pain    Pt. Education:  [x] Yes  [] No  [] Reviewed Prior HEP/Ed  Method of Education: [x] Verbal  [x] Demo  [x] Written: knee strengthening ex  Comprehension of Education:  [x] Verbalizes understanding. [x] Demonstrates understanding. [x] Needs review. [] Demonstrates/verbalizes HEP/Ed previously given. Access Code: [email or web address]  URL: [email or web address]  Date: 04/05/2023  Prepared by: Myranda Blair    Exercises  - Supine Hamstring Stretch with Strap  - 1 x daily - 7 x weekly - 3 sets - 10 reps  - Seated Long Arc Quad  - 1 x daily - 7 x weekly - 3 sets - 10 reps  - Sidelying Hip Abduction  - 1 x daily - 7 x weekly - 3 sets - 10 reps  - Supine Heel Slide  - 1 x daily - 7 x weekly - 3 sets - 10 reps  - Small Range Straight Leg Raise  - 1 x daily - 7 x weekly - 3 sets - 10 reps     Plan: [x] Continue current frequency toward long and short term goals.     [x] Specific Instructions for subsequent treatments: prescribe HEP, restore R knee mobility, global RLE strengthening per pt tolerance, R knee stability and gait training w/ SPC, vaso PRN       Time In:11:50AM            Time Out: 12:40    Electronically signed by:  Tone Mckoy, SHELL

## 2023-04-05 NOTE — FLOWSHEET NOTE
reps  Flexion-Extension Shoulder Pendulum with Table Support - 3 x daily - 7 x weekly - 2 sets - 10 reps  Standing Elbow Flexion Extension AROM - 3 x daily - 7 x weekly - 2 sets - 10 reps  Gripping Sponge Supinated - 3 x daily - 7 x weekly - 2 sets - 10 reps  Gripping Sponge Pronated - 3 x daily - 7 x weekly - 2 sets - 10 reps    Access Code: [email or web address]  URL: [email or web address]  Date: 11/30/2022  Prepared by: Nando Valenzuela    Exercises  Supine Shoulder Press with Dowel - 1 x daily - 7 x weekly - 2 sets - 10 reps  Supine Shoulder Press AAROM in Abduction with Dowel - 1 x daily - 7 x weekly - 2 sets - 10 reps  Supine Shoulder Abduction AAROM with Dowel - 1 x daily - 7 x weekly - 2 sets - 10 reps  Supine Shoulder Flexion AAROM with Dowel - 1 x daily - 7 x weekly - 2 sets - 10 reps       Plan: [x] Continue current frequency toward long and short term goals.   Scheduled every other week for remaining visits leading up to ortho follow up per pt request.   [x] Specific Instructions for subsequent treatments:progress shoulder and scapular strengthening, update HEP       Time In:  11:10am        Time Out: 11:50am      Electronically signed by:  Marlin Wilson PTA

## 2023-04-17 ENCOUNTER — HOSPITAL ENCOUNTER (OUTPATIENT)
Dept: PHYSICAL THERAPY | Facility: CLINIC | Age: 53
Setting detail: THERAPIES SERIES
Discharge: HOME | End: 2023-04-17
Payer: MEDICAID

## 2023-04-17 PROCEDURE — 97110 THERAPEUTIC EXERCISES: CPT

## 2023-04-17 PROCEDURE — 97016 VASOPNEUMATIC DEVICE THERAPY: CPT

## 2023-04-17 NOTE — FLOWSHEET NOTE
Increase R knee AROM limitations throughout to equal bilat to reduce difficulty with ADLs. Progress made- limited flexion   Pt to improve R ankle DF AROM to at least 5 degrees to improve heel-toe progression during gait mechanics. MET   ? Strength: Increase R hip, knee, and ankle strength to at least 4+/5 globally to improve tolerance to standing, walking, and stair climbing. Partially met- limited knee extension   ? Function: Pt to demonstrate improved functional strength with ability to perform at least 5 bilateral squats with improved mechanics and without associated increase in pain. MET  Pt to be independent and compliant with Home Exercise Programs with ability to demonstrate exercises without cueing for technique. Ongoing- fair compliance         LTG: (to be met in 12 treatments)  Improve score on assessment tool LEFI  to 50% impaired or less to demonstrate improved functional mobility. Reduce R knee pain levels to 4/10 or less to improve independence with all daily activities and household chores including cooking and cleaning   Pt to demonstrate ability to ambulate at least 300' with improved gait mechanics and without an associated increase in R knee pain to ease community access. Pt to demonstrate ability to ascend/descend a full flight of stairs with reciprocal stepping mechanics and without pain to improve access to basement for laundry. Pt to demonstrate ability to maintain R SLS for at least 10\" with improved stability and without an increase in knee pain. Patient goals: reduce pain    Pt. Education:  [x] Yes  [] No  [] Reviewed Prior HEP/Ed  Method of Education: [x] Verbal  [x] Demo  [] Written:  Comprehension of Education:  [] Verbalizes understanding. [] Demonstrates understanding. [] Needs review. [x] Demonstrates/verbalizes HEP/Ed previously given. Access Code: [email or web address]  URL: [email or web address]  Date: 04/05/2023  Prepared by: Elle Flores    Exercises  -

## 2023-04-19 ENCOUNTER — HOSPITAL ENCOUNTER (OUTPATIENT)
Dept: PHYSICAL THERAPY | Facility: CLINIC | Age: 53
Setting detail: THERAPIES SERIES
Discharge: HOME | End: 2023-04-19
Payer: MEDICAID

## 2023-04-19 PROCEDURE — 97110 THERAPEUTIC EXERCISES: CPT

## 2023-04-19 PROCEDURE — 97016 VASOPNEUMATIC DEVICE THERAPY: CPT

## 2023-04-19 NOTE — FLOWSHEET NOTE
interventions with out incident. Pt reports sharp pain with side lying abduction however dissipates with therapist assisting scapula in upward rotation. Also provided assistance with scapular protraction and retraction during sidelying h. Abd. Pt demonstrates increased fatigue through out the session. Attempted to add in overhead lifting with much difficulty present with 2.2# ball therefore completed with 1.1 # ball to improve ability to complete. Pt would benefit from continued therapy to improve range, pain and ability to complete functional interventions. [] No change     [x] Other: Recheck completed with Primary PT this date. [x] Patient would continue to benefit from skilled physical therapy services in order to: decrease pain and increase ROM, strength, and function in L shoulder. Problems:    [x] ? Pain: 3-10/10 pain  [x] ? ROM: decreased PROM in flex/abd/ER  [x] ? Strength: NT due to no AROM at this time  [x] ? Function: 99% impairment  [x] Other:  poor posture        Goals  MET NOT MET ON-  GOING  Details   Date Addressed: 1/31/23           STG: To be met in 8 treatments            1. ? Pain: Patient will self report worst pain no greater than 3/10 in order to better tolerate ADLs/work activities with minimal dysfunction []  []  [x]  Ongoing- up to 10/10 at night     2. ? ROM: Patient will improve PROM in flexion to 125 degrees, ER at 90 degrees of ABD to at least 45 degrees and IR at 90 degrees of ABD to at least 45 degrees in order to demonstrate ability to move/reach in all planes unrestricted at PLOF [x]  []  []       3. Independent with Home Exercise Programs [x]  []  []      4. Demonstrate knowledge of fall risk prevention  [x]  []  []        []  []  []                  Date Addressed: 1/31/23           LTG: To be met in 16 treatments           1.  Improve score on assessment tool UEFS from 99% impairment to less than 65% impairment to 45% impairment or less 1/31  []  []  [x]  UEFS: 28/80 or

## 2023-04-19 NOTE — FLOWSHEET NOTE
- 10 reps  - Seated Long Arc Quad  - 1 x daily - 7 x weekly - 3 sets - 10 reps  - Sidelying Hip Abduction  - 1 x daily - 7 x weekly - 3 sets - 10 reps  - Supine Heel Slide  - 1 x daily - 7 x weekly - 3 sets - 10 reps  - Small Range Straight Leg Raise  - 1 x daily - 7 x weekly - 3 sets - 10 reps     Plan: [x] Continue current frequency toward long and short term goals.     [x] Specific Instructions for subsequent treatments: restore R knee mobility, global RLE strengthening per pt tolerance, R knee stability, vaso PRN       Time In: 12:19 pm            Time Out: 1:15 pm     Electronically signed by:  Phyllis Boothe PTA

## 2023-04-23 DIAGNOSIS — K21.9 GASTROESOPHAGEAL REFLUX DISEASE, UNSPECIFIED WHETHER ESOPHAGITIS PRESENT: ICD-10-CM

## 2023-04-24 ENCOUNTER — HOSPITAL ENCOUNTER (OUTPATIENT)
Dept: PHYSICAL THERAPY | Facility: CLINIC | Age: 53
Setting detail: THERAPIES SERIES
Discharge: HOME | End: 2023-04-24
Payer: MEDICAID

## 2023-04-24 PROCEDURE — 97110 THERAPEUTIC EXERCISES: CPT

## 2023-04-24 PROCEDURE — 97016 VASOPNEUMATIC DEVICE THERAPY: CPT

## 2023-04-24 RX ORDER — PANTOPRAZOLE SODIUM 40 MG/1
TABLET, DELAYED RELEASE ORAL
Qty: 30 TABLET | Refills: 1 | Status: SHIPPED | OUTPATIENT
Start: 2023-04-24
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
## 2023-04-24 NOTE — FLOWSHEET NOTE
[] [facility]  [address]  [phone]  F: [phone] [x] [address]  [facility] 36   [address]  P: [phone]  F: [phone] [] [place] &  Therapy  [address]  P: [phone]  F: [phone] [] [address]  P: [phone]  F: [phone] [] [address]  [facility]   [address]   [place]: [phone]  F: [phone]      Physical Therapy Daily Treatment Note    Date:  2023  Patient Name:  Yasmani Maravilla    :  1970  MRN: [id number]  Physician: Roque Downey MD     Insurance: [facility] medicaid (25v )  Medical Diagnosis: M25.561, G89.29 (ICD-10-CM) - Chronic pain of right knee  Rehab Codes: M62.81, M62.9, Z73.6, R29.3,  M25.60, Z74.0   Onset Date: 23   Next Dr. Herbert Peoples: 23  Visit# / total visits: ; Progress note for Medicare patient due at visit 12     Cancels/No Shows: 5/2(from shoulder treatments)    Subjective:    Pain:  [x] Yes  [] No Location: R knee Pain Rating: (0-10 scale) 0/10 upon arrival   Pain altered Tx:  [x] No  [] Yes  Action:  Comments: Pt arrives denying pain symptoms however, does feel \"stiff\". Pt reports her left shoulder is feeling better.      Objective:  Modalities: vasocompression R knee in supine x15' min pressure  Precautions:  Exercises: Bold completed 23   Exercise Reps/ Time Weight/ Level Comments         Nustep 5' L2 Arms and legs; inc level          Supine       Quad sets 10 5\"    SAQ 15x2 2# Added second set  ; added weight     SLR 15x  Rest breaks    HS strap stretch 3x30\"  Added    Bridges  2x10  New          Sidelying- bilateral       clamshells 2x10 blue Added band 4/12   Reverse clamshells 2x10     Hip

## 2023-04-26 ENCOUNTER — HOSPITAL ENCOUNTER (OUTPATIENT)
Dept: PHYSICAL THERAPY | Facility: CLINIC | Age: 53
Setting detail: THERAPIES SERIES
Discharge: HOME | End: 2023-04-26
Payer: MEDICAID

## 2023-04-26 PROCEDURE — 97110 THERAPEUTIC EXERCISES: CPT

## 2023-04-26 PROCEDURE — 97016 VASOPNEUMATIC DEVICE THERAPY: CPT

## 2023-04-26 NOTE — FLOWSHEET NOTE
[] [facility] &  Therapy  [address]    [phone]  F: [phone]   [] [address]  [facility] 36   [address]  P: [phone]  F: [phone]  [] [address] &  Therapy  [address]  P: [phone]  F: [phone] [] [address]  P: [phone]  F: [phone]  [] [address]  [facility]   [address]   [place]: [phone]  F: [phone]   [] [address]  [place]: [phone]   F: [phone]     Physical Therapy Cancel/No Show note    Date: 2023  Patient: Contreras Snyder  : 1970  MRN: [id number]    Cancels/No Shows to date:     For today's appointment patient:    [x]  Cancelled    [] Rescheduled appointment    [] No-show     Reason given by patient:    []  Patient ill    [x]  Conflicting appointment    [] No transportation      [] Conflict with work    [] No reason given    [] Weather related    [] COVID-19    [] Other:      Comments:        [x] Next appointment was confirmed    Electronically signed by: Santos Foster PTA

## 2023-04-26 NOTE — FLOWSHEET NOTE
[] [facility]  [address]  [phone]  F: [phone] [x] [address]  [facility] 36   [address]  P: [phone]  F: [phone] [] [place] &  Therapy  [address]  P: [phone]  F: [phone] [] [address]  P: [phone]  F: [phone] [] [address]  [facility]   [address]   [place]: [phone]  F: [phone]      Physical Therapy Daily Treatment Note    Date:  2023  Patient Name:  Demetrio Lozoya    :  1970  MRN: [id number]  Physician: Ynes Briggs MD     Insurance: [facility] [place] medicaid ()  Medical Diagnosis: M25.561, G89.29 (ICD-10-CM) - Chronic pain of right knee  Rehab Codes: M62.81, M62.9, Z73.6, R29.3,  M25.60, Z74.0   Onset Date: 23   Next Dr. Farzad Cortez: 23  Visit# / total visits: ; Progress note for Medicare patient due at visit 12     Cancels/No Shows: 5/2(from shoulder treatments)    Physician: Mando Perrin DO           Insurance: [place] [id number] (30 visits, auth after )  Medical Diagnosis:   Z98.890 (ICD-10-CM) - Status post surgery   M75.122 (ICD-10-CM) - Complete tear of left rotator cuff, unspecified whether traumatic   Rehab Codes: M62.81, M62.9, Z73.6, R29.3,  M25.60, Z74.0  Onset Date: 10/25/22 - DOS                         Next 's appt.: 23  Visit# / total visits:    ( 6 additional visits approved )               Cancels/No Shows: 5/2    Subjective:    Pain:  [x] Yes  [] No Location: R knee, bilateral shoulders  Pain Rating: (0-10 scale) minimal/10 upon arrival ; 5/10 R shoulder   Pain altered Tx:  [x] No  [] Yes  Action:  Comments: Pt arrives with minimal pain this date.  Describes increased pain at

## 2023-04-27 ENCOUNTER — HOSPITAL ENCOUNTER (OUTPATIENT)
Dept: MRI IMAGING | Age: 53
Discharge: HOME | End: 2023-04-29
Payer: MEDICAID

## 2023-04-27 PROCEDURE — 73221 MRI JOINT UPR EXTREM W/O DYE: CPT

## 2023-05-01 ENCOUNTER — OFFICE VISIT (OUTPATIENT)
Dept: ORTHOPEDIC SURGERY | Age: 53
End: 2023-05-01
Payer: MEDICAID

## 2023-05-01 VITALS — HEIGHT: 59 IN | BODY MASS INDEX: 24.48 KG/M2 | WEIGHT: 121.4 LBS

## 2023-05-01 DIAGNOSIS — M75.81 ROTATOR CUFF TENDONITIS, RIGHT: ICD-10-CM

## 2023-05-01 DIAGNOSIS — Z98.890 S/P ROTATOR CUFF REPAIR: ICD-10-CM

## 2023-05-01 DIAGNOSIS — Z98.890 HISTORY OF REPAIR OF RIGHT ROTATOR CUFF: Primary | ICD-10-CM

## 2023-05-01 DIAGNOSIS — M75.102 ROTATOR CUFF SYNDROME OF LEFT SHOULDER: ICD-10-CM

## 2023-05-01 PROCEDURE — 99214 OFFICE O/P EST MOD 30 MIN: CPT | Performed by: PHYSICIAN ASSISTANT

## 2023-05-01 RX ORDER — METHYLPREDNISOLONE 4 MG/1
TABLET ORAL
Qty: 1 KIT | Refills: 0 | Status: SHIPPED | OUTPATIENT
Start: 2023-05-01 | End: 2023-05-07

## 2023-05-01 NOTE — PROGRESS NOTES
[address]  [address]  Dept: [phone]  Dept Fax: [phone]        Ambulatory Follow Up      Subjective:   Winston Mariano is a 45y.o. year old female who presents to our office today for routine followup regarding her   1. History of repair of right rotator cuff    2. Rotator cuff tendonitis, right    3. Rotator cuff syndrome of left shoulder    4. S/P rotator cuff repair        Chief Complaint   Patient presents with    Results     MRI results right shoulder         HPI Winston Mariano  is a 45 y.o. Right hand dominant  female who presents today in follow for bilateral shoulder pain after a fall in February 2023. The patient was last seen on 3/17/2023 and underwent treatment in the form of orders for MRI of the bilateral shoulder to investigate a possible re-injury to her rotator cuffs. The patient most recently has a left shoulder  arthroscopy with rotator cuff repair on 10/25/2022 with Dr Mallory Alvarado. The patient also had multiple surgeries on her right shoulder in the past with Dr Andrew Cardona. The patient notes is here today for her MRI results of the bilateral shoulder. She does note that she is still in PT for her left shoulder and does note improvement in her shoulder discomfort when compared to her last appointment on 3/17/2023. Review of Systems   Constitutional:  Negative for activity change and fever. HENT:  Negative for sneezing. Respiratory:  Negative for cough and shortness of breath. Cardiovascular:  Negative for chest pain. Gastrointestinal:  Negative for vomiting. Musculoskeletal:  Negative for arthralgias, joint swelling and myalgias. Skin:  Negative for color change. Neurological:  Negative for weakness and numbness. Psychiatric/Behavioral:  Negative for sleep disturbance.         Objective :   Ht 4' 11\" (1.499 m)   Wt 121 lb 6.4 oz (55.1 kg)   LMP 10/21/2020   BMI 24.52

## 2023-05-03 ENCOUNTER — HOSPITAL ENCOUNTER (OUTPATIENT)
Dept: PHYSICAL THERAPY | Facility: CLINIC | Age: 53
Setting detail: THERAPIES SERIES
Discharge: HOME | End: 2023-05-03
Payer: MEDICAID

## 2023-05-03 PROCEDURE — 97110 THERAPEUTIC EXERCISES: CPT

## 2023-05-03 PROCEDURE — 97016 VASOPNEUMATIC DEVICE THERAPY: CPT

## 2023-05-03 ASSESSMENT — ENCOUNTER SYMPTOMS
SHORTNESS OF BREATH: 0
COUGH: 0
VOMITING: 0
COLOR CHANGE: 0

## 2023-05-03 NOTE — FLOWSHEET NOTE
[] [facility] [address] [facility]  [address]  [phone]  F: [phone] [x] [address]  [facility]   [address]  P: [phone]  F: [phone] [] [address]  [address]  P: [phone]  F: [phone] [] [address]  P: [phone]  F: [phone] [] [address]  [facility] [facility]   [address]   [place]: [phone]  F: [phone]      Physical Therapy Daily Treatment Note    Date:  5/3/2023  Patient Name:  Arcenio Armas    :  1970  MRN: [id number]  Physician: Howard Huynh MD     Insurance: [facility] [place] medicaid ()  Medical Diagnosis: M25.561, G89.29 (ICD-10-CM) - Chronic pain of right knee  Rehab Codes: M62.81, M62.9, Z73.6, R29.3,  M25.60, Z74.0   Onset Date: 23   Next Dr. Maribel Weeks: 23  Visit# / total visits: ; Progress note for Medicare patient due at visit 12     Cancels/No Shows: 62(from shoulder treatments)    Physician: Melita Carreon DO           Insurance: [place] [id number] (30 visits, auth after )  Medical Diagnosis:   Z98.890 (ICD-10-CM) - Status post surgery   M75.122 (ICD-10-CM) - Complete tear of left rotator cuff, unspecified whether traumatic   Rehab Codes: M62.81, M62.9, Z73.6, R29.3,  M25.60, Z74.0  Onset Date: 10/25/22 - DOS                         Next 's appt.: 23  Visit# / total visits:    ( 6 additional visits approved )               Cancels/No Shows: 6/    Subjective:    Pain:  [x] Yes  [] No Location: R knee, bilateral shoulders  Pain Rating: (0-10 scale) minimal/10 upon arrival ; 5/10 R shoulder   Pain altered Tx:  [x] No  [] Yes  Action:  Comments: Pt arrives with minimal pain this date.  Describes increased pain at

## 2023-05-17 ENCOUNTER — OFFICE VISIT (OUTPATIENT)
Dept: FAMILY MEDICINE CLINIC | Age: 53
End: 2023-05-17
Payer: MEDICAID

## 2023-05-17 ENCOUNTER — HOSPITAL ENCOUNTER (OUTPATIENT)
Dept: PHYSICAL THERAPY | Facility: CLINIC | Age: 53
Setting detail: THERAPIES SERIES
Discharge: HOME | End: 2023-05-17
Payer: MEDICAID

## 2023-05-17 VITALS
TEMPERATURE: 97.6 F | DIASTOLIC BLOOD PRESSURE: 88 MMHG | BODY MASS INDEX: 24.84 KG/M2 | SYSTOLIC BLOOD PRESSURE: 138 MMHG | WEIGHT: 123 LBS | HEART RATE: 72 BPM

## 2023-05-17 DIAGNOSIS — Z12.31 ENCOUNTER FOR SCREENING MAMMOGRAM FOR BREAST CANCER: ICD-10-CM

## 2023-05-17 DIAGNOSIS — Z00.00 HEALTH MAINTENANCE EXAMINATION: ICD-10-CM

## 2023-05-17 DIAGNOSIS — M25.562 CHRONIC PAIN OF BOTH KNEES: ICD-10-CM

## 2023-05-17 DIAGNOSIS — M25.561 CHRONIC PAIN OF BOTH KNEES: ICD-10-CM

## 2023-05-17 DIAGNOSIS — M54.50 LOW BACK PAIN, UNSPECIFIED BACK PAIN LATERALITY, UNSPECIFIED CHRONICITY, UNSPECIFIED WHETHER SCIATICA PRESENT: Primary | ICD-10-CM

## 2023-05-17 DIAGNOSIS — R73.03 PREDIABETES: ICD-10-CM

## 2023-05-17 DIAGNOSIS — G89.29 CHRONIC PAIN OF BOTH KNEES: ICD-10-CM

## 2023-05-17 LAB — HBA1C MFR BLD: 6.3 %

## 2023-05-17 PROCEDURE — 99213 OFFICE O/P EST LOW 20 MIN: CPT

## 2023-05-17 PROCEDURE — A9270 NON-COVERED ITEM OR SERVICE: HCPCS

## 2023-05-17 PROCEDURE — 99211 OFF/OP EST MAY X REQ PHY/QHP: CPT

## 2023-05-17 PROCEDURE — 83036 HEMOGLOBIN GLYCOSYLATED A1C: CPT

## 2023-05-17 RX ORDER — ACETAMINOPHEN 500 MG
500 TABLET ORAL 3 TIMES DAILY PRN
Qty: 90 TABLET | Refills: 1 | Status: SHIPPED | OUTPATIENT
Start: 2023-05-17

## 2023-05-17 RX ORDER — DULOXETIN HYDROCHLORIDE 60 MG/1
60 CAPSULE, DELAYED RELEASE ORAL DAILY
Qty: 30 CAPSULE | Refills: 3 | Status: SHIPPED | OUTPATIENT
Start: 2023-05-17 | End: 2023-05-17

## 2023-05-17 RX ORDER — DULOXETIN HYDROCHLORIDE 30 MG/1
30 CAPSULE, DELAYED RELEASE ORAL DAILY
Qty: 30 CAPSULE | Refills: 3 | Status: SHIPPED | OUTPATIENT
Start: 2023-05-17

## 2023-05-17 ASSESSMENT — ENCOUNTER SYMPTOMS
DIARRHEA: 0
VOMITING: 0
BACK PAIN: 1
APNEA: 0
NAUSEA: 0
CHEST TIGHTNESS: 0
EYE PAIN: 0
CONSTIPATION: 0
COUGH: 0
FACIAL SWELLING: 0
SORE THROAT: 0
SHORTNESS OF BREATH: 0
VOICE CHANGE: 0

## 2023-05-17 ASSESSMENT — PATIENT HEALTH QUESTIONNAIRE - PHQ9
2. FEELING DOWN, DEPRESSED OR HOPELESS: 0
SUM OF ALL RESPONSES TO PHQ QUESTIONS 1-9: 3
9. THOUGHTS THAT YOU WOULD BE BETTER OFF DEAD, OR OF HURTING YOURSELF: 0
4. FEELING TIRED OR HAVING LITTLE ENERGY: 0
8. MOVING OR SPEAKING SO SLOWLY THAT OTHER PEOPLE COULD HAVE NOTICED. OR THE OPPOSITE, BEING SO FIGETY OR RESTLESS THAT YOU HAVE BEEN MOVING AROUND A LOT MORE THAN USUAL: 0
SUM OF ALL RESPONSES TO PHQ QUESTIONS 1-9: 3
SUM OF ALL RESPONSES TO PHQ QUESTIONS 1-9: 3
1. LITTLE INTEREST OR PLEASURE IN DOING THINGS: 0
10. IF YOU CHECKED OFF ANY PROBLEMS, HOW DIFFICULT HAVE THESE PROBLEMS MADE IT FOR YOU TO DO YOUR WORK, TAKE CARE OF THINGS AT HOME, OR GET ALONG WITH OTHER PEOPLE: 0
7. TROUBLE CONCENTRATING ON THINGS, SUCH AS READING THE NEWSPAPER OR WATCHING TELEVISION: 1
SUM OF ALL RESPONSES TO PHQ9 QUESTIONS 1 & 2: 0
5. POOR APPETITE OR OVEREATING: 1
6. FEELING BAD ABOUT YOURSELF - OR THAT YOU ARE A FAILURE OR HAVE LET YOURSELF OR YOUR FAMILY DOWN: 0
SUM OF ALL RESPONSES TO PHQ QUESTIONS 1-9: 3
3. TROUBLE FALLING OR STAYING ASLEEP: 1

## 2023-05-17 NOTE — PROGRESS NOTES
138Visit Information    Have you changed or started any medications since your last visit including any over-the-counter medicines, vitamins, or herbal medicines? no   Have you stopped taking any of your medications? Is so, why? -  no  Are you having any side effects from any of your medications? - no    Have you seen any other physician or provider since your last visit?  no   Have you had any other diagnostic tests since your last visit?  no   Have you been seen in the emergency room and/or had an admission in a hospital since we last saw you?  no   Have you had your routine dental cleaning in the past 6 months?  no     Do you have an active MyChart account? If no, what is the barrier?   No:     Patient Care Team:  Kan Vanegas MD as PCP - General (Family Medicine)    Medical History Review  Past Medical, Family, and Social History reviewed and does not contribute to the patient presenting condition    Health Maintenance   Topic Date Due    COVID-19 Vaccine (1) Never done    Pneumococcal 0-64 years Vaccine (1 - PCV) Never done    Diabetic foot exam  Never done    Diabetic retinal exam  Never done    Hepatitis B vaccine (1 of 3 - Risk 3-dose series) Never done    Shingles vaccine (1 of 2) Never done    Cervical cancer screen  04/20/2020    Diabetic Alb to Cr ratio (uACR) test  12/22/2021    Depression Monitoring  01/31/2023    Breast cancer screen  03/04/2023    A1C test (Diabetic or Prediabetic)  07/28/2023    Flu vaccine (Season Ended) 08/01/2023    Lipids  10/19/2023    GFR test (Diabetes, CKD 3-4, OR last GFR 15-59)  10/19/2023    DTaP/Tdap/Td vaccine (2 - Td or Tdap) 03/17/2025    Colorectal Cancer Screen  03/08/2031    Hepatitis C screen  Completed    HIV screen  Completed    Hepatitis A vaccine  Aged Out    Hib vaccine  Aged Out    Meningococcal (ACWY) vaccine  Aged Out    Depression Screen  Discontinued
Attending Physician Statement  I have discussed the care of CynthiaGrantincluding pertinent history and exam findings,  with the resident. I have reviewed the key elements of all parts of the encounter with the resident. I agree with the assessment, plan and orders as documented by the resident. (GE Modifier)    Chronic LBP- sees pain management/xray  HM-ordered  Prediabetes- Life Style Modification- Diet and Exercise discussed.
understanding of this note.

## 2023-05-17 NOTE — PATIENT INSTRUCTIONS
Thank you for letting us take care of you today. We hope all your questions were addressed. If a question was overlooked or something else comes to mind after you return home, please contact a member of your Care Team listed below. Your Care Team at [place] [address] is Team #4  Jocelynn uMstafa MD (Faculty)  Merle Moore MD (Resident)  Abhinav Mcgowan MD (Resident)  Enoch Goncalves MD (Resident)  Brenda Hinkle MD (Resident)  IVY Hogan RMA [employer] [facility] office)  Dusty Peterson, [address] (Clinical Practice Manager)  [facility] (Clinical Pharmacist)       Office phone number: [phone]    If you need to get in right away due to illness, please be advised we have \"Same Day\" appointments available Monday-Friday. Please call us at [phone] option #3 to schedule your \"Same Day\" appointment.

## 2023-05-17 NOTE — FLOWSHEET NOTE
[] [address]  [address]    [phone]  F: [phone]   [x] [address] [facility] [address]   [address]  P: [phone]  F: [phone]  [] [address] &  Therapy  [address]  P: [phone]  F: [phone] [] [address]  P: [phone]  F: [phone]  [] [address]  [address] [facility] [address]   [address]   [place]: [phone]  F: [phone]   [] [place] [address] [facility] [address]  [place]: [phone]   F: [phone]     Physical Therapy Cancel/No Show note    Date: 2023  Patient: Madeline Guzman  : 1970  MRN: [id number]    Cancels/No Shows to date: 7/3    For today's appointment patient:    [x]  Cancelled    [] Rescheduled appointment    [] No-show     Reason given by patient:    []  Patient ill    []  Conflicting appointment    [] No transportation      [] Conflict with work    [x] No reason given    [] Weather related    [] COVID-19    [x] Other:      Comments:  Patient 5 mins prior to appt to cancel. Please review policy next and only schedule one visit.        [x] Next appointment was confirmed    Electronically signed by: Negro Hasnen PTA

## 2023-05-24 ENCOUNTER — HOSPITAL ENCOUNTER (OUTPATIENT)
Dept: PHYSICAL THERAPY | Facility: CLINIC | Age: 53
Setting detail: THERAPIES SERIES
Discharge: HOME | End: 2023-05-24
Payer: MEDICAID

## 2023-05-24 PROCEDURE — 97161 PT EVAL LOW COMPLEX 20 MIN: CPT

## 2023-05-24 PROCEDURE — 97110 THERAPEUTIC EXERCISES: CPT

## 2023-05-24 NOTE — DISCHARGE SUMMARY
Knee Flex 125 125   Ext 2 hyper  2 hyper   Ankle DF 5 5   PF         Function:  Test:   LEFI Score: 44/80 or 45% impaired at 10th visit         STG: (to be met in 6 treatments) Reassessed by primary PT 5/24  ? Pain: Decrease R knee pain levels to 7/10 or less to ease ADL progression. MET  ? ROM: Increase R knee AROM limitations throughout to equal bilat to reduce difficulty with ADLs. MET  Pt to improve R ankle DF AROM to at least 5 degrees to improve heel-toe progression during gait mechanics. MET   ? Strength: Increase R hip, knee, and ankle strength to at least 4+/5 globally to improve tolerance to standing, walking, and stair climbing. MET   ? Function: Pt to demonstrate improved functional strength with ability to perform at least 5 bilateral squats with improved mechanics and without associated increase in pain. MET  Pt to be independent and compliant with Home Exercise Programs with ability to demonstrate exercises without cueing for technique. MET        LTG: (to be met in 12 treatments) Reassessed by primary PT 5/24  Improve score on assessment tool LEFI  to 50% impaired or less to demonstrate improved functional mobility. MET- 45% impaired this date    Reduce R knee pain levels to 4/10 or less to improve independence with all daily activities and household chores including cooking and cleaning Ongoing - intermittent increases   Pt to demonstrate ability to ambulate at least 300' with improved gait mechanics and without an associated increase in R knee pain to ease community access. Partially met- decreased gait speed   Pt to demonstrate ability to ascend/descend a full flight of stairs with reciprocal stepping mechanics and without pain to improve access to basement for laundry. MET  Pt to demonstrate ability to maintain R SLS for at least 10\" with improved stability and without an increase in knee pain.  Not met- pt attributes inability to maintain SLS without UE assist due to low back pain

## 2023-05-24 NOTE — CONSULTS
[] [facility] &  Therapy  [address]  [phone]  F: [phone] [x] [address]  [employer] [address]   [address]  P: [phone]  F: [phone] [] [address] &  Therapy  [address]  P: [phone]  F: [phone] [] [address]  P: [phone]  F: [phone] [] [address]  [facility]   [address]   [place]: [phone]  F: [phone]      Physical Therapy Upper Extremity Evaluation    Date:  2023  Patient: Leda Curry  : 1970  MRN: [id number]  Physician: Royal Arriaza PA-C  Insurance: [facility] Medicaid Faheem Ditch after 30v)  Medical Diagnosis:   Z98.890 (ICD-10-CM) - History of repair of right rotator cuff   M75.81 (ICD-10-CM) - Rotator cuff tendonitis, right   M75.102 (ICD-10-CM) - Rotator cuff syndrome of left shoulder   Z98.890 (ICD-10-CM) - S/P rotator cuff repair   Rehab Codes: M75.81, M75.102, M62.81, R29.3  Onset Date: 23    Next 's appt. : tbd     Subjective:   CC/HPI: 45 y.o. female presents to physical therapy with R shoulder pain sustained during a fall on 23. Pt is a current pt following for L RTC repair and R knee pain that was caused from the same fall. Pt reports R knee pain has mostly resolved and plans to be discharged from this episode of care. R shoulder pain is described as constant. Difficulty performing ADLs and sleeping due to R shoulder pain. Pt describes popping and clicking through R shoulder. Denies N/T. Pt with history of R shoulder arthroscopy in 2018.        Past Medical History:   Diagnosis Date    Anemia     Anxiety     Brain aneurysm     had two, had one coiled and they are watching the other    Chronic right shoulder pain     on Lyrica    Depression

## 2023-05-31 ENCOUNTER — HOSPITAL ENCOUNTER (OUTPATIENT)
Dept: PHYSICAL THERAPY | Facility: CLINIC | Age: 53
Setting detail: THERAPIES SERIES
Discharge: HOME | End: 2023-05-31
Payer: MEDICAID

## 2023-05-31 PROCEDURE — 97110 THERAPEUTIC EXERCISES: CPT

## 2023-05-31 NOTE — FLOWSHEET NOTE
[] [facility]) - [facility] &  Therapy  [address]  [phone]  F: [phone] [x] [address]  [facility] [address]   [address]  P: [phone]  F: [phone] [] [place] &  Therapy  [address]  P: [phone]  F: [phone] [] [address]  P: [phone]  F: [phone] [] [address]  [facility]   [address]   [place]: [phone]  F: [phone]      Physical Therapy Daily Treatment Note    Date:  2023  Patient Name:  Dean Garcia    :  1970  MRN: [id number]  Physician: Marvin Fletcher PA-C                   Insurance: [facility] Medicaid Metta Acre after 30v)  Medical Diagnosis:   Z98.890 (ICD-10-CM) - History of repair of right rotator cuff   M75.81 (ICD-10-CM) - Rotator cuff tendonitis, right   M75.102 (ICD-10-CM) - Rotator cuff syndrome of left shoulder   Z98.890 (ICD-10-CM) - S/P rotator cuff repair   Rehab Codes: M75.81, M75.102, M62.81, R29.3  Onset Date: 23                                       Next Dr.'s appt. : tbd   Visit# / total visits: 2/;    Cancels/No Shows: 0/0    Subjective:    Pain:  [] Yes  [x] No Location:  bilateral shoulders  Pain Rating: (0-10 scale) 0/10  Pain altered Tx:  [x] No  [] Yes  Action:  Comments: Pt reports minimal shouder pain and that she feels good today. Objective:      Todays Treatment:                                        Exercises: all completed bilaterally   Exercise       Reps/ Time Weight/ Level Comments   Pulleys  2/2  added           Supine         PROM x   All planes          Cane exercises       flexion  10x  2#  added    protraction 10x 2# added     horizontal abd  10x  2#  added               Prone         Rows 10x  2# added    flexion 10x

## 2023-06-01 ENCOUNTER — HOSPITAL ENCOUNTER (OUTPATIENT)
Dept: GENERAL RADIOLOGY | Age: 53
Discharge: HOME | End: 2023-06-03
Payer: MEDICAID

## 2023-06-01 ENCOUNTER — HOSPITAL ENCOUNTER (OUTPATIENT)
Age: 53
Discharge: HOME | End: 2023-06-03
Payer: MEDICAID

## 2023-06-01 ENCOUNTER — HOSPITAL ENCOUNTER (OUTPATIENT)
Age: 53
Discharge: HOME | End: 2023-06-01
Payer: MEDICAID

## 2023-06-01 DIAGNOSIS — R73.03 PREDIABETES: ICD-10-CM

## 2023-06-01 DIAGNOSIS — M54.50 LOW BACK PAIN, UNSPECIFIED BACK PAIN LATERALITY, UNSPECIFIED CHRONICITY, UNSPECIFIED WHETHER SCIATICA PRESENT: ICD-10-CM

## 2023-06-01 PROCEDURE — 72100 X-RAY EXAM L-S SPINE 2/3 VWS: CPT

## 2023-06-01 PROCEDURE — 82043 UR ALBUMIN QUANTITATIVE: CPT

## 2023-06-01 PROCEDURE — 36415 COLL VENOUS BLD VENIPUNCTURE: CPT

## 2023-06-01 PROCEDURE — 82570 ASSAY OF URINE CREATININE: CPT

## 2023-06-02 LAB
CREAT UR-MCNC: 198.3 MG/DL (ref 28–217)
MICROALBUMIN UR-MCNC: 13 MG/L
MICROALBUMIN/CREAT UR-RTO: 7 MCG/MG CREAT

## 2023-06-07 ENCOUNTER — HOSPITAL ENCOUNTER (OUTPATIENT)
Dept: PHYSICAL THERAPY | Facility: CLINIC | Age: 53
Setting detail: THERAPIES SERIES
Discharge: HOME | End: 2023-06-07
Payer: MEDICAID

## 2023-06-07 PROCEDURE — 97110 THERAPEUTIC EXERCISES: CPT

## 2023-06-07 NOTE — FLOWSHEET NOTE
tasks. Pt to improve R shoulder elevation to at least 165 degrees actively and IR/ER to at least 75 degrees actively to ease difficulty with grooming and dressing tasks  Pt to be independent and compliant with Home Exercise Programs with ability to demonstrate without cueing for appropriate technique. Pt. Education:  [x] Yes  [] No  [x] Reviewed Prior HEP/Ed  Method of Education: [x] Verbal  [x] Demo  [x] Written  Comprehension of Education:  [x] Verbalizes understanding. [x] Demonstrates understanding. [] Needs review. [x] Demonstrates/verbalizes HEP/Ed previously given. Plan: [x] Continue current frequency toward long and short term goals.     [x] Specific Instructions for subsequent treatments: progress as able       Time In: 1:04 pm            Time Out: 1:53 pm    Electronically signed by:  Valorie Simpson PTA

## 2023-06-21 ENCOUNTER — TELEPHONE (OUTPATIENT)
Dept: FAMILY MEDICINE CLINIC | Age: 53
End: 2023-06-21

## 2023-06-21 ENCOUNTER — HOSPITAL ENCOUNTER (OUTPATIENT)
Dept: PHYSICAL THERAPY | Facility: CLINIC | Age: 53
Setting detail: THERAPIES SERIES
Discharge: HOME | End: 2023-06-21
Payer: MEDICAID

## 2023-06-21 PROCEDURE — 97110 THERAPEUTIC EXERCISES: CPT

## 2023-06-21 NOTE — FLOWSHEET NOTE
[] [facility]) [facility] &  Therapy  [address]  [phone]  F: [phone] [x] [address]  [place] [address]   [address]  P: [phone]  F: [phone] [] [address]  [address]  P: [phone]  F: [phone] [] [address]  P: [phone]  F: [phone] [] [address]  [facility]   [address]   [place]: [phone]  F: [phone]      Physical Therapy Daily Treatment Note    Date:  2023  Patient Name:  Hoa Chinchilla    :  1970  MRN: [id number]  Physician: Jeri Devine PA-C                   Insurance: [facility] Medicaid Daryll Porter after 30v)  Medical Diagnosis:   Z98.890 (ICD-10-CM) - History of repair of right rotator cuff   M75.81 (ICD-10-CM) - Rotator cuff tendonitis, right   M75.102 (ICD-10-CM) - Rotator cuff syndrome of left shoulder   Z98.890 (ICD-10-CM) - S/P rotator cuff repair   Rehab Codes: M75.81, M75.102, M62.81, R29.3  Onset Date: 23                                       Next 's appt. : tbd   Visit# / total visits: ;    Cancels/No Shows: 0/0    Subjective:    Pain:  [x] Yes  [] No  Location:  bilateral shoulders   Pain Rating: (0-10 scale) not rated/10  Pain altered Tx:  [x] No  [] Yes  Action:  Comments: Pt arrived to physical therapy with c/o increased B shoulder pain without giving a pain rating number. Pt reported has only been able to do pendulum at home due to high pain level. Pt also c/o R knee pain that has progressively worsen. Objective:   Todays Treatment:                                        Exercises: all completed bilaterally bold completed  Exercise       Reps/ Time Weight/ Level Comments   UBE 2/ L1 Added    Pulleys  2/2  added           Supine

## 2023-06-28 ENCOUNTER — HOSPITAL ENCOUNTER (OUTPATIENT)
Dept: PHYSICAL THERAPY | Facility: CLINIC | Age: 53
Setting detail: THERAPIES SERIES
Discharge: HOME | End: 2023-06-28
Payer: MEDICAID

## 2023-06-28 PROCEDURE — 97110 THERAPEUTIC EXERCISES: CPT

## 2023-07-27 ENCOUNTER — OFFICE VISIT (OUTPATIENT)
Dept: FAMILY MEDICINE CLINIC | Age: 53
End: 2023-07-27
Payer: MEDICAID

## 2023-07-27 VITALS
DIASTOLIC BLOOD PRESSURE: 73 MMHG | BODY MASS INDEX: 33.47 KG/M2 | TEMPERATURE: 97.4 F | HEIGHT: 59 IN | SYSTOLIC BLOOD PRESSURE: 122 MMHG | HEART RATE: 59 BPM | WEIGHT: 166 LBS

## 2023-07-27 DIAGNOSIS — M12.811 ROTATOR CUFF ARTHROPATHY OF RIGHT SHOULDER: Primary | ICD-10-CM

## 2023-07-27 DIAGNOSIS — Z12.31 ENCOUNTER FOR SCREENING MAMMOGRAM FOR BREAST CANCER: ICD-10-CM

## 2023-07-27 PROCEDURE — 99213 OFFICE O/P EST LOW 20 MIN: CPT

## 2023-07-27 RX ORDER — FLUOXETINE HYDROCHLORIDE 20 MG/1
20 CAPSULE ORAL DAILY
COMMUNITY
Start: 2023-04-23

## 2023-07-27 RX ORDER — DULOXETIN HYDROCHLORIDE 60 MG/1
60 CAPSULE, DELAYED RELEASE ORAL DAILY
COMMUNITY
Start: 2023-07-24

## 2023-07-27 RX ORDER — METHYLPREDNISOLONE 4 MG/1
TABLET ORAL
COMMUNITY
Start: 2023-07-25

## 2023-07-27 RX ORDER — ONDANSETRON 4 MG/1
4 TABLET, ORALLY DISINTEGRATING ORAL EVERY 8 HOURS PRN
COMMUNITY
Start: 2023-02-21

## 2023-07-27 RX ORDER — PREDNISONE 10 MG/1
10 TABLET ORAL 2 TIMES DAILY
Qty: 10 TABLET | Refills: 0 | Status: SHIPPED | OUTPATIENT
Start: 2023-07-27 | End: 2023-08-01

## 2023-07-27 RX ORDER — PREGABALIN 75 MG/1
CAPSULE ORAL
COMMUNITY
Start: 2023-07-24

## 2023-07-27 RX ORDER — OXYCODONE AND ACETAMINOPHEN 7.5; 325 MG/1; MG/1
TABLET ORAL
COMMUNITY
Start: 2023-07-24

## 2023-07-27 SDOH — ECONOMIC STABILITY: FOOD INSECURITY: WITHIN THE PAST 12 MONTHS, YOU WORRIED THAT YOUR FOOD WOULD RUN OUT BEFORE YOU GOT MONEY TO BUY MORE.: NEVER TRUE

## 2023-07-27 SDOH — ECONOMIC STABILITY: HOUSING INSECURITY
IN THE LAST 12 MONTHS, WAS THERE A TIME WHEN YOU DID NOT HAVE A STEADY PLACE TO SLEEP OR SLEPT IN A SHELTER (INCLUDING NOW)?: NO

## 2023-07-27 SDOH — ECONOMIC STABILITY: INCOME INSECURITY: HOW HARD IS IT FOR YOU TO PAY FOR THE VERY BASICS LIKE FOOD, HOUSING, MEDICAL CARE, AND HEATING?: NOT VERY HARD

## 2023-07-27 SDOH — ECONOMIC STABILITY: FOOD INSECURITY: WITHIN THE PAST 12 MONTHS, THE FOOD YOU BOUGHT JUST DIDN'T LAST AND YOU DIDN'T HAVE MONEY TO GET MORE.: NEVER TRUE

## 2023-07-27 ASSESSMENT — PATIENT HEALTH QUESTIONNAIRE - PHQ9
SUM OF ALL RESPONSES TO PHQ QUESTIONS 1-9: 3
SUM OF ALL RESPONSES TO PHQ9 QUESTIONS 1 & 2: 3
2. FEELING DOWN, DEPRESSED OR HOPELESS: 3
1. LITTLE INTEREST OR PLEASURE IN DOING THINGS: 0
SUM OF ALL RESPONSES TO PHQ QUESTIONS 1-9: 3

## 2023-07-27 ASSESSMENT — ENCOUNTER SYMPTOMS
GASTROINTESTINAL NEGATIVE: 1
EYES NEGATIVE: 1
RESPIRATORY NEGATIVE: 1

## 2023-07-27 NOTE — PROGRESS NOTES
Visit Information    Have you changed or started any medications since your last visit including any over-the-counter medicines, vitamins, or herbal medicines? no   Have you stopped taking any of your medications? Is so, why? -  yes - see list  Are you having any side effects from any of your medications? - no    Have you seen any other physician or provider since your last visit?  no   Have you had any other diagnostic tests since your last visit?  no   Have you been seen in the emergency room and/or had an admission in a hospital since we last saw you?  no   Have you had your routine dental cleaning in the past 6 months?  no     Do you have an active MyChart account? If no, what is the barrier?   Yes    Patient Care Team:  Rodrigo Wetzel MD as PCP - General (Family Medicine)    Medical History Review  Past Medical, Family, and Social History reviewed and does not contribute to the patient presenting condition    Health Maintenance   Topic Date Due    COVID-19 Vaccine (1) Never done    Pneumococcal 0-64 years Vaccine (1 - PCV) Never done    Diabetic foot exam  Never done    Diabetic retinal exam  Never done    Hepatitis B vaccine (1 of 3 - Risk 3-dose series) Never done    Shingles vaccine (1 of 2) Never done    Cervical cancer screen  04/20/2020    Breast cancer screen  03/04/2023    Flu vaccine (1) 08/01/2023    Lipids  10/19/2023    GFR test (Diabetes, CKD 3-4, OR last GFR 15-59)  10/19/2023    A1C test (Diabetic or Prediabetic)  05/17/2024    Depression Monitoring  05/17/2024    Diabetic Alb to Cr ratio (uACR) test  06/01/2024    DTaP/Tdap/Td vaccine (2 - Td or Tdap) 03/17/2025    Colorectal Cancer Screen  03/08/2031    Hepatitis C screen  Completed    HIV screen  Completed    Hepatitis A vaccine  Aged Out    Hib vaccine  Aged Out    Meningococcal (ACWY) vaccine  Aged Out    Depression Screen  Discontinued

## 2023-08-26 PROBLEM — Z12.31 ENCOUNTER FOR SCREENING MAMMOGRAM FOR BREAST CANCER: Status: RESOLVED | Noted: 2023-07-27 | Resolved: 2023-08-26

## 2023-12-06 ENCOUNTER — APPOINTMENT (OUTPATIENT)
Dept: GENERAL RADIOLOGY | Age: 53
End: 2023-12-06

## 2023-12-06 ENCOUNTER — HOSPITAL ENCOUNTER (EMERGENCY)
Age: 53
Discharge: HOME | End: 2023-12-06
Attending: STUDENT IN AN ORGANIZED HEALTH CARE EDUCATION/TRAINING PROGRAM

## 2023-12-06 VITALS
BODY MASS INDEX: 24.19 KG/M2 | RESPIRATION RATE: 16 BRPM | HEART RATE: 66 BPM | HEIGHT: 59 IN | DIASTOLIC BLOOD PRESSURE: 94 MMHG | TEMPERATURE: 98 F | OXYGEN SATURATION: 99 % | WEIGHT: 120 LBS | SYSTOLIC BLOOD PRESSURE: 175 MMHG

## 2023-12-06 DIAGNOSIS — I10 HYPERTENSION, UNSPECIFIED TYPE: ICD-10-CM

## 2023-12-06 DIAGNOSIS — R07.9 CHEST PAIN, UNSPECIFIED TYPE: Primary | ICD-10-CM

## 2023-12-06 LAB
ANION GAP SERPL CALCULATED.3IONS-SCNC: 10 MMOL/L (ref 9–17)
BASOPHILS # BLD: 0.05 K/UL (ref 0–0.2)
BASOPHILS NFR BLD: 1 % (ref 0–2)
BNP SERPL-MCNC: <36 PG/ML
BUN SERPL-MCNC: 15 MG/DL (ref 6–20)
BUN/CREAT SERPL: 17 (ref 9–20)
CALCIUM SERPL-MCNC: 9.6 MG/DL (ref 8.6–10.4)
CHLORIDE SERPL-SCNC: 104 MMOL/L (ref 98–107)
CO2 SERPL-SCNC: 26 MMOL/L (ref 20–31)
CREAT SERPL-MCNC: 0.9 MG/DL (ref 0.5–0.9)
EKG ATRIAL RATE: 67 BPM
EKG P AXIS: 57 DEGREES
EKG P-R INTERVAL: 174 MS
EKG Q-T INTERVAL: 362 MS
EKG QRS DURATION: 70 MS
EKG QTC CALCULATION (BAZETT): 382 MS
EKG R AXIS: -12 DEGREES
EKG T AXIS: 39 DEGREES
EKG VENTRICULAR RATE: 67 BPM
EOSINOPHIL # BLD: 0.07 K/UL (ref 0–0.44)
EOSINOPHILS RELATIVE PERCENT: 1 % (ref 1–4)
ERYTHROCYTE [DISTWIDTH] IN BLOOD BY AUTOMATED COUNT: 14 % (ref 11.8–14.4)
FLUAV RNA RESP QL NAA+PROBE: NOT DETECTED
FLUBV RNA RESP QL NAA+PROBE: NOT DETECTED
GFR SERPL CREATININE-BSD FRML MDRD: >60 ML/MIN/1.73M2
GLUCOSE SERPL-MCNC: 108 MG/DL (ref 70–99)
HCT VFR BLD AUTO: 38.8 % (ref 36.3–47.1)
HGB BLD-MCNC: 12.5 G/DL (ref 11.9–15.1)
IMM GRANULOCYTES # BLD AUTO: 0.02 K/UL (ref 0–0.3)
IMM GRANULOCYTES NFR BLD: 0 %
LYMPHOCYTES NFR BLD: 2.91 K/UL (ref 1.1–3.7)
LYMPHOCYTES RELATIVE PERCENT: 34 % (ref 24–43)
MCH RBC QN AUTO: 28.2 PG (ref 25.2–33.5)
MCHC RBC AUTO-ENTMCNC: 32.2 G/DL (ref 28.4–34.8)
MCV RBC AUTO: 87.4 FL (ref 82.6–102.9)
MONOCYTES NFR BLD: 0.44 K/UL (ref 0.1–1.2)
MONOCYTES NFR BLD: 5 % (ref 3–12)
NEUTROPHILS NFR BLD: 59 % (ref 36–65)
NEUTS SEG NFR BLD: 5.13 K/UL (ref 1.5–8.1)
NRBC BLD-RTO: 0 PER 100 WBC
PLATELET # BLD AUTO: 279 K/UL (ref 138–453)
PMV BLD AUTO: 9.3 FL (ref 8.1–13.5)
POTASSIUM SERPL-SCNC: 3.6 MMOL/L (ref 3.7–5.3)
RBC # BLD AUTO: 4.44 M/UL (ref 3.95–5.11)
SARS-COV-2 RNA RESP QL NAA+PROBE: NOT DETECTED
SODIUM SERPL-SCNC: 140 MMOL/L (ref 135–144)
SOURCE: NORMAL
SPECIMEN DESCRIPTION: NORMAL
TROPONIN I SERPL HS-MCNC: <6 NG/L (ref 0–14)
TROPONIN I SERPL HS-MCNC: <6 NG/L (ref 0–14)
WBC OTHER # BLD: 8.6 K/UL (ref 3.5–11.3)

## 2023-12-06 PROCEDURE — 84484 ASSAY OF TROPONIN QUANT: CPT

## 2023-12-06 PROCEDURE — 85025 COMPLETE CBC W/AUTO DIFF WBC: CPT

## 2023-12-06 PROCEDURE — 93005 ELECTROCARDIOGRAM TRACING: CPT | Performed by: STUDENT IN AN ORGANIZED HEALTH CARE EDUCATION/TRAINING PROGRAM

## 2023-12-06 PROCEDURE — 87636 SARSCOV2 & INF A&B AMP PRB: CPT

## 2023-12-06 PROCEDURE — 71045 X-RAY EXAM CHEST 1 VIEW: CPT

## 2023-12-06 PROCEDURE — 93010 ELECTROCARDIOGRAM REPORT: CPT | Performed by: INTERNAL MEDICINE

## 2023-12-06 PROCEDURE — 80048 BASIC METABOLIC PNL TOTAL CA: CPT

## 2023-12-06 PROCEDURE — 83880 ASSAY OF NATRIURETIC PEPTIDE: CPT

## 2023-12-06 PROCEDURE — 99285 EMERGENCY DEPT VISIT HI MDM: CPT

## 2023-12-06 RX ORDER — KETOROLAC TROMETHAMINE 30 MG/ML
30 INJECTION, SOLUTION INTRAMUSCULAR; INTRAVENOUS ONCE
Status: DISCONTINUED | OUTPATIENT
Start: 2023-12-06 | End: 2023-12-06 | Stop reason: HOSPADM

## 2023-12-06 NOTE — ED NOTES
Pt presents to ED via private auto with c/o mid-sternal chest pain, onset 2200 yesterday. Pt denies cardiac hx. Pt denies SOB. Even, non-labored breathing. Pt able to ambulate without assist. Pt alert and oriented x4.       Tello Roman RN  12/06/23 4138
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
## 2024-03-01 ENCOUNTER — HOSPITAL ENCOUNTER (EMERGENCY)
Age: 54
Discharge: HOME | End: 2024-03-01
Attending: EMERGENCY MEDICINE
Payer: COMMERCIAL

## 2024-03-01 ENCOUNTER — APPOINTMENT (OUTPATIENT)
Dept: CT IMAGING | Age: 54
End: 2024-03-01
Payer: COMMERCIAL

## 2024-03-01 VITALS
DIASTOLIC BLOOD PRESSURE: 82 MMHG | HEART RATE: 74 BPM | RESPIRATION RATE: 18 BRPM | WEIGHT: 124 LBS | SYSTOLIC BLOOD PRESSURE: 176 MMHG | TEMPERATURE: 98.1 F | BODY MASS INDEX: 25.04 KG/M2 | OXYGEN SATURATION: 100 %

## 2024-03-01 DIAGNOSIS — R19.7 NAUSEA VOMITING AND DIARRHEA: Primary | ICD-10-CM

## 2024-03-01 DIAGNOSIS — R11.2 NAUSEA VOMITING AND DIARRHEA: Primary | ICD-10-CM

## 2024-03-01 LAB
ALBUMIN SERPL-MCNC: 4.7 G/DL (ref 3.5–5.2)
ALP SERPL-CCNC: 130 U/L (ref 35–104)
ALT SERPL-CCNC: 12 U/L (ref 5–33)
ANION GAP SERPL CALCULATED.3IONS-SCNC: 13 MMOL/L (ref 9–17)
AST SERPL-CCNC: 19 U/L
BACTERIA URNS QL MICRO: ABNORMAL
BASOPHILS # BLD: 0.03 K/UL (ref 0–0.2)
BASOPHILS NFR BLD: 0 % (ref 0–2)
BILIRUB SERPL-MCNC: 0.5 MG/DL (ref 0.3–1.2)
BILIRUB UR QL STRIP: NEGATIVE
BUN SERPL-MCNC: 17 MG/DL (ref 6–20)
BUN/CREAT SERPL: 21 (ref 9–20)
CALCIUM SERPL-MCNC: 9.8 MG/DL (ref 8.6–10.4)
CHLORIDE SERPL-SCNC: 98 MMOL/L (ref 98–107)
CLARITY UR: CLEAR
CO2 SERPL-SCNC: 24 MMOL/L (ref 20–31)
COLOR UR: YELLOW
CREAT SERPL-MCNC: 0.8 MG/DL (ref 0.5–0.9)
EOSINOPHIL # BLD: <0.03 K/UL (ref 0–0.44)
EOSINOPHILS RELATIVE PERCENT: 0 % (ref 1–4)
EPI CELLS #/AREA URNS HPF: ABNORMAL /HPF (ref 0–5)
ERYTHROCYTE [DISTWIDTH] IN BLOOD BY AUTOMATED COUNT: 13.7 % (ref 11.8–14.4)
GFR SERPL CREATININE-BSD FRML MDRD: >60 ML/MIN/1.73M2
GLUCOSE SERPL-MCNC: 109 MG/DL (ref 70–99)
GLUCOSE UR STRIP-MCNC: NEGATIVE MG/DL
HCT VFR BLD AUTO: 41.1 % (ref 36.3–47.1)
HGB BLD-MCNC: 13.3 G/DL (ref 11.9–15.1)
HGB UR QL STRIP.AUTO: NEGATIVE
IMM GRANULOCYTES # BLD AUTO: 0.03 K/UL (ref 0–0.3)
IMM GRANULOCYTES NFR BLD: 0 %
KETONES UR STRIP-MCNC: ABNORMAL MG/DL
LACTATE BLDV-SCNC: 1.1 MMOL/L (ref 0.5–2.2)
LEUKOCYTE ESTERASE UR QL STRIP: NEGATIVE
LIPASE SERPL-CCNC: 41 U/L (ref 13–60)
LYMPHOCYTES NFR BLD: 1.88 K/UL (ref 1.1–3.7)
LYMPHOCYTES RELATIVE PERCENT: 15 % (ref 24–43)
MCH RBC QN AUTO: 27.7 PG (ref 25.2–33.5)
MCHC RBC AUTO-ENTMCNC: 32.4 G/DL (ref 28.4–34.8)
MCV RBC AUTO: 85.4 FL (ref 82.6–102.9)
MONOCYTES NFR BLD: 0.6 K/UL (ref 0.1–1.2)
MONOCYTES NFR BLD: 5 % (ref 3–12)
NEUTROPHILS NFR BLD: 80 % (ref 36–65)
NEUTS SEG NFR BLD: 9.9 K/UL (ref 1.5–8.1)
NITRITE UR QL STRIP: NEGATIVE
NRBC BLD-RTO: 0 PER 100 WBC
PH UR STRIP: 6 [PH] (ref 5–8)
PLATELET # BLD AUTO: 298 K/UL (ref 138–453)
PMV BLD AUTO: 9.4 FL (ref 8.1–13.5)
POTASSIUM SERPL-SCNC: 3.8 MMOL/L (ref 3.7–5.3)
PROT SERPL-MCNC: 8.2 G/DL (ref 6.4–8.3)
PROT UR STRIP-MCNC: NEGATIVE MG/DL
RBC # BLD AUTO: 4.81 M/UL (ref 3.95–5.11)
RBC #/AREA URNS HPF: ABNORMAL /HPF (ref 0–2)
SODIUM SERPL-SCNC: 135 MMOL/L (ref 135–144)
SP GR UR STRIP: 1.01 (ref 1–1.03)
UROBILINOGEN UR STRIP-ACNC: NORMAL EU/DL (ref 0–1)
WBC #/AREA URNS HPF: ABNORMAL /HPF (ref 0–5)
WBC OTHER # BLD: 12.4 K/UL (ref 3.5–11.3)

## 2024-03-01 PROCEDURE — 80053 COMPREHEN METABOLIC PANEL: CPT

## 2024-03-01 PROCEDURE — 2580000003 HC RX 258

## 2024-03-01 PROCEDURE — 6360000002 HC RX W HCPCS

## 2024-03-01 PROCEDURE — 74176 CT ABD & PELVIS W/O CONTRAST: CPT

## 2024-03-01 PROCEDURE — 96374 THER/PROPH/DIAG INJ IV PUSH: CPT

## 2024-03-01 PROCEDURE — 85025 COMPLETE CBC W/AUTO DIFF WBC: CPT

## 2024-03-01 PROCEDURE — 83690 ASSAY OF LIPASE: CPT

## 2024-03-01 PROCEDURE — 70450 CT HEAD/BRAIN W/O DYE: CPT

## 2024-03-01 PROCEDURE — 36415 COLL VENOUS BLD VENIPUNCTURE: CPT

## 2024-03-01 PROCEDURE — 81001 URINALYSIS AUTO W/SCOPE: CPT

## 2024-03-01 PROCEDURE — 6360000004 HC RX CONTRAST MEDICATION

## 2024-03-01 PROCEDURE — 96375 TX/PRO/DX INJ NEW DRUG ADDON: CPT

## 2024-03-01 PROCEDURE — 99285 EMERGENCY DEPT VISIT HI MDM: CPT

## 2024-03-01 PROCEDURE — 83605 ASSAY OF LACTIC ACID: CPT

## 2024-03-01 PROCEDURE — 96376 TX/PRO/DX INJ SAME DRUG ADON: CPT

## 2024-03-01 PROCEDURE — 96361 HYDRATE IV INFUSION ADD-ON: CPT

## 2024-03-01 RX ORDER — ONDANSETRON 2 MG/ML
4 INJECTION INTRAMUSCULAR; INTRAVENOUS ONCE
Status: COMPLETED | OUTPATIENT
Start: 2024-03-01 | End: 2024-03-01

## 2024-03-01 RX ORDER — 0.9 % SODIUM CHLORIDE 0.9 %
1000 INTRAVENOUS SOLUTION INTRAVENOUS ONCE
Status: COMPLETED | OUTPATIENT
Start: 2024-03-01 | End: 2024-03-01

## 2024-03-01 RX ORDER — ONDANSETRON 4 MG/1
4 TABLET, ORALLY DISINTEGRATING ORAL 3 TIMES DAILY PRN
Qty: 21 TABLET | Refills: 0 | Status: SHIPPED | OUTPATIENT
Start: 2024-03-01

## 2024-03-01 RX ORDER — DICYCLOMINE HYDROCHLORIDE 10 MG/ML
20 INJECTION INTRAMUSCULAR ONCE
Status: DISCONTINUED | OUTPATIENT
Start: 2024-03-01 | End: 2024-03-01

## 2024-03-01 RX ORDER — 0.9 % SODIUM CHLORIDE 0.9 %
80 INTRAVENOUS SOLUTION INTRAVENOUS ONCE
Status: DISCONTINUED | OUTPATIENT
Start: 2024-03-01 | End: 2024-03-01 | Stop reason: HOSPADM

## 2024-03-01 RX ORDER — SODIUM CHLORIDE 0.9 % (FLUSH) 0.9 %
10 SYRINGE (ML) INJECTION PRN
Status: DISCONTINUED | OUTPATIENT
Start: 2024-03-01 | End: 2024-03-01 | Stop reason: HOSPADM

## 2024-03-01 RX ORDER — KETOROLAC TROMETHAMINE 15 MG/ML
15 INJECTION, SOLUTION INTRAMUSCULAR; INTRAVENOUS ONCE
Status: COMPLETED | OUTPATIENT
Start: 2024-03-01 | End: 2024-03-01

## 2024-03-01 RX ORDER — HYDRALAZINE HYDROCHLORIDE 20 MG/ML
5 INJECTION INTRAMUSCULAR; INTRAVENOUS ONCE
Status: COMPLETED | OUTPATIENT
Start: 2024-03-01 | End: 2024-03-01

## 2024-03-01 RX ADMIN — ONDANSETRON 4 MG: 2 INJECTION INTRAMUSCULAR; INTRAVENOUS at 10:22

## 2024-03-01 RX ADMIN — ONDANSETRON 4 MG: 2 INJECTION INTRAMUSCULAR; INTRAVENOUS at 12:43

## 2024-03-01 RX ADMIN — KETOROLAC TROMETHAMINE 15 MG: 15 INJECTION, SOLUTION INTRAMUSCULAR; INTRAVENOUS at 10:22

## 2024-03-01 RX ADMIN — SODIUM CHLORIDE, PRESERVATIVE FREE 10 ML: 5 INJECTION INTRAVENOUS at 12:32

## 2024-03-01 RX ADMIN — HYDRALAZINE HYDROCHLORIDE 5 MG: 20 INJECTION, SOLUTION INTRAMUSCULAR; INTRAVENOUS at 12:43

## 2024-03-01 RX ADMIN — Medication 80 ML: at 12:33

## 2024-03-01 RX ADMIN — IOPAMIDOL 75 ML: 755 INJECTION, SOLUTION INTRAVENOUS at 12:32

## 2024-03-01 RX ADMIN — SODIUM CHLORIDE 1000 ML: 9 INJECTION, SOLUTION INTRAVENOUS at 10:22

## 2024-03-01 ASSESSMENT — ENCOUNTER SYMPTOMS
CHEST TIGHTNESS: 0
ABDOMINAL PAIN: 1
DIARRHEA: 1
SORE THROAT: 0
COUGH: 0
SHORTNESS OF BREATH: 0
CONSTIPATION: 0
VOMITING: 1
SINUS PAIN: 0
NAUSEA: 1
SINUS PRESSURE: 0

## 2024-03-01 ASSESSMENT — PAIN - FUNCTIONAL ASSESSMENT: PAIN_FUNCTIONAL_ASSESSMENT: 0-10

## 2024-03-01 ASSESSMENT — PAIN SCALES - GENERAL
PAINLEVEL_OUTOF10: 10
PAINLEVEL_OUTOF10: 10
PAINLEVEL_OUTOF10: 7

## 2024-03-01 ASSESSMENT — PAIN DESCRIPTION - LOCATION: LOCATION: ABDOMEN

## 2024-03-01 ASSESSMENT — PAIN DESCRIPTION - DESCRIPTORS: DESCRIPTORS: SHARP;PRESSURE

## 2024-03-01 NOTE — DISCHARGE INSTRUCTIONS
Please schedule follow-up appoint with your primary care physician or gastroenterologist for further evaluation and treatment recommendations.    You take the Zofran as needed for nausea vomiting, every 8 hours or up to 3 times daily.    Take your medication as indicated and prescribed.  Avoid drinking alcohol or drinks that have caffeine it.  Drink plenty of water or fluids like Gatorade to keep yourself hydrated.  You should eat bland foods like bananas, rice, apple sauce and toast / crackers.    During your evaluation today in the emergency department it was noted that your blood pressure was elevated.  You need to follow-up with your primary care physician in regards to this as long-term uncontrolled high blood pressure can lead to negative medical conditions including but not limited to heart attack, stroke, chronic kidney disease.      PLEASE RETURN TO THE EMERGENCY DEPARTMENT IMMEDIATELY for worsening symptoms, increase in the amount of diarrhea you are having, abdominal pain, blood in your stool, vomiting up blood, persistent nausea and/or vomiting, or if you develop any concerning symptoms such as: high fever not relieved by acetaminophen (Tylenol) and/or ibuprofen (Motrin / Advil), chills, shortness of breath, chest pain, feeling of your heart fluttering or racing, loss of consciousness, numbness, weakness or tingling in the arms or legs or change in color of the extremities, changes in mental status, persistent headache, blurry vision, loss of bladder / bowel control, unable to follow up with your physician, or other any other care or concern.

## 2024-03-01 NOTE — ED PROVIDER NOTES
eMERGENCY dEPARTMENT eNCOUnter   Independent Attestation     Pt Name: Meseret Rodney  MRN: [id number]  Birthdate 1970  Date of evaluation: 3/1/24     Meseret Rodney is a 53 y.o. female with CC: Nausea & Vomiting and Abdominal Pain      Based on the medical record the care appears appropriate.  I was personally available for consultation in the Emergency Department.    Cornell Gonzalez MD  Attending Emergency Physician          Cornell Gonzalez MD  03/01/24 0554

## 2024-03-01 NOTE — DISCHARGE INSTR - COC
Continuity of Care Form    Patient Name: Meseret Rodney   :  1970  MRN:  [id number]    Admit date:  3/1/2024  Discharge date:  ***    Code Status Order: Prior   Advance Directives:     Admitting Physician:  No admitting provider for patient encounter.  PCP: Gladys Santos MD    Discharging Nurse: ***  Discharging Hospital Unit/Room#:   Discharging Unit Phone Number: ***    Emergency Contact:   Extended Emergency Contact Information  Primary Emergency Contact: IgorAshely  Address: [address]  Home Phone: [phone]  Work Phone: [phone]  Mobile Phone: [phone]  Relation: Parent  Secondary Emergency Contact: Wilmar Rodney  Home Phone: [phone]  Relation: Spouse    Past Surgical History:  Past Surgical History:   Procedure Laterality Date    BRAIN ANEURYSM SURGERY  2014    Coiling at [facility]     SECTION      x4     COLONOSCOPY  2019    COLONOSCOPY N/A 2019    COLORECTAL CANCER SCREENING, NOT HIGH RISK performed by Chelsey Fragoso MD at [facility] OR    ENDOSCOPY, COLON, DIAGNOSTIC      [place] SURGICAL ARTHROSCOPY SHOULDER W/ROTATOR CUFF RPR Right 2018    RIGHT SHOULDER ARTHROSCOPY ROTATOR CUFF LYSIS OF ADHESIONS performed by Fredy Hoffmann DO at [facility] OR    SHOULDER ARTHROSCOPY Right 2018    extensive lysis of adhesions;Open suture granuloma removal     SHOULDER ARTHROSCOPY Left 10/25/2022    LEFT SHOULDER ARTHROSCOPY PARTIAL SYNOVECTOMY ROTATOR CUFF REPAIR performed by Frank Antoine DO at [facility] OR    SHOULDER SURGERY Right     x 3    TONSILLECTOMY      UPPER GASTROINTESTINAL ENDOSCOPY N/A 2021    EGD BIOPSY performed by Chelsey Fragoso MD at [facility]       Immunization History:   Immunization History   Administered Date(s) Administered    TDaP, ADACEL (age 10y-64y), BOOSTRIX (age 10y+), IM, 0.5mL 2015       Active Problems:  Patient Active Problem List   Diagnosis Code    Uterine  NO:}  Bladder: {YES / NO:}  Urinary Catheter: {Urinary Catheter:833548619}   Colostomy/Ileostomy/Ileal Conduit: {YES / NO:}       Date of Last BM: ***  No intake or output data in the 24 hours ending 24 1428  No intake/output data recorded.    Safety Concerns:     { HAROON Safety Concerns:968707500}    Impairments/Disabilities:      { HAROON Impairments/Disabilities:534694374}    Nutrition Therapy:  Current Nutrition Therapy:   { [facility]OON Diet List:008115998}    Routes of Feeding: {[facility] DME Other Feedings:685576223}  Liquids: {Slp liquid thickness:39356}  Daily Fluid Restriction: {[facility] DME Yes amt example:060010126}  Last Modified Barium Swallow with Video (Video Swallowing Test): {Done Not Done Date:423632911}    Treatments at the Time of Hospital Discharge:   Respiratory Treatments: ***  Oxygen Therapy:  {Therapy; copd oxygen:78721}  Ventilator:    {[facility] Vent List:613440059}    Rehab Therapies: {THERAPEUTIC INTERVENTION:4337883282}  Weight Bearing Status/Restrictions: {[facility] Weight Bearin}  Other Medical Equipment (for information only, NOT a DME order):  {EQUIPMENT:476439247}  Other Treatments: ***    Patient's personal belongings (please select all that are sent with patient):  {[facility] DME Belongings:729518121}    RN SIGNATURE:  {Esignature:016863677}    CASE MANAGEMENT/SOCIAL WORK SECTION    Inpatient Status Date: ***    Readmission Risk Assessment Score:  Readmission Risk              Risk of Unplanned Readmission:  0           Discharging to Facility/ Agency   Name:   Address:  Phone:  Fax:    Dialysis Facility (if applicable)   Name:  Address:  Dialysis Schedule:  Phone:  Fax:    / signature: {Esignature:407754730}    PHYSICIAN SECTION    Prognosis: {Prognosis:9831625031}    Condition at Discharge: { Patient Condition:986610026}    Rehab Potential (if transferring to Rehab): {Prognosis:4204588174}    Recommended Labs or Other Treatments After Discharge:
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
## 2025-03-10 NOTE — DISCHARGE SUMMARY
1436 Pt arrived to [facility] via stretcher    1450 Maintenance HD started via R PC without issue   [x] Elena Alberta        Outpatient Physical                Therapy       [address]       Phone: [phone]       Fax: [phone] [] [facility] at [address]       Phone: [phone]       Fax: [phone] [] [place] [place] [address]     [address] [facility]     Phone: [phone]     Fax:  [phone]     Physical Therapy Discharge Note    Date: 2018      Patient: Sherryle Lewandowsky  : 1970  MRN: [id number]    Physician: Rk Beaver DO  Insurance: [employer]   Diagnosis:  s/p right shoulder arthroscopy with revision of rotator cuff repair          Onset Date: 2018    Next Dr. Radha Pool: 2018  Total visits attended: of current C-9 ( from prior C-9's)  Cancels/No shows: 3/2  Date of initial visit: 2018                Date of final visit: 2018      Subjective:  Pain:  [x] Yes  [] No  Location: right shoulder  Pain Rating: (0-10 scale) 7/10  Pain altered Tx:  [x] No  [] Yes  Action:  Comments:Patient reports  right shoulder pain 7/10 stating the humid weather may be causing discomfort. Objective:  Test Measurements:  Function:    Assessment:  STG: (to be met in 9 treatments)  1. ? Pain:I the right shoulder 5/10 or less with activity( 2018 (goal met)  2. ? ROM: right shoulder:  flexion 160 ° or better (goal met),abduction 150 ° of better (goal met), internal rotation 85 ° or better, external rotation 85 ° or better (both rotation from 90 ° of abduction)   3. ? Strength:patient will have 4+/5 strength in right shoulder flexion, abduction and supraspinatus and external rotation at 90 ° abduction 18 NOT MET for flexion and abduction  4. ? Function:patient will have DASH score with 45 % of less impairment 18 NOT MET  5. Independent with Home Exercise Programs 18 MET  6.  Demonstrate Knowledge of fall prevention   LTG: (to be met in 18 treatments)  1. DASH score with 40% or less impairment  2. Patient able to reach overhead with 3/10 or less pain  3. Patient will have 5-/5 strength in all muscle groups of the right upper extremity and scapular muscle groups to promote return to previous activities of daily living and avocational activities  4.   Sleep improve to 6 hours without waking due to right shoulder pain.(goal met)  Additional long term goals:to be met by visit 38  1. Patient will be able to reach overhead with 2/10 or less pain in he right shoulder so she can place objects (plates) in her cupboards (progress made - patient states she can place a cup but not a plate)  2. Patient will be able to tolerate 1.5 hours of strengthening exercises in the clinic - progress made, participation varies with patient's subjective complaints        Treatment to Date:  [x] Therapeutic Exercise    [x] Modalities:  [x] Therapeutic Activity    [] Ultrasound  [] Electrical Stimulation  [] Gait Training     [] Massage       [] Lumbar/Cervical Traction  [] Neuromuscular Re-education [x] Cold/hotpack [] Iontophoresis: 4 mg/mL  [] Instruction in Home Exercise Program                     Dexamethasone Sodium  [] Manual Therapy             Phosphate 40-80 mAmin  [] Aquatic Therapy                   [] Vasocompression/    [] Other:             Game Ready    Discharge Status:     [] Pt recovered from conditions. Treatment goals were met. [] Pt received maximum benefit. No further therapy indicated at this time. [] Pt to continue exercise/home instructions independently. [] Therapy interrupted due to:    [] Pt has 2 or more no shows/cancels, is discontinued per our policy. [x] Pt has completed prescribed number of treatment sessions. [] Other:         Electronically signed by Linda Mc PT on 9/14/2018 at 10:07 AM      If you have any questions or concerns, please don't hesitate to call.   Thank you for your referral.

## 2025-03-16 ENCOUNTER — HOSPITAL ENCOUNTER (EMERGENCY)
Age: 55
Discharge: HOME | End: 2025-03-17
Attending: EMERGENCY MEDICINE
Payer: COMMERCIAL

## 2025-03-16 DIAGNOSIS — I10 HYPERTENSION, UNSPECIFIED TYPE: ICD-10-CM

## 2025-03-16 DIAGNOSIS — R42 DIZZINESS: Primary | ICD-10-CM

## 2025-03-16 DIAGNOSIS — F41.1 ANXIETY STATE: ICD-10-CM

## 2025-03-16 PROCEDURE — 36415 COLL VENOUS BLD VENIPUNCTURE: CPT

## 2025-03-16 PROCEDURE — 6370000000 HC RX 637 (ALT 250 FOR IP): Performed by: EMERGENCY MEDICINE

## 2025-03-16 PROCEDURE — 84484 ASSAY OF TROPONIN QUANT: CPT

## 2025-03-16 PROCEDURE — 93005 ELECTROCARDIOGRAM TRACING: CPT | Performed by: EMERGENCY MEDICINE

## 2025-03-16 PROCEDURE — 99285 EMERGENCY DEPT VISIT HI MDM: CPT

## 2025-03-16 PROCEDURE — 80053 COMPREHEN METABOLIC PANEL: CPT

## 2025-03-16 PROCEDURE — 85025 COMPLETE CBC W/AUTO DIFF WBC: CPT

## 2025-03-16 RX ORDER — MECLIZINE HCL 12.5 MG 12.5 MG/1
25 TABLET ORAL ONCE
Status: COMPLETED | OUTPATIENT
Start: 2025-03-16 | End: 2025-03-16

## 2025-03-16 RX ADMIN — MECLIZINE 25 MG: 12.5 TABLET ORAL at 23:35

## 2025-03-17 ENCOUNTER — APPOINTMENT (OUTPATIENT)
Dept: CT IMAGING | Age: 55
End: 2025-03-17
Payer: COMMERCIAL

## 2025-03-17 VITALS
DIASTOLIC BLOOD PRESSURE: 76 MMHG | RESPIRATION RATE: 18 BRPM | HEART RATE: 56 BPM | OXYGEN SATURATION: 93 % | TEMPERATURE: 98.8 F | BODY MASS INDEX: 24.24 KG/M2 | SYSTOLIC BLOOD PRESSURE: 134 MMHG | WEIGHT: 120 LBS

## 2025-03-17 LAB
ALBUMIN SERPL-MCNC: 4.5 G/DL (ref 3.5–5.2)
ALBUMIN/GLOB SERPL: 1.4 {RATIO} (ref 1–2.5)
ALP SERPL-CCNC: 130 U/L (ref 35–104)
ALT SERPL-CCNC: 25 U/L (ref 10–35)
ANION GAP SERPL CALCULATED.3IONS-SCNC: 13 MMOL/L (ref 9–16)
AST SERPL-CCNC: 28 U/L (ref 10–35)
BASOPHILS # BLD: 0.04 K/UL (ref 0–0.2)
BASOPHILS NFR BLD: 1 % (ref 0–2)
BILIRUB SERPL-MCNC: <0.2 MG/DL (ref 0–1.2)
BILIRUB UR QL STRIP: NEGATIVE
BUN SERPL-MCNC: 17 MG/DL (ref 6–20)
CALCIUM SERPL-MCNC: 9.8 MG/DL (ref 8.6–10.4)
CHLORIDE SERPL-SCNC: 102 MMOL/L (ref 98–107)
CLARITY UR: CLEAR
CO2 SERPL-SCNC: 23 MMOL/L (ref 20–31)
COLOR UR: YELLOW
CREAT SERPL-MCNC: 0.9 MG/DL (ref 0.5–0.9)
EKG ATRIAL RATE: 64 BPM
EKG P AXIS: 71 DEGREES
EKG P-R INTERVAL: 156 MS
EKG Q-T INTERVAL: 374 MS
EKG QRS DURATION: 74 MS
EKG QTC CALCULATION (BAZETT): 385 MS
EKG R AXIS: -8 DEGREES
EKG T AXIS: 35 DEGREES
EKG VENTRICULAR RATE: 64 BPM
EOSINOPHIL # BLD: 0.08 K/UL (ref 0–0.44)
EOSINOPHILS RELATIVE PERCENT: 1 % (ref 1–4)
ERYTHROCYTE [DISTWIDTH] IN BLOOD BY AUTOMATED COUNT: 14.2 % (ref 11.8–14.4)
GFR, ESTIMATED: 76 ML/MIN/1.73M2
GLUCOSE SERPL-MCNC: 100 MG/DL (ref 74–99)
GLUCOSE UR STRIP-MCNC: NEGATIVE MG/DL
HCT VFR BLD AUTO: 40.2 % (ref 36.3–47.1)
HGB BLD-MCNC: 13.3 G/DL (ref 11.9–15.1)
HGB UR QL STRIP.AUTO: NEGATIVE
IMM GRANULOCYTES # BLD AUTO: 0.03 K/UL (ref 0–0.3)
IMM GRANULOCYTES NFR BLD: 0 %
KETONES UR STRIP-MCNC: NEGATIVE MG/DL
LEUKOCYTE ESTERASE UR QL STRIP: NEGATIVE
LYMPHOCYTES NFR BLD: 2.84 K/UL (ref 1.1–3.7)
LYMPHOCYTES RELATIVE PERCENT: 36 % (ref 24–43)
MCH RBC QN AUTO: 28.2 PG (ref 25.2–33.5)
MCHC RBC AUTO-ENTMCNC: 33.1 G/DL (ref 28.4–34.8)
MCV RBC AUTO: 85.4 FL (ref 82.6–102.9)
MONOCYTES NFR BLD: 0.36 K/UL (ref 0.1–1.2)
MONOCYTES NFR BLD: 5 % (ref 3–12)
NEUTROPHILS NFR BLD: 57 % (ref 36–65)
NEUTS SEG NFR BLD: 4.63 K/UL (ref 1.5–8.1)
NITRITE UR QL STRIP: NEGATIVE
NRBC BLD-RTO: 0 PER 100 WBC
PH UR STRIP: 5.5 [PH] (ref 5–8)
PLATELET # BLD AUTO: 260 K/UL (ref 138–453)
PMV BLD AUTO: 9.9 FL (ref 8.1–13.5)
POTASSIUM SERPL-SCNC: 4.2 MMOL/L (ref 3.7–5.3)
PROT SERPL-MCNC: 7.6 G/DL (ref 6.6–8.7)
PROT UR STRIP-MCNC: NEGATIVE MG/DL
RBC # BLD AUTO: 4.71 M/UL (ref 3.95–5.11)
SODIUM SERPL-SCNC: 138 MMOL/L (ref 136–145)
SP GR UR STRIP: 1.02 (ref 1–1.03)
TROPONIN I SERPL HS-MCNC: <6 NG/L (ref 0–14)
UROBILINOGEN UR STRIP-ACNC: NORMAL EU/DL (ref 0–1)
WBC OTHER # BLD: 8 K/UL (ref 3.5–11.3)

## 2025-03-17 PROCEDURE — 70498 CT ANGIOGRAPHY NECK: CPT

## 2025-03-17 PROCEDURE — 6370000000 HC RX 637 (ALT 250 FOR IP): Performed by: EMERGENCY MEDICINE

## 2025-03-17 PROCEDURE — 2580000003 HC RX 258: Performed by: EMERGENCY MEDICINE

## 2025-03-17 PROCEDURE — 6360000004 HC RX CONTRAST MEDICATION: Performed by: EMERGENCY MEDICINE

## 2025-03-17 PROCEDURE — 2500000003 HC RX 250 WO HCPCS: Performed by: EMERGENCY MEDICINE

## 2025-03-17 PROCEDURE — 81003 URINALYSIS AUTO W/O SCOPE: CPT

## 2025-03-17 PROCEDURE — 70450 CT HEAD/BRAIN W/O DYE: CPT

## 2025-03-17 RX ORDER — DIPHENHYDRAMINE HCL 25 MG
25 TABLET ORAL ONCE
Status: COMPLETED | OUTPATIENT
Start: 2025-03-17 | End: 2025-03-17

## 2025-03-17 RX ORDER — SODIUM CHLORIDE 0.9 % (FLUSH) 0.9 %
10 SYRINGE (ML) INJECTION PRN
Status: DISCONTINUED | OUTPATIENT
Start: 2025-03-17 | End: 2025-03-17 | Stop reason: HOSPADM

## 2025-03-17 RX ORDER — IOPAMIDOL 755 MG/ML
75 INJECTION, SOLUTION INTRAVASCULAR
Status: COMPLETED | OUTPATIENT
Start: 2025-03-17 | End: 2025-03-17

## 2025-03-17 RX ORDER — METOPROLOL TARTRATE 25 MG/1
50 TABLET, FILM COATED ORAL ONCE
Status: DISCONTINUED | OUTPATIENT
Start: 2025-03-17 | End: 2025-03-17

## 2025-03-17 RX ORDER — MECLIZINE HYDROCHLORIDE 25 MG/1
25 TABLET ORAL 3 TIMES DAILY PRN
Qty: 15 TABLET | Refills: 0 | Status: SHIPPED | OUTPATIENT
Start: 2025-03-17 | End: 2025-03-27

## 2025-03-17 RX ORDER — 0.9 % SODIUM CHLORIDE 0.9 %
80 INTRAVENOUS SOLUTION INTRAVENOUS ONCE
Status: COMPLETED | OUTPATIENT
Start: 2025-03-17 | End: 2025-03-17

## 2025-03-17 RX ADMIN — SODIUM CHLORIDE 80 ML: 9 INJECTION, SOLUTION INTRAVENOUS at 03:11

## 2025-03-17 RX ADMIN — IOPAMIDOL 75 ML: 755 INJECTION, SOLUTION INTRAVENOUS at 03:11

## 2025-03-17 RX ADMIN — DIPHENHYDRAMINE HCL 25 MG: 25 TABLET ORAL at 01:23

## 2025-03-17 RX ADMIN — SODIUM CHLORIDE, PRESERVATIVE FREE 10 ML: 5 INJECTION INTRAVENOUS at 03:11

## 2025-03-17 RX ADMIN — DIPHENHYDRAMINE HCL 25 MG: 25 TABLET ORAL at 05:06

## 2025-03-17 ASSESSMENT — ENCOUNTER SYMPTOMS
COLOR CHANGE: 0
CONSTIPATION: 0
ABDOMINAL PAIN: 0
SHORTNESS OF BREATH: 0
NAUSEA: 0
EYE REDNESS: 0
VOMITING: 0
WHEEZING: 0
STRIDOR: 0
COUGH: 0
EYE DISCHARGE: 0
EYE PAIN: 0
SORE THROAT: 0
DIARRHEA: 0

## 2025-03-17 NOTE — ED NOTES
Patient arrived to ED with c/o dizziness. Patient reports she has had persistent dizziness for three days. Patient reported she had BBQ on Friday that had a large amount of salt and since then she has been dizzy. Patient has been dizzy with any type of movement. While obtaining orthostatic blood pressures patient needed to move in slow motions due to the room spinning and her dizziness. Patient denies history of vertigo or having feelings of this in the past.

## 2025-03-17 NOTE — ED PROVIDER NOTES
[facility] EMERGENCY DEPARTMENT  EMERGENCY DEPARTMENT ENCOUNTER      Pt Name: Meseret Rodney  MRN: [id number]  Birthdate 1970  Date of evaluation: 3/16/2025  Provider: Ashley Dawson MD    CHIEF COMPLAINT       Chief Complaint   Patient presents with    Dizziness     Keeps feeling dizzy, worse with position changes and when laying down pt states the room spins.  Onset was 2 days ago       HISTORY OF PRESENT ILLNESS  (Location/Symptom, Timing/Onset, Context/Setting, Quality, Duration, Modifying Factors, Severity.)   Meseret Rodney is a 54 y.o. female who presents to the emergency department complaining of dizziness.  She relates that onset was 2 days ago now.  She relates that she is concerned that it is perhaps from too much salt.  She relates that they cooked with heavy type of salt on their hot dogs on Friday night.  And then because it was so salty they actually had to wash the hot dogs off to be able to eat them.  And then instead of this they then decided to put them in beans and they ate them Saturday.  So she thinks that perhaps it is because of this as she is feeling the sensation of dizziness.  She tells me it is not vertigo.  She has felt that before and it is not that sensation.  She relates its like a sensation of feeling like she is going to pass out.  She denies any headache.  No chest pain or shortness of breath.  No nausea or vomiting.  She also has noticed that her blood pressure has been quite elevated.  She relates she has been checking it with her 's blood pressure cuff and it was 170s to 180s over 109 at the highest.  She is not on any blood pressure medication and denies having high blood pressure.    Nursing Notes were reviewed.    REVIEW OF SYSTEMS    (2-9 systems for level 4, 10 or more for level 5)     Review of Systems   Constitutional:  Negative for activity change, appetite change, chills, fatigue and fever.   HENT:  Negative for congestion, ear pain and sore throat.

## 2025-03-17 NOTE — ED NOTES
Patient reports she has a sudden pressure behind her right eye with a headache. Patient has no history of migraines. Patient denies this pain occurring within the last three days. Patient reported that she has had two brain aneurysms in the past of which one she has had a coil placed.     Dr. Dawson notified.

## 2025-03-17 NOTE — ED NOTES
Upon entering patients room for transport to CT Scan; patient's  brought glbhargav and a diet dr stacy, which the patient was eating upon my arrival.    ENS  0127

## 2025-03-17 NOTE — ED NOTES
Patient was taken to CT for imaging via wheelchair at approximately 0110. Upon entering CT suite, patient was assisted to CT table in a seated position. Indicated to patient that we needed to lay flat for the testing, and when patient attempted to lay down, she stated that she felt like she was going to pass out, and had to sit back up.     In order to accommodate, I added a pillow behind intended head cushion in hopes to elevate patient and be able to tolerate the exam. Patient had to roll onto her side, sit for a few moments in that position, then slowly roll onto her back. Elevation seemed to help alleviate patient's symptoms, and testing was able to be completed without interruption.    After testing was complete, I slowly assisted patient to a seated position. She was upright when she said that she felt that she was going to pass out again and that we sat up too fast. She immediately returned to a supine position. After a few moments, she tried to sit up again with assistance, and was successful. Returned back to ER via wheelchair with no other incidents.    ENS

## 2025-03-17 NOTE — ED NOTES
While obtaining manual orthostatic blood pressure when initially laying patient down flat patient needed to be sat back up immediately as she felt that she was going to pass out. Patient sat back up in an upright position until she was ready to re attempt orthostatic blood pressure.     While she was laying down during the orthostatic blood pressure patient kept repeating that she needed to be sat back up, patient redirected that the blood pressure was almost completed and she was able to follow verbal commands with calm coaching and encouragement.     Patient sat up in an upright position for manual orthostatic blood pressure and reported she felt dizzy while sitting in a 90 degree position.     While patient was standing for manual orthostatic blood pressure patient held on to writer for stabilization due to her dizziness.     Patient was able to complete manual orthostatic blood pressure with encouragement from writer.

## 2025-03-17 NOTE — ED NOTES
Patient ambulatory to bathroom with writer at her side. Patient had a minimal unsteady gait, she was able to ambulate with no use of assistive devices or wheelchair. She did report she felt dizzy with ambulation.

## 2025-03-17 NOTE — ED NOTES
Pt taken back to CT for contrast imaging; was taken via stretcher. Maximum elevation provided while maintaining quality of exam. Patient transferred self from cart to CT table without incident. Only complaint was after test was completed, she started to move too quickly upon transferring self back to [facility], but recovered quickly.     ENS

## (undated) DEVICE — SMARTSTITCH PERFECTPASSER                                    MAGNUMWIRE SUTURE CARTRIDGES, BLUE CO-BRAID: Brand: SMARTSTITCH PERFECTPASSER

## (undated) DEVICE — DISCONTINUED USE 405792 GLOVE SURG SENSICARE ALOE LT LF PF ST GRN SZ 7

## (undated) DEVICE — DRAPE,REIN 53X77,STERILE: Brand: MEDLINE

## (undated) DEVICE — Device

## (undated) DEVICE — BASIN EMSIS 700ML GRAPHITE PLAS KID SHP GRAD

## (undated) DEVICE — SLEEVE TRAC VELC ROT FOR 3 PNT SHLDR DISTR SYS

## (undated) DEVICE — DRAPE,U/ SHT,SPLIT,PLAS,STERIL: Brand: MEDLINE

## (undated) DEVICE — [STANDARD 12-FLUTE BARREL BUR, ARTHROSCOPIC SHAVER BLADE,  DO NOT RESTERILIZE,  DO NOT USE IF PACKAGE IS DAMAGED,  KEEP DRY,  KEEP AWAY FROM SUNLIGHT]: Brand: FORMULA

## (undated) DEVICE — 3M™ WARMING BLANKET, LOWER BODY, 10 PER CASE, 42568: Brand: BAIR HUGGER™

## (undated) DEVICE — INTENDED FOR TISSUE SEPARATION, AND OTHER PROCEDURES THAT REQUIRE A SHARP SURGICAL BLADE TO PUNCTURE OR CUT.: Brand: BARD-PARKER ® CARBON RIB-BACK BLADES

## (undated) DEVICE — AMBIENT SUPER TURBOVAC 90: Brand: COBLATION

## (undated) DEVICE — CANNULA ARTHSCP L7CM ID5.75MM CRYS W/ OBT

## (undated) DEVICE — CANNULA NSL AD L2IN ETCO2 SAMP SFT CRUSH RESIST FEM AIRLFE

## (undated) DEVICE — MAT ABSRB W28XL48IN C6L FLR ULT W POLY BK

## (undated) DEVICE — 10K ARTHROSCOPY INFLOW/OUTFLOW TUBE SET: Brand: 10K

## (undated) DEVICE — SUPER TURBOVAC 90 INTEGRATED CABLE WAND ICW: Brand: COBLATION

## (undated) DEVICE — TOWEL,OR,DSP,ST,BLUE,STD,4/PK,20PK/CS: Brand: MEDLINE

## (undated) DEVICE — IMMOBILIZER SHLDR L10.5-17IN D7IN SLNG W/ 15DEG ABD PLLW

## (undated) DEVICE — GLOVE SURG SZ 85 CRM LTX FREE POLYISOPRENE POLYMER BEAD ANTI

## (undated) DEVICE — SYRINGE, LUER LOCK, 10ML: Brand: MEDLINE

## (undated) DEVICE — SMARTSTITCH PERFECTPASSER CONNECTOR: Brand: PERFECTPASSER

## (undated) DEVICE — DRESSING PETRO W3XL8IN OIL EMUL N ADH GZ KNIT IMPREG CELOS

## (undated) DEVICE — 1010 S-DRAPE TOWEL DRAPE 10/BX: Brand: STERI-DRAPE™

## (undated) DEVICE — MANIFOLD REPROC SUCT 4 PRT F/NEPTUNE 2 WST MGMT SYS

## (undated) DEVICE — SOLUTION IV IRRIG LACTATED RINGERS 3000ML 2B7487

## (undated) DEVICE — CANNULA ARTHSCP L7CM ID8.25MM TRNSLUC THRD FLX W/ NO SQUIRT

## (undated) DEVICE — DEFENDO AIR WATER SUCTION AND BIOPSY VALVE KIT FOR  OLYMPUS: Brand: DEFENDO AIR/WATER/SUCTION AND BIOPSY VALVE

## (undated) DEVICE — SMARTSTITCH PERFECTPASSER                                    MAGNUMWIRE SUTURE CARTRIDGES, BLACK CO-BRAID: Brand: SMARTSTITCH PERFECTPASSER

## (undated) DEVICE — GLOVE SURG SZ 8 L12IN FNGR THK87MIL WHT LTX FREE

## (undated) DEVICE — GLOVE SURG SZ 85 L12IN FNGR THK13MIL WHT ISOLEX POLYISOPRENE

## (undated) DEVICE — BITEBLOCK 54FR W/ DENT RIM BLOX

## (undated) DEVICE — BANDAGE COBAN 4 IN COMPR W4INXL5YD FOAM COHESIVE QUIK STK SELF ADH SFT

## (undated) DEVICE — TUBING, SUCTION, 1/4" X 12', STRAIGHT: Brand: MEDLINE

## (undated) DEVICE — GOWN,AURORA,NONREINFORCED,LARGE: Brand: MEDLINE

## (undated) DEVICE — [AGGRESSIVE PLUS CUTTER, ARTHROSCOPIC SHAVER BLADE,  DO NOT RESTERILIZE,  DO NOT USE IF PACKAGE IS DAMAGED,  KEEP DRY,  KEEP AWAY FROM SUNLIGHT]: Brand: FORMULA

## (undated) DEVICE — TUBING FLD MGMT Y DBL SPIK DUALWAVE

## (undated) DEVICE — APPLICATOR MEDICATED 26 CC SOLUTION HI LT ORNG CHLORAPREP

## (undated) DEVICE — GOWN,AURORA,NON-REINFORCED,2XL: Brand: MEDLINE

## (undated) DEVICE — T-MAX DISPOSABLE FACE MASK 8 PER BOX

## (undated) DEVICE — GOWN,AURORA,NONRNF,XL,30/CS: Brand: MEDLINE

## (undated) DEVICE — TUBING PMP L16FT MAIN DISP FOR AR-6400 AR-6475

## (undated) DEVICE — SUTURE ETHLN SZ 3-0 L18IN NONABSORBABLE BLK L24MM PS-1 3/8 1663G

## (undated) DEVICE — YANKAUER,FLEXIBLE HANDLE,REGLR CAPACITY: Brand: MEDLINE INDUSTRIES, INC.

## (undated) DEVICE — GLOVE SURG SZ 85 L12IN FNGR THK87MIL WHT LTX FREE

## (undated) DEVICE — SOLUTION IRRIG 3000ML 0.9% SOD CHL USP UROMATIC PLAS CONT

## (undated) DEVICE — SHOULDER STABILIZATION KIT,                                    DISPOSABLE 12 PER BOX

## (undated) DEVICE — DRAPE,SHOULDER,BEACH CHAIR,STERILE: Brand: MEDLINE

## (undated) DEVICE — ENDO KIT W/SYRINGE: Brand: MEDLINE INDUSTRIES, INC.

## (undated) DEVICE — GLOVE SURG SZ 65 L12IN FNGR THK87MIL WHT LTX FREE

## (undated) DEVICE — GAUZE,SPONGE,4"X4",16PLY,STRL,LF,10/TRAY: Brand: MEDLINE

## (undated) DEVICE — GAUZE,SPONGE,4"X4",16PLY,XRAY,STRL,LF: Brand: MEDLINE

## (undated) DEVICE — PROTECTOR ULN NRV PUR FOAM HK LOOP STRP ANATOMICALLY

## (undated) DEVICE — SINGLE-USE BIOPSY FORCEPS: Brand: RADIAL JAW 4